# Patient Record
Sex: FEMALE | Race: WHITE | NOT HISPANIC OR LATINO | Employment: PART TIME | URBAN - METROPOLITAN AREA
[De-identification: names, ages, dates, MRNs, and addresses within clinical notes are randomized per-mention and may not be internally consistent; named-entity substitution may affect disease eponyms.]

---

## 2017-02-13 ENCOUNTER — HOSPITAL ENCOUNTER (OUTPATIENT)
Dept: CARDIOLOGY | Facility: HOSPITAL | Age: 57
Discharge: HOME | End: 2017-02-13
Attending: INTERNAL MEDICINE | Admitting: INTERNAL MEDICINE

## 2017-02-16 ENCOUNTER — HOSPITAL ENCOUNTER (OUTPATIENT)
Dept: PREADMISSION TESTING | Facility: HOSPITAL | Age: 57
Discharge: HOME | End: 2017-02-16
Attending: INTERNAL MEDICINE | Admitting: INTERNAL MEDICINE

## 2017-02-16 LAB
ANION GAP SERPL CALC-SCNC: 11.3 MMOL/L (ref 10–20)
BASOPHILS # BLD AUTO: 0 10*3/UL (ref 0–0.2)
BASOPHILS NFR BLD AUTO: 1 % (ref 0–2)
BUN SERPL-MCNC: 18 MG/DL (ref 8–20)
BUN/CREAT SERPL: 15 (ref 5.4–26.2)
CALCIUM SERPL-MCNC: 9.5 MG/DL (ref 8.9–10.3)
CHLORIDE SERPL-SCNC: 105 MMOL/L (ref 101–111)
CONV CO2: 28 MMOL/L (ref 22–32)
CREAT UR-MCNC: 1.2 MG/DL (ref 0.4–1)
DIFFERENTIAL METHOD BLD: (no result)
EOSINOPHIL # BLD AUTO: 0.3 10*3/UL (ref 0–0.3)
EOSINOPHIL # BLD AUTO: 5 % (ref 0–3)
ERYTHROCYTE [DISTWIDTH] IN BLOOD BY AUTOMATED COUNT: 14.4 % (ref 11.5–14.5)
GLUCOSE SERPL-MCNC: 89 MG/DL (ref 65–99)
HCT VFR BLD AUTO: 35.5 % (ref 35–49)
HGB BLD-MCNC: 12 G/DL (ref 12–15)
INR PPP: 0.7
LYMPHOCYTES # BLD AUTO: 2.1 10*3/UL (ref 0.8–4.8)
LYMPHOCYTES NFR BLD AUTO: 36 % (ref 18–42)
MCH RBC QN AUTO: 30.2 PG (ref 26–32)
MCHC RBC AUTO-ENTMCNC: 33.8 G/DL (ref 32–36)
MCV RBC AUTO: 89.4 FL (ref 80–94)
MONOCYTES # BLD AUTO: 0.6 10*3/UL (ref 0.1–1.3)
MONOCYTES NFR BLD AUTO: 11 % (ref 2–11)
NEUTROPHILS # BLD AUTO: 2.8 10*3/UL (ref 2.3–8.6)
NEUTROPHILS NFR BLD AUTO: 47 % (ref 50–75)
NRBC BLD AUTO-RTO: 0 /100{WBCS}
NRBC/RBC NFR BLD MANUAL: 0 10*3/UL
PLATELET # BLD AUTO: 249 10*3/UL (ref 150–450)
PMV BLD AUTO: 8.9 FL (ref 7.4–10.4)
POTASSIUM SERPL-SCNC: 4.3 MMOL/L (ref 3.6–5.1)
PROTHROMBIN TIME: 9.9 SEC (ref 9.6–11.7)
RBC # BLD AUTO: 3.97 10*6/UL (ref 4–5.4)
SODIUM SERPL-SCNC: 140 MMOL/L (ref 136–144)
WBC # BLD AUTO: 5.9 10*3/UL (ref 4.5–11.5)

## 2017-04-17 ENCOUNTER — OFFICE (AMBULATORY)
Dept: URBAN - METROPOLITAN AREA CLINIC 64 | Facility: CLINIC | Age: 57
End: 2017-04-17

## 2017-04-17 VITALS
SYSTOLIC BLOOD PRESSURE: 99 MMHG | WEIGHT: 264 LBS | HEART RATE: 82 BPM | DIASTOLIC BLOOD PRESSURE: 61 MMHG | HEIGHT: 65 IN

## 2017-04-17 DIAGNOSIS — R10.84 GENERALIZED ABDOMINAL PAIN: ICD-10-CM

## 2017-04-17 DIAGNOSIS — R19.7 DIARRHEA, UNSPECIFIED: ICD-10-CM

## 2017-04-17 PROCEDURE — 99212 OFFICE O/P EST SF 10 MIN: CPT | Performed by: NURSE PRACTITIONER

## 2017-05-15 ENCOUNTER — HOSPITAL ENCOUNTER (OUTPATIENT)
Dept: FAMILY MEDICINE CLINIC | Facility: CLINIC | Age: 57
Setting detail: SPECIMEN
Discharge: HOME | End: 2017-05-15
Attending: PHYSICIAN ASSISTANT | Admitting: PHYSICIAN ASSISTANT

## 2017-05-15 LAB
B PERT DNA SPEC QL NAA+PROBE: NOT DETECTED
C PNEUM DNA NPH QL NAA+NON-PROBE: NOT DETECTED
FLUAV H1 2009 PAND RNA NPH QL NAA+PROBE: NOT DETECTED
FLUAV H1 HA GENE NPH QL NAA+PROBE: NOT DETECTED
FLUAV H3 RNA NPH QL NAA+PROBE: NOT DETECTED
FLUAV SUBTYP SPEC NAA+PROBE: NOT DETECTED
FLUBV RNA ISLT QL NAA+PROBE: NOT DETECTED
HADV DNA SPEC NAA+PROBE: NOT DETECTED
HCOV 229E RNA SPEC QL NAA+PROBE: NOT DETECTED
HCOV HKU1 RNA SPEC QL NAA+PROBE: NOT DETECTED
HCOV NL63 RNA SPEC QL NAA+PROBE: NOT DETECTED
HCOV OC43 RNA SPEC QL NAA+PROBE: NOT DETECTED
HMPV RNA NPH QL NAA+NON-PROBE: NOT DETECTED
HPIV1 RNA ISLT QL NAA+PROBE: NOT DETECTED
HPIV2 RNA SPEC QL NAA+PROBE: NOT DETECTED
HPIV3 RNA NPH QL NAA+PROBE: NOT DETECTED
HPIV4 P GENE NPH QL NAA+PROBE: DETECTED
M PNEUMO IGG SER IA-ACNC: NOT DETECTED
RHINOVIRUS RNA SPEC NAA+PROBE: NOT DETECTED
RSV RNA NPH QL NAA+NON-PROBE: NOT DETECTED

## 2017-06-01 ENCOUNTER — HOSPITAL ENCOUNTER (OUTPATIENT)
Dept: RESPIRATORY THERAPY | Facility: HOSPITAL | Age: 57
Discharge: HOME | End: 2017-06-01
Attending: FAMILY MEDICINE | Admitting: FAMILY MEDICINE

## 2017-06-19 ENCOUNTER — HOSPITAL ENCOUNTER (OUTPATIENT)
Dept: FAMILY MEDICINE CLINIC | Facility: CLINIC | Age: 57
Setting detail: SPECIMEN
Discharge: HOME | End: 2017-06-19
Attending: FAMILY MEDICINE | Admitting: FAMILY MEDICINE

## 2017-06-19 LAB
ALBUMIN SERPL-MCNC: 3.7 G/DL (ref 3.5–4.8)
ALBUMIN/GLOB SERPL: 1.1 {RATIO} (ref 1–1.7)
ALP SERPL-CCNC: 85 IU/L (ref 32–91)
ALT SERPL-CCNC: 17 IU/L (ref 14–54)
ANION GAP SERPL CALC-SCNC: 15.3 MMOL/L (ref 10–20)
AST SERPL-CCNC: 23 IU/L (ref 15–41)
BASOPHILS # BLD AUTO: 0.1 10*3/UL (ref 0–0.2)
BASOPHILS NFR BLD AUTO: 1 % (ref 0–2)
BILIRUB SERPL-MCNC: 0.5 MG/DL (ref 0.3–1.2)
BUN SERPL-MCNC: 18 MG/DL (ref 8–20)
BUN/CREAT SERPL: 22.5 (ref 5.4–26.2)
CALCIUM SERPL-MCNC: 9.3 MG/DL (ref 8.9–10.3)
CHLORIDE SERPL-SCNC: 106 MMOL/L (ref 101–111)
CONV CO2: 25 MMOL/L (ref 22–32)
CONV TOTAL PROTEIN: 7.1 G/DL (ref 6.1–7.9)
CREAT UR-MCNC: 0.8 MG/DL (ref 0.4–1)
DIFFERENTIAL METHOD BLD: (no result)
EOSINOPHIL # BLD AUTO: 0.3 10*3/UL (ref 0–0.3)
EOSINOPHIL # BLD AUTO: 4 % (ref 0–3)
ERYTHROCYTE [DISTWIDTH] IN BLOOD BY AUTOMATED COUNT: 15 % (ref 11.5–14.5)
GLOBULIN UR ELPH-MCNC: 3.4 G/DL (ref 2.5–3.8)
GLUCOSE SERPL-MCNC: 80 MG/DL (ref 65–99)
HCT VFR BLD AUTO: 34.4 % (ref 35–49)
HGB BLD-MCNC: 11.5 G/DL (ref 12–15)
LYMPHOCYTES # BLD AUTO: 2.3 10*3/UL (ref 0.8–4.8)
LYMPHOCYTES NFR BLD AUTO: 30 % (ref 18–42)
MCH RBC QN AUTO: 30.4 PG (ref 26–32)
MCHC RBC AUTO-ENTMCNC: 33.5 G/DL (ref 32–36)
MCV RBC AUTO: 90.9 FL (ref 80–94)
MONOCYTES # BLD AUTO: 0.6 10*3/UL (ref 0.1–1.3)
MONOCYTES NFR BLD AUTO: 8 % (ref 2–11)
NEUTROPHILS # BLD AUTO: 4.3 10*3/UL (ref 2.3–8.6)
NEUTROPHILS NFR BLD AUTO: 57 % (ref 50–75)
NRBC BLD AUTO-RTO: 0 /100{WBCS}
NRBC/RBC NFR BLD MANUAL: 0 10*3/UL
PLATELET # BLD AUTO: 280 10*3/UL (ref 150–450)
PMV BLD AUTO: 9.4 FL (ref 7.4–10.4)
POTASSIUM SERPL-SCNC: 4.3 MMOL/L (ref 3.6–5.1)
RBC # BLD AUTO: 3.79 10*6/UL (ref 4–5.4)
SODIUM SERPL-SCNC: 142 MMOL/L (ref 136–144)
TSH SERPL-ACNC: 15.45 UIU/ML (ref 0.34–5.6)
WBC # BLD AUTO: 7.6 10*3/UL (ref 4.5–11.5)

## 2017-10-02 ENCOUNTER — HOSPITAL ENCOUNTER (OUTPATIENT)
Dept: LAB | Facility: HOSPITAL | Age: 57
Discharge: HOME | End: 2017-10-02
Attending: INTERNAL MEDICINE | Admitting: INTERNAL MEDICINE

## 2017-10-02 LAB
BASOPHILS # BLD AUTO: 0.1 10*3/UL (ref 0–0.2)
BASOPHILS NFR BLD AUTO: 1 % (ref 0–2)
DIFFERENTIAL METHOD BLD: (no result)
EOSINOPHIL # BLD AUTO: 0.2 10*3/UL (ref 0–0.3)
EOSINOPHIL # BLD AUTO: 3 % (ref 0–3)
ERYTHROCYTE [DISTWIDTH] IN BLOOD BY AUTOMATED COUNT: 15.1 % (ref 11.5–14.5)
HCT VFR BLD AUTO: 34.4 % (ref 35–49)
HGB BLD-MCNC: 11.4 G/DL (ref 12–15)
LYMPHOCYTES # BLD AUTO: 2 10*3/UL (ref 0.8–4.8)
LYMPHOCYTES NFR BLD AUTO: 31 % (ref 18–42)
MCH RBC QN AUTO: 30.3 PG (ref 26–32)
MCHC RBC AUTO-ENTMCNC: 33.3 G/DL (ref 32–36)
MCV RBC AUTO: 91.1 FL (ref 80–94)
MONOCYTES # BLD AUTO: 0.5 10*3/UL (ref 0.1–1.3)
MONOCYTES NFR BLD AUTO: 8 % (ref 2–11)
NEUTROPHILS # BLD AUTO: 3.9 10*3/UL (ref 2.3–8.6)
NEUTROPHILS NFR BLD AUTO: 57 % (ref 50–75)
NRBC BLD AUTO-RTO: 0 /100{WBCS}
NRBC/RBC NFR BLD MANUAL: 0 10*3/UL
PLATELET # BLD AUTO: 249 10*3/UL (ref 150–450)
PMV BLD AUTO: 8.7 FL (ref 7.4–10.4)
RBC # BLD AUTO: 3.78 10*6/UL (ref 4–5.4)
WBC # BLD AUTO: 6.7 10*3/UL (ref 4.5–11.5)

## 2017-12-18 ENCOUNTER — HOSPITAL ENCOUNTER (OUTPATIENT)
Dept: FAMILY MEDICINE CLINIC | Facility: CLINIC | Age: 57
Setting detail: SPECIMEN
Discharge: HOME | End: 2017-12-18
Attending: FAMILY MEDICINE | Admitting: FAMILY MEDICINE

## 2017-12-18 LAB
ALBUMIN SERPL-MCNC: 3.9 G/DL (ref 3.5–4.8)
ALBUMIN/GLOB SERPL: 1.4 {RATIO} (ref 1–1.7)
ALP SERPL-CCNC: 79 IU/L (ref 32–91)
ALT SERPL-CCNC: ABNORMAL IU/L (ref 14–54)
ANION GAP SERPL CALC-SCNC: 10.8 MMOL/L (ref 10–20)
AST SERPL-CCNC: ABNORMAL IU/L (ref 15–41)
BASOPHILS # BLD AUTO: 0.1 10*3/UL (ref 0–0.2)
BASOPHILS NFR BLD AUTO: 1 % (ref 0–2)
BILIRUB SERPL-MCNC: ABNORMAL MG/DL (ref 0.3–1.2)
BUN SERPL-MCNC: 23 MG/DL (ref 8–20)
BUN/CREAT SERPL: 17.7 (ref 5.4–26.2)
CALCIUM SERPL-MCNC: 8.9 MG/DL (ref 8.9–10.3)
CHLORIDE SERPL-SCNC: 111 MMOL/L (ref 101–111)
CONV CO2: 21 MMOL/L (ref 22–32)
CONV TOTAL PROTEIN: 6.7 G/DL (ref 6.1–7.9)
CREAT UR-MCNC: 1.3 MG/DL (ref 0.4–1)
DIFFERENTIAL METHOD BLD: (no result)
EOSINOPHIL # BLD AUTO: 0.2 10*3/UL (ref 0–0.3)
EOSINOPHIL # BLD AUTO: 2 % (ref 0–3)
ERYTHROCYTE [DISTWIDTH] IN BLOOD BY AUTOMATED COUNT: 15.2 % (ref 11.5–14.5)
GLOBULIN UR ELPH-MCNC: 2.8 G/DL (ref 2.5–3.8)
GLUCOSE SERPL-MCNC: 77 MG/DL (ref 65–99)
HCT VFR BLD AUTO: 35.7 % (ref 35–49)
HGB BLD-MCNC: 11.8 G/DL (ref 12–15)
LYMPHOCYTES # BLD AUTO: 1.8 10*3/UL (ref 0.8–4.8)
LYMPHOCYTES NFR BLD AUTO: 26 % (ref 18–42)
MCH RBC QN AUTO: 30.9 PG (ref 26–32)
MCHC RBC AUTO-ENTMCNC: 33.1 G/DL (ref 32–36)
MCV RBC AUTO: 93.1 FL (ref 80–94)
MONOCYTES # BLD AUTO: 0.6 10*3/UL (ref 0.1–1.3)
MONOCYTES NFR BLD AUTO: 8 % (ref 2–11)
NEUTROPHILS # BLD AUTO: 4.3 10*3/UL (ref 2.3–8.6)
NEUTROPHILS NFR BLD AUTO: 63 % (ref 50–75)
NRBC BLD AUTO-RTO: 0 /100{WBCS}
NRBC/RBC NFR BLD MANUAL: 0 10*3/UL
PLATELET # BLD AUTO: 261 10*3/UL (ref 150–450)
PMV BLD AUTO: 9.3 FL (ref 7.4–10.4)
POTASSIUM SERPL-SCNC: ABNORMAL MMOL/L (ref 3.6–5.1)
RBC # BLD AUTO: 3.84 10*6/UL (ref 4–5.4)
SODIUM SERPL-SCNC: 139 MMOL/L (ref 136–144)
WBC # BLD AUTO: 6.8 10*3/UL (ref 4.5–11.5)

## 2018-01-08 ENCOUNTER — HOSPITAL ENCOUNTER (OUTPATIENT)
Dept: FAMILY MEDICINE CLINIC | Facility: CLINIC | Age: 58
Setting detail: SPECIMEN
Discharge: HOME | End: 2018-01-08
Attending: PHYSICIAN ASSISTANT | Admitting: PHYSICIAN ASSISTANT

## 2018-01-08 ENCOUNTER — HOSPITAL ENCOUNTER (OUTPATIENT)
Dept: MAMMOGRAPHY | Facility: HOSPITAL | Age: 58
Discharge: HOME | End: 2018-01-08
Attending: OBSTETRICS & GYNECOLOGY | Admitting: OBSTETRICS & GYNECOLOGY

## 2018-01-08 LAB
B PERT DNA SPEC QL NAA+PROBE: NOT DETECTED
BASOPHILS # BLD AUTO: 0.1 10*3/UL (ref 0–0.2)
BASOPHILS NFR BLD AUTO: 1 % (ref 0–2)
C PNEUM DNA NPH QL NAA+NON-PROBE: NOT DETECTED
CONV RESPNL SPECIMEN: NORMAL
DIFFERENTIAL METHOD BLD: (no result)
EOSINOPHIL # BLD AUTO: 0.2 10*3/UL (ref 0–0.3)
EOSINOPHIL # BLD AUTO: 3 % (ref 0–3)
ERYTHROCYTE [DISTWIDTH] IN BLOOD BY AUTOMATED COUNT: 14.8 % (ref 11.5–14.5)
FLUAV H1 2009 PAND RNA NPH QL NAA+PROBE: NOT DETECTED
FLUAV H1 HA GENE NPH QL NAA+PROBE: NOT DETECTED
FLUAV H3 RNA NPH QL NAA+PROBE: NOT DETECTED
FLUAV SUBTYP SPEC NAA+PROBE: NOT DETECTED
FLUBV RNA ISLT QL NAA+PROBE: NOT DETECTED
HADV DNA SPEC NAA+PROBE: NOT DETECTED
HCOV 229E RNA SPEC QL NAA+PROBE: NOT DETECTED
HCOV HKU1 RNA SPEC QL NAA+PROBE: NOT DETECTED
HCOV NL63 RNA SPEC QL NAA+PROBE: NOT DETECTED
HCOV OC43 RNA SPEC QL NAA+PROBE: NOT DETECTED
HCT VFR BLD AUTO: 36.6 % (ref 35–49)
HGB BLD-MCNC: 11.9 G/DL (ref 12–15)
HMPV RNA NPH QL NAA+NON-PROBE: NOT DETECTED
HPIV1 RNA ISLT QL NAA+PROBE: NOT DETECTED
HPIV2 RNA SPEC QL NAA+PROBE: NOT DETECTED
HPIV3 RNA NPH QL NAA+PROBE: NOT DETECTED
HPIV4 P GENE NPH QL NAA+PROBE: NOT DETECTED
LYMPHOCYTES # BLD AUTO: 2.1 10*3/UL (ref 0.8–4.8)
LYMPHOCYTES NFR BLD AUTO: 28 % (ref 18–42)
M PNEUMO IGG SER IA-ACNC: NOT DETECTED
MCH RBC QN AUTO: 30.3 PG (ref 26–32)
MCHC RBC AUTO-ENTMCNC: 32.6 G/DL (ref 32–36)
MCV RBC AUTO: 93 FL (ref 80–94)
MONOCYTES # BLD AUTO: 0.7 10*3/UL (ref 0.1–1.3)
MONOCYTES NFR BLD AUTO: 9 % (ref 2–11)
NEUTROPHILS # BLD AUTO: 4.5 10*3/UL (ref 2.3–8.6)
NEUTROPHILS NFR BLD AUTO: 59 % (ref 50–75)
NRBC BLD AUTO-RTO: 0 /100{WBCS}
NRBC/RBC NFR BLD MANUAL: 0 10*3/UL
PLATELET # BLD AUTO: 306 10*3/UL (ref 150–450)
PMV BLD AUTO: 8.9 FL (ref 7.4–10.4)
RBC # BLD AUTO: 3.93 10*6/UL (ref 4–5.4)
RHINOVIRUS RNA SPEC NAA+PROBE: NOT DETECTED
RSV RNA NPH QL NAA+NON-PROBE: NOT DETECTED
WBC # BLD AUTO: 7.6 10*3/UL (ref 4.5–11.5)

## 2018-01-22 ENCOUNTER — HOSPITAL ENCOUNTER (OUTPATIENT)
Dept: FAMILY MEDICINE CLINIC | Facility: CLINIC | Age: 58
Setting detail: SPECIMEN
Discharge: HOME | End: 2018-01-22
Attending: PHYSICIAN ASSISTANT | Admitting: PHYSICIAN ASSISTANT

## 2018-01-22 LAB
ALBUMIN SERPL-MCNC: 4.1 G/DL (ref 3.5–4.8)
ALBUMIN/GLOB SERPL: 1.2 {RATIO} (ref 1–1.7)
ALP SERPL-CCNC: 88 IU/L (ref 32–91)
ALT SERPL-CCNC: 19 IU/L (ref 14–54)
ANION GAP SERPL CALC-SCNC: 16.1 MMOL/L (ref 10–20)
AST SERPL-CCNC: 28 IU/L (ref 15–41)
BASOPHILS # BLD AUTO: 0.1 10*3/UL (ref 0–0.2)
BASOPHILS NFR BLD AUTO: 2 % (ref 0–2)
BILIRUB SERPL-MCNC: 0.6 MG/DL (ref 0.3–1.2)
BILIRUB UR QL STRIP: NEGATIVE MG/DL
BUN SERPL-MCNC: 17 MG/DL (ref 8–20)
BUN/CREAT SERPL: 17 (ref 5.4–26.2)
CALCIUM SERPL-MCNC: 9.8 MG/DL (ref 8.9–10.3)
CASTS URNS QL MICRO: NORMAL /[LPF]
CHLORIDE SERPL-SCNC: 100 MMOL/L (ref 101–111)
COLOR UR: YELLOW
CONV BACTERIA IN URINE MICRO: NEGATIVE
CONV CLARITY OF URINE: CLEAR
CONV CO2: 24 MMOL/L (ref 22–32)
CONV HYALINE CASTS IN URINE MICRO: 1 /[LPF] (ref 0–5)
CONV PROTEIN IN URINE BY AUTOMATED TEST STRIP: NEGATIVE MG/DL
CONV SMALL ROUND CELLS: NORMAL /[HPF]
CONV TOTAL PROTEIN: 7.4 G/DL (ref 6.1–7.9)
CONV UROBILINOGEN IN URINE BY AUTOMATED TEST STRIP: 0.2 MG/DL
CREAT UR-MCNC: 1 MG/DL (ref 0.4–1)
CULTURE INDICATED?: NORMAL
DIFFERENTIAL METHOD BLD: (no result)
EOSINOPHIL # BLD AUTO: 0.1 10*3/UL (ref 0–0.3)
EOSINOPHIL # BLD AUTO: 2 % (ref 0–3)
ERYTHROCYTE [DISTWIDTH] IN BLOOD BY AUTOMATED COUNT: 14.6 % (ref 11.5–14.5)
GLOBULIN UR ELPH-MCNC: 3.3 G/DL (ref 2.5–3.8)
GLUCOSE SERPL-MCNC: 102 MG/DL (ref 65–99)
GLUCOSE UR QL: NEGATIVE MG/DL
HCT VFR BLD AUTO: 37 % (ref 35–49)
HGB BLD-MCNC: 12.2 G/DL (ref 12–15)
HGB UR QL STRIP: NEGATIVE
KETONES UR QL STRIP: NEGATIVE MG/DL
L PNEUMO1 AG UR QL IA: NEGATIVE
LEUKOCYTE ESTERASE UR QL STRIP: NEGATIVE
LYMPHOCYTES # BLD AUTO: 3.3 10*3/UL (ref 0.8–4.8)
LYMPHOCYTES NFR BLD AUTO: 41 % (ref 18–42)
MCH RBC QN AUTO: 30 PG (ref 26–32)
MCHC RBC AUTO-ENTMCNC: 33 G/DL (ref 32–36)
MCV RBC AUTO: 90.9 FL (ref 80–94)
MONOCYTES # BLD AUTO: 0.6 10*3/UL (ref 0.1–1.3)
MONOCYTES NFR BLD AUTO: 8 % (ref 2–11)
NEUTROPHILS # BLD AUTO: 3.8 10*3/UL (ref 2.3–8.6)
NEUTROPHILS NFR BLD AUTO: 47 % (ref 50–75)
NITRITE UR QL STRIP: NEGATIVE
NRBC BLD AUTO-RTO: 0 /100{WBCS}
NRBC/RBC NFR BLD MANUAL: 0 10*3/UL
PH UR STRIP.AUTO: 5.5 [PH] (ref 4.5–8)
PLATELET # BLD AUTO: 270 10*3/UL (ref 150–450)
PMV BLD AUTO: 8.6 FL (ref 7.4–10.4)
POTASSIUM SERPL-SCNC: 4.1 MMOL/L (ref 3.6–5.1)
RBC # BLD AUTO: 4.07 10*6/UL (ref 4–5.4)
RBC #/AREA URNS HPF: 0 /[HPF] (ref 0–3)
S PNEUM AG SPEC QL LA: NEGATIVE
SODIUM SERPL-SCNC: 136 MMOL/L (ref 136–144)
SP GR UR: 1.01 (ref 1–1.03)
SPERM URNS QL MICRO: NORMAL /[HPF]
SQUAMOUS SPT QL MICRO: 0 /[HPF] (ref 0–5)
UNIDENT CRYS URNS QL MICRO: NORMAL /[HPF]
WBC # BLD AUTO: 8 10*3/UL (ref 4.5–11.5)
WBC #/AREA URNS HPF: 0 /[HPF] (ref 0–5)
YEAST SPEC QL WET PREP: NORMAL /[HPF]

## 2018-01-23 LAB
B PERT DNA SPEC QL NAA+PROBE: NOT DETECTED
C PNEUM DNA NPH QL NAA+NON-PROBE: NOT DETECTED
CONV RESPNL SPECIMEN: ABNORMAL
FLUAV H1 2009 PAND RNA NPH QL NAA+PROBE: NOT DETECTED
FLUAV H1 HA GENE NPH QL NAA+PROBE: NOT DETECTED
FLUAV H3 RNA NPH QL NAA+PROBE: DETECTED
FLUAV SUBTYP SPEC NAA+PROBE: DETECTED
FLUBV RNA ISLT QL NAA+PROBE: NOT DETECTED
HADV DNA SPEC NAA+PROBE: NOT DETECTED
HCOV 229E RNA SPEC QL NAA+PROBE: NOT DETECTED
HCOV HKU1 RNA SPEC QL NAA+PROBE: NOT DETECTED
HCOV NL63 RNA SPEC QL NAA+PROBE: NOT DETECTED
HCOV OC43 RNA SPEC QL NAA+PROBE: NOT DETECTED
HMPV RNA NPH QL NAA+NON-PROBE: NOT DETECTED
HPIV1 RNA ISLT QL NAA+PROBE: NOT DETECTED
HPIV2 RNA SPEC QL NAA+PROBE: NOT DETECTED
HPIV3 RNA NPH QL NAA+PROBE: NOT DETECTED
HPIV4 P GENE NPH QL NAA+PROBE: NOT DETECTED
M PNEUMO IGG SER IA-ACNC: NOT DETECTED
RHINOVIRUS RNA SPEC NAA+PROBE: NOT DETECTED
RSV RNA NPH QL NAA+NON-PROBE: NOT DETECTED

## 2018-04-11 ENCOUNTER — HOSPITAL ENCOUNTER (OUTPATIENT)
Dept: FAMILY MEDICINE CLINIC | Facility: CLINIC | Age: 58
Setting detail: SPECIMEN
Discharge: HOME | End: 2018-04-11
Attending: PHYSICIAN ASSISTANT | Admitting: PHYSICIAN ASSISTANT

## 2018-04-11 LAB
B PERT DNA SPEC QL NAA+PROBE: NOT DETECTED
C PNEUM DNA NPH QL NAA+NON-PROBE: NOT DETECTED
CONV RESPNL SPECIMEN: NORMAL
FLUAV H1 2009 PAND RNA NPH QL NAA+PROBE: NOT DETECTED
FLUAV H1 HA GENE NPH QL NAA+PROBE: NOT DETECTED
FLUAV H3 RNA NPH QL NAA+PROBE: NOT DETECTED
FLUAV SUBTYP SPEC NAA+PROBE: NOT DETECTED
FLUBV RNA ISLT QL NAA+PROBE: NOT DETECTED
HADV DNA SPEC NAA+PROBE: NOT DETECTED
HCOV 229E RNA SPEC QL NAA+PROBE: NOT DETECTED
HCOV HKU1 RNA SPEC QL NAA+PROBE: NOT DETECTED
HCOV NL63 RNA SPEC QL NAA+PROBE: NOT DETECTED
HCOV OC43 RNA SPEC QL NAA+PROBE: NOT DETECTED
HMPV RNA NPH QL NAA+NON-PROBE: NOT DETECTED
HPIV1 RNA ISLT QL NAA+PROBE: NOT DETECTED
HPIV2 RNA SPEC QL NAA+PROBE: NOT DETECTED
HPIV3 RNA NPH QL NAA+PROBE: NOT DETECTED
HPIV4 P GENE NPH QL NAA+PROBE: NOT DETECTED
M PNEUMO IGG SER IA-ACNC: NOT DETECTED
RHINOVIRUS RNA SPEC NAA+PROBE: NOT DETECTED
RSV RNA NPH QL NAA+NON-PROBE: NOT DETECTED

## 2018-05-10 ENCOUNTER — HOSPITAL ENCOUNTER (OUTPATIENT)
Dept: FAMILY MEDICINE CLINIC | Facility: CLINIC | Age: 58
Setting detail: SPECIMEN
Discharge: HOME | End: 2018-05-10
Attending: FAMILY MEDICINE | Admitting: FAMILY MEDICINE

## 2018-05-22 ENCOUNTER — OFFICE (AMBULATORY)
Dept: URBAN - METROPOLITAN AREA CLINIC 64 | Facility: CLINIC | Age: 58
End: 2018-05-22
Payer: COMMERCIAL

## 2018-05-22 VITALS
HEART RATE: 86 BPM | DIASTOLIC BLOOD PRESSURE: 64 MMHG | SYSTOLIC BLOOD PRESSURE: 83 MMHG | HEIGHT: 65 IN | WEIGHT: 273 LBS

## 2018-05-22 DIAGNOSIS — R19.7 DIARRHEA, UNSPECIFIED: ICD-10-CM

## 2018-05-22 DIAGNOSIS — R10.84 GENERALIZED ABDOMINAL PAIN: ICD-10-CM

## 2018-05-22 PROCEDURE — 99212 OFFICE O/P EST SF 10 MIN: CPT | Performed by: NURSE PRACTITIONER

## 2018-07-17 ENCOUNTER — HOSPITAL ENCOUNTER (OUTPATIENT)
Dept: PREOP | Facility: HOSPITAL | Age: 58
Setting detail: HOSPITAL OUTPATIENT SURGERY
Discharge: HOME | End: 2018-07-17
Attending: OPHTHALMOLOGY | Admitting: OPHTHALMOLOGY

## 2018-08-08 ENCOUNTER — HOSPITAL ENCOUNTER (OUTPATIENT)
Dept: FAMILY MEDICINE CLINIC | Facility: CLINIC | Age: 58
Setting detail: SPECIMEN
Discharge: HOME | End: 2018-08-08
Attending: FAMILY MEDICINE | Admitting: FAMILY MEDICINE

## 2018-08-08 LAB
ALBUMIN SERPL-MCNC: 3.7 G/DL (ref 3.5–4.8)
ALBUMIN/GLOB SERPL: 1.2 {RATIO} (ref 1–1.7)
ALP SERPL-CCNC: 90 IU/L (ref 32–91)
ALT SERPL-CCNC: 22 IU/L (ref 14–54)
ANION GAP SERPL CALC-SCNC: 12.5 MMOL/L (ref 10–20)
APTT BLD: 23.1 SEC (ref 24–31)
AST SERPL-CCNC: 27 IU/L (ref 15–41)
BASOPHILS # BLD AUTO: 0 10*3/UL (ref 0–0.2)
BASOPHILS NFR BLD AUTO: 1 % (ref 0–2)
BILIRUB SERPL-MCNC: 0.3 MG/DL (ref 0.3–1.2)
BUN SERPL-MCNC: 22 MG/DL (ref 8–20)
BUN/CREAT SERPL: 20 (ref 5.4–26.2)
CALCIUM SERPL-MCNC: 8.6 MG/DL (ref 8.9–10.3)
CHLORIDE SERPL-SCNC: 101 MMOL/L (ref 101–111)
CONV CO2: 25 MMOL/L (ref 22–32)
CONV TOTAL PROTEIN: 6.8 G/DL (ref 6.1–7.9)
CREAT UR-MCNC: 1.1 MG/DL (ref 0.4–1)
DIFFERENTIAL METHOD BLD: (no result)
EOSINOPHIL # BLD AUTO: 0.2 10*3/UL (ref 0–0.3)
EOSINOPHIL # BLD AUTO: 3 % (ref 0–3)
ERYTHROCYTE [DISTWIDTH] IN BLOOD BY AUTOMATED COUNT: 16.4 % (ref 11.5–14.5)
GLOBULIN UR ELPH-MCNC: 3.1 G/DL (ref 2.5–3.8)
GLUCOSE SERPL-MCNC: 93 MG/DL (ref 65–99)
HCT VFR BLD AUTO: 33.1 % (ref 35–49)
HGB BLD-MCNC: 10.8 G/DL (ref 12–15)
INR PPP: 0.9
LYMPHOCYTES # BLD AUTO: 2.2 10*3/UL (ref 0.8–4.8)
LYMPHOCYTES NFR BLD AUTO: 32 % (ref 18–42)
MCH RBC QN AUTO: 30.1 PG (ref 26–32)
MCHC RBC AUTO-ENTMCNC: 32.7 G/DL (ref 32–36)
MCV RBC AUTO: 92.1 FL (ref 80–94)
MONOCYTES # BLD AUTO: 0.8 10*3/UL (ref 0.1–1.3)
MONOCYTES NFR BLD AUTO: 11 % (ref 2–11)
NEUTROPHILS # BLD AUTO: 3.8 10*3/UL (ref 2.3–8.6)
NEUTROPHILS NFR BLD AUTO: 53 % (ref 50–75)
NRBC BLD AUTO-RTO: 0 /100{WBCS}
NRBC/RBC NFR BLD MANUAL: 0 10*3/UL
PLATELET # BLD AUTO: 242 10*3/UL (ref 150–450)
PMV BLD AUTO: 8.6 FL (ref 7.4–10.4)
POTASSIUM SERPL-SCNC: 3.5 MMOL/L (ref 3.6–5.1)
PROTHROMBIN TIME: 9.6 SEC (ref 9.6–11.7)
RBC # BLD AUTO: 3.6 10*6/UL (ref 4–5.4)
SODIUM SERPL-SCNC: 135 MMOL/L (ref 136–144)
WBC # BLD AUTO: 7 10*3/UL (ref 4.5–11.5)

## 2018-08-13 ENCOUNTER — HOSPITAL ENCOUNTER (OUTPATIENT)
Dept: FAMILY MEDICINE CLINIC | Facility: CLINIC | Age: 58
Setting detail: SPECIMEN
Discharge: HOME | End: 2018-08-13
Attending: FAMILY MEDICINE | Admitting: FAMILY MEDICINE

## 2018-08-13 LAB
BASOPHILS # BLD AUTO: 0.1 10*3/UL (ref 0–0.2)
BASOPHILS NFR BLD AUTO: 1 % (ref 0–2)
DIFFERENTIAL METHOD BLD: (no result)
EOSINOPHIL # BLD AUTO: 0.2 10*3/UL (ref 0–0.3)
EOSINOPHIL # BLD AUTO: 2 % (ref 0–3)
ERYTHROCYTE [DISTWIDTH] IN BLOOD BY AUTOMATED COUNT: 16 % (ref 11.5–14.5)
HCT VFR BLD AUTO: 36.7 % (ref 35–49)
HGB BLD-MCNC: 11.9 G/DL (ref 12–15)
IRON SATN MFR SERPL: 14 % (ref 15–50)
IRON SERPL-MCNC: 58 UG/DL (ref 28–170)
LYMPHOCYTES # BLD AUTO: 2.8 10*3/UL (ref 0.8–4.8)
LYMPHOCYTES NFR BLD AUTO: 35 % (ref 18–42)
MAGNESIUM UR-MCNC: 0.93 % (ref 0.5–1.5)
MCH RBC QN AUTO: 29.4 PG (ref 26–32)
MCHC RBC AUTO-ENTMCNC: 32.4 G/DL (ref 32–36)
MCV RBC AUTO: 90.8 FL (ref 80–94)
MONOCYTES # BLD AUTO: 0.7 10*3/UL (ref 0.1–1.3)
MONOCYTES NFR BLD AUTO: 8 % (ref 2–11)
NEUTROPHILS # BLD AUTO: 4.4 10*3/UL (ref 2.3–8.6)
NEUTROPHILS NFR BLD AUTO: 54 % (ref 50–75)
NRBC BLD AUTO-RTO: 0 /100{WBCS}
NRBC/RBC NFR BLD MANUAL: 0 10*3/UL
PLATELET # BLD AUTO: 276 10*3/UL (ref 150–450)
PMV BLD AUTO: 8.7 FL (ref 7.4–10.4)
RBC # BLD AUTO: 4.04 10*6/UL (ref 4–5.4)
RETICS/RBC NFR MANUAL: 0.04 10*6/UL
TIBC SERPL-MCNC: 402 UG/DL (ref 228–428)
WBC # BLD AUTO: 8.1 10*3/UL (ref 4.5–11.5)

## 2018-08-24 ENCOUNTER — HOSPITAL ENCOUNTER (OUTPATIENT)
Dept: OTHER | Facility: HOSPITAL | Age: 58
Discharge: HOME | End: 2018-08-24
Attending: PODIATRIST | Admitting: PODIATRIST

## 2018-08-24 LAB
ANION GAP SERPL CALC-SCNC: 7.5 MMOL/L (ref 10–20)
BASOPHILS # BLD AUTO: 0.1 10*3/UL (ref 0–0.2)
BASOPHILS NFR BLD AUTO: 1 % (ref 0–2)
BUN SERPL-MCNC: 18 MG/DL (ref 8–20)
BUN/CREAT SERPL: 16.4 (ref 5.4–26.2)
CALCIUM SERPL-MCNC: 8.6 MG/DL (ref 8.9–10.3)
CHLORIDE SERPL-SCNC: 105 MMOL/L (ref 101–111)
CONV CO2: 27 MMOL/L (ref 22–32)
CREAT UR-MCNC: 1.1 MG/DL (ref 0.4–1)
DIFFERENTIAL METHOD BLD: (no result)
EOSINOPHIL # BLD AUTO: 0.2 10*3/UL (ref 0–0.3)
EOSINOPHIL # BLD AUTO: 3 % (ref 0–3)
ERYTHROCYTE [DISTWIDTH] IN BLOOD BY AUTOMATED COUNT: 15.4 % (ref 11.5–14.5)
GLUCOSE SERPL-MCNC: 79 MG/DL (ref 65–99)
HCT VFR BLD AUTO: 34.8 % (ref 35–49)
HGB BLD-MCNC: 11.6 G/DL (ref 12–15)
LYMPHOCYTES # BLD AUTO: 3.2 10*3/UL (ref 0.8–4.8)
LYMPHOCYTES NFR BLD AUTO: 39 % (ref 18–42)
MCH RBC QN AUTO: 30.6 PG (ref 26–32)
MCHC RBC AUTO-ENTMCNC: 33.4 G/DL (ref 32–36)
MCV RBC AUTO: 91.7 FL (ref 80–94)
MONOCYTES # BLD AUTO: 0.7 10*3/UL (ref 0.1–1.3)
MONOCYTES NFR BLD AUTO: 8 % (ref 2–11)
NEUTROPHILS # BLD AUTO: 4 10*3/UL (ref 2.3–8.6)
NEUTROPHILS NFR BLD AUTO: 49 % (ref 50–75)
NRBC BLD AUTO-RTO: 0 /100{WBCS}
NRBC/RBC NFR BLD MANUAL: 0 10*3/UL
PLATELET # BLD AUTO: 248 10*3/UL (ref 150–450)
PMV BLD AUTO: 8.6 FL (ref 7.4–10.4)
POTASSIUM SERPL-SCNC: ABNORMAL MMOL/L (ref 3.6–5.1)
RBC # BLD AUTO: 3.79 10*6/UL (ref 4–5.4)
SODIUM SERPL-SCNC: 136 MMOL/L (ref 136–144)
WBC # BLD AUTO: 8.2 10*3/UL (ref 4.5–11.5)

## 2018-09-10 ENCOUNTER — OFFICE (AMBULATORY)
Dept: URBAN - METROPOLITAN AREA PATHOLOGY 4 | Facility: PATHOLOGY | Age: 58
End: 2018-09-10
Payer: COMMERCIAL

## 2018-09-10 ENCOUNTER — ON CAMPUS - OUTPATIENT (AMBULATORY)
Dept: URBAN - METROPOLITAN AREA HOSPITAL 2 | Facility: HOSPITAL | Age: 58
End: 2018-09-10
Payer: COMMERCIAL

## 2018-09-10 VITALS
SYSTOLIC BLOOD PRESSURE: 118 MMHG | DIASTOLIC BLOOD PRESSURE: 62 MMHG | SYSTOLIC BLOOD PRESSURE: 123 MMHG | WEIGHT: 263 LBS | HEART RATE: 60 BPM | SYSTOLIC BLOOD PRESSURE: 108 MMHG | DIASTOLIC BLOOD PRESSURE: 69 MMHG | SYSTOLIC BLOOD PRESSURE: 116 MMHG | HEART RATE: 59 BPM | OXYGEN SATURATION: 97 % | DIASTOLIC BLOOD PRESSURE: 37 MMHG | OXYGEN SATURATION: 98 % | OXYGEN SATURATION: 100 % | RESPIRATION RATE: 16 BRPM | TEMPERATURE: 97 F | DIASTOLIC BLOOD PRESSURE: 63 MMHG | DIASTOLIC BLOOD PRESSURE: 66 MMHG | SYSTOLIC BLOOD PRESSURE: 115 MMHG | HEART RATE: 57 BPM | SYSTOLIC BLOOD PRESSURE: 110 MMHG | DIASTOLIC BLOOD PRESSURE: 65 MMHG | DIASTOLIC BLOOD PRESSURE: 42 MMHG | SYSTOLIC BLOOD PRESSURE: 101 MMHG | DIASTOLIC BLOOD PRESSURE: 74 MMHG | SYSTOLIC BLOOD PRESSURE: 104 MMHG | DIASTOLIC BLOOD PRESSURE: 71 MMHG | RESPIRATION RATE: 20 BRPM | HEART RATE: 66 BPM | SYSTOLIC BLOOD PRESSURE: 107 MMHG | OXYGEN SATURATION: 97.5 % | HEART RATE: 58 BPM | HEIGHT: 65 IN | SYSTOLIC BLOOD PRESSURE: 90 MMHG | HEART RATE: 65 BPM

## 2018-09-10 DIAGNOSIS — R19.7 DIARRHEA, UNSPECIFIED: ICD-10-CM

## 2018-09-10 DIAGNOSIS — R13.10 DYSPHAGIA, UNSPECIFIED: ICD-10-CM

## 2018-09-10 DIAGNOSIS — Z48.815 ENCOUNTER FOR SURGICAL AFTERCARE FOLLOWING SURGERY ON THE DI: ICD-10-CM

## 2018-09-10 DIAGNOSIS — K29.50 UNSPECIFIED CHRONIC GASTRITIS WITHOUT BLEEDING: ICD-10-CM

## 2018-09-10 DIAGNOSIS — B37.81 CANDIDAL ESOPHAGITIS: ICD-10-CM

## 2018-09-10 DIAGNOSIS — D50.9 IRON DEFICIENCY ANEMIA, UNSPECIFIED: ICD-10-CM

## 2018-09-10 PROBLEM — K29.70 GASTRITIS, UNSPECIFIED, WITHOUT BLEEDING: Status: ACTIVE | Noted: 2018-09-10

## 2018-09-10 LAB
GI HISTOLOGY: A. SELECT: (no result)
GI HISTOLOGY: B. SELECT: (no result)
GI HISTOLOGY: C. SELECT: (no result)
GI HISTOLOGY: D. UNSPECIFIED: (no result)
GI HISTOLOGY: PDF REPORT: (no result)

## 2018-09-10 PROCEDURE — 88305 TISSUE EXAM BY PATHOLOGIST: CPT | Performed by: INTERNAL MEDICINE

## 2018-09-10 PROCEDURE — 45380 COLONOSCOPY AND BIOPSY: CPT | Performed by: INTERNAL MEDICINE

## 2018-09-10 PROCEDURE — 43239 EGD BIOPSY SINGLE/MULTIPLE: CPT | Mod: 59 | Performed by: INTERNAL MEDICINE

## 2018-09-21 ENCOUNTER — HOSPITAL ENCOUNTER (OUTPATIENT)
Dept: PREOP | Facility: HOSPITAL | Age: 58
Setting detail: HOSPITAL OUTPATIENT SURGERY
Discharge: HOME | End: 2018-09-21
Attending: OPHTHALMOLOGY | Admitting: OPHTHALMOLOGY

## 2018-09-21 LAB — POTASSIUM SERPL-SCNC: 3.5 MMOL/L (ref 3.6–5.1)

## 2018-10-29 ENCOUNTER — OFFICE (AMBULATORY)
Dept: URBAN - METROPOLITAN AREA CLINIC 64 | Facility: CLINIC | Age: 58
End: 2018-10-29
Payer: COMMERCIAL

## 2018-10-29 VITALS
HEART RATE: 94 BPM | SYSTOLIC BLOOD PRESSURE: 114 MMHG | WEIGHT: 265 LBS | HEIGHT: 65 IN | DIASTOLIC BLOOD PRESSURE: 64 MMHG

## 2018-10-29 DIAGNOSIS — D50.9 IRON DEFICIENCY ANEMIA, UNSPECIFIED: ICD-10-CM

## 2018-10-29 DIAGNOSIS — R19.7 DIARRHEA, UNSPECIFIED: ICD-10-CM

## 2018-10-29 DIAGNOSIS — B37.81 CANDIDAL ESOPHAGITIS: ICD-10-CM

## 2018-10-29 PROCEDURE — 99214 OFFICE O/P EST MOD 30 MIN: CPT | Performed by: INTERNAL MEDICINE

## 2018-12-31 ENCOUNTER — OFFICE (AMBULATORY)
Dept: URBAN - METROPOLITAN AREA INFUSION 3 | Facility: INFUSION | Age: 58
End: 2018-12-31
Payer: COMMERCIAL

## 2018-12-31 VITALS
HEART RATE: 70 BPM | HEIGHT: 65 IN | HEART RATE: 73 BPM | HEART RATE: 64 BPM | DIASTOLIC BLOOD PRESSURE: 76 MMHG | HEART RATE: 63 BPM | SYSTOLIC BLOOD PRESSURE: 101 MMHG | DIASTOLIC BLOOD PRESSURE: 64 MMHG | DIASTOLIC BLOOD PRESSURE: 65 MMHG | RESPIRATION RATE: 17 BRPM | SYSTOLIC BLOOD PRESSURE: 103 MMHG | SYSTOLIC BLOOD PRESSURE: 94 MMHG | TEMPERATURE: 97.6 F | SYSTOLIC BLOOD PRESSURE: 106 MMHG

## 2018-12-31 DIAGNOSIS — D50.9 IRON DEFICIENCY ANEMIA, UNSPECIFIED: ICD-10-CM

## 2018-12-31 DIAGNOSIS — K90.9 INTESTINAL MALABSORPTION, UNSPECIFIED: ICD-10-CM

## 2018-12-31 PROCEDURE — 96365 THER/PROPH/DIAG IV INF INIT: CPT | Performed by: INTERNAL MEDICINE

## 2019-01-02 ENCOUNTER — HOSPITAL ENCOUNTER (OUTPATIENT)
Dept: LAB | Facility: HOSPITAL | Age: 59
Discharge: HOME | End: 2019-01-02
Attending: INTERNAL MEDICINE | Admitting: INTERNAL MEDICINE

## 2019-01-02 LAB
BASOPHILS # BLD AUTO: 0 10*3/UL (ref 0–0.2)
BASOPHILS NFR BLD AUTO: 1 % (ref 0–2)
DIFFERENTIAL METHOD BLD: (no result)
EOSINOPHIL # BLD AUTO: 0.1 10*3/UL (ref 0–0.3)
EOSINOPHIL # BLD AUTO: 1 % (ref 0–3)
ERYTHROCYTE [DISTWIDTH] IN BLOOD BY AUTOMATED COUNT: 14.6 % (ref 11.5–14.5)
HCT VFR BLD AUTO: 37.1 % (ref 35–49)
HGB BLD-MCNC: 11.8 G/DL (ref 12–15)
LYMPHOCYTES # BLD AUTO: 2.4 10*3/UL (ref 0.8–4.8)
LYMPHOCYTES NFR BLD AUTO: 33 % (ref 18–42)
MCH RBC QN AUTO: 29 PG (ref 26–32)
MCHC RBC AUTO-ENTMCNC: 31.9 G/DL (ref 32–36)
MCV RBC AUTO: 91 FL (ref 80–94)
MONOCYTES # BLD AUTO: 0.7 10*3/UL (ref 0.1–1.3)
MONOCYTES NFR BLD AUTO: 10 % (ref 2–11)
NEUTROPHILS # BLD AUTO: 4 10*3/UL (ref 2.3–8.6)
NEUTROPHILS NFR BLD AUTO: 55 % (ref 50–75)
NRBC BLD AUTO-RTO: 0 /100{WBCS}
NRBC/RBC NFR BLD MANUAL: 0 10*3/UL
PLATELET # BLD AUTO: 269 10*3/UL (ref 150–450)
PMV BLD AUTO: 8.1 FL (ref 7.4–10.4)
RBC # BLD AUTO: 4.08 10*6/UL (ref 4–5.4)
WBC # BLD AUTO: 7.2 10*3/UL (ref 4.5–11.5)

## 2019-01-07 ENCOUNTER — OFFICE (AMBULATORY)
Dept: URBAN - METROPOLITAN AREA INFUSION 3 | Facility: INFUSION | Age: 59
End: 2019-01-07
Payer: COMMERCIAL

## 2019-01-07 VITALS
SYSTOLIC BLOOD PRESSURE: 119 MMHG | SYSTOLIC BLOOD PRESSURE: 118 MMHG | TEMPERATURE: 96.8 F | HEIGHT: 65 IN | DIASTOLIC BLOOD PRESSURE: 72 MMHG | SYSTOLIC BLOOD PRESSURE: 114 MMHG | DIASTOLIC BLOOD PRESSURE: 74 MMHG | TEMPERATURE: 97 F | RESPIRATION RATE: 18 BRPM | DIASTOLIC BLOOD PRESSURE: 67 MMHG | HEART RATE: 80 BPM | TEMPERATURE: 96.5 F | RESPIRATION RATE: 16 BRPM | SYSTOLIC BLOOD PRESSURE: 117 MMHG | HEART RATE: 68 BPM | HEART RATE: 70 BPM

## 2019-01-07 DIAGNOSIS — D50.9 IRON DEFICIENCY ANEMIA, UNSPECIFIED: ICD-10-CM

## 2019-01-07 DIAGNOSIS — K90.9 INTESTINAL MALABSORPTION, UNSPECIFIED: ICD-10-CM

## 2019-01-07 PROCEDURE — 96365 THER/PROPH/DIAG IV INF INIT: CPT | Performed by: INTERNAL MEDICINE

## 2019-01-10 ENCOUNTER — HOSPITAL ENCOUNTER (OUTPATIENT)
Dept: PREADMISSION TESTING | Facility: HOSPITAL | Age: 59
Discharge: HOME | End: 2019-01-10
Attending: OPHTHALMOLOGY | Admitting: OPHTHALMOLOGY

## 2019-01-10 LAB — POTASSIUM SERPL-SCNC: 4.3 MMOL/L (ref 3.6–5.1)

## 2019-01-14 ENCOUNTER — HOSPITAL ENCOUNTER (OUTPATIENT)
Dept: CARDIOLOGY | Facility: HOSPITAL | Age: 59
Discharge: HOME | End: 2019-01-14
Attending: INTERNAL MEDICINE | Admitting: INTERNAL MEDICINE

## 2019-01-15 ENCOUNTER — HOSPITAL ENCOUNTER (OUTPATIENT)
Dept: PREOP | Facility: HOSPITAL | Age: 59
Setting detail: HOSPITAL OUTPATIENT SURGERY
Discharge: HOME | End: 2019-01-15
Attending: OPHTHALMOLOGY | Admitting: OPHTHALMOLOGY

## 2019-02-11 ENCOUNTER — HOSPITAL ENCOUNTER (OUTPATIENT)
Dept: FAMILY MEDICINE CLINIC | Facility: CLINIC | Age: 59
Setting detail: SPECIMEN
Discharge: HOME | End: 2019-02-11
Attending: FAMILY MEDICINE | Admitting: FAMILY MEDICINE

## 2019-02-11 LAB
ALBUMIN SERPL-MCNC: 3.8 G/DL (ref 3.5–4.8)
ALBUMIN/GLOB SERPL: 1.3 {RATIO} (ref 1–1.7)
ALP SERPL-CCNC: 122 IU/L (ref 32–91)
ALT SERPL-CCNC: 18 IU/L (ref 14–54)
ANION GAP SERPL CALC-SCNC: 12.3 MMOL/L (ref 10–20)
AST SERPL-CCNC: 24 IU/L (ref 15–41)
BASOPHILS # BLD AUTO: 0.1 10*3/UL (ref 0–0.2)
BASOPHILS NFR BLD AUTO: 1 % (ref 0–2)
BILIRUB SERPL-MCNC: 0.4 MG/DL (ref 0.3–1.2)
BUN SERPL-MCNC: 16 MG/DL (ref 8–20)
BUN/CREAT SERPL: 16 (ref 5.4–26.2)
CALCIUM SERPL-MCNC: 9.2 MG/DL (ref 8.9–10.3)
CHLORIDE SERPL-SCNC: 102 MMOL/L (ref 101–111)
CHOLEST SERPL-MCNC: 198 MG/DL
CHOLEST/HDLC SERPL: 2.4 {RATIO}
CONV CO2: 26 MMOL/L (ref 22–32)
CONV LDL CHOLESTEROL DIRECT: 95 MG/DL (ref 0–100)
CONV TOTAL PROTEIN: 6.7 G/DL (ref 6.1–7.9)
CREAT UR-MCNC: 1 MG/DL (ref 0.4–1)
DIFFERENTIAL METHOD BLD: (no result)
EOSINOPHIL # BLD AUTO: 0.2 10*3/UL (ref 0–0.3)
EOSINOPHIL # BLD AUTO: 3 % (ref 0–3)
ERYTHROCYTE [DISTWIDTH] IN BLOOD BY AUTOMATED COUNT: 17.2 % (ref 11.5–14.5)
GLOBULIN UR ELPH-MCNC: 2.9 G/DL (ref 2.5–3.8)
GLUCOSE SERPL-MCNC: 82 MG/DL (ref 65–99)
HCT VFR BLD AUTO: 37.3 % (ref 35–49)
HDLC SERPL-MCNC: 82 MG/DL
HGB BLD-MCNC: 12.3 G/DL (ref 12–15)
LDLC/HDLC SERPL: 1.2 {RATIO}
LIPID INTERPRETATION: NORMAL
LYMPHOCYTES # BLD AUTO: 2.1 10*3/UL (ref 0.8–4.8)
LYMPHOCYTES NFR BLD AUTO: 32 % (ref 18–42)
MCH RBC QN AUTO: 30.3 PG (ref 26–32)
MCHC RBC AUTO-ENTMCNC: 32.9 G/DL (ref 32–36)
MCV RBC AUTO: 92.2 FL (ref 80–94)
MONOCYTES # BLD AUTO: 0.7 10*3/UL (ref 0.1–1.3)
MONOCYTES NFR BLD AUTO: 11 % (ref 2–11)
NEUTROPHILS # BLD AUTO: 3.6 10*3/UL (ref 2.3–8.6)
NEUTROPHILS NFR BLD AUTO: 53 % (ref 50–75)
NRBC BLD AUTO-RTO: 0 /100{WBCS}
NRBC/RBC NFR BLD MANUAL: 0 10*3/UL
PLATELET # BLD AUTO: 258 10*3/UL (ref 150–450)
PMV BLD AUTO: 8.3 FL (ref 7.4–10.4)
POTASSIUM SERPL-SCNC: 4.3 MMOL/L (ref 3.6–5.1)
RBC # BLD AUTO: 4.05 10*6/UL (ref 4–5.4)
SODIUM SERPL-SCNC: 136 MMOL/L (ref 136–144)
TRIGL SERPL-MCNC: 95 MG/DL
VLDLC SERPL CALC-MCNC: 20.6 MG/DL
WBC # BLD AUTO: 6.7 10*3/UL (ref 4.5–11.5)

## 2019-02-25 ENCOUNTER — HOSPITAL ENCOUNTER (OUTPATIENT)
Dept: PHYSICAL THERAPY | Facility: HOSPITAL | Age: 59
Setting detail: RECURRING SERIES
Discharge: HOME | End: 2019-06-14
Attending: FAMILY MEDICINE | Admitting: FAMILY MEDICINE

## 2019-03-25 ENCOUNTER — HOSPITAL ENCOUNTER (OUTPATIENT)
Dept: RESPIRATORY THERAPY | Facility: HOSPITAL | Age: 59
Discharge: HOME | End: 2019-03-25
Attending: NURSE PRACTITIONER | Admitting: NURSE PRACTITIONER

## 2019-04-07 ENCOUNTER — HOSPITAL ENCOUNTER (OUTPATIENT)
Dept: SLEEP MEDICINE | Facility: HOSPITAL | Age: 59
Discharge: HOME | End: 2019-04-07
Attending: NURSE PRACTITIONER | Admitting: NURSE PRACTITIONER

## 2019-04-14 ENCOUNTER — HOSPITAL ENCOUNTER (OUTPATIENT)
Dept: SLEEP MEDICINE | Facility: HOSPITAL | Age: 59
Discharge: HOME | End: 2019-04-14
Attending: INTERNAL MEDICINE | Admitting: INTERNAL MEDICINE

## 2019-04-22 ENCOUNTER — HOSPITAL ENCOUNTER (OUTPATIENT)
Dept: OTHER | Facility: HOSPITAL | Age: 59
Discharge: HOME | End: 2019-04-22
Attending: ORTHOPAEDIC SURGERY | Admitting: ORTHOPAEDIC SURGERY

## 2019-04-22 LAB
ANION GAP SERPL CALC-SCNC: 16 MMOL/L (ref 10–20)
BASOPHILS # BLD AUTO: 0.2 10*3/UL (ref 0–0.2)
BASOPHILS NFR BLD AUTO: 2 % (ref 0–2)
BUN SERPL-MCNC: 16 MG/DL (ref 8–20)
BUN/CREAT SERPL: 17.8 (ref 5.4–26.2)
CALCIUM SERPL-MCNC: 9.5 MG/DL (ref 8.9–10.3)
CHLORIDE SERPL-SCNC: 103 MMOL/L (ref 101–111)
CONV CO2: 24 MMOL/L (ref 22–32)
CREAT UR-MCNC: 0.9 MG/DL (ref 0.4–1)
DIFFERENTIAL METHOD BLD: (no result)
EOSINOPHIL # BLD AUTO: 0.3 10*3/UL (ref 0–0.3)
EOSINOPHIL # BLD AUTO: 2 % (ref 0–3)
ERYTHROCYTE [DISTWIDTH] IN BLOOD BY AUTOMATED COUNT: 14.6 % (ref 11.5–14.5)
GLUCOSE SERPL-MCNC: 92 MG/DL (ref 65–99)
HCT VFR BLD AUTO: 38.8 % (ref 35–49)
HGB BLD-MCNC: 12.8 G/DL (ref 12–15)
LYMPHOCYTES # BLD AUTO: 5 10*3/UL (ref 0.8–4.8)
LYMPHOCYTES NFR BLD AUTO: 45 % (ref 18–42)
MCH RBC QN AUTO: 31.2 PG (ref 26–32)
MCHC RBC AUTO-ENTMCNC: 32.9 G/DL (ref 32–36)
MCV RBC AUTO: 94.7 FL (ref 80–94)
MONOCYTES # BLD AUTO: 0.9 10*3/UL (ref 0.1–1.3)
MONOCYTES NFR BLD AUTO: 8 % (ref 2–11)
NEUTROPHILS # BLD AUTO: 4.8 10*3/UL (ref 2.3–8.6)
NEUTROPHILS NFR BLD AUTO: 43 % (ref 50–75)
NRBC BLD AUTO-RTO: 0 /100{WBCS}
NRBC/RBC NFR BLD MANUAL: 0 10*3/UL
PLATELET # BLD AUTO: 218 10*3/UL (ref 150–450)
PMV BLD AUTO: 9 FL (ref 7.4–10.4)
POTASSIUM SERPL-SCNC: 4 MMOL/L (ref 3.6–5.1)
RBC # BLD AUTO: 4.1 10*6/UL (ref 4–5.4)
SODIUM SERPL-SCNC: 139 MMOL/L (ref 136–144)
WBC # BLD AUTO: 11.1 10*3/UL (ref 4.5–11.5)

## 2019-06-14 DIAGNOSIS — I25.10 CAD IN NATIVE ARTERY: ICD-10-CM

## 2019-06-14 DIAGNOSIS — R00.1 BRADYCARDIA, SINUS: Primary | ICD-10-CM

## 2019-06-17 ENCOUNTER — TRANSCRIBE ORDERS (OUTPATIENT)
Dept: PHYSICAL THERAPY | Facility: CLINIC | Age: 59
End: 2019-06-17

## 2019-06-17 DIAGNOSIS — R29.6 REPEATED FALLS: Primary | ICD-10-CM

## 2019-06-20 ENCOUNTER — OFFICE VISIT (OUTPATIENT)
Dept: PHYSICAL THERAPY | Facility: CLINIC | Age: 59
End: 2019-06-20

## 2019-06-20 DIAGNOSIS — M25.532 PAIN, WRIST, LEFT: ICD-10-CM

## 2019-06-20 DIAGNOSIS — Z74.09 IMPAIRED FUNCTIONAL MOBILITY, BALANCE, GAIT, AND ENDURANCE: Primary | ICD-10-CM

## 2019-06-20 DIAGNOSIS — R29.6 REPEATED FALLS: ICD-10-CM

## 2019-06-20 PROCEDURE — 97110 THERAPEUTIC EXERCISES: CPT | Performed by: PHYSICAL THERAPIST

## 2019-06-20 PROCEDURE — 97112 NEUROMUSCULAR REEDUCATION: CPT | Performed by: PHYSICAL THERAPIST

## 2019-06-20 NOTE — PROGRESS NOTES
Physical Therapy Daily Progress Note  VISIT#: 16    Subjective   Cassie Winter reports:So far no pain. Overall a good day. Still occasionally left wrist pain when moving it the wrong way. Pt still concerned with left leg weakness but reports overall at least 75 to 80 improvement in balance and endurance.       Objective     See Exercise, Manual, and Modality Logs for complete treatment.     Patient Education: how to improve left hip strength by doing more hip flex/abd. strength ex.     Assessment & Plan       Goals  Plan Goals: 4/5 STG met so far, partially met goal for safe body mechanics, improved squats, still limited with carrying, up down off the floor due to left wrist and left hip weakness.  4/7 LTG met, making progress towards remaining goals.    Plan  Plan details: Cont. To see pt 1 to 2 times a week till all goals are met and pt I with HEP for self management of her condition. Cont. To work on strength balance, gait quality and endurance.                      Timed:         Manual Therapy:         mins  60383;     Therapeutic Exercise:    30     mins  31596;     Neuromuscular Mele:    15    mins  91864;    Therapeutic Activity:          mins  59422;     Gait Training:           mins  37592;     Ultrasound:          mins  93229;    Ionto                                   mins   30482  Self Care                            mins   15515  Canalith Repos                   mins  4209  Aquatic                               mins 45208    Un-Timed:  Electrical Stimulation:         mins  32027 ( );  Dry Needling          mins self-pay  Traction          mins 46981    Timed Treatment:   45   mins   Total Treatment:     45   mins    Bonnie Landa PT  [place] License # [id number]  Physical Therapist

## 2019-06-21 ENCOUNTER — TRANSCRIBE ORDERS (OUTPATIENT)
Dept: PHYSICAL THERAPY | Facility: CLINIC | Age: 59
End: 2019-06-21

## 2019-06-21 DIAGNOSIS — S62.92XA FRACTURE OF LEFT HAND, CLOSED, INITIAL ENCOUNTER: Primary | ICD-10-CM

## 2019-06-24 ENCOUNTER — OFFICE VISIT (OUTPATIENT)
Dept: PHYSICAL THERAPY | Facility: CLINIC | Age: 59
End: 2019-06-24

## 2019-06-24 DIAGNOSIS — M25.532 PAIN, WRIST, LEFT: Primary | ICD-10-CM

## 2019-06-24 DIAGNOSIS — M25.532 PAIN, WRIST, LEFT: ICD-10-CM

## 2019-06-24 DIAGNOSIS — Z74.09 IMPAIRED FUNCTIONAL MOBILITY, BALANCE, GAIT, AND ENDURANCE: Primary | ICD-10-CM

## 2019-06-24 PROCEDURE — 97110 THERAPEUTIC EXERCISES: CPT | Performed by: OCCUPATIONAL THERAPIST

## 2019-06-24 PROCEDURE — 97530 THERAPEUTIC ACTIVITIES: CPT | Performed by: OCCUPATIONAL THERAPIST

## 2019-06-24 PROCEDURE — 97110 THERAPEUTIC EXERCISES: CPT | Performed by: PHYSICAL THERAPIST

## 2019-06-24 PROCEDURE — 97112 NEUROMUSCULAR REEDUCATION: CPT | Performed by: PHYSICAL THERAPIST

## 2019-06-24 PROCEDURE — 97140 MANUAL THERAPY 1/> REGIONS: CPT | Performed by: OCCUPATIONAL THERAPIST

## 2019-06-24 NOTE — PROGRESS NOTES
Physical Therapy Daily Progress Note  VISIT#: 17/20    Subjective   Cassie Winter reports: My back hurt when I first got up this am. I had trouble getting up out of bed. But I'm all better now. I did my lying down stretches and EIS. So far no Left wrist pain yet.       Objective     See Exercise, Manual, and Modality Logs for complete treatment.     Patient Education: Discussion keeping up with HEP, considering joining health club.    Assessment/Plan: Pt still struggling with lateral step ups to the left due to hip weakness. Still c/o R hip pain after dynamic gait side walking with 2# ankle weight.  Still more trouble eyes closed standing balance narrow stance on Aerex mat.       Cont. to see pt 2 times a week, progress as able to tolerate till all goals are met and patient independent with HEP for self management of her condition.             Timed:         Manual Therapy:         mins  56924;     Therapeutic Exercise:    30     mins  75637;     Neuromuscular Mele:    15    mins  47433;    Therapeutic Activity:          mins  65757;     Gait Training:           mins  30625;     Ultrasound:          mins  05897;    Ionto                                   mins   78170  Self Care                            mins   10159  Canalith Repos                   mins  4209  Aquatic                               mins 13920    Un-Timed:  Electrical Stimulation:         mins  79892 ( );  Dry Needling          mins self-pay  Traction          mins 74807    Timed Treatment:   45   mins   Total Treatment:     45   mins    Bonnie Landa PT  [place] License # [id number]  Physical Therapist

## 2019-06-24 NOTE — PROGRESS NOTES
Re-Assessment         Patient: Cassie Winter   : 1960  Diagnosis/ICD-10 Code:  Pain, wrist, left [M25.532]  Referring practitioner: Cristopher Garcia Jr., MD  Date of Initial Visit: Type: THERAPY  Noted: 2019  Today's Date: 2019  Patient seen for 1 sessions      Subjective:   Cassie Winter reports:     Clinical Progress: improved  Home Program Compliance: No  Treatment has included: therapeutic exercise , therapeutic activity, manual therapy    Subjective   Pt reports that she is feeling better and uses her hand as much as she can but continues to have pain along the ulnar styloid   Objective   Initial Quick Dash Score was 75% and the WORK  MODULE DASH was 31%   At this time the Quick Dash Score is 61%  And the Work Module Dash was 25%Assessment/Plan    Progress toward previous goals: Partially Met    New Goals  Short-term goals (STG): decrease pain along radial styloid to 3/10 during activity in 2 wks   Increase Active ROM of wrist ext to 50 from 45 degrees in 2 weeks   Increase dexterity in 9 hole peg from 40 to 30 sec     Long-term goals (LTG):  Increase  strength to 8# from 2# by discharge   Increase functional use of L hand/wrist in ADL and IADL for 10- 15 min   without pain by discharge   Increase pt ability to perform weighted or resistive tasks with L hand /wrist without pain by discharge   Increase L hand /wrist integration of ADL and IADL in bilateral tasks by discharge       Recommendations:  Continue Occupational Therapy   Using Therapeutic exercise and activities as well as manual therapy to achieve above goals   Timeframe: 3 months  Prognosis to achieve goals: good    OT Signature: Maty Andres OT      Based upon review of the patient's progress and continued therapy plan, it is my medical opinion that Cassie Winter should continue occupational therapy treatment at [facility]  [facility]  [address] [place] [address] [place] [address].    Signature:  __________________________________  Cristopher Garcia Jr., MD      Timed Codes        Manual Therapy:    15     mins  71796;    Therapeutic Exercise:    15     mins  97931;     Neuromuscular Mele:        mins  97801;    Therapeutic Activity:          mins  42788;      Ultrasound:          mins  31407;    Community/ work         mins  74110  Self Care/ [place]         mins  15175  Sensory Re-Int.                  mins   36461  Cognitve Re-train         mins  23046     Un-timed Codes  Electrical Stimulation:         mins 9 45338 ( );    Timed Treatment:   30   mins   Total Treatment:     30   mins

## 2019-06-26 ENCOUNTER — OFFICE VISIT (OUTPATIENT)
Dept: PHYSICAL THERAPY | Facility: CLINIC | Age: 59
End: 2019-06-26

## 2019-06-26 DIAGNOSIS — R29.6 REPEATED FALLS: ICD-10-CM

## 2019-06-26 DIAGNOSIS — Z74.09 IMPAIRED FUNCTIONAL MOBILITY, BALANCE, GAIT, AND ENDURANCE: Primary | ICD-10-CM

## 2019-06-26 PROCEDURE — 97112 NEUROMUSCULAR REEDUCATION: CPT | Performed by: PHYSICAL THERAPIST

## 2019-06-26 PROCEDURE — 97110 THERAPEUTIC EXERCISES: CPT | Performed by: PHYSICAL THERAPIST

## 2019-06-26 NOTE — PROGRESS NOTES
Physical Therapy Daily Progress Note  VISIT#: 18/20    Subjective   Cassie Winter reports: pt came in with no pain but legs still tired and weak.   Pt dropped a can of pairs on her left 3 rd toe. Probably broken, black and blue, 2 nd toe also bruised. Showed pt how to tape 2 nd and 3 rd toes together.    Objective     See Exercise, Manual, and Modality Logs for complete treatment.     Patient Education: Taping toes for possible fx.     Assessment/Plan   Cont. to see pt 2 times a week, progress as able to tolerate till all goals are met and patient independent with HEP for self management of his/her condition.                   Timed:         Manual Therapy:         mins  80349;     Therapeutic Exercise:    30     mins  73211;     Neuromuscular Mele:    15    mins  87448;    Therapeutic Activity:          mins  04199;     Gait Training:           mins  78506;     Ultrasound:          mins  95338;    Ionto                                   mins   48118  Self Care                            mins   93241  Canalith Repos                   mins  4209  Aquatic                               mins 11315    Un-Timed:  Electrical Stimulation:         mins  10315 ( );  Dry Needling          mins self-pay  Traction          mins 84324    Timed Treatment:   45   mins   Total Treatment:     45   mins    Bonnie Landa, PT  [place] License # [id number]  Physical Therapist

## 2019-07-01 ENCOUNTER — OFFICE VISIT (OUTPATIENT)
Dept: PHYSICAL THERAPY | Facility: CLINIC | Age: 59
End: 2019-07-01

## 2019-07-01 DIAGNOSIS — M25.532 PAIN, WRIST, LEFT: Primary | ICD-10-CM

## 2019-07-01 DIAGNOSIS — M25.532 PAIN, WRIST, LEFT: ICD-10-CM

## 2019-07-01 DIAGNOSIS — Z74.09 IMPAIRED FUNCTIONAL MOBILITY, BALANCE, GAIT, AND ENDURANCE: ICD-10-CM

## 2019-07-01 DIAGNOSIS — R29.6 REPEATED FALLS: Primary | ICD-10-CM

## 2019-07-01 PROCEDURE — 97110 THERAPEUTIC EXERCISES: CPT | Performed by: OCCUPATIONAL THERAPIST

## 2019-07-01 PROCEDURE — 97140 MANUAL THERAPY 1/> REGIONS: CPT | Performed by: OCCUPATIONAL THERAPIST

## 2019-07-01 PROCEDURE — 97110 THERAPEUTIC EXERCISES: CPT | Performed by: PHYSICAL THERAPIST

## 2019-07-01 PROCEDURE — 97112 NEUROMUSCULAR REEDUCATION: CPT | Performed by: PHYSICAL THERAPIST

## 2019-07-01 PROCEDURE — 97530 THERAPEUTIC ACTIVITIES: CPT | Performed by: OCCUPATIONAL THERAPIST

## 2019-07-01 NOTE — PROGRESS NOTES
Physical Therapy Daily Progress Note  VISIT#: 19/20    Subjective   Cassie Winter reports: She fell back at a friends house on Friday: banged up her left forearm. Still 2/10 sore from fall in left arm and LBP.   Left foot 2&3 rd toes getting better, less bruised and swollen.         Objective     See Exercise, Manual, and Modality Logs for complete treatment.     Patient Education: Discussed follow up with I HEP. Pt thinking of possible doing ex. in the pool at the [facility].    Assessment/Plan; Pt did well with her ex. Routine while here. Concerned about new fall. Discussed being more aware of pets and throw rugs and using cane when tired and in new surroundings...      Progress per Plan of Care and Anticipate DC next Visit            Timed:         Manual Therapy:         mins  99872;     Therapeutic Exercise:    30     mins  16448;     Neuromuscular Mele:    15    mins  04864;    Therapeutic Activity:          mins  69973;     Gait Training:           mins  89491;     Ultrasound:          mins  08147;    Ionto                                   mins   30553  Self Care                            mins   70837  Canalith Repos                   mins  4209  Aquatic                              mins 80946    Un-Timed:  Electrical Stimulation:         mins  32556 ( );  Dry Needling          mins self-pay  Traction          mins 34762    Timed Treatment:   45   mins   Total Treatment:     45   mins    Bonnie Landa PT  [place] License # [id number]  Physical Therapist

## 2019-07-01 NOTE — PROGRESS NOTES
OccupationalTherapy Daily Progress Note        Patient: Cassie Winter   : 1960  Diagnosis/ICD-10 Code:  No primary diagnosis found.  Referring practitioner: Cristopher Garcia Jr., MD  Date of Initial Visit: Type: THERAPY  Noted: 2019  Today's Date: 2019  Patient seen for 18 sessions         Cassie Winter reports:   I fell again at a friends house on my left arm       Subjective   Pt is bruised on the volar side and the hand is slightly edematous   Pt is able to make a fist, oppose and use the hand with no new pain reports     Objective   See Exercise, Manual, and Modality Logs for complete treatment.   Tx has been focusing on strengthening especially    Treatment involves wrist strengthening in all planes   Supination  In full range pt reports pain on volar surface ulnar border but able to do all tasks and exercises   Pt continues to have indentations on dorsal surface when performs wrist extension   Pt performs continuous  Gripping activities but however has no registered  strength        Assessment/Plan    Progress strengthening /stabilization /functional activity   Pt does register lat and 3pt prehension but no  strength with dynamometer            Timed Codes        Manual Therapy:    15     mins  29597;    Therapeutic Exercise:    15     mins  10135;     Neuromuscular Mele:        mins  29379;    Therapeutic Activity:     15   mins  21303;      Ultrasound:          mins  00445;    Community/ work         mins  36505  Self Care/ [place]         mins  42938  Sensory Re-Int.                  mins   75322  Cognitve Re-train         mins  74648     Un-timed Codes  Electrical Stimulation:         mins 9 88990 ( );      Timed Treatment:   45   mins   Total Treatment:     45   mins    Maty Andres OT  Occupational Therapist

## 2019-07-03 ENCOUNTER — OFFICE VISIT (OUTPATIENT)
Dept: PHYSICAL THERAPY | Facility: CLINIC | Age: 59
End: 2019-07-03

## 2019-07-03 DIAGNOSIS — M25.532 PAIN, WRIST, LEFT: Primary | ICD-10-CM

## 2019-07-03 PROCEDURE — 97530 THERAPEUTIC ACTIVITIES: CPT | Performed by: OCCUPATIONAL THERAPIST

## 2019-07-03 PROCEDURE — 97110 THERAPEUTIC EXERCISES: CPT | Performed by: OCCUPATIONAL THERAPIST

## 2019-07-03 NOTE — PROGRESS NOTES
OccupationalTherapy Daily Progress Note        Patient: Cassie Winter   : 1960  Diagnosis/ICD-10 Code:  Pain, wrist, left [M25.532]  Referring practitioner: Cristopher Garcia Jr., MD  Date of Initial Visit: Type: THERAPY  Noted: 2019  Today's Date: 7/3/2019  Patient seen for 19 sessions         Cassie Winter reports: pt reports that the dog she is watching scratched her arm bleeding but I'm ok I'll be glad to get this dog back to owner     Subjective   Pt appears good no report of pain and she is moving her left arm well       Objective   See Exercise, Manual, and Modality Logs for complete treatment.   Exercises focus on increasing  strength as we have been unable to register or increase the left  strength   After tx she did register 20 # of  on the left and lat at 10, tip and 3pt at 8   Continues to display wrist flex /ext WFL  Supination at 80 with mild pain along ulnar border when at extreme end range but pt reports it is improving at 2-3          Assessment/Plan    Progress strengthening /stabilization /functional activity           Timed Codes        Manual Therapy:        mins  51835;    Therapeutic Exercise:    15     mins  92324;     Neuromuscular Mele:     mins  04106;    Therapeutic Activity:   15     mins  02759;      Ultrasound:          mins  67018;    Community/ work         mins  59083  Self Care/ [place]         mins  76383  Sensory Re-Int.                  mins   64606  Cognitve Re-train         mins  63440     Un-timed Codes  Electrical Stimulation:         mins 9 58766 ( );      Timed Treatment:   31  mins   Total Treatment:     31   mins    Maty Andres OT  Occupational Therapist

## 2019-07-23 ENCOUNTER — DOCUMENTATION (OUTPATIENT)
Dept: PHYSICAL THERAPY | Facility: CLINIC | Age: 59
End: 2019-07-23

## 2019-07-23 NOTE — PROGRESS NOTES
Discharge Summary  Discharge Summary from Physical Therapy Report      Dates  PT visit: 2/25/19 to 7/1/19  Number of Visits: 19 visits     Discharge Status of Patient: See MD Note dated 7/1/19 last PT visit, pt di not reschedule last PT visit for re-assessment.     Goals: Partially Met all STG met. All but one long term goal met.  Pt had another fall before DC but did not get hurt this time.    Discharge Plan: Continue with current home exercise program as instructed    Comments : pt did not reschedule for formal re-assess and wrap up, depite leaving multiple phone messages.    Date of Discharge 7/23/19        Bonnie Landa, PT  Physical Therapist

## 2019-07-25 PROBLEM — J45.909 ASTHMA: Status: ACTIVE | Noted: 2017-06-19

## 2019-07-25 PROBLEM — R55 SYNCOPE: Status: ACTIVE | Noted: 2019-05-20

## 2019-07-25 PROBLEM — H33.20 DETACHED RETINA: Status: ACTIVE | Noted: 2018-07-25

## 2019-07-25 PROBLEM — R00.1 BRADYCARDIA: Status: ACTIVE | Noted: 2019-05-20

## 2019-07-25 PROBLEM — I25.10 CHRONIC CORONARY ARTERY DISEASE: Status: ACTIVE | Noted: 2017-03-20

## 2019-07-25 PROBLEM — R29.6 FREQUENT FALLS: Status: ACTIVE | Noted: 2019-02-11

## 2019-07-25 RX ORDER — MONTELUKAST SODIUM 10 MG/1
1 TABLET ORAL NIGHTLY
COMMUNITY
Start: 2017-06-19 | End: 2019-11-25 | Stop reason: SDUPTHER

## 2019-07-25 RX ORDER — LEVOTHYROXINE SODIUM 0.15 MG/1
TABLET ORAL EVERY 24 HOURS
COMMUNITY
Start: 2019-02-28 | End: 2019-08-12 | Stop reason: CLARIF

## 2019-07-25 RX ORDER — PREGABALIN 75 MG/1
CAPSULE ORAL
COMMUNITY
Start: 2016-01-11 | End: 2019-09-06 | Stop reason: CLARIF

## 2019-07-25 RX ORDER — LEVOTHYROXINE SODIUM 112 UG/1
TABLET ORAL
COMMUNITY
Start: 2015-07-09 | End: 2019-08-12 | Stop reason: SDUPTHER

## 2019-07-25 RX ORDER — ARIPIPRAZOLE 2 MG/1
TABLET ORAL EVERY 24 HOURS
COMMUNITY
Start: 2018-08-08 | End: 2019-08-12

## 2019-07-25 RX ORDER — ALBUTEROL SULFATE 2.5 MG/3ML
SOLUTION RESPIRATORY (INHALATION)
COMMUNITY
Start: 2018-01-22 | End: 2019-09-28 | Stop reason: SDUPTHER

## 2019-07-25 RX ORDER — MONTELUKAST SODIUM 4 MG/1
TABLET, CHEWABLE ORAL EVERY 12 HOURS
COMMUNITY
Start: 2016-11-28

## 2019-07-25 RX ORDER — CYANOCOBALAMIN 1000 UG/ML
INJECTION, SOLUTION INTRAMUSCULAR; SUBCUTANEOUS
COMMUNITY
Start: 2018-07-23 | End: 2019-07-25 | Stop reason: SDUPTHER

## 2019-07-25 RX ORDER — ASPIRIN 81 MG/1
TABLET ORAL EVERY 24 HOURS
COMMUNITY
Start: 2019-05-20

## 2019-07-25 RX ORDER — DIAZEPAM 10 MG/1
TABLET ORAL
COMMUNITY
Start: 2015-05-26 | End: 2019-08-12

## 2019-07-25 RX ORDER — ISOSORBIDE MONONITRATE 20 MG/1
TABLET ORAL EVERY 24 HOURS
COMMUNITY
Start: 2017-11-06 | End: 2020-02-18

## 2019-07-25 RX ORDER — TOLTERODINE 4 MG/1
CAPSULE, EXTENDED RELEASE ORAL
COMMUNITY
Start: 2018-01-04 | End: 2019-08-12 | Stop reason: CLARIF

## 2019-07-25 RX ORDER — VENLAFAXINE HYDROCHLORIDE 150 MG/1
CAPSULE, EXTENDED RELEASE ORAL
COMMUNITY
Start: 2018-04-11 | End: 2022-05-09 | Stop reason: SDUPTHER

## 2019-07-25 RX ORDER — ERGOCALCIFEROL 1.25 MG/1
CAPSULE ORAL
COMMUNITY
Start: 2015-07-05 | End: 2020-11-16

## 2019-07-25 RX ORDER — FUROSEMIDE 20 MG/1
TABLET ORAL EVERY 24 HOURS
COMMUNITY
Start: 2015-07-16 | End: 2019-09-05 | Stop reason: SDUPTHER

## 2019-07-25 RX ORDER — DICYCLOMINE HCL 20 MG
TABLET ORAL EVERY 12 HOURS
COMMUNITY
Start: 2016-11-28

## 2019-07-25 RX ORDER — CYANOCOBALAMIN 1000 UG/ML
1000 INJECTION, SOLUTION INTRAMUSCULAR; SUBCUTANEOUS
Qty: 10 ML | Refills: 0 | Status: SHIPPED | OUTPATIENT
Start: 2019-07-25 | End: 2020-06-10

## 2019-07-25 RX ORDER — TRAZODONE HYDROCHLORIDE 100 MG/1
TABLET ORAL
COMMUNITY
Start: 2015-07-15 | End: 2020-11-16

## 2019-07-26 ENCOUNTER — TRANSCRIBE ORDERS (OUTPATIENT)
Dept: ADMINISTRATIVE | Facility: HOSPITAL | Age: 59
End: 2019-07-26

## 2019-07-26 DIAGNOSIS — J45.909 ASTHMA IN ADULT WITHOUT COMPLICATION, UNSPECIFIED ASTHMA SEVERITY, UNSPECIFIED WHETHER PERSISTENT: Primary | ICD-10-CM

## 2019-07-26 DIAGNOSIS — Z12.39 SCREENING BREAST EXAMINATION: Primary | ICD-10-CM

## 2019-08-05 ENCOUNTER — ON CAMPUS - OUTPATIENT (AMBULATORY)
Dept: URBAN - METROPOLITAN AREA HOSPITAL 2 | Facility: HOSPITAL | Age: 59
End: 2019-08-05
Payer: COMMERCIAL

## 2019-08-05 VITALS
SYSTOLIC BLOOD PRESSURE: 95 MMHG | HEART RATE: 90 BPM | HEART RATE: 69 BPM | HEART RATE: 88 BPM | DIASTOLIC BLOOD PRESSURE: 59 MMHG | DIASTOLIC BLOOD PRESSURE: 54 MMHG | SYSTOLIC BLOOD PRESSURE: 136 MMHG | OXYGEN SATURATION: 100 % | SYSTOLIC BLOOD PRESSURE: 134 MMHG | DIASTOLIC BLOOD PRESSURE: 108 MMHG | RESPIRATION RATE: 16 BRPM | OXYGEN SATURATION: 92 % | DIASTOLIC BLOOD PRESSURE: 52 MMHG | WEIGHT: 257.2 LBS | OXYGEN SATURATION: 96 % | DIASTOLIC BLOOD PRESSURE: 61 MMHG | SYSTOLIC BLOOD PRESSURE: 135 MMHG | HEART RATE: 77 BPM | HEART RATE: 91 BPM | HEART RATE: 86 BPM | TEMPERATURE: 97.8 F | HEART RATE: 74 BPM | DIASTOLIC BLOOD PRESSURE: 46 MMHG | HEART RATE: 68 BPM | OXYGEN SATURATION: 94 % | HEART RATE: 79 BPM | DIASTOLIC BLOOD PRESSURE: 82 MMHG | SYSTOLIC BLOOD PRESSURE: 128 MMHG | SYSTOLIC BLOOD PRESSURE: 77 MMHG | DIASTOLIC BLOOD PRESSURE: 100 MMHG | SYSTOLIC BLOOD PRESSURE: 99 MMHG | HEART RATE: 85 BPM | OXYGEN SATURATION: 97 % | SYSTOLIC BLOOD PRESSURE: 116 MMHG | RESPIRATION RATE: 18 BRPM | HEIGHT: 65 IN | DIASTOLIC BLOOD PRESSURE: 88 MMHG | OXYGEN SATURATION: 93 % | SYSTOLIC BLOOD PRESSURE: 120 MMHG | DIASTOLIC BLOOD PRESSURE: 76 MMHG | DIASTOLIC BLOOD PRESSURE: 48 MMHG | SYSTOLIC BLOOD PRESSURE: 151 MMHG

## 2019-08-05 DIAGNOSIS — D50.9 IRON DEFICIENCY ANEMIA, UNSPECIFIED: ICD-10-CM

## 2019-08-05 PROCEDURE — 45378 DIAGNOSTIC COLONOSCOPY: CPT | Performed by: INTERNAL MEDICINE

## 2019-08-12 ENCOUNTER — OFFICE VISIT (OUTPATIENT)
Dept: FAMILY MEDICINE CLINIC | Facility: CLINIC | Age: 59
End: 2019-08-12

## 2019-08-12 ENCOUNTER — HOSPITAL ENCOUNTER (OUTPATIENT)
Dept: MAMMOGRAPHY | Facility: HOSPITAL | Age: 59
Discharge: HOME | End: 2019-08-12
Admitting: OBSTETRICS & GYNECOLOGY

## 2019-08-12 ENCOUNTER — LAB (OUTPATIENT)
Dept: LAB | Facility: HOSPITAL | Age: 59
End: 2019-08-12

## 2019-08-12 ENCOUNTER — HOSPITAL ENCOUNTER (OUTPATIENT)
Dept: RESPIRATORY THERAPY | Facility: HOSPITAL | Age: 59
Discharge: HOME | End: 2019-08-12

## 2019-08-12 VITALS
RESPIRATION RATE: 16 BRPM | HEIGHT: 65 IN | DIASTOLIC BLOOD PRESSURE: 75 MMHG | TEMPERATURE: 98 F | SYSTOLIC BLOOD PRESSURE: 109 MMHG | OXYGEN SATURATION: 98 % | WEIGHT: 259.6 LBS | BODY MASS INDEX: 43.25 KG/M2 | HEART RATE: 66 BPM

## 2019-08-12 DIAGNOSIS — Z12.39 SCREENING BREAST EXAMINATION: ICD-10-CM

## 2019-08-12 DIAGNOSIS — I25.10 CHRONIC CORONARY ARTERY DISEASE: ICD-10-CM

## 2019-08-12 DIAGNOSIS — F33.41 RECURRENT MAJOR DEPRESSIVE DISORDER, IN PARTIAL REMISSION (HCC): ICD-10-CM

## 2019-08-12 DIAGNOSIS — N32.89 IRRITABLE BLADDER: ICD-10-CM

## 2019-08-12 DIAGNOSIS — K58.2 IRRITABLE BOWEL SYNDROME WITH BOTH CONSTIPATION AND DIARRHEA: ICD-10-CM

## 2019-08-12 DIAGNOSIS — I25.10 CHRONIC CORONARY ARTERY DISEASE: Primary | ICD-10-CM

## 2019-08-12 DIAGNOSIS — J45.909 ASTHMA IN ADULT WITHOUT COMPLICATION, UNSPECIFIED ASTHMA SEVERITY, UNSPECIFIED WHETHER PERSISTENT: ICD-10-CM

## 2019-08-12 DIAGNOSIS — E03.9 ACQUIRED HYPOTHYROIDISM: ICD-10-CM

## 2019-08-12 DIAGNOSIS — J45.20 MILD INTERMITTENT ASTHMA, UNSPECIFIED WHETHER COMPLICATED: ICD-10-CM

## 2019-08-12 PROBLEM — H33.20 DETACHED RETINA: Status: RESOLVED | Noted: 2018-07-25 | Resolved: 2019-08-12

## 2019-08-12 LAB
ALBUMIN SERPL-MCNC: 3.8 G/DL (ref 3.5–4.8)
ALBUMIN/GLOB SERPL: ABNORMAL G/DL (ref 1–1.7)
ALP SERPL-CCNC: 95 U/L (ref 32–91)
ALT SERPL W P-5'-P-CCNC: 22 U/L (ref 14–54)
ANION GAP SERPL CALCULATED.3IONS-SCNC: 12.6 MMOL/L (ref 5–15)
AST SERPL-CCNC: 25 U/L (ref 15–41)
BASOPHILS # BLD AUTO: 0 10*3/MM3 (ref 0–0.2)
BASOPHILS NFR BLD AUTO: 0.5 % (ref 0–1.5)
BILIRUB SERPL-MCNC: 0.6 MG/DL (ref 0.3–1.2)
BUN BLD-MCNC: 16 MG/DL (ref 8–20)
BUN/CREAT SERPL: 14.5 (ref 5.4–26.2)
CALCIUM SPEC-SCNC: 9 MG/DL (ref 8.9–10.3)
CHLORIDE SERPL-SCNC: 106 MMOL/L (ref 101–111)
CO2 SERPL-SCNC: 22 MMOL/L (ref 22–32)
CREAT BLD-MCNC: 1.1 MG/DL (ref 0.4–1)
DEPRECATED RDW RBC AUTO: 46.4 FL (ref 37–54)
EOSINOPHIL # BLD AUTO: 0.2 10*3/MM3 (ref 0–0.4)
EOSINOPHIL NFR BLD AUTO: 3 % (ref 0.3–6.2)
ERYTHROCYTE [DISTWIDTH] IN BLOOD BY AUTOMATED COUNT: 13.7 % (ref 12.3–15.4)
GFR SERPL CREATININE-BSD FRML MDRD: 51 ML/MIN/1.73
GLOBULIN UR ELPH-MCNC: ABNORMAL GM/DL (ref 2.5–3.8)
GLUCOSE BLD-MCNC: 108 MG/DL (ref 65–99)
HCT VFR BLD AUTO: 37.3 % (ref 34–46.6)
HGB BLD-MCNC: 12.7 G/DL (ref 12–15.9)
LYMPHOCYTES # BLD AUTO: 2.3 10*3/MM3 (ref 0.7–3.1)
LYMPHOCYTES NFR BLD AUTO: 32.6 % (ref 19.6–45.3)
MCH RBC QN AUTO: 32.8 PG (ref 26.6–33)
MCHC RBC AUTO-ENTMCNC: 34 G/DL (ref 31.5–35.7)
MCV RBC AUTO: 96.4 FL (ref 79–97)
MONOCYTES # BLD AUTO: 0.7 10*3/MM3 (ref 0.1–0.9)
MONOCYTES NFR BLD AUTO: 9.9 % (ref 5–12)
NEUTROPHILS # BLD AUTO: 3.8 10*3/MM3 (ref 1.7–7)
NEUTROPHILS NFR BLD AUTO: 54 % (ref 42.7–76)
NRBC BLD AUTO-RTO: 0.1 /100 WBC (ref 0–0.2)
PLATELET # BLD AUTO: 252 10*3/MM3 (ref 140–450)
PMV BLD AUTO: 8.1 FL (ref 6–12)
POTASSIUM BLD-SCNC: 3.6 MMOL/L (ref 3.6–5.1)
PROT SERPL-MCNC: <1 G/DL (ref 6.1–7.9)
RBC # BLD AUTO: 3.87 10*6/MM3 (ref 3.77–5.28)
SODIUM BLD-SCNC: 137 MMOL/L (ref 136–144)
WBC NRBC COR # BLD: 7 10*3/MM3 (ref 3.4–10.8)

## 2019-08-12 PROCEDURE — 85025 COMPLETE CBC W/AUTO DIFF WBC: CPT

## 2019-08-12 PROCEDURE — 80053 COMPREHEN METABOLIC PANEL: CPT

## 2019-08-12 PROCEDURE — 77067 SCR MAMMO BI INCL CAD: CPT

## 2019-08-12 PROCEDURE — 99214 OFFICE O/P EST MOD 30 MIN: CPT | Performed by: FAMILY MEDICINE

## 2019-08-12 PROCEDURE — 77063 BREAST TOMOSYNTHESIS BI: CPT

## 2019-08-12 PROCEDURE — 94060 EVALUATION OF WHEEZING: CPT

## 2019-08-12 PROCEDURE — 94727 GAS DIL/WSHOT DETER LNG VOL: CPT

## 2019-08-12 PROCEDURE — 94729 DIFFUSING CAPACITY: CPT

## 2019-08-12 PROCEDURE — 36415 COLL VENOUS BLD VENIPUNCTURE: CPT

## 2019-08-12 RX ORDER — ARIPIPRAZOLE 5 MG/1
5 TABLET ORAL EVERY 24 HOURS
Status: SHIPPED | COMMUNITY
Start: 2019-08-12 | End: 2020-02-17

## 2019-08-12 RX ORDER — ALBUTEROL SULFATE 2.5 MG/3ML
2.5 SOLUTION RESPIRATORY (INHALATION) ONCE
Status: COMPLETED | OUTPATIENT
Start: 2019-08-12 | End: 2019-08-12

## 2019-08-12 RX ORDER — OXYBUTYNIN CHLORIDE 5 MG/1
5 TABLET ORAL 3 TIMES DAILY
Qty: 60 TABLET | Refills: 0 | Status: SHIPPED | OUTPATIENT
Start: 2019-08-12 | End: 2019-09-17 | Stop reason: SDUPTHER

## 2019-08-12 RX ADMIN — ALBUTEROL SULFATE 2.5 MG: 2.5 SOLUTION RESPIRATORY (INHALATION) at 14:41

## 2019-08-12 NOTE — PATIENT INSTRUCTIONS
Continue your current medications and treatment, except try oxybutin instead of Detrol.    Have the follow up labs and a mammogram done and call for results.    Follow up in the office in 6 months.

## 2019-08-12 NOTE — PROGRESS NOTES
"Subjective   Cassie Winter is a 58 y.o. female.     Chief Complaint   Patient presents with   • Hyperlipidemia     6 MTH F/U   • Hypertension       HPI  Chief complaint: Coronary disease asthma irritable bowel syndrome hypothyroidism irritable bladder syndrome depression    The patient is a 58-year-old white female comes in for follow-up and maintenance of her current problems which include    #1 coronary artery disease-stable-patient on aspirin and long-acting nitroglycerin.  Denies chest pain shortness orthopnea or PND.    #2 asthma-stable-patient on Spiriva Singulair rescue inhaler and Breo.  Denies recent cough shortness breath wheezing or sputum production.    #3 irritable bowel syndrome-stable-patient on Colestid and Bentyl.  She denied recent abdominal pain or diarrhea.    #4 irritable bladder syndrome-stable-patient states her insurance will no longer pay for Detrol.  They recommended a trial of accident.  Patient states he would like to give him a trial.  She still having problems with urgency and incontinence at times.    #5 depression-stable patient on Abilify venlafaxine and trazodone.  Not anxious agitation depressed or suicidal thoughts.        The following portions of the patient's history were reviewed and updated as appropriate: allergies, current medications, past family history, past medical history, past social history, past surgical history and problem list.    Review of Systems    Objective     /75 (BP Location: Left arm, Patient Position: Sitting, Cuff Size: Large Adult)   Pulse 66   Temp 98 °F (36.7 °C) (Oral)   Resp 16   Ht 165.1 cm (65\")   Wt 118 kg (259 lb 9.6 oz)   SpO2 98%   BMI 43.20 kg/m²     Physical Exam   Constitutional: She is oriented to person, place, and time. She appears well-developed and well-nourished.   Obese, poor muscle mass   HENT:   Head: Normocephalic and atraumatic.   Eyes: Conjunctivae and EOM are normal. Pupils are equal, round, and reactive to light. "   Neck: Normal range of motion. Neck supple.   Cardiovascular: Normal rate, regular rhythm, normal heart sounds and intact distal pulses.   Pulmonary/Chest: Effort normal and breath sounds normal.   Abdominal: Soft. Bowel sounds are normal.   Musculoskeletal: Normal range of motion.   Neurological: She is alert and oriented to person, place, and time.   Skin: Skin is warm and dry.   Psychiatric: She has a normal mood and affect. Her behavior is normal.   Nursing note and vitals reviewed.        Assessment/Plan   Cassie was seen today for hyperlipidemia and hypertension.    Diagnoses and all orders for this visit:    Chronic coronary artery disease  -     CBC & Differential; Future  -     Comprehensive Metabolic Panel; Future    Mild intermittent asthma, unspecified whether complicated    Irritable bowel syndrome with both constipation and diarrhea    Acquired hypothyroidism    Irritable bladder    Recurrent major depressive disorder, in partial remission (CMS/HCC)    Other orders  -     oxybutynin (DITROPAN) 5 MG tablet; Take 1 tablet by mouth 3 (Three) Times a Day.      Patient Instructions   Continue your current medications and treatment, except try oxybutin instead of Detrol.    Have the follow up labs and a mammogram done and call for results.    Follow up in the office in 6 months.          Cristopher Garcia Jr., MD    08/12/19

## 2019-09-04 RX ORDER — LEVOTHYROXINE SODIUM 0.15 MG/1
TABLET ORAL
Qty: 90 TABLET | Refills: 1 | Status: SHIPPED | OUTPATIENT
Start: 2019-09-04 | End: 2020-02-21 | Stop reason: SDUPTHER

## 2019-09-05 RX ORDER — FUROSEMIDE 20 MG/1
TABLET ORAL
Qty: 90 TABLET | Refills: 0 | Status: SHIPPED | OUTPATIENT
Start: 2019-09-05 | End: 2020-02-18

## 2019-09-06 ENCOUNTER — OFFICE VISIT (OUTPATIENT)
Dept: PULMONOLOGY | Facility: HOSPITAL | Age: 59
End: 2019-09-06

## 2019-09-06 VITALS
HEIGHT: 66 IN | SYSTOLIC BLOOD PRESSURE: 108 MMHG | WEIGHT: 261 LBS | DIASTOLIC BLOOD PRESSURE: 71 MMHG | OXYGEN SATURATION: 96 % | BODY MASS INDEX: 41.95 KG/M2 | HEART RATE: 66 BPM

## 2019-09-06 DIAGNOSIS — G47.33 OSA (OBSTRUCTIVE SLEEP APNEA): Primary | ICD-10-CM

## 2019-09-06 DIAGNOSIS — J45.909 UNCOMPLICATED ASTHMA, UNSPECIFIED ASTHMA SEVERITY, UNSPECIFIED WHETHER PERSISTENT: ICD-10-CM

## 2019-09-06 PROCEDURE — G0463 HOSPITAL OUTPT CLINIC VISIT: HCPCS

## 2019-09-06 RX ORDER — FLUTICASONE PROPIONATE 50 MCG
2 SPRAY, SUSPENSION (ML) NASAL DAILY
COMMUNITY
End: 2019-12-26

## 2019-09-06 NOTE — PROGRESS NOTES
9/6/2019     Cassie Winter  1960      Chief Complaint   Patient presents with   • Asthma   • Sleep Apnea         Subjective   58 year old female presented to clinic today for follow up to her WILFRID and asthma.  She was last seen in clinic by me on 3/25/19 and since that visit she has completed a sleep study.  She now has a CPAP.  Data sheet reviewed and showed 77 days with device and 8 days without.  90.6% compliance.  Average usage time is 6 hours and 25 minutes.  Greater than 4 hours use is 82.4%.  Average AHI is 2.4.  She is using a full face mask.  No issues with her machine or mask.  She feels better with use and is more rested during the day.  No longer wakes up short of air.  She does awaken at night to use the bathroom and us taking a diuretic.  She does have some shortness of air with activity that is better with rest.  No recent hospitalizations or antibiotics.      Review of System  General: Denies fevers or chills.  Denies any weakness or fatigue.  Denies any weight loss or weight gain.  HEENT: Denies sore throat, rhinorrhea, ear pain.  Denies any change in vision.   LUNGS: + shortness of breath No wheezing.  Denies any cough.  Denies any hemoptysis.  CARDIAC: Denies any chest pain, palpitations. + edema  ABD: Denies any abdominal pain.  Denies any N/V/D  PSYCH: Negative for suicidal ideas. Denies anxiety or depression  All other systems reviewed and negative unless stated in HPI       Objective    Vitals:    09/06/19 1018   BP: 108/71   Pulse: 66   SpO2: 96%       General: NAD, alert and oriented x 4  Cardiac: S1, S2, RRR, no murmur, + LE edema  Pulmonary: CTA bilaterally, no wheezing   Abdomen: soft, non-tender, non-distended  : Voids independently, no CVA tenderness   Musculoskeletal: Moves all extremities, equal strength bilaterally  Neuro: alert and oriented x 3, CN 2 - 12 grossly intact  Skin: warm, dry, and intact          Current Outpatient Medications:   •  albuterol (PROVENTIL) (2.5  MG/3ML) 0.083% nebulizer solution, ALBUTEROL SULFATE (2.5 MG/3ML) 0.083% NEBU, Disp: , Rfl:   •  ARIPiprazole (ABILIFY) 5 MG tablet, 1 tablet Daily., Disp: , Rfl:   •  aspirin (ASPIR-LOW) 81 MG EC tablet, Daily., Disp: , Rfl:   •  colestipol (COLESTID) 1 g tablet, Every 12 (Twelve) Hours., Disp: , Rfl:   •  cyanocobalamin 1000 MCG/ML injection, Inject 1 mL into the appropriate muscle as directed by prescriber Every 30 (Thirty) Days., Disp: 10 mL, Rfl: 0  •  dicyclomine (BENTYL) 20 MG tablet, Every 12 (Twelve) Hours., Disp: , Rfl:   •  fluticasone (FLONASE) 50 MCG/ACT nasal spray, 2 sprays into the nostril(s) as directed by provider Daily., Disp: , Rfl:   •  Fluticasone Furoate-Vilanterol (BREO ELLIPTA) 200-25 MCG/INH inhaler, BREO ELLIPTA 200-25 MCG/INH AEPB, Disp: , Rfl:   •  furosemide (LASIX) 20 MG tablet, TAKE 1 TABLET BY MOUTH DAILY, Disp: 90 tablet, Rfl: 0  •  isosorbide mononitrate (ISMO,MONOKET) 20 MG tablet, Daily., Disp: , Rfl:   •  levothyroxine (SYNTHROID, LEVOTHROID) 150 MCG tablet, TAKE 1 TABLET BY MOUTH EVERY DAY, Disp: 90 tablet, Rfl: 1  •  montelukast (SINGULAIR) 10 MG tablet, MONTELUKAST SODIUM 10 MG TABS, Disp: , Rfl:   •  Nebulizers (NEBULIZER COMPRESSOR) misc, NEBULIZER COMPRESSOR, Disp: , Rfl:   •  oxybutynin (DITROPAN) 5 MG tablet, Take 1 tablet by mouth 3 (Three) Times a Day., Disp: 60 tablet, Rfl: 0  •  traZODone (DESYREL) 100 MG tablet, TRAZODONE  MG TABS, Disp: , Rfl:   •  venlafaxine XR (EFFEXOR XR) 150 MG 24 hr capsule, EFFEXOR  MG XR24H-CAP, Disp: , Rfl:   •  vitamin D (ERGOCALCIFEROL) 25136 units capsule capsule, , Disp: , Rfl:   •  tiotropium (SPIRIVA) 18 MCG per inhalation capsule, Place 1 capsule into inhaler and inhale Daily., Disp: 30 capsule, Rfl: 5    Social History     Socioeconomic History   • Marital status: Single     Spouse name: Not on file   • Number of children: Not on file   • Years of education: Not on file   • Highest education level: Not on file    Tobacco Use   • Smoking status: Never Smoker   • Smokeless tobacco: Never Used   Substance and Sexual Activity   • Alcohol use: No     Frequency: Never   • Drug use: No       Past Medical History:   Diagnosis Date   • Allergic rhinitis    • Anemia    • Asthma    • B12 deficiency    • Broken arm 2019    Left arm broken    • Chronic back pain    • Chronic bronchitis (CMS/HCC)    • Coronary artery disease    • Diarrhea    • Hypothyroidism    • MRSA (methicillin resistant Staphylococcus aureus)    • Neuropathic pain of foot    • Severe back pain 2010   • Sleep apnea, obstructive                Diagnoses and all orders for this visit:    1. WILFRID (obstructive sleep apnea) (Primary)    2. Uncomplicated asthma, unspecified asthma severity, unspecified whether persistent    Other orders  -     tiotropium (SPIRIVA) 18 MCG per inhalation capsule; Place 1 capsule into inhaler and inhale Daily.  Dispense: 30 capsule; Refill: 5      PLAN:  -data sheet reviewed and showed 77 days with device and 8 days without.  90.6% compliance.  Average usage time is 6 hours and 25 minutes.  Greater than 4 hours use is 82.4%.  Average AHI is 2.4.  -patient is benefiting from use and feels better with use.  Patient is compliant with PAP  -cont use of Breo  -using Spiriva Respimat and is requesting Spiriva Handihaler.  She feels like it works better for her.      Nisha Cancino, APRN

## 2019-09-18 RX ORDER — OXYBUTYNIN CHLORIDE 5 MG/1
TABLET ORAL
Qty: 60 TABLET | Refills: 5 | Status: SHIPPED | OUTPATIENT
Start: 2019-09-18 | End: 2020-04-12

## 2019-10-01 RX ORDER — ALBUTEROL SULFATE 2.5 MG/3ML
SOLUTION RESPIRATORY (INHALATION)
Qty: 360 ML | Refills: 0 | Status: SHIPPED | OUTPATIENT
Start: 2019-10-01 | End: 2022-11-15 | Stop reason: SDUPTHER

## 2019-11-13 ENCOUNTER — TELEPHONE (OUTPATIENT)
Dept: FAMILY MEDICINE CLINIC | Facility: CLINIC | Age: 59
End: 2019-11-13

## 2019-11-13 NOTE — TELEPHONE ENCOUNTER
Called and spoke with patient to let her know she needs to be seen to have disability papers filled out, she will call back to schedule appointment.

## 2019-11-25 ENCOUNTER — TELEPHONE (OUTPATIENT)
Dept: PULMONOLOGY | Facility: HOSPITAL | Age: 59
End: 2019-11-25

## 2019-11-25 RX ORDER — MONTELUKAST SODIUM 10 MG/1
10 TABLET ORAL NIGHTLY
Qty: 30 TABLET | Refills: 5 | Status: SHIPPED | OUTPATIENT
Start: 2019-11-25 | End: 2020-05-19 | Stop reason: SDUPTHER

## 2019-11-25 NOTE — TELEPHONE ENCOUNTER
Patient left  requesting refills on Breo and Singulair.  She states refills were denied, and pharmacy was told she is not our patient.      Upon review, pharmacy has not contacted us for refills.  Advised patient they likely sent refill requests to [facility] office.  I told patient I would be glad to send refills to her pharmacy.  Sent electronically.

## 2019-12-02 ENCOUNTER — OFFICE VISIT (OUTPATIENT)
Dept: FAMILY MEDICINE CLINIC | Facility: CLINIC | Age: 59
End: 2019-12-02

## 2019-12-02 VITALS
SYSTOLIC BLOOD PRESSURE: 104 MMHG | DIASTOLIC BLOOD PRESSURE: 72 MMHG | HEART RATE: 69 BPM | BODY MASS INDEX: 40.11 KG/M2 | WEIGHT: 249.6 LBS | OXYGEN SATURATION: 93 % | TEMPERATURE: 98 F | RESPIRATION RATE: 18 BRPM | HEIGHT: 66 IN

## 2019-12-02 DIAGNOSIS — G89.29 CHRONIC MIDLINE LOW BACK PAIN, UNSPECIFIED WHETHER SCIATICA PRESENT: Primary | ICD-10-CM

## 2019-12-02 DIAGNOSIS — Z98.890 H/O LAMINECTOMY: ICD-10-CM

## 2019-12-02 DIAGNOSIS — J98.8 RESPIRATORY INFECTION: ICD-10-CM

## 2019-12-02 DIAGNOSIS — M54.50 CHRONIC MIDLINE LOW BACK PAIN, UNSPECIFIED WHETHER SCIATICA PRESENT: Primary | ICD-10-CM

## 2019-12-02 PROCEDURE — 99213 OFFICE O/P EST LOW 20 MIN: CPT | Performed by: FAMILY MEDICINE

## 2019-12-02 RX ORDER — AZITHROMYCIN 500 MG/1
500 TABLET, FILM COATED ORAL DAILY
Qty: 3 TABLET | Refills: 0 | Status: SHIPPED | OUTPATIENT
Start: 2019-12-02 | End: 2019-12-26

## 2019-12-02 NOTE — PROGRESS NOTES
"Subjective   Cassie Winter is a 59 y.o. female.     Chief Complaint   Patient presents with   • Back Pain     Disability paperwork   • Sinus Problem       HPI  Chief complaint: Low back pain    Respiratory infection    This patient is a 59-year-old white female comes in for follow-up and maintenance of her current problems which includes     1. Chronic Low Back Pain-deteriorated-Patient has a history of chronic low back pain.  Patient states that she had surgery on her lower back in 2006.  She states she had spine decompression at that time.  Patient states that she is had chronic low back pain since then.  She denies fever bowel or bladder problems.  She denies radicular symptoms.  She states however she is only able to be up and about for a brief period of time because of her pain.  She is only able to sit and walk for short distances because of the pain.  Patient has had imaging of the lumbosacral spine which revealed a fluid collection in the lower lumbosacral spine area that is been stable.  The imaging did reveal previous surgical changes.  States is resolved her back pain she is unable to perform gainful employment    2. Respiratory infection-deteriorated-patient complained of hoarseness and cough.  She denies chills.  She states that the cough is productive of scant sputum.  Patient does complain of hoarseness.      The following portions of the patient's history were reviewed and updated as appropriate: allergies, current medications, past family history, past medical history, past social history, past surgical history and problem list.    Review of Systems    Objective     /72 (BP Location: Left arm, Patient Position: Sitting, Cuff Size: Large Adult)   Pulse 69   Temp 98 °F (36.7 °C) (Oral)   Resp 18   Ht 166.4 cm (65.5\")   Wt 113 kg (249 lb 9.6 oz)   SpO2 93%   BMI 40.90 kg/m²     Physical Exam   Constitutional: She is oriented to person, place, and time.   Obese, poor muscle mass   HENT: "   Head: Normocephalic and atraumatic.   Eyes: Conjunctivae and EOM are normal. Pupils are equal, round, and reactive to light.   Neck: Normal range of motion. Neck supple.   Cardiovascular: Normal rate, regular rhythm, normal heart sounds and intact distal pulses.   Pulmonary/Chest: Effort normal and breath sounds normal.   Abdominal: Soft. Bowel sounds are normal.   Musculoskeletal: Normal range of motion.   - SLR test  Reflexes symmetrical  Tenderness of the lower LS spine area   Neurological: She is alert and oriented to person, place, and time.   Skin: Skin is warm and dry.   Psychiatric: She has a normal mood and affect. Her behavior is normal.   Nursing note and vitals reviewed.    I Completed insurance paperwork for the patient.    Assessment/Plan   Cassie was seen today for back pain and sinus problem.    Diagnoses and all orders for this visit:    Chronic midline low back pain, unspecified whether sciatica present    H/O laminectomy    Respiratory infection    Other orders  -     azithromycin (ZITHROMAX) 500 MG tablet; Take 1 tablet by mouth Daily.      Patient Instructions   Take the antibiotics as prescribed.    Continue your other treatment.    Follow up with ENT and GI if the hoarseness and cough does not improve.      Cristopher Garcia Jr., MD    12/02/19

## 2019-12-02 NOTE — PATIENT INSTRUCTIONS
Take the antibiotics as prescribed.    Continue your other treatment.    Follow up with ENT and GI if the hoarseness and cough does not improve.

## 2019-12-09 ENCOUNTER — TELEPHONE (OUTPATIENT)
Dept: FAMILY MEDICINE CLINIC | Facility: CLINIC | Age: 59
End: 2019-12-09

## 2019-12-09 DIAGNOSIS — J02.9 PHARYNGITIS, UNSPECIFIED ETIOLOGY: Primary | ICD-10-CM

## 2019-12-09 RX ORDER — PREDNISONE 20 MG/1
20 TABLET ORAL 2 TIMES DAILY
Qty: 14 TABLET | Refills: 0 | Status: SHIPPED | OUTPATIENT
Start: 2019-12-09 | End: 2019-12-26

## 2019-12-09 NOTE — TELEPHONE ENCOUNTER
The patient is calling today because her throat is not any better. She stated that during her visit last week you had mentioned if it did not improve, you would order some kind of test. Please call the patient to discuss this.

## 2019-12-10 ENCOUNTER — LAB (OUTPATIENT)
Dept: LAB | Facility: HOSPITAL | Age: 59
End: 2019-12-10

## 2019-12-10 DIAGNOSIS — J02.9 PHARYNGITIS, UNSPECIFIED ETIOLOGY: ICD-10-CM

## 2019-12-10 LAB
ALBUMIN SERPL-MCNC: 4.1 G/DL (ref 3.5–5.2)
ALBUMIN/GLOB SERPL: 1.4 G/DL
ALP SERPL-CCNC: 107 U/L (ref 39–117)
ALT SERPL W P-5'-P-CCNC: 18 U/L (ref 1–33)
ANION GAP SERPL CALCULATED.3IONS-SCNC: 11.4 MMOL/L (ref 5–15)
AST SERPL-CCNC: 23 U/L (ref 1–32)
BASOPHILS # BLD AUTO: 0.05 10*3/MM3 (ref 0–0.2)
BASOPHILS NFR BLD AUTO: 0.6 % (ref 0–1.5)
BILIRUB SERPL-MCNC: 0.2 MG/DL (ref 0.2–1.2)
BUN BLD-MCNC: 17 MG/DL (ref 8–23)
BUN/CREAT SERPL: 17.7 (ref 7–25)
CALCIUM SPEC-SCNC: 9.5 MG/DL (ref 8.6–10.5)
CHLORIDE SERPL-SCNC: 102 MMOL/L (ref 98–107)
CO2 SERPL-SCNC: 27.6 MMOL/L (ref 22–29)
CREAT BLD-MCNC: 0.96 MG/DL (ref 0.57–1)
DEPRECATED RDW RBC AUTO: 43.2 FL (ref 37–54)
EOSINOPHIL # BLD AUTO: 0.17 10*3/MM3 (ref 0–0.4)
EOSINOPHIL NFR BLD AUTO: 2.1 % (ref 0.3–6.2)
ERYTHROCYTE [DISTWIDTH] IN BLOOD BY AUTOMATED COUNT: 12.3 % (ref 12.3–15.4)
GFR SERPL CREATININE-BSD FRML MDRD: 59 ML/MIN/1.73
GLOBULIN UR ELPH-MCNC: 3 GM/DL
GLUCOSE BLD-MCNC: 91 MG/DL (ref 65–99)
HCT VFR BLD AUTO: 37.6 % (ref 34–46.6)
HGB BLD-MCNC: 12.1 G/DL (ref 12–15.9)
IMM GRANULOCYTES # BLD AUTO: 0.04 10*3/MM3 (ref 0–0.05)
IMM GRANULOCYTES NFR BLD AUTO: 0.5 % (ref 0–0.5)
LYMPHOCYTES # BLD AUTO: 2.44 10*3/MM3 (ref 0.7–3.1)
LYMPHOCYTES NFR BLD AUTO: 30.8 % (ref 19.6–45.3)
MCH RBC QN AUTO: 30.9 PG (ref 26.6–33)
MCHC RBC AUTO-ENTMCNC: 32.2 G/DL (ref 31.5–35.7)
MCV RBC AUTO: 95.9 FL (ref 79–97)
MONOCYTES # BLD AUTO: 0.74 10*3/MM3 (ref 0.1–0.9)
MONOCYTES NFR BLD AUTO: 9.4 % (ref 5–12)
NEUTROPHILS # BLD AUTO: 4.47 10*3/MM3 (ref 1.7–7)
NEUTROPHILS NFR BLD AUTO: 56.6 % (ref 42.7–76)
NRBC BLD AUTO-RTO: 0 /100 WBC (ref 0–0.2)
PLATELET # BLD AUTO: 241 10*3/MM3 (ref 140–450)
PMV BLD AUTO: 10.4 FL (ref 6–12)
POTASSIUM BLD-SCNC: 4 MMOL/L (ref 3.5–5.2)
PROT SERPL-MCNC: 7.1 G/DL (ref 6–8.5)
RBC # BLD AUTO: 3.92 10*6/MM3 (ref 3.77–5.28)
SODIUM BLD-SCNC: 141 MMOL/L (ref 136–145)
WBC NRBC COR # BLD: 7.91 10*3/MM3 (ref 3.4–10.8)

## 2019-12-10 PROCEDURE — 86665 EPSTEIN-BARR CAPSID VCA: CPT

## 2019-12-10 PROCEDURE — 87081 CULTURE SCREEN ONLY: CPT

## 2019-12-10 PROCEDURE — 85025 COMPLETE CBC W/AUTO DIFF WBC: CPT

## 2019-12-10 PROCEDURE — 80053 COMPREHEN METABOLIC PANEL: CPT

## 2019-12-10 PROCEDURE — 86645 CMV ANTIBODY IGM: CPT

## 2019-12-10 PROCEDURE — 36415 COLL VENOUS BLD VENIPUNCTURE: CPT

## 2019-12-11 LAB
CMV IGM SERPL IA-ACNC: <30 AU/ML (ref 0–29.9)
EBV VCA IGM SER-ACNC: <36 U/ML (ref 0–35.9)

## 2019-12-12 LAB — BACTERIA SPEC AEROBE CULT: NORMAL

## 2019-12-26 ENCOUNTER — OFFICE VISIT (OUTPATIENT)
Dept: FAMILY MEDICINE CLINIC | Facility: CLINIC | Age: 59
End: 2019-12-26

## 2019-12-26 VITALS
HEART RATE: 78 BPM | HEIGHT: 66 IN | OXYGEN SATURATION: 96 % | RESPIRATION RATE: 24 BRPM | BODY MASS INDEX: 39.25 KG/M2 | WEIGHT: 244.2 LBS | SYSTOLIC BLOOD PRESSURE: 116 MMHG | TEMPERATURE: 98.8 F | DIASTOLIC BLOOD PRESSURE: 84 MMHG

## 2019-12-26 DIAGNOSIS — J06.9 ACUTE URI: Primary | ICD-10-CM

## 2019-12-26 LAB
EXPIRATION DATE: NORMAL
FLUAV AG NPH QL: NEGATIVE
FLUBV AG NPH QL: NEGATIVE
INTERNAL CONTROL: NORMAL
Lab: NORMAL

## 2019-12-26 PROCEDURE — 87804 INFLUENZA ASSAY W/OPTIC: CPT | Performed by: NURSE PRACTITIONER

## 2019-12-26 PROCEDURE — 99213 OFFICE O/P EST LOW 20 MIN: CPT | Performed by: NURSE PRACTITIONER

## 2019-12-26 NOTE — PATIENT INSTRUCTIONS
Use inhalers prescribed.  Stay hydrated. mucinex water  Tylenol for aches and pain  No work for 2 days.  Take delsym or robitussin for cough.

## 2019-12-26 NOTE — PROGRESS NOTES
Subjective   Cassie Winter is a 59 y.o. female.     Chief Complaint   Patient presents with   • Cough   • Diarrhea   • Generalized Body Aches       HPI  She is here for cough, body aches and diarrhea. She started diarrhea last Friday.Sat and sun got better. Then she started with cough and congestion. She has not had flu shot.   She is using albuterol, breo, and singulair needs refill on flonase.   She was unable to work today. No chest pain. Not short of air feels fatigued.         The following portions of the patient's history were reviewed and updated as appropriate: allergies, current medications, past family history, past medical history, past social history, past surgical history and problem list.      Current Outpatient Medications:   •  albuterol (PROVENTIL) (2.5 MG/3ML) 0.083% nebulizer solution, USE 1 VIAL IN NEBULIZER FOUR TIMES DAILY AS NEEDED FOR COUGH AND SHORTNESS OF AIRNESS, Disp: 360 mL, Rfl: 0  •  ARIPiprazole (ABILIFY) 5 MG tablet, 1 tablet Daily., Disp: , Rfl:   •  aspirin (ASPIR-LOW) 81 MG EC tablet, Daily., Disp: , Rfl:   •  colestipol (COLESTID) 1 g tablet, Every 12 (Twelve) Hours., Disp: , Rfl:   •  cyanocobalamin 1000 MCG/ML injection, Inject 1 mL into the appropriate muscle as directed by prescriber Every 30 (Thirty) Days., Disp: 10 mL, Rfl: 0  •  dicyclomine (BENTYL) 20 MG tablet, Every 12 (Twelve) Hours., Disp: , Rfl:   •  Fluticasone Furoate-Vilanterol (BREO ELLIPTA) 200-25 MCG/INH inhaler, Inhale 1 puff Daily., Disp: 60 each, Rfl: 5  •  furosemide (LASIX) 20 MG tablet, TAKE 1 TABLET BY MOUTH DAILY, Disp: 90 tablet, Rfl: 0  •  isosorbide mononitrate (ISMO,MONOKET) 20 MG tablet, Daily., Disp: , Rfl:   •  levothyroxine (SYNTHROID, LEVOTHROID) 150 MCG tablet, TAKE 1 TABLET BY MOUTH EVERY DAY, Disp: 90 tablet, Rfl: 1  •  montelukast (SINGULAIR) 10 MG tablet, Take 1 tablet by mouth Every Night., Disp: 30 tablet, Rfl: 5  •  Nebulizers (NEBULIZER COMPRESSOR) misc, NEBULIZER COMPRESSOR, Disp: ,  Rfl:   •  oxybutynin (DITROPAN) 5 MG tablet, TAKE 1 TABLET BY MOUTH THREE TIMES DAILY, Disp: 60 tablet, Rfl: 5  •  tiotropium (SPIRIVA) 18 MCG per inhalation capsule, Place 1 capsule into inhaler and inhale Daily., Disp: 30 capsule, Rfl: 5  •  traZODone (DESYREL) 100 MG tablet, TRAZODONE  MG TABS, Disp: , Rfl:   •  venlafaxine XR (EFFEXOR XR) 150 MG 24 hr capsule, EFFEXOR  MG XR24H-CAP, Disp: , Rfl:   •  vitamin D (ERGOCALCIFEROL) 81708 units capsule capsule, , Disp: , Rfl:     Recent Results (from the past 4032 hour(s))   Comprehensive Metabolic Panel    Collection Time: 08/12/19  1:53 PM   Result Value Ref Range    Glucose 108 (H) 65 - 99 mg/dL    BUN 16 8 - 20 mg/dL    Creatinine 1.10 (H) 0.40 - 1.00 mg/dL    Sodium 137 136 - 144 mmol/L    Potassium 3.6 3.6 - 5.1 mmol/L    Chloride 106 101 - 111 mmol/L    CO2 22.0 22.0 - 32.0 mmol/L    Calcium 9.0 8.9 - 10.3 mg/dL    Total Protein <1.0 (L) 6.1 - 7.9 g/dL    Albumin 3.80 3.50 - 4.80 g/dL    ALT (SGPT) 22 14 - 54 U/L    AST (SGOT) 25 15 - 41 U/L    Alkaline Phosphatase 95 (H) 32 - 91 U/L    Total Bilirubin 0.6 0.3 - 1.2 mg/dL    eGFR Non African Amer 51 (L) >60 mL/min/1.73    Globulin  2.5 - 3.8 gm/dL    A/G Ratio  1.0 - 1.7 g/dL    BUN/Creatinine Ratio 14.5 5.4 - 26.2    Anion Gap 12.6 5.0 - 15.0 mmol/L   CBC Auto Differential    Collection Time: 08/12/19  1:53 PM   Result Value Ref Range    WBC 7.00 3.40 - 10.80 10*3/mm3    RBC 3.87 3.77 - 5.28 10*6/mm3    Hemoglobin 12.7 12.0 - 15.9 g/dL    Hematocrit 37.3 34.0 - 46.6 %    MCV 96.4 79.0 - 97.0 fL    MCH 32.8 26.6 - 33.0 pg    MCHC 34.0 31.5 - 35.7 g/dL    RDW 13.7 12.3 - 15.4 %    RDW-SD 46.4 37.0 - 54.0 fl    MPV 8.1 6.0 - 12.0 fL    Platelets 252 140 - 450 10*3/mm3    Neutrophil % 54.0 42.7 - 76.0 %    Lymphocyte % 32.6 19.6 - 45.3 %    Monocyte % 9.9 5.0 - 12.0 %    Eosinophil % 3.0 0.3 - 6.2 %    Basophil % 0.5 0.0 - 1.5 %    Neutrophils, Absolute 3.80 1.70 - 7.00 10*3/mm3    Lymphocytes,  Absolute 2.30 0.70 - 3.10 10*3/mm3    Monocytes, Absolute 0.70 0.10 - 0.90 10*3/mm3    Eosinophils, Absolute 0.20 0.00 - 0.40 10*3/mm3    Basophils, Absolute 0.00 0.00 - 0.20 10*3/mm3    nRBC 0.1 0.0 - 0.2 /100 WBC   Throat / Upper Respiratory Culture - Swab, Throat    Collection Time: 12/10/19  2:41 PM   Result Value Ref Range    Throat Culture, Beta Strep No Beta Hemolytic Streptococcus Isolated    Jenifer-Barr Virus VCA, IgM    Collection Time: 12/10/19  3:15 PM   Result Value Ref Range    EBV VCA IgM <36.0 0.0 - 35.9 U/mL   Cytomegalovirus Antibody, IgM    Collection Time: 12/10/19  3:15 PM   Result Value Ref Range    CMV IgM <30.0 0.0 - 29.9 AU/mL   Comprehensive Metabolic Panel    Collection Time: 12/10/19  3:15 PM   Result Value Ref Range    Glucose 91 65 - 99 mg/dL    BUN 17 8 - 23 mg/dL    Creatinine 0.96 0.57 - 1.00 mg/dL    Sodium 141 136 - 145 mmol/L    Potassium 4.0 3.5 - 5.2 mmol/L    Chloride 102 98 - 107 mmol/L    CO2 27.6 22.0 - 29.0 mmol/L    Calcium 9.5 8.6 - 10.5 mg/dL    Total Protein 7.1 6.0 - 8.5 g/dL    Albumin 4.10 3.50 - 5.20 g/dL    ALT (SGPT) 18 1 - 33 U/L    AST (SGOT) 23 1 - 32 U/L    Alkaline Phosphatase 107 39 - 117 U/L    Total Bilirubin 0.2 0.2 - 1.2 mg/dL    eGFR Non African Amer 59 (L) >60 mL/min/1.73    Globulin 3.0 gm/dL    A/G Ratio 1.4 g/dL    BUN/Creatinine Ratio 17.7 7.0 - 25.0    Anion Gap 11.4 5.0 - 15.0 mmol/L   CBC Auto Differential    Collection Time: 12/10/19  3:15 PM   Result Value Ref Range    WBC 7.91 3.40 - 10.80 10*3/mm3    RBC 3.92 3.77 - 5.28 10*6/mm3    Hemoglobin 12.1 12.0 - 15.9 g/dL    Hematocrit 37.6 34.0 - 46.6 %    MCV 95.9 79.0 - 97.0 fL    MCH 30.9 26.6 - 33.0 pg    MCHC 32.2 31.5 - 35.7 g/dL    RDW 12.3 12.3 - 15.4 %    RDW-SD 43.2 37.0 - 54.0 fl    MPV 10.4 6.0 - 12.0 fL    Platelets 241 140 - 450 10*3/mm3    Neutrophil % 56.6 42.7 - 76.0 %    Lymphocyte % 30.8 19.6 - 45.3 %    Monocyte % 9.4 5.0 - 12.0 %    Eosinophil % 2.1 0.3 - 6.2 %    Basophil %  "0.6 0.0 - 1.5 %    Immature Grans % 0.5 0.0 - 0.5 %    Neutrophils, Absolute 4.47 1.70 - 7.00 10*3/mm3    Lymphocytes, Absolute 2.44 0.70 - 3.10 10*3/mm3    Monocytes, Absolute 0.74 0.10 - 0.90 10*3/mm3    Eosinophils, Absolute 0.17 0.00 - 0.40 10*3/mm3    Basophils, Absolute 0.05 0.00 - 0.20 10*3/mm3    Immature Grans, Absolute 0.04 0.00 - 0.05 10*3/mm3    nRBC 0.0 0.0 - 0.2 /100 WBC         Review of Systems   HENT: Positive for postnasal drip and sore throat.    Respiratory: Positive for cough.    Gastrointestinal: Positive for diarrhea.   Musculoskeletal: Positive for myalgias.       Objective     /84 (BP Location: Left arm, Patient Position: Sitting, Cuff Size: Large Adult)   Pulse 78   Temp 98.8 °F (37.1 °C) (Oral)   Resp 24   Ht 166.4 cm (65.5\")   Wt 111 kg (244 lb 3.2 oz)   SpO2 96%   BMI 40.02 kg/m²     Physical Exam   Constitutional: She is oriented to person, place, and time. She appears well-developed and well-nourished.   HENT:   Head: Normocephalic and atraumatic.   Mouth/Throat: Uvula is midline, oropharynx is clear and moist and mucous membranes are normal. No posterior oropharyngeal erythema.   Eyes: Pupils are equal, round, and reactive to light. Conjunctivae and EOM are normal.   Neck: Normal range of motion. Neck supple.   Cardiovascular: Normal rate, regular rhythm, normal heart sounds and intact distal pulses.   Pulmonary/Chest: Effort normal and breath sounds normal. No accessory muscle usage. No respiratory distress.           Abdominal: Soft. Bowel sounds are normal.   Musculoskeletal: Normal range of motion.   Neurological: She is alert and oriented to person, place, and time.   Skin: Skin is warm and dry.   Psychiatric: She has a normal mood and affect. Her behavior is normal.   Nursing note and vitals reviewed.        Assessment/Plan   Cassie was seen today for cough, diarrhea and generalized body aches.    Diagnoses and all orders for this visit:    Acute " URI  Comments:  symptomatic treatment      Patient Instructions   Use inhalers prescribed.  Stay hydrated. mucinex water  Tylenol for aches and pain  No work for 2 days.  Take delsym or robitussin for cough.        Michelle Cage, APRN    12/26/19

## 2020-01-27 ENCOUNTER — DOCUMENTATION (OUTPATIENT)
Dept: PHYSICAL THERAPY | Facility: CLINIC | Age: 60
End: 2020-01-27

## 2020-01-27 NOTE — PROGRESS NOTES
Discharge Summary  Discharge Summary from Occupational Therapy Report      Dates OT visit: 3    Clinical Progress: improved  Home Program Compliance: No  Treatment has included: therapeutic exercise , therapeutic activity, manual therapy    Subjective   Pt reports that she is feeling better and uses her hand as much as she can but continues to have pain along the ulnar styloid   Objective   Initial Quick Dash Score was 75% and the WORK  MODULE DASH was 31%   At this time the Quick Dash Score is 61%  And the Work Module Dash was 25%Assessment/Plan     Progress toward previous goals: Partially Met     New Goals  Short-term goals (STG): decrease pain along radial styloid to 3/10 during activity in 2 wks   Increase Active ROM of wrist ext to 50 from 45 degrees in 2 weeks   Increase dexterity in 9 hole peg from 40 to 30 sec      Long-term goals (LTG):  Increase  strength to 8# from 2# by discharge   Increase functional use of L hand/wrist in ADL and IADL for 10- 15 min   without pain by discharge   Increase pt ability to perform weighted or resistive tasks with L hand /wrist without pain by discharge   Increase L hand /wrist integration of ADL and IADL in bilateral tasks by discharge                 Recommendations:  Continue Occupational Therapy   Using Therapeutic exercise and activities as well as manual therapy to achieve above goals       Goals: Partially Met    Discharge Plan: Continue with current home exercise program as instructed    Date of Discharge 1/27/20        Maty Andres OT  Occupational Therapist

## 2020-02-11 ENCOUNTER — TRANSCRIBE ORDERS (OUTPATIENT)
Dept: ADMINISTRATIVE | Facility: HOSPITAL | Age: 60
End: 2020-02-11

## 2020-02-11 ENCOUNTER — HOSPITAL ENCOUNTER (OUTPATIENT)
Dept: GENERAL RADIOLOGY | Facility: HOSPITAL | Age: 60
Discharge: HOME | End: 2020-02-11

## 2020-02-11 DIAGNOSIS — Z02.71 ENCOUNTER FOR DISABILITY DETERMINATION: ICD-10-CM

## 2020-02-11 DIAGNOSIS — Z02.71 ENCOUNTER FOR DISABILITY DETERMINATION: Primary | ICD-10-CM

## 2020-02-11 PROCEDURE — 72100 X-RAY EXAM L-S SPINE 2/3 VWS: CPT

## 2020-02-17 ENCOUNTER — OFFICE VISIT (OUTPATIENT)
Dept: FAMILY MEDICINE CLINIC | Facility: CLINIC | Age: 60
End: 2020-02-17

## 2020-02-17 ENCOUNTER — LAB (OUTPATIENT)
Dept: LAB | Facility: HOSPITAL | Age: 60
End: 2020-02-17

## 2020-02-17 VITALS
TEMPERATURE: 98.5 F | HEART RATE: 64 BPM | DIASTOLIC BLOOD PRESSURE: 82 MMHG | OXYGEN SATURATION: 98 % | BODY MASS INDEX: 37.99 KG/M2 | SYSTOLIC BLOOD PRESSURE: 131 MMHG | RESPIRATION RATE: 18 BRPM | HEIGHT: 66 IN | WEIGHT: 236.4 LBS

## 2020-02-17 DIAGNOSIS — J45.20 MILD INTERMITTENT ASTHMA, UNSPECIFIED WHETHER COMPLICATED: ICD-10-CM

## 2020-02-17 DIAGNOSIS — E03.9 ACQUIRED HYPOTHYROIDISM: ICD-10-CM

## 2020-02-17 DIAGNOSIS — I25.10 CHRONIC CORONARY ARTERY DISEASE: Primary | ICD-10-CM

## 2020-02-17 DIAGNOSIS — D64.9 ANEMIA, UNSPECIFIED TYPE: ICD-10-CM

## 2020-02-17 DIAGNOSIS — K58.2 IRRITABLE BOWEL SYNDROME WITH BOTH CONSTIPATION AND DIARRHEA: ICD-10-CM

## 2020-02-17 DIAGNOSIS — F33.41 RECURRENT MAJOR DEPRESSIVE DISORDER, IN PARTIAL REMISSION (HCC): ICD-10-CM

## 2020-02-17 DIAGNOSIS — N32.89 IRRITABLE BLADDER: ICD-10-CM

## 2020-02-17 PROBLEM — J06.9 ACUTE URI: Status: RESOLVED | Noted: 2019-12-26 | Resolved: 2020-02-17

## 2020-02-17 PROBLEM — R55 SYNCOPE: Status: RESOLVED | Noted: 2019-05-20 | Resolved: 2020-02-17

## 2020-02-17 PROBLEM — J98.8 RESPIRATORY INFECTION: Status: RESOLVED | Noted: 2019-12-02 | Resolved: 2020-02-17

## 2020-02-17 LAB — TSH SERPL DL<=0.05 MIU/L-ACNC: 23.1 UIU/ML (ref 0.27–4.2)

## 2020-02-17 PROCEDURE — 36415 COLL VENOUS BLD VENIPUNCTURE: CPT

## 2020-02-17 PROCEDURE — 84443 ASSAY THYROID STIM HORMONE: CPT

## 2020-02-17 PROCEDURE — 99214 OFFICE O/P EST MOD 30 MIN: CPT | Performed by: FAMILY MEDICINE

## 2020-02-17 RX ORDER — ALBUTEROL SULFATE 90 UG/1
2 AEROSOL, METERED RESPIRATORY (INHALATION) EVERY 4 HOURS PRN
COMMUNITY
Start: 2020-01-01 | End: 2022-05-09 | Stop reason: SDUPTHER

## 2020-02-17 RX ORDER — ARIPIPRAZOLE 15 MG/1
1 TABLET ORAL NIGHTLY
COMMUNITY
Start: 2020-01-27 | End: 2022-05-09

## 2020-02-17 NOTE — PROGRESS NOTES
Subjective   Cassie Winter is a 59 y.o. female.     Chief Complaint   Patient presents with   • Coronary Artery Disease     6 MTH F/U   • Hypothyroidism       HPI  Chief complaint: Asthma coronary artery disease hypothyroidism B12 deficiency/anemia irritable bowel syndrome irritable bladder    The patient is a 59-year-old white female comes in for follow-up and maintenance of her current problems which include    1.  Allergic rhinitis/asthma-stable-patient is on inhaled long-acting beta adrenergics inhaled corticosteroids singular 10 mg daily Spiriva once a day and a rescue inhaler.  She denied recent cough shortness of breath wheezing or sputum production.    2.  Coronary artery disease-stable-patient on aspirin 81 mg daily and long-acting nitroglycerin.  She denies chest pain shortness of breath orthopnea or PND.    3.  Anemia/B12 deficiency-stable-the patient is on B12 injections once a month.  Her last hemoglobin was normal.  Patient states she feels better.    4.  Irritable bowel syndrome-stable-patient is currently on dicyclomine every 12 hours and Colestid 1 g every 12 hours.  She denied abdominal pain diarrhea.    5.  Depression/anxiety-stable-patient is on trazodone 100 mg at night Effexor 150 mg daily and Abilify 15 mg daily.  She denies anxiousness agitation depressed or suicidal thoughts.    6.  Urinary tract infection-stable-patient on oxybutynin.  She denies urinary frequency intermittency hesitancy or incontinence.    7 hypothyroidism-stable-patient on thyroid replacement therapy.,  Synthroid 150 mg daily.  She denied heat or cold intolerance.  She denied tremor weight gain or weight loss.        The following portions of the patient's history were reviewed and updated as appropriate: allergies, current medications, past family history, past medical history, past social history, past surgical history and problem list.    Review of Systems    Objective     /82 (BP Location: Right arm, Patient  "Position: Sitting, Cuff Size: Large Adult)   Pulse 64   Temp 98.5 °F (36.9 °C) (Oral)   Resp 18   Ht 166.4 cm (65.5\")   Wt 107 kg (236 lb 6.4 oz)   SpO2 98%   BMI 38.74 kg/m²     Physical Exam   Constitutional: She is oriented to person, place, and time. She appears well-developed and well-nourished.   Obese, poor muscle mass   HENT:   Head: Normocephalic and atraumatic.   Eyes: Pupils are equal, round, and reactive to light. Conjunctivae and EOM are normal.   Neck: Normal range of motion. Neck supple.   Cardiovascular: Normal rate, regular rhythm, normal heart sounds and intact distal pulses.   Pulmonary/Chest: Effort normal and breath sounds normal.   Abdominal: Soft. Bowel sounds are normal.   Musculoskeletal: Normal range of motion.   Neurological: She is alert and oriented to person, place, and time.   Skin: Skin is warm and dry.   Psychiatric: She has a normal mood and affect. Her behavior is normal.   Nursing note and vitals reviewed.        Assessment/Plan   Cassie was seen today for coronary artery disease and hypothyroidism.    Diagnoses and all orders for this visit:    Chronic coronary artery disease    Mild intermittent asthma, unspecified whether complicated    Irritable bowel syndrome with both constipation and diarrhea    Acquired hypothyroidism    Irritable bladder    Anemia, unspecified type    Recurrent major depressive disorder, in partial remission (CMS/[facility])      Patient Instructions   Continue your current medications and treatment.    Have the follow up labs done and call for results.    Foloow up in the offcie in 6 months.        Cristopher Garcia Jr., MD    02/17/20  "

## 2020-02-18 RX ORDER — FUROSEMIDE 20 MG/1
TABLET ORAL
Qty: 90 TABLET | Refills: 0 | Status: SHIPPED | OUTPATIENT
Start: 2020-02-18 | End: 2020-05-15

## 2020-02-18 RX ORDER — ISOSORBIDE MONONITRATE 20 MG/1
TABLET ORAL
Qty: 90 TABLET | Refills: 1 | Status: SHIPPED | OUTPATIENT
Start: 2020-02-18 | End: 2020-08-10

## 2020-02-21 ENCOUNTER — TELEPHONE (OUTPATIENT)
Dept: FAMILY MEDICINE CLINIC | Facility: CLINIC | Age: 60
End: 2020-02-21

## 2020-02-21 RX ORDER — LEVOTHYROXINE SODIUM 0.15 MG/1
150 TABLET ORAL DAILY
Qty: 90 TABLET | Refills: 1 | Status: SHIPPED | OUTPATIENT
Start: 2020-02-21 | End: 2020-08-10

## 2020-02-21 NOTE — TELEPHONE ENCOUNTER
Pt called regarding the increase in her levothyroxine Rx, the new Rx has not been sent to the pharmacy

## 2020-04-12 RX ORDER — OXYBUTYNIN CHLORIDE 5 MG/1
TABLET ORAL
Qty: 60 TABLET | Refills: 5 | Status: SHIPPED | OUTPATIENT
Start: 2020-04-12 | End: 2020-07-10

## 2020-04-12 RX ORDER — PREDNISONE 20 MG/1
TABLET ORAL
Qty: 14 TABLET | Refills: 0 | Status: SHIPPED | OUTPATIENT
Start: 2020-04-12 | End: 2020-05-29

## 2020-04-27 ENCOUNTER — TELEPHONE (OUTPATIENT)
Dept: PULMONOLOGY | Facility: HOSPITAL | Age: 60
End: 2020-04-27

## 2020-04-27 NOTE — TELEPHONE ENCOUNTER
Pt was scheduled for telehealth visit with Dr. Moon at 2:30pm on 4/23/2020. Tried calling pt a few times and had to LMOM.

## 2020-05-15 RX ORDER — FUROSEMIDE 20 MG/1
TABLET ORAL
Qty: 90 TABLET | Refills: 0 | Status: SHIPPED | OUTPATIENT
Start: 2020-05-15 | End: 2020-08-10

## 2020-05-19 RX ORDER — MONTELUKAST SODIUM 10 MG/1
10 TABLET ORAL NIGHTLY
Qty: 30 TABLET | Refills: 0 | Status: SHIPPED | OUTPATIENT
Start: 2020-05-19 | End: 2022-05-20 | Stop reason: SDUPTHER

## 2020-05-29 ENCOUNTER — OFFICE VISIT (OUTPATIENT)
Dept: CARDIOLOGY | Facility: CLINIC | Age: 60
End: 2020-05-29

## 2020-05-29 VITALS
WEIGHT: 236 LBS | BODY MASS INDEX: 39.32 KG/M2 | HEIGHT: 65 IN | DIASTOLIC BLOOD PRESSURE: 63 MMHG | SYSTOLIC BLOOD PRESSURE: 104 MMHG | OXYGEN SATURATION: 100 % | HEART RATE: 59 BPM

## 2020-05-29 DIAGNOSIS — I25.10 CAD IN NATIVE ARTERY: ICD-10-CM

## 2020-05-29 DIAGNOSIS — R00.1 BRADYCARDIA, SINUS: ICD-10-CM

## 2020-05-29 DIAGNOSIS — I47.1 PAROXYSMAL ATRIAL TACHYCARDIA (HCC): Primary | ICD-10-CM

## 2020-05-29 DIAGNOSIS — R60.0 EDEMA LEG: ICD-10-CM

## 2020-05-29 DIAGNOSIS — R06.09 DYSPNEA ON EXERTION: ICD-10-CM

## 2020-05-29 DIAGNOSIS — I25.10 CORONARY ARTERY DISEASE INVOLVING NATIVE CORONARY ARTERY OF NATIVE HEART WITHOUT ANGINA PECTORIS: ICD-10-CM

## 2020-05-29 PROCEDURE — 99214 OFFICE O/P EST MOD 30 MIN: CPT | Performed by: INTERNAL MEDICINE

## 2020-05-29 RX ORDER — METOPROLOL SUCCINATE 25 MG/1
25 TABLET, EXTENDED RELEASE ORAL DAILY
Qty: 90 TABLET | Refills: 3 | Status: SHIPPED | OUTPATIENT
Start: 2020-05-29 | End: 2020-12-14 | Stop reason: SDUPTHER

## 2020-06-10 RX ORDER — CYANOCOBALAMIN 1000 UG/ML
INJECTION, SOLUTION INTRAMUSCULAR; SUBCUTANEOUS
Qty: 10 ML | Refills: 0 | Status: SHIPPED | OUTPATIENT
Start: 2020-06-10 | End: 2021-07-20 | Stop reason: SDUPTHER

## 2020-06-18 ENCOUNTER — DOCUMENTATION (OUTPATIENT)
Dept: PULMONOLOGY | Facility: HOSPITAL | Age: 60
End: 2020-06-18

## 2020-06-18 ENCOUNTER — TELEPHONE (OUTPATIENT)
Dept: PULMONOLOGY | Facility: HOSPITAL | Age: 60
End: 2020-06-18

## 2020-07-10 RX ORDER — OXYBUTYNIN CHLORIDE 5 MG/1
TABLET ORAL
Qty: 60 TABLET | Refills: 1 | Status: SHIPPED | OUTPATIENT
Start: 2020-07-10 | End: 2020-09-14

## 2020-08-10 RX ORDER — ISOSORBIDE MONONITRATE 20 MG/1
TABLET ORAL
Qty: 90 TABLET | Refills: 0 | Status: SHIPPED | OUTPATIENT
Start: 2020-08-10 | End: 2020-11-11

## 2020-08-10 RX ORDER — LEVOTHYROXINE SODIUM 0.15 MG/1
150 TABLET ORAL DAILY
Qty: 90 TABLET | Refills: 0 | Status: SHIPPED | OUTPATIENT
Start: 2020-08-10 | End: 2020-08-17

## 2020-08-10 RX ORDER — FUROSEMIDE 20 MG/1
TABLET ORAL
Qty: 90 TABLET | Refills: 0 | Status: SHIPPED | OUTPATIENT
Start: 2020-08-10 | End: 2020-11-11

## 2020-08-17 ENCOUNTER — LAB (OUTPATIENT)
Dept: LAB | Facility: HOSPITAL | Age: 60
End: 2020-08-17

## 2020-08-17 ENCOUNTER — OFFICE VISIT (OUTPATIENT)
Dept: FAMILY MEDICINE CLINIC | Facility: CLINIC | Age: 60
End: 2020-08-17

## 2020-08-17 VITALS
BODY MASS INDEX: 38.39 KG/M2 | OXYGEN SATURATION: 96 % | DIASTOLIC BLOOD PRESSURE: 63 MMHG | WEIGHT: 230.4 LBS | HEIGHT: 65 IN | HEART RATE: 92 BPM | RESPIRATION RATE: 20 BRPM | SYSTOLIC BLOOD PRESSURE: 96 MMHG

## 2020-08-17 DIAGNOSIS — R42 DIZZINESS: ICD-10-CM

## 2020-08-17 DIAGNOSIS — M54.50 CHRONIC MIDLINE LOW BACK PAIN, UNSPECIFIED WHETHER SCIATICA PRESENT: ICD-10-CM

## 2020-08-17 DIAGNOSIS — K58.2 IRRITABLE BOWEL SYNDROME WITH BOTH CONSTIPATION AND DIARRHEA: ICD-10-CM

## 2020-08-17 DIAGNOSIS — J45.20 MILD INTERMITTENT ASTHMA, UNSPECIFIED WHETHER COMPLICATED: Primary | ICD-10-CM

## 2020-08-17 DIAGNOSIS — F33.41 RECURRENT MAJOR DEPRESSIVE DISORDER, IN PARTIAL REMISSION (HCC): ICD-10-CM

## 2020-08-17 DIAGNOSIS — E03.9 ACQUIRED HYPOTHYROIDISM: ICD-10-CM

## 2020-08-17 DIAGNOSIS — I25.10 CHRONIC CORONARY ARTERY DISEASE: ICD-10-CM

## 2020-08-17 DIAGNOSIS — N32.89 IRRITABLE BLADDER: ICD-10-CM

## 2020-08-17 DIAGNOSIS — G89.29 CHRONIC MIDLINE LOW BACK PAIN, UNSPECIFIED WHETHER SCIATICA PRESENT: ICD-10-CM

## 2020-08-17 PROBLEM — R29.6 FREQUENT FALLS: Status: RESOLVED | Noted: 2019-02-11 | Resolved: 2020-08-17

## 2020-08-17 PROBLEM — R00.1 BRADYCARDIA: Status: RESOLVED | Noted: 2019-05-20 | Resolved: 2020-08-17

## 2020-08-17 LAB
ALBUMIN SERPL-MCNC: 3.8 G/DL (ref 3.5–5.2)
ALBUMIN/GLOB SERPL: 1.7 G/DL
ALP SERPL-CCNC: 60 U/L (ref 39–117)
ALT SERPL W P-5'-P-CCNC: 16 U/L (ref 1–33)
ANION GAP SERPL CALCULATED.3IONS-SCNC: 9 MMOL/L (ref 5–15)
AST SERPL-CCNC: 15 U/L (ref 1–32)
BACTERIA UR QL AUTO: NORMAL /HPF
BASOPHILS # BLD AUTO: 0.08 10*3/MM3 (ref 0–0.2)
BASOPHILS NFR BLD AUTO: 1.1 % (ref 0–1.5)
BILIRUB SERPL-MCNC: 0.3 MG/DL (ref 0–1.2)
BILIRUB UR QL STRIP: NEGATIVE
BUN SERPL-MCNC: 18 MG/DL (ref 8–23)
BUN/CREAT SERPL: 13.7 (ref 7–25)
CALCIUM SPEC-SCNC: 9.3 MG/DL (ref 8.6–10.5)
CHLORIDE SERPL-SCNC: 106 MMOL/L (ref 98–107)
CLARITY UR: CLEAR
CO2 SERPL-SCNC: 27 MMOL/L (ref 22–29)
COLOR UR: YELLOW
CREAT SERPL-MCNC: 1.31 MG/DL (ref 0.57–1)
DEPRECATED RDW RBC AUTO: 45.3 FL (ref 37–54)
EOSINOPHIL # BLD AUTO: 0.3 10*3/MM3 (ref 0–0.4)
EOSINOPHIL NFR BLD AUTO: 4.3 % (ref 0.3–6.2)
ERYTHROCYTE [DISTWIDTH] IN BLOOD BY AUTOMATED COUNT: 12.6 % (ref 12.3–15.4)
GFR SERPL CREATININE-BSD FRML MDRD: 41 ML/MIN/1.73
GLOBULIN UR ELPH-MCNC: 2.3 GM/DL
GLUCOSE SERPL-MCNC: 87 MG/DL (ref 65–99)
GLUCOSE UR STRIP-MCNC: NEGATIVE MG/DL
HCT VFR BLD AUTO: 34.8 % (ref 34–46.6)
HGB BLD-MCNC: 11.3 G/DL (ref 12–15.9)
HGB UR QL STRIP.AUTO: NEGATIVE
HYALINE CASTS UR QL AUTO: NORMAL /LPF
IMM GRANULOCYTES # BLD AUTO: 0.03 10*3/MM3 (ref 0–0.05)
IMM GRANULOCYTES NFR BLD AUTO: 0.4 % (ref 0–0.5)
KETONES UR QL STRIP: NEGATIVE
LEUKOCYTE ESTERASE UR QL STRIP.AUTO: ABNORMAL
LYMPHOCYTES # BLD AUTO: 2.96 10*3/MM3 (ref 0.7–3.1)
LYMPHOCYTES NFR BLD AUTO: 42.2 % (ref 19.6–45.3)
MCH RBC QN AUTO: 31.8 PG (ref 26.6–33)
MCHC RBC AUTO-ENTMCNC: 32.5 G/DL (ref 31.5–35.7)
MCV RBC AUTO: 98 FL (ref 79–97)
MONOCYTES # BLD AUTO: 0.53 10*3/MM3 (ref 0.1–0.9)
MONOCYTES NFR BLD AUTO: 7.5 % (ref 5–12)
NEUTROPHILS NFR BLD AUTO: 3.12 10*3/MM3 (ref 1.7–7)
NEUTROPHILS NFR BLD AUTO: 44.5 % (ref 42.7–76)
NITRITE UR QL STRIP: NEGATIVE
NRBC BLD AUTO-RTO: 0 /100 WBC (ref 0–0.2)
PH UR STRIP.AUTO: 6 [PH] (ref 5–8)
PLATELET # BLD AUTO: 236 10*3/MM3 (ref 140–450)
PMV BLD AUTO: 10.4 FL (ref 6–12)
POTASSIUM SERPL-SCNC: 4.2 MMOL/L (ref 3.5–5.2)
PROT SERPL-MCNC: 6.1 G/DL (ref 6–8.5)
PROT UR QL STRIP: NEGATIVE
RBC # BLD AUTO: 3.55 10*6/MM3 (ref 3.77–5.28)
RBC # UR: NORMAL /HPF
REF LAB TEST METHOD: NORMAL
SODIUM SERPL-SCNC: 142 MMOL/L (ref 136–145)
SP GR UR STRIP: 1.01 (ref 1–1.03)
SQUAMOUS #/AREA URNS HPF: NORMAL /HPF
TSH SERPL DL<=0.05 MIU/L-ACNC: 68.9 UIU/ML (ref 0.27–4.2)
UROBILINOGEN UR QL STRIP: ABNORMAL
VIT B12 BLD-MCNC: 458 PG/ML (ref 211–946)
WBC # BLD AUTO: 7.02 10*3/MM3 (ref 3.4–10.8)
WBC UR QL AUTO: NORMAL /HPF

## 2020-08-17 PROCEDURE — 81001 URINALYSIS AUTO W/SCOPE: CPT

## 2020-08-17 PROCEDURE — 82607 VITAMIN B-12: CPT

## 2020-08-17 PROCEDURE — 85025 COMPLETE CBC W/AUTO DIFF WBC: CPT

## 2020-08-17 PROCEDURE — 84443 ASSAY THYROID STIM HORMONE: CPT

## 2020-08-17 PROCEDURE — 80053 COMPREHEN METABOLIC PANEL: CPT

## 2020-08-17 PROCEDURE — 99214 OFFICE O/P EST MOD 30 MIN: CPT | Performed by: FAMILY MEDICINE

## 2020-08-17 PROCEDURE — 36415 COLL VENOUS BLD VENIPUNCTURE: CPT

## 2020-08-17 RX ORDER — LEVOTHYROXINE SODIUM 175 UG/1
175 TABLET ORAL DAILY
Qty: 30 TABLET | Refills: 2 | Status: SHIPPED | OUTPATIENT
Start: 2020-08-17 | End: 2021-01-26 | Stop reason: SDUPTHER

## 2020-08-17 NOTE — PATIENT INSTRUCTIONS
Hole ythe lasix and the Imdur for now.    Have the follow up labs done and call for results.    Talk with your cardiologist regarding the Imdur.    Furhter care will depend on the results of the test and how you progress.

## 2020-08-17 NOTE — PROGRESS NOTES
Subjective   Cassie Winter is a 59 y.o. female.     Chief Complaint   Patient presents with   • Coronary Artery Disease     6 month follow up   • Hypothyroidism   • Depression       HPI chief complaint: Dizziness asthma coronary artery disease hypothyroidism irritable bladder syndrome depression    The patient is a 59-year-old white female comes in for follow-up and maintenance of her current problems which include    1.  Dizziness-new-patient states that she has had intermittent episodes where she becomes lightheaded and dizzy.  She states that these occur while she is sitting at her desk at rest.  She states the last 15 to 20 minutes and then subside.  States that afterwards she feels fatigued.  She denied loss of consciousness or loss of bowel or bladder during these episodes.  She is not aware that her heart is racing during these episodes.    2.  Asthma/allergic rhinitis-stable-patient is currently on Breo Spiriva a rescue inhaler.  She denied cough shortness of breath wheezing or sputum    3.  Coronary artery disease-stable-patient is on aspirin 81 mg daily long-acting nitroglycerin and a beta-blocker.  She was placed on a beta-blocker because of intermittent episodes of rapid heart rhythm.  This was done several months ago.  She denied chest pain shortness of breath orthopnea or PND.  Patient also is on Lasix 20 mg daily as needed.    4.  Depression-stable-patient on 20 mg daily venlafaxine 150 mg daily Abilify 15 mg daily.  She denies anxiousness agitation depressed or suicidal thoughts.    5. Hypothyroidism-stable-patient is currently on thyroid replacement therapy.  She denied heat or cold intolerance tremor weight gain or weight loss.    6.  Irritable bladder syndrome-stable-patient is on Ditropan.  She denies urinary frequency intermittency hesitancy or incontinence.    7.  Irritable bowel syndrome-stable-patient is on Bentyl.        The following portions of the patient's history were reviewed and  "updated as appropriate: allergies, current medications, past family history, past medical history, past social history, past surgical history and problem list.    Review of Systems    Objective     BP 96/63 (BP Location: Left arm, Patient Position: Sitting, Cuff Size: Large Adult)   Pulse 92   Resp 20   Ht 165.1 cm (65\")   Wt 105 kg (230 lb 6.4 oz)   SpO2 96%   BMI 38.34 kg/m²     Physical Exam   Constitutional: She is oriented to person, place, and time. She appears well-developed and well-nourished.   HENT:   Head: Normocephalic and atraumatic.   Eyes: Pupils are equal, round, and reactive to light. Conjunctivae and EOM are normal.   Neck: Normal range of motion. Neck supple.   Cardiovascular: Normal rate, regular rhythm, normal heart sounds and intact distal pulses.   Pulmonary/Chest: Effort normal and breath sounds normal.   Abdominal: Soft. Bowel sounds are normal.   Musculoskeletal: Normal range of motion.   Neurological: She is alert and oriented to person, place, and time.   Skin: Skin is warm and dry.   Psychiatric: She has a normal mood and affect. Her behavior is normal.   Nursing note and vitals reviewed.        Assessment/Plan   Cassie was seen today for coronary artery disease, hypothyroidism and depression.    Diagnoses and all orders for this visit:    Mild intermittent asthma, unspecified whether complicated    Chronic coronary artery disease    Irritable bowel syndrome with both constipation and diarrhea    Acquired hypothyroidism  -     TSH; Future    Irritable bladder    Recurrent major depressive disorder, in partial remission (CMS/HCC)    Chronic midline low back pain, unspecified whether sciatica present    Dizziness-the patient was found to have relatively low blood pressure today.  I recommended she hold her Lasix and long-acting nitroglycerin.  Patient was advised that some of her psychiatric medications can sometimes cause hypotension.  She is to have laboratory testing.  She is to " talk about holding her long-acting nitroglycerin and beta-blocker long-term with her cardiologist.  Further care depend results of studies and how she progresses.  -     CBC & Differential; Future  -     Comprehensive Metabolic Panel; Future  -     Vitamin B12; Future  -     Urinalysis With / Culture If Indicated -; Future      Patient Instructions   Hole robinson lasix and the Imdur for now.    Have the follow up labs done and call for results.    Talk with your cardiologist regarding the Imdur.    Furhter care will depend on the results of the test and how you progress.          Cristopher Garcia Jr., MD    08/17/20

## 2020-09-14 RX ORDER — OXYBUTYNIN CHLORIDE 5 MG/1
TABLET ORAL
Qty: 60 TABLET | Refills: 5 | Status: SHIPPED | OUTPATIENT
Start: 2020-09-14 | End: 2021-08-02 | Stop reason: SDUPTHER

## 2020-11-11 RX ORDER — ISOSORBIDE MONONITRATE 20 MG/1
TABLET ORAL
Qty: 90 TABLET | Refills: 0 | Status: SHIPPED | OUTPATIENT
Start: 2020-11-11 | End: 2020-11-16

## 2020-11-11 RX ORDER — LEVOTHYROXINE SODIUM 175 UG/1
175 TABLET ORAL DAILY
Qty: 90 TABLET | Refills: 0 | Status: SHIPPED | OUTPATIENT
Start: 2020-11-11 | End: 2020-11-16

## 2020-11-11 RX ORDER — FUROSEMIDE 20 MG/1
TABLET ORAL
Qty: 90 TABLET | Refills: 0 | Status: SHIPPED | OUTPATIENT
Start: 2020-11-11 | End: 2020-11-16

## 2020-11-16 ENCOUNTER — OFFICE VISIT (OUTPATIENT)
Dept: FAMILY MEDICINE CLINIC | Facility: CLINIC | Age: 60
End: 2020-11-16

## 2020-11-16 ENCOUNTER — LAB (OUTPATIENT)
Dept: LAB | Facility: HOSPITAL | Age: 60
End: 2020-11-16

## 2020-11-16 VITALS
HEIGHT: 65 IN | OXYGEN SATURATION: 100 % | SYSTOLIC BLOOD PRESSURE: 114 MMHG | WEIGHT: 233.6 LBS | RESPIRATION RATE: 18 BRPM | HEART RATE: 62 BPM | BODY MASS INDEX: 38.92 KG/M2 | TEMPERATURE: 96.8 F | DIASTOLIC BLOOD PRESSURE: 78 MMHG

## 2020-11-16 DIAGNOSIS — I25.10 CORONARY ARTERY DISEASE INVOLVING NATIVE CORONARY ARTERY OF NATIVE HEART WITHOUT ANGINA PECTORIS: ICD-10-CM

## 2020-11-16 DIAGNOSIS — J45.20 MILD INTERMITTENT ASTHMA, UNSPECIFIED WHETHER COMPLICATED: ICD-10-CM

## 2020-11-16 DIAGNOSIS — I25.10 CAD IN NATIVE ARTERY: ICD-10-CM

## 2020-11-16 DIAGNOSIS — R60.0 EDEMA LEG: ICD-10-CM

## 2020-11-16 DIAGNOSIS — D64.9 ANEMIA, UNSPECIFIED TYPE: ICD-10-CM

## 2020-11-16 DIAGNOSIS — F33.41 RECURRENT MAJOR DEPRESSIVE DISORDER, IN PARTIAL REMISSION (HCC): ICD-10-CM

## 2020-11-16 DIAGNOSIS — E55.9 VITAMIN D DEFICIENCY: ICD-10-CM

## 2020-11-16 DIAGNOSIS — R42 DIZZINESS: ICD-10-CM

## 2020-11-16 DIAGNOSIS — R06.09 DYSPNEA ON EXERTION: ICD-10-CM

## 2020-11-16 DIAGNOSIS — I47.1 NARROW COMPLEX TACHYCARDIA (HCC): ICD-10-CM

## 2020-11-16 DIAGNOSIS — R00.1 BRADYCARDIA, SINUS: ICD-10-CM

## 2020-11-16 DIAGNOSIS — E03.9 ACQUIRED HYPOTHYROIDISM: ICD-10-CM

## 2020-11-16 DIAGNOSIS — I25.10 CHRONIC CORONARY ARTERY DISEASE: ICD-10-CM

## 2020-11-16 DIAGNOSIS — E03.9 ACQUIRED HYPOTHYROIDISM: Primary | ICD-10-CM

## 2020-11-16 DIAGNOSIS — K58.2 IRRITABLE BOWEL SYNDROME WITH BOTH CONSTIPATION AND DIARRHEA: ICD-10-CM

## 2020-11-16 DIAGNOSIS — N32.89 IRRITABLE BLADDER: ICD-10-CM

## 2020-11-16 DIAGNOSIS — I47.1 PAROXYSMAL ATRIAL TACHYCARDIA (HCC): ICD-10-CM

## 2020-11-16 PROBLEM — I47.19 ATRIAL TACHYCARDIA: Status: ACTIVE | Noted: 2020-11-16

## 2020-11-16 LAB
25(OH)D3 SERPL-MCNC: 28.6 NG/ML (ref 30–100)
ALBUMIN SERPL-MCNC: 4.1 G/DL (ref 3.5–5.2)
ALBUMIN/GLOB SERPL: 1.6 G/DL
ALP SERPL-CCNC: 87 U/L (ref 39–117)
ALT SERPL W P-5'-P-CCNC: 11 U/L (ref 1–33)
ANION GAP SERPL CALCULATED.3IONS-SCNC: 7.5 MMOL/L (ref 5–15)
AST SERPL-CCNC: 19 U/L (ref 1–32)
BASOPHILS # BLD AUTO: 0.07 10*3/MM3 (ref 0–0.2)
BASOPHILS NFR BLD AUTO: 1.1 % (ref 0–1.5)
BILIRUB SERPL-MCNC: 0.2 MG/DL (ref 0–1.2)
BUN SERPL-MCNC: 16 MG/DL (ref 8–23)
BUN/CREAT SERPL: 16.5 (ref 7–25)
CALCIUM SPEC-SCNC: 9.3 MG/DL (ref 8.6–10.5)
CHLORIDE SERPL-SCNC: 107 MMOL/L (ref 98–107)
CHOLEST SERPL-MCNC: 187 MG/DL (ref 0–200)
CO2 SERPL-SCNC: 26.5 MMOL/L (ref 22–29)
CREAT SERPL-MCNC: 0.97 MG/DL (ref 0.57–1)
DEPRECATED RDW RBC AUTO: 42.9 FL (ref 37–54)
EOSINOPHIL # BLD AUTO: 0.26 10*3/MM3 (ref 0–0.4)
EOSINOPHIL NFR BLD AUTO: 4 % (ref 0.3–6.2)
ERYTHROCYTE [DISTWIDTH] IN BLOOD BY AUTOMATED COUNT: 12.3 % (ref 12.3–15.4)
GFR SERPL CREATININE-BSD FRML MDRD: 58 ML/MIN/1.73
GLOBULIN UR ELPH-MCNC: 2.5 GM/DL
GLUCOSE SERPL-MCNC: 98 MG/DL (ref 65–99)
HCT VFR BLD AUTO: 40 % (ref 34–46.6)
HDLC SERPL-MCNC: 75 MG/DL (ref 40–60)
HGB BLD-MCNC: 12.8 G/DL (ref 12–15.9)
IMM GRANULOCYTES # BLD AUTO: 0.02 10*3/MM3 (ref 0–0.05)
IMM GRANULOCYTES NFR BLD AUTO: 0.3 % (ref 0–0.5)
LDLC SERPL CALC-MCNC: 91 MG/DL (ref 0–100)
LDLC/HDLC SERPL: 1.17 {RATIO}
LYMPHOCYTES # BLD AUTO: 2.6 10*3/MM3 (ref 0.7–3.1)
LYMPHOCYTES NFR BLD AUTO: 39.5 % (ref 19.6–45.3)
MCH RBC QN AUTO: 30.7 PG (ref 26.6–33)
MCHC RBC AUTO-ENTMCNC: 32 G/DL (ref 31.5–35.7)
MCV RBC AUTO: 95.9 FL (ref 79–97)
MONOCYTES # BLD AUTO: 0.68 10*3/MM3 (ref 0.1–0.9)
MONOCYTES NFR BLD AUTO: 10.3 % (ref 5–12)
NEUTROPHILS NFR BLD AUTO: 2.95 10*3/MM3 (ref 1.7–7)
NEUTROPHILS NFR BLD AUTO: 44.8 % (ref 42.7–76)
NRBC BLD AUTO-RTO: 0 /100 WBC (ref 0–0.2)
NT-PROBNP SERPL-MCNC: 165.7 PG/ML (ref 0–900)
PLATELET # BLD AUTO: 310 10*3/MM3 (ref 140–450)
PMV BLD AUTO: 10 FL (ref 6–12)
POTASSIUM SERPL-SCNC: 4.3 MMOL/L (ref 3.5–5.2)
PROT SERPL-MCNC: 6.6 G/DL (ref 6–8.5)
RBC # BLD AUTO: 4.17 10*6/MM3 (ref 3.77–5.28)
SODIUM SERPL-SCNC: 141 MMOL/L (ref 136–145)
TRIGL SERPL-MCNC: 123 MG/DL (ref 0–150)
TSH SERPL DL<=0.05 MIU/L-ACNC: 8.82 UIU/ML (ref 0.27–4.2)
VLDLC SERPL-MCNC: 21 MG/DL (ref 5–40)
WBC # BLD AUTO: 6.58 10*3/MM3 (ref 3.4–10.8)

## 2020-11-16 PROCEDURE — 84443 ASSAY THYROID STIM HORMONE: CPT

## 2020-11-16 PROCEDURE — 82306 VITAMIN D 25 HYDROXY: CPT

## 2020-11-16 PROCEDURE — 83880 ASSAY OF NATRIURETIC PEPTIDE: CPT

## 2020-11-16 PROCEDURE — 36415 COLL VENOUS BLD VENIPUNCTURE: CPT

## 2020-11-16 PROCEDURE — 99214 OFFICE O/P EST MOD 30 MIN: CPT | Performed by: FAMILY MEDICINE

## 2020-11-16 PROCEDURE — 80061 LIPID PANEL: CPT

## 2020-11-16 PROCEDURE — 85025 COMPLETE CBC W/AUTO DIFF WBC: CPT

## 2020-11-16 PROCEDURE — 80053 COMPREHEN METABOLIC PANEL: CPT

## 2020-11-16 RX ORDER — LEMBOREXANT 5 MG/1
1 TABLET, FILM COATED ORAL NIGHTLY
COMMUNITY
Start: 2020-11-14 | End: 2020-12-14 | Stop reason: SDUPTHER

## 2020-11-16 NOTE — PROGRESS NOTES
Subjective   Cassie Winter is a 60 y.o. female.     Chief Complaint   Patient presents with   • Depression     3 month followup   • Coronary Artery Disease   • Hypothyroidism       HPI  Chief complaint: Depression coronary artery disease hypothyroidism    Patient is a 60-year-old white female comes in for follow-up and maintenance of her current problems which include    1.  Hypothyroidism-stable-patient on Synthroid 0.175 mg daily.  Patient's TSH done last time was elevated.  Her dose was increased to her current dose from 0.150 mgm daily.  She denied heat or cold intolerance or tremor.    2.  Coronary artery disease-stable-patient is on aspirin  81 mg daily metoprolol 25 mg daily.  She denies chest pain shortness breath orthopnea or PND.    3.  Asthma/allergic rhinitis-stable-patient is currently on a rescue inhaler inhaled long-acting corticosteroids and will inhaled long-acting beta adrenergics Singulair 10 mg daily and inhaled anticholinergics.  She denied recent cough shortness of breath wheezing or sputum duction.    4.  Depression/insomnia-stable-patient is currently on lower 150 mg daily Abilify 25 mg daily.  Patient states she is having difficulty sleeping.  Is currently being monitored by psychiatry.    5.  Irritable bladder syndrome-stable-he is on Ditropan 5 mg.  She states it helps with her urinary frequency and urgency.    6.  Irritable bowel syndrome-stable-patient on dicyclomine 20 mg.  She states it helps to control her abdominal pain and diarrhea.    7.  Dizziness/rapid heart rhythm-improved-patient states she is having less frequent episodes of dizziness.  Patient's been evaluated for dizziness and lightheadedness.  Patient had an Holter monitor done several months ago revealed short episodes of atrial tachycardia.  Patient has a follow-up appoint with a cardiologist.      The following portions of the patient's history were reviewed and updated as appropriate: allergies, current medications,  "past family history, past medical history, past social history, past surgical history and problem list.    Review of Systems    Objective     /78 (BP Location: Right arm, Patient Position: Sitting, Cuff Size: Large Adult)   Pulse 62   Temp 96.8 °F (36 °C) (Infrared)   Resp 18   Ht 165.1 cm (65\")   Wt 106 kg (233 lb 9.6 oz)   SpO2 100%   BMI 38.87 kg/m²     Physical Exam  Vitals signs and nursing note reviewed.   Constitutional:       Appearance: She is well-developed. She is obese.   HENT:      Head: Normocephalic and atraumatic.      Nose: Nose normal.      Mouth/Throat:      Mouth: Mucous membranes are moist.      Pharynx: Oropharynx is clear.   Eyes:      Conjunctiva/sclera: Conjunctivae normal.      Pupils: Pupils are equal, round, and reactive to light.   Neck:      Musculoskeletal: Normal range of motion and neck supple.   Cardiovascular:      Rate and Rhythm: Normal rate and regular rhythm.      Pulses: Normal pulses.      Heart sounds: Normal heart sounds.   Pulmonary:      Effort: Pulmonary effort is normal.      Breath sounds: Normal breath sounds.   Abdominal:      General: Abdomen is flat. Bowel sounds are normal.      Palpations: Abdomen is soft.   Musculoskeletal: Normal range of motion.   Skin:     General: Skin is warm and dry.   Neurological:      Mental Status: She is alert and oriented to person, place, and time.   Psychiatric:         Behavior: Behavior normal.         Thought Content: Thought content normal.         Judgment: Judgment normal.           Assessment/Plan   Diagnoses and all orders for this visit:    1. Acquired hypothyroidism (Primary)  -     TSH; Future    2. Chronic coronary artery disease  -     CBC & Differential; Future  -     Comprehensive Metabolic Panel; Future  -     Lipid Panel; Future    3. Mild intermittent asthma, unspecified whether complicated    4. Dizziness    5. Recurrent major depressive disorder, in partial remission (CMS/[facility])    6. Irritable " bladder    7. Anemia, unspecified type    8. Irritable bowel syndrome with both constipation and diarrhea    9. Narrow complex tachycardia (CMS/HCC)      Patient Instructions   Continue your current medications and treatment.    Have the follow up labs done and call for results.    Follow up in the office in 6 months.      Cristopher Garcia Jr., MD    11/16/20

## 2020-12-01 RX ORDER — FAMOTIDINE 20 MG
90 TABLET ORAL DAILY
Qty: 90 CAPSULE | Refills: 3 | Status: SHIPPED | OUTPATIENT
Start: 2020-12-01

## 2020-12-14 ENCOUNTER — OFFICE VISIT (OUTPATIENT)
Dept: CARDIOLOGY | Facility: CLINIC | Age: 60
End: 2020-12-14

## 2020-12-14 VITALS
BODY MASS INDEX: 40.32 KG/M2 | TEMPERATURE: 93.2 F | DIASTOLIC BLOOD PRESSURE: 79 MMHG | HEIGHT: 65 IN | OXYGEN SATURATION: 99 % | WEIGHT: 242 LBS | SYSTOLIC BLOOD PRESSURE: 114 MMHG | HEART RATE: 57 BPM

## 2020-12-14 DIAGNOSIS — R00.1 BRADYCARDIA, SINUS: ICD-10-CM

## 2020-12-14 DIAGNOSIS — I25.10 CAD IN NATIVE ARTERY: ICD-10-CM

## 2020-12-14 DIAGNOSIS — I47.1 PAROXYSMAL ATRIAL TACHYCARDIA (HCC): Primary | ICD-10-CM

## 2020-12-14 PROCEDURE — 99213 OFFICE O/P EST LOW 20 MIN: CPT | Performed by: INTERNAL MEDICINE

## 2020-12-14 RX ORDER — LEMBOREXANT 10 MG/1
TABLET, FILM COATED ORAL
COMMUNITY
Start: 2020-12-01 | End: 2021-12-20

## 2020-12-14 RX ORDER — METOPROLOL SUCCINATE 25 MG/1
25 TABLET, EXTENDED RELEASE ORAL DAILY
Qty: 90 TABLET | Refills: 3 | Status: SHIPPED | OUTPATIENT
Start: 2020-12-14 | End: 2021-11-02
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
## 2021-01-26 RX ORDER — LEVOTHYROXINE SODIUM 175 UG/1
175 TABLET ORAL DAILY
Qty: 90 TABLET | Refills: 1 | Status: SHIPPED | OUTPATIENT
Start: 2021-01-26 | End: 2021-08-02 | Stop reason: SDUPTHER

## 2021-05-17 ENCOUNTER — LAB (OUTPATIENT)
Dept: LAB | Facility: HOSPITAL | Age: 61
End: 2021-05-17

## 2021-05-17 ENCOUNTER — OFFICE VISIT (OUTPATIENT)
Dept: FAMILY MEDICINE CLINIC | Facility: CLINIC | Age: 61
End: 2021-05-17

## 2021-05-17 VITALS
SYSTOLIC BLOOD PRESSURE: 105 MMHG | HEIGHT: 65 IN | OXYGEN SATURATION: 100 % | TEMPERATURE: 97.1 F | HEART RATE: 69 BPM | DIASTOLIC BLOOD PRESSURE: 72 MMHG | BODY MASS INDEX: 40.98 KG/M2 | WEIGHT: 246 LBS

## 2021-05-17 DIAGNOSIS — J45.20 MILD INTERMITTENT ASTHMA, UNSPECIFIED WHETHER COMPLICATED: Chronic | ICD-10-CM

## 2021-05-17 DIAGNOSIS — F33.41 RECURRENT MAJOR DEPRESSIVE DISORDER, IN PARTIAL REMISSION (HCC): Chronic | ICD-10-CM

## 2021-05-17 DIAGNOSIS — E03.9 ACQUIRED HYPOTHYROIDISM: ICD-10-CM

## 2021-05-17 DIAGNOSIS — K58.2 IRRITABLE BOWEL SYNDROME WITH BOTH CONSTIPATION AND DIARRHEA: Chronic | ICD-10-CM

## 2021-05-17 DIAGNOSIS — I25.10 CHRONIC CORONARY ARTERY DISEASE: Primary | ICD-10-CM

## 2021-05-17 DIAGNOSIS — I25.10 CHRONIC CORONARY ARTERY DISEASE: ICD-10-CM

## 2021-05-17 DIAGNOSIS — G47.39 OTHER SLEEP APNEA: Chronic | ICD-10-CM

## 2021-05-17 DIAGNOSIS — Z13.9 ENCOUNTER FOR HEALTH-RELATED SCREENING: ICD-10-CM

## 2021-05-17 DIAGNOSIS — E53.8 B12 DEFICIENCY: Chronic | ICD-10-CM

## 2021-05-17 PROBLEM — I47.1 NARROW COMPLEX TACHYCARDIA: Chronic | Status: ACTIVE | Noted: 2020-11-16

## 2021-05-17 PROBLEM — J45.909 ASTHMA: Chronic | Status: ACTIVE | Noted: 2017-06-19

## 2021-05-17 PROBLEM — R42 DIZZINESS: Chronic | Status: ACTIVE | Noted: 2020-08-17

## 2021-05-17 PROBLEM — I47.19 NARROW COMPLEX TACHYCARDIA: Chronic | Status: ACTIVE | Noted: 2020-11-16

## 2021-05-17 LAB
ALBUMIN SERPL-MCNC: 3.6 G/DL (ref 3.5–5.2)
ALBUMIN/GLOB SERPL: 1.4 G/DL
ALP SERPL-CCNC: 123 U/L (ref 39–117)
ALT SERPL W P-5'-P-CCNC: 14 U/L (ref 1–33)
ANION GAP SERPL CALCULATED.3IONS-SCNC: 7.7 MMOL/L (ref 5–15)
AST SERPL-CCNC: 20 U/L (ref 1–32)
BASOPHILS # BLD AUTO: 0.13 10*3/MM3 (ref 0–0.2)
BASOPHILS NFR BLD AUTO: 1.5 % (ref 0–1.5)
BILIRUB SERPL-MCNC: 0.2 MG/DL (ref 0–1.2)
BUN SERPL-MCNC: 12 MG/DL (ref 8–23)
BUN/CREAT SERPL: 11.8 (ref 7–25)
CALCIUM SPEC-SCNC: 8.7 MG/DL (ref 8.6–10.5)
CHLORIDE SERPL-SCNC: 107 MMOL/L (ref 98–107)
CO2 SERPL-SCNC: 25.3 MMOL/L (ref 22–29)
CREAT SERPL-MCNC: 1.02 MG/DL (ref 0.57–1)
DEPRECATED RDW RBC AUTO: 47.2 FL (ref 37–54)
EOSINOPHIL # BLD AUTO: 0.52 10*3/MM3 (ref 0–0.4)
EOSINOPHIL NFR BLD AUTO: 6.2 % (ref 0.3–6.2)
ERYTHROCYTE [DISTWIDTH] IN BLOOD BY AUTOMATED COUNT: 13.3 % (ref 12.3–15.4)
GFR SERPL CREATININE-BSD FRML MDRD: 55 ML/MIN/1.73
GLOBULIN UR ELPH-MCNC: 2.6 GM/DL
GLUCOSE SERPL-MCNC: 85 MG/DL (ref 65–99)
HCT VFR BLD AUTO: 40.6 % (ref 34–46.6)
HGB BLD-MCNC: 12.8 G/DL (ref 12–15.9)
IMM GRANULOCYTES # BLD AUTO: 0.04 10*3/MM3 (ref 0–0.05)
IMM GRANULOCYTES NFR BLD AUTO: 0.5 % (ref 0–0.5)
LYMPHOCYTES # BLD AUTO: 3.25 10*3/MM3 (ref 0.7–3.1)
LYMPHOCYTES NFR BLD AUTO: 38.6 % (ref 19.6–45.3)
MCH RBC QN AUTO: 30.6 PG (ref 26.6–33)
MCHC RBC AUTO-ENTMCNC: 31.5 G/DL (ref 31.5–35.7)
MCV RBC AUTO: 97.1 FL (ref 79–97)
MONOCYTES # BLD AUTO: 0.86 10*3/MM3 (ref 0.1–0.9)
MONOCYTES NFR BLD AUTO: 10.2 % (ref 5–12)
NEUTROPHILS NFR BLD AUTO: 3.61 10*3/MM3 (ref 1.7–7)
NEUTROPHILS NFR BLD AUTO: 43 % (ref 42.7–76)
NRBC BLD AUTO-RTO: 0.1 /100 WBC (ref 0–0.2)
PLATELET # BLD AUTO: 262 10*3/MM3 (ref 140–450)
PMV BLD AUTO: 10.4 FL (ref 6–12)
POTASSIUM SERPL-SCNC: 4.3 MMOL/L (ref 3.5–5.2)
PROT SERPL-MCNC: 6.2 G/DL (ref 6–8.5)
RBC # BLD AUTO: 4.18 10*6/MM3 (ref 3.77–5.28)
SODIUM SERPL-SCNC: 140 MMOL/L (ref 136–145)
TSH SERPL DL<=0.05 MIU/L-ACNC: 2.14 UIU/ML (ref 0.27–4.2)
WBC # BLD AUTO: 8.41 10*3/MM3 (ref 3.4–10.8)

## 2021-05-17 PROCEDURE — 80053 COMPREHEN METABOLIC PANEL: CPT

## 2021-05-17 PROCEDURE — 99214 OFFICE O/P EST MOD 30 MIN: CPT | Performed by: FAMILY MEDICINE

## 2021-05-17 PROCEDURE — 84443 ASSAY THYROID STIM HORMONE: CPT

## 2021-05-17 PROCEDURE — 36415 COLL VENOUS BLD VENIPUNCTURE: CPT

## 2021-05-17 PROCEDURE — 85025 COMPLETE CBC W/AUTO DIFF WBC: CPT

## 2021-05-17 NOTE — PATIENT INSTRUCTIONS
Contnue your current medications and treatment.    Have the follow up labs done and call for results.    Have a mammogram.    Follow up with psychiatry and gastroenterology.    Further care will depend on the results of the tests and how you progress.    Follow up in the office in 6 months or sooner if needed.

## 2021-05-17 NOTE — PROGRESS NOTES
Subjective   Cassie Winter is a 60 y.o. female.     Chief Complaint   Patient presents with   • Depression     6 month f/u   • Coronary Artery Disease   • Hypothyroidism       HPI  Chief complaint: Hypothyroidism depression coronary artery disease atrial tachycardia irritable bowel syndrome asthma    The patient is a 60-year-old white female comes in for follow-up and maintenance of her current problems which include    1.  Hypothyroidism-stable-patient on Synthroid 0.175 mg a day.  She does complain of daytime sleepiness and.  She states that sometimes interferes with her work.    2.  Coronary artery disease-stable-patient is currently on aspirin 81 mg daily metoprolol 25 mg daily.  She denies chest pain shortness breath orthopnea or PND.    3.  Atrial tachycardia-stable-patient is currently metoprolol 25 mg daily.  She denies episodes of rapid or slow heart rhythm.    4.  Depression-deteriorated-patient has history of recurrent moderately severe unipolar depression.  Patient is currently taking Abilify 15 mg daily and Effexor 150 mg daily.  States that her depression is worse.  She is not suicidal or homicidal.  She is scheduled for follow-up appointment with her psychiatrist.    5.  Irritable bowel syndrome-deteriorated-patient is currently on Colestid 1 g every 12 hours and dicyclomine 20 mg twice a day.  She continues to have diarrhea.  I recommended she follow-up with gastroenterology.  Patient has not had recent colonoscopy.    6.  Asthma/allergic rhinitis-stable-patient on Spiriva 1 inhalation a day Breo 200/25 1 puff twice a day Singulair 10 mg daily rescue inhaler as needed.  She denied recent cough shortness of breath wheezing or sputum production.    7.  Sleep apnea-deteriorated-patient complains of daytime sleepiness.  Patient does wear her CPAP on a regular basis.  Patient however has not had a follow-up evaluation recently.    8.  Irritable bladder syndrome-stable-patient on different Ditropan 5 mg 3  "times a day.        The following portions of the patient's history were reviewed and updated as appropriate: allergies, current medications, past family history, past medical history, past social history, past surgical history and problem list.    Review of Systems    Objective     /72 (BP Location: Right arm, Patient Position: Sitting, Cuff Size: Large Adult)   Pulse 69   Temp 97.1 °F (36.2 °C)   Ht 165.1 cm (65\")   Wt 112 kg (246 lb)   SpO2 100%   BMI 40.94 kg/m²     Physical Exam  Vitals and nursing note reviewed.   Constitutional:       Appearance: She is well-developed. She is obese.   HENT:      Head: Normocephalic and atraumatic.      Nose: Nose normal.      Mouth/Throat:      Mouth: Mucous membranes are moist.      Pharynx: Oropharynx is clear.   Eyes:      Extraocular Movements: Extraocular movements intact.      Conjunctiva/sclera: Conjunctivae normal.      Pupils: Pupils are equal, round, and reactive to light.   Cardiovascular:      Rate and Rhythm: Normal rate and regular rhythm.      Pulses: Normal pulses.      Heart sounds: Normal heart sounds.   Pulmonary:      Effort: Pulmonary effort is normal.      Breath sounds: Normal breath sounds.   Abdominal:      General: Abdomen is flat. Bowel sounds are normal.      Palpations: Abdomen is soft.   Musculoskeletal:         General: Normal range of motion.      Cervical back: Neck supple.   Skin:     General: Skin is warm and dry.   Neurological:      Mental Status: She is alert and oriented to person, place, and time.   Psychiatric:         Behavior: Behavior normal.         Thought Content: Thought content normal.         Judgment: Judgment normal.       Vitamin D 25 Hydroxy (11/16/2020 10:47)  TSH (11/16/2020 10:47)  Lipid Panel (11/16/2020 10:47)  Comprehensive Metabolic Panel (11/16/2020 10:47)  CBC & Differential (11/16/2020 10:47)      Assessment/Plan   Diagnoses and all orders for this visit:    1. Chronic coronary artery disease " (Primary)  -     CBC & Differential; Future  -     Comprehensive Metabolic Panel; Future    2. Acquired hypothyroidism  -     TSH; Future    3. B12 deficiency    4. Recurrent major depressive disorder, in partial remission (CMS/HCC)    5. Mild intermittent asthma, unspecified whether complicated    6. Encounter for health-related screening  -     Mammo Screening Digital Tomosynthesis Bilateral With CAD; Future    7. Irritable bowel syndrome with both constipation and diarrhea    8. Other sleep apnea      Patient Instructions   Contnue your current medications and treatment.    Have the follow up labs done and call for results.    Have a mammogram.    Follow up with psychiatry and gastroenterology.    Further care will depend on the results of the tests and how you progress.    Follow up in the office in 6 months or sooner if needed.      Cristopher Garcia Jr., MD    05/17/21

## 2021-05-27 ENCOUNTER — HOSPITAL ENCOUNTER (OUTPATIENT)
Dept: MAMMOGRAPHY | Facility: HOSPITAL | Age: 61
Discharge: HOME | End: 2021-05-27
Admitting: FAMILY MEDICINE

## 2021-05-27 DIAGNOSIS — Z13.9 ENCOUNTER FOR HEALTH-RELATED SCREENING: ICD-10-CM

## 2021-05-27 PROCEDURE — 77063 BREAST TOMOSYNTHESIS BI: CPT

## 2021-05-27 PROCEDURE — 77067 SCR MAMMO BI INCL CAD: CPT

## 2021-07-20 RX ORDER — CYANOCOBALAMIN 1000 UG/ML
1000 INJECTION, SOLUTION INTRAMUSCULAR; SUBCUTANEOUS
Qty: 10 ML | Refills: 0 | Status: SHIPPED | OUTPATIENT
Start: 2021-07-20 | End: 2022-08-11 | Stop reason: SDUPTHER

## 2021-08-02 RX ORDER — LEVOTHYROXINE SODIUM 175 UG/1
175 TABLET ORAL DAILY
Qty: 90 TABLET | Refills: 1 | Status: SHIPPED | OUTPATIENT
Start: 2021-08-02 | End: 2021-12-15

## 2021-08-02 RX ORDER — OXYBUTYNIN CHLORIDE 5 MG/1
5 TABLET ORAL 3 TIMES DAILY
Qty: 270 TABLET | Refills: 1 | Status: SHIPPED | OUTPATIENT
Start: 2021-08-02 | End: 2022-02-11

## 2021-08-11 ENCOUNTER — OFFICE (AMBULATORY)
Dept: URBAN - METROPOLITAN AREA CLINIC 64 | Facility: CLINIC | Age: 61
End: 2021-08-11
Payer: COMMERCIAL

## 2021-08-11 VITALS
HEIGHT: 65 IN | SYSTOLIC BLOOD PRESSURE: 91 MMHG | WEIGHT: 261 LBS | HEART RATE: 71 BPM | DIASTOLIC BLOOD PRESSURE: 60 MMHG

## 2021-08-11 DIAGNOSIS — K90.9 INTESTINAL MALABSORPTION, UNSPECIFIED: ICD-10-CM

## 2021-08-11 DIAGNOSIS — D50.9 IRON DEFICIENCY ANEMIA, UNSPECIFIED: ICD-10-CM

## 2021-08-11 DIAGNOSIS — R19.7 DIARRHEA, UNSPECIFIED: ICD-10-CM

## 2021-08-11 DIAGNOSIS — R53.83 OTHER FATIGUE: ICD-10-CM

## 2021-08-11 PROCEDURE — 99214 OFFICE O/P EST MOD 30 MIN: CPT | Performed by: INTERNAL MEDICINE

## 2021-08-11 RX ORDER — DICYCLOMINE HYDROCHLORIDE 20 MG/1
TABLET ORAL
Qty: 60 | Refills: 0 | Status: COMPLETED
End: 2023-09-05

## 2021-08-11 RX ORDER — COLESTIPOL HYDROCHLORIDE 1 G/1
TABLET, FILM COATED ORAL
Qty: 60 | Refills: 11 | Status: ACTIVE

## 2021-11-02 RX ORDER — METOPROLOL SUCCINATE 25 MG/1
25 TABLET, EXTENDED RELEASE ORAL DAILY
Qty: 90 TABLET | Refills: 0 | Status: SHIPPED | OUTPATIENT
Start: 2021-11-02 | End: 2022-05-02

## 2021-11-02 NOTE — TELEPHONE ENCOUNTER
Rx Refill Note  Requested Prescriptions     Pending Prescriptions Disp Refills   • metoprolol succinate XL (TOPROL-XL) 25 MG 24 hr tablet [Pharmacy Med Name: METOPROLOL ER SUCCINATE 25MG TABS] 90 tablet 3     Sig: TAKE 1 TABLET BY MOUTH DAILY      Last office visit with prescribing clinician: 12/14/2020      Next office visit with prescribing clinician: 12/20/2021            Gloria Loco MA  11/02/21, 08:16 EDT

## 2021-11-05 ENCOUNTER — OFFICE VISIT (OUTPATIENT)
Dept: FAMILY MEDICINE CLINIC | Facility: CLINIC | Age: 61
End: 2021-11-05

## 2021-11-05 VITALS
RESPIRATION RATE: 18 BRPM | HEIGHT: 65 IN | SYSTOLIC BLOOD PRESSURE: 106 MMHG | BODY MASS INDEX: 44.65 KG/M2 | TEMPERATURE: 96.9 F | HEART RATE: 80 BPM | WEIGHT: 268 LBS | DIASTOLIC BLOOD PRESSURE: 72 MMHG | OXYGEN SATURATION: 97 %

## 2021-11-05 DIAGNOSIS — D64.9 ANEMIA, UNSPECIFIED TYPE: Chronic | ICD-10-CM

## 2021-11-05 DIAGNOSIS — J45.20 MILD INTERMITTENT ASTHMA, UNSPECIFIED WHETHER COMPLICATED: Chronic | ICD-10-CM

## 2021-11-05 DIAGNOSIS — K58.2 IRRITABLE BOWEL SYNDROME WITH BOTH CONSTIPATION AND DIARRHEA: Chronic | ICD-10-CM

## 2021-11-05 DIAGNOSIS — I25.10 CHRONIC CORONARY ARTERY DISEASE: Chronic | ICD-10-CM

## 2021-11-05 DIAGNOSIS — E03.9 ACQUIRED HYPOTHYROIDISM: Primary | Chronic | ICD-10-CM

## 2021-11-05 DIAGNOSIS — J30.9 ALLERGIC RHINITIS, UNSPECIFIED SEASONALITY, UNSPECIFIED TRIGGER: Chronic | ICD-10-CM

## 2021-11-05 DIAGNOSIS — M26.629 TMJ SYNDROME: ICD-10-CM

## 2021-11-05 DIAGNOSIS — I47.1 NARROW COMPLEX TACHYCARDIA (HCC): Chronic | ICD-10-CM

## 2021-11-05 DIAGNOSIS — F33.41 RECURRENT MAJOR DEPRESSIVE DISORDER, IN PARTIAL REMISSION (HCC): Chronic | ICD-10-CM

## 2021-11-05 PROCEDURE — 99214 OFFICE O/P EST MOD 30 MIN: CPT | Performed by: FAMILY MEDICINE

## 2021-11-05 RX ORDER — PANCRELIPASE 36000; 180000; 114000 [USP'U]/1; [USP'U]/1; [USP'U]/1
CAPSULE, DELAYED RELEASE PELLETS ORAL
COMMUNITY
Start: 2021-10-22

## 2021-11-05 RX ORDER — BREXPIPRAZOLE 2 MG/1
2 TABLET ORAL DAILY
COMMUNITY
Start: 2021-10-11 | End: 2022-05-09 | Stop reason: SDUPTHER

## 2021-11-05 NOTE — PATIENT INSTRUCTIONS
Comntinue your current medications and treat,ent.    Follow up with the dentist regarding the TMJ pain.    Have the follow up labs done and call for results.

## 2021-11-05 NOTE — PROGRESS NOTES
Subjective   Cassie Winter is a 61 y.o. female.     Chief Complaint   Patient presents with   • Earache     right ear started 2 weeks ago   • Sore Throat   • Hypothyroidism     6 month followup       HPI  Chief complaint: Right ear pain asthma hypothyroidism coronary artery disease irritable bowel syndrome    Patient is a 61-year-old white female comes in for follow-up and maintenance of her current problems which include    1. Right ear pain-new-patient states that for the past 2 weeks she has had pain in the right ear. She denied fever chills. She denied ringing in ears. She denied decreased hearing. She denied sinus congestion or drainage.     2. coronary disease-stable-the patient is current on aspirin 81 mg daily and metoprolol 25 mg daily. Patient denies chest pain shortness of breath orthopnea or PND.    3. Depression-stable-patient has history of moderately severe recurrent bipolar depression with anxiety. Patient is currently on Rexulti 2 mg daily Abilify 15 mg a day venlafaxine 150 mg a day. She states that her depression is controlled.    4. Allergic rhinitis/asthma-stable-the patient on Breo Ellipta 200/25 1 puff daily Spiriva 1 puff daily Singulair 10 mg daily a rescue inhaler. She denied recent cough shortness breath wheezing or sputum production.    5. Irritable bowel syndrome-stable-patient on Bentyl 20 mg every 12 hours.    6. Hypothyroidism-stable-patient on Synthroid 0.175 mg daily. She denied heat or cold intolerance tremor weight gain or weight loss. Her last TSH was therapeutic.    7. Narrow complex tachycardia-stable patient is on metoprolol 25 mg daily. Denied sounds of rapid or slow heart rhythm.      The following portions of the patient's history were reviewed and updated as appropriate: allergies, current medications, past family history, past medical history, past social history, past surgical history and problem list.    Review of Systems    Objective     /72 (BP Location: Left  "arm, Patient Position: Sitting, Cuff Size: Large Adult)   Pulse 80   Temp 96.9 °F (36.1 °C) (Infrared)   Resp 18   Ht 165.1 cm (65\")   Wt 122 kg (268 lb)   SpO2 97%   BMI 44.60 kg/m²     Physical Exam  Vitals and nursing note reviewed.   Constitutional:       Appearance: She is well-developed. She is obese.   HENT:      Head: Normocephalic and atraumatic.      Nose: Nose normal.      Mouth/Throat:      Mouth: Mucous membranes are moist.      Pharynx: Oropharynx is clear.   Eyes:      Extraocular Movements: Extraocular movements intact.      Conjunctiva/sclera: Conjunctivae normal.      Pupils: Pupils are equal, round, and reactive to light.   Cardiovascular:      Rate and Rhythm: Normal rate and regular rhythm.      Heart sounds: Normal heart sounds.   Pulmonary:      Effort: Pulmonary effort is normal.      Breath sounds: Normal breath sounds.   Abdominal:      General: Abdomen is flat. Bowel sounds are normal.      Palpations: Abdomen is soft.   Musculoskeletal:         General: Normal range of motion.      Cervical back: Neck supple.   Skin:     General: Skin is warm and dry.   Neurological:      Mental Status: She is alert and oriented to person, place, and time.   Psychiatric:         Behavior: Behavior normal.         Thought Content: Thought content normal.         Judgment: Judgment normal.         TSH (05/17/2021 08:49)  Comprehensive Metabolic Panel (05/17/2021 08:49)  CBC & Differential (05/17/2021 08:49)    Assessment/Plan   Diagnoses and all orders for this visit:    1. Acquired hypothyroidism (Primary)  -     TSH; Future    2. Chronic coronary artery disease  -     ALT; Future  -     Basic Metabolic Panel; Future  -     Lipid Panel; Future    3. Allergic rhinitis, unspecified seasonality, unspecified trigger    4. Irritable bowel syndrome with both constipation and diarrhea    5. Mild intermittent asthma, unspecified whether complicated    6. Recurrent major depressive disorder, in partial " remission (HCC)    7. Anemia, unspecified type  -     CBC & Differential; Future    8. Narrow complex tachycardia (HCC)    9. TMJ syndrome-on clinical exam patient has tenderness to the right TMJ with palpation. When I put pressure on the right TMJ reproduces the patient's pain. States she has a cavity on the other side. I recommended he follow-up with her dentist for possible creation of an implant..      Patient Instructions   Comntinue your current medications and treat,ent.    Follow up with the dentist regarding the TMJ pain.    Have the follow up labs done and call for results.      Cristopher Garcia Jr., MD    11/05/21

## 2021-11-15 ENCOUNTER — OFFICE (AMBULATORY)
Dept: URBAN - METROPOLITAN AREA CLINIC 64 | Facility: CLINIC | Age: 61
End: 2021-11-15
Payer: COMMERCIAL

## 2021-11-15 VITALS
SYSTOLIC BLOOD PRESSURE: 98 MMHG | HEART RATE: 70 BPM | WEIGHT: 260 LBS | DIASTOLIC BLOOD PRESSURE: 61 MMHG | HEIGHT: 65 IN

## 2021-11-15 DIAGNOSIS — D50.0 IRON DEFICIENCY ANEMIA SECONDARY TO BLOOD LOSS (CHRONIC): ICD-10-CM

## 2021-11-15 DIAGNOSIS — K90.9 INTESTINAL MALABSORPTION, UNSPECIFIED: ICD-10-CM

## 2021-11-15 PROCEDURE — 99214 OFFICE O/P EST MOD 30 MIN: CPT | Performed by: NURSE PRACTITIONER

## 2021-11-15 RX ORDER — DICYCLOMINE HYDROCHLORIDE 20 MG/1
TABLET ORAL
Qty: 60 | Refills: 0 | Status: COMPLETED
End: 2023-09-05

## 2021-11-15 RX ORDER — PANCRELIPASE 36000; 180000; 114000 [USP'U]/1; [USP'U]/1; [USP'U]/1
CAPSULE, DELAYED RELEASE PELLETS ORAL
Qty: 540 | Refills: 6 | Status: COMPLETED
Start: 2021-08-17 | End: 2023-09-05

## 2021-11-15 RX ORDER — COLESTIPOL HYDROCHLORIDE 1 G/1
TABLET, FILM COATED ORAL
Qty: 60 | Refills: 11 | Status: ACTIVE

## 2021-11-22 ENCOUNTER — LAB (OUTPATIENT)
Dept: LAB | Facility: HOSPITAL | Age: 61
End: 2021-11-22

## 2021-11-22 DIAGNOSIS — I25.10 CHRONIC CORONARY ARTERY DISEASE: Chronic | ICD-10-CM

## 2021-11-22 DIAGNOSIS — D64.9 ANEMIA, UNSPECIFIED TYPE: ICD-10-CM

## 2021-11-22 DIAGNOSIS — E03.9 ACQUIRED HYPOTHYROIDISM: Chronic | ICD-10-CM

## 2021-11-22 LAB
ALT SERPL W P-5'-P-CCNC: 19 U/L (ref 1–33)
ANION GAP SERPL CALCULATED.3IONS-SCNC: 10.9 MMOL/L (ref 5–15)
BASOPHILS # BLD AUTO: 0.09 10*3/MM3 (ref 0–0.2)
BASOPHILS NFR BLD AUTO: 0.9 % (ref 0–1.5)
BUN SERPL-MCNC: 23 MG/DL (ref 8–23)
BUN/CREAT SERPL: 21.7 (ref 7–25)
CALCIUM SPEC-SCNC: 8.9 MG/DL (ref 8.6–10.5)
CHLORIDE SERPL-SCNC: 106 MMOL/L (ref 98–107)
CHOLEST SERPL-MCNC: 187 MG/DL (ref 0–200)
CO2 SERPL-SCNC: 24.1 MMOL/L (ref 22–29)
CREAT SERPL-MCNC: 1.06 MG/DL (ref 0.57–1)
DEPRECATED RDW RBC AUTO: 45.5 FL (ref 37–54)
EOSINOPHIL # BLD AUTO: 0.39 10*3/MM3 (ref 0–0.4)
EOSINOPHIL NFR BLD AUTO: 4 % (ref 0.3–6.2)
ERYTHROCYTE [DISTWIDTH] IN BLOOD BY AUTOMATED COUNT: 12.7 % (ref 12.3–15.4)
GFR SERPL CREATININE-BSD FRML MDRD: 52 ML/MIN/1.73
GLUCOSE SERPL-MCNC: 100 MG/DL (ref 65–99)
HCT VFR BLD AUTO: 39.5 % (ref 34–46.6)
HDLC SERPL-MCNC: 69 MG/DL (ref 40–60)
HGB BLD-MCNC: 12.7 G/DL (ref 12–15.9)
IMM GRANULOCYTES # BLD AUTO: 0.06 10*3/MM3 (ref 0–0.05)
IMM GRANULOCYTES NFR BLD AUTO: 0.6 % (ref 0–0.5)
LDLC SERPL CALC-MCNC: 93 MG/DL (ref 0–100)
LDLC/HDLC SERPL: 1.29 {RATIO}
LYMPHOCYTES # BLD AUTO: 4.05 10*3/MM3 (ref 0.7–3.1)
LYMPHOCYTES NFR BLD AUTO: 41.2 % (ref 19.6–45.3)
MCH RBC QN AUTO: 31.1 PG (ref 26.6–33)
MCHC RBC AUTO-ENTMCNC: 32.2 G/DL (ref 31.5–35.7)
MCV RBC AUTO: 96.6 FL (ref 79–97)
MONOCYTES # BLD AUTO: 0.74 10*3/MM3 (ref 0.1–0.9)
MONOCYTES NFR BLD AUTO: 7.5 % (ref 5–12)
NEUTROPHILS NFR BLD AUTO: 4.49 10*3/MM3 (ref 1.7–7)
NEUTROPHILS NFR BLD AUTO: 45.8 % (ref 42.7–76)
NRBC BLD AUTO-RTO: 0.1 /100 WBC (ref 0–0.2)
PLATELET # BLD AUTO: 313 10*3/MM3 (ref 140–450)
PMV BLD AUTO: 10.1 FL (ref 6–12)
POTASSIUM SERPL-SCNC: 4 MMOL/L (ref 3.5–5.2)
RBC # BLD AUTO: 4.09 10*6/MM3 (ref 3.77–5.28)
SODIUM SERPL-SCNC: 141 MMOL/L (ref 136–145)
TRIGL SERPL-MCNC: 144 MG/DL (ref 0–150)
TSH SERPL DL<=0.05 MIU/L-ACNC: 17.8 UIU/ML (ref 0.27–4.2)
VLDLC SERPL-MCNC: 25 MG/DL (ref 5–40)
WBC NRBC COR # BLD: 9.82 10*3/MM3 (ref 3.4–10.8)

## 2021-11-22 PROCEDURE — 84443 ASSAY THYROID STIM HORMONE: CPT

## 2021-11-22 PROCEDURE — 36415 COLL VENOUS BLD VENIPUNCTURE: CPT

## 2021-11-22 PROCEDURE — 85025 COMPLETE CBC W/AUTO DIFF WBC: CPT

## 2021-11-22 PROCEDURE — 80061 LIPID PANEL: CPT

## 2021-11-22 PROCEDURE — 80048 BASIC METABOLIC PNL TOTAL CA: CPT

## 2021-11-22 PROCEDURE — 84460 ALANINE AMINO (ALT) (SGPT): CPT

## 2021-12-15 DIAGNOSIS — E03.9 ACQUIRED HYPOTHYROIDISM: Primary | ICD-10-CM

## 2021-12-15 RX ORDER — LEVOTHYROXINE SODIUM 0.2 MG/1
200 TABLET ORAL DAILY
Qty: 90 TABLET | Refills: 1 | Status: SHIPPED | OUTPATIENT
Start: 2021-12-15 | End: 2022-06-01 | Stop reason: SDUPTHER

## 2021-12-20 ENCOUNTER — OFFICE VISIT (OUTPATIENT)
Dept: CARDIOLOGY | Facility: CLINIC | Age: 61
End: 2021-12-20

## 2021-12-20 VITALS
WEIGHT: 236.5 LBS | HEART RATE: 53 BPM | OXYGEN SATURATION: 96 % | HEIGHT: 65 IN | DIASTOLIC BLOOD PRESSURE: 75 MMHG | BODY MASS INDEX: 39.4 KG/M2 | SYSTOLIC BLOOD PRESSURE: 114 MMHG

## 2021-12-20 DIAGNOSIS — E66.9 OBESITY (BMI 30-39.9): ICD-10-CM

## 2021-12-20 DIAGNOSIS — R07.2 PRECORDIAL PAIN: Primary | ICD-10-CM

## 2021-12-20 DIAGNOSIS — I25.118 CORONARY ARTERY DISEASE OF NATIVE ARTERY OF NATIVE HEART WITH STABLE ANGINA PECTORIS (HCC): ICD-10-CM

## 2021-12-20 DIAGNOSIS — R00.1 BRADYCARDIA, SINUS: ICD-10-CM

## 2021-12-20 DIAGNOSIS — I47.1 PAROXYSMAL ATRIAL TACHYCARDIA (HCC): ICD-10-CM

## 2021-12-20 DIAGNOSIS — R06.09 DYSPNEA ON EXERTION: ICD-10-CM

## 2021-12-20 PROCEDURE — 93000 ELECTROCARDIOGRAM COMPLETE: CPT | Performed by: INTERNAL MEDICINE

## 2021-12-20 PROCEDURE — 99214 OFFICE O/P EST MOD 30 MIN: CPT | Performed by: INTERNAL MEDICINE

## 2021-12-20 RX ORDER — CHLORHEXIDINE GLUCONATE 0.12 MG/ML
RINSE ORAL
COMMUNITY
Start: 2021-11-13

## 2021-12-20 RX ORDER — SUVOREXANT 20 MG/1
TABLET, FILM COATED ORAL
COMMUNITY
Start: 2021-11-17 | End: 2022-05-09 | Stop reason: SDUPTHER

## 2021-12-20 RX ORDER — VENLAFAXINE HYDROCHLORIDE 75 MG/1
TABLET, EXTENDED RELEASE ORAL
COMMUNITY
Start: 2021-12-16 | End: 2022-05-09 | Stop reason: SDUPTHER
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
## 2022-01-03 ENCOUNTER — HOSPITAL ENCOUNTER (OUTPATIENT)
Dept: CARDIOLOGY | Facility: HOSPITAL | Age: 62
Discharge: HOME | End: 2022-01-03

## 2022-01-03 DIAGNOSIS — I47.1 PAROXYSMAL ATRIAL TACHYCARDIA: ICD-10-CM

## 2022-01-03 DIAGNOSIS — R00.1 BRADYCARDIA, SINUS: ICD-10-CM

## 2022-01-03 DIAGNOSIS — E66.9 OBESITY (BMI 30-39.9): ICD-10-CM

## 2022-01-03 DIAGNOSIS — R07.2 PRECORDIAL PAIN: ICD-10-CM

## 2022-01-03 DIAGNOSIS — R06.09 DYSPNEA ON EXERTION: ICD-10-CM

## 2022-01-03 DIAGNOSIS — I25.118 CORONARY ARTERY DISEASE OF NATIVE ARTERY OF NATIVE HEART WITH STABLE ANGINA PECTORIS: ICD-10-CM

## 2022-01-03 LAB
BH CV REST NUCLEAR ISOTOPE DOSE: 10.9 MCI
BH CV STRESS BP STAGE 1: NORMAL
BH CV STRESS COMMENTS STAGE 1: NORMAL
BH CV STRESS DOSE REGADENOSON STAGE 1: 0.4
BH CV STRESS DURATION MIN STAGE 1: 0
BH CV STRESS DURATION SEC STAGE 1: 10
BH CV STRESS HR STAGE 1: 62
BH CV STRESS NUCLEAR ISOTOPE DOSE: 31.4 MCI
BH CV STRESS PROTOCOL 1: NORMAL
BH CV STRESS RECOVERY BP: NORMAL MMHG
BH CV STRESS RECOVERY HR: 72 BPM
BH CV STRESS STAGE 1: 1
MAXIMAL PREDICTED HEART RATE: 157 BPM
STRESS BASELINE BP: NORMAL MMHG
STRESS BASELINE HR: 62 BPM
STRESS TARGET HR: 133 BPM

## 2022-01-03 PROCEDURE — 93016 CV STRESS TEST SUPVJ ONLY: CPT | Performed by: NURSE PRACTITIONER

## 2022-01-03 PROCEDURE — A9500 TC99M SESTAMIBI: HCPCS | Performed by: INTERNAL MEDICINE

## 2022-01-03 PROCEDURE — 78452 HT MUSCLE IMAGE SPECT MULT: CPT | Performed by: INTERNAL MEDICINE

## 2022-01-03 PROCEDURE — 0 TECHNETIUM SESTAMIBI: Performed by: INTERNAL MEDICINE

## 2022-01-03 PROCEDURE — 25010000002 REGADENOSON 0.4 MG/5ML SOLUTION: Performed by: INTERNAL MEDICINE

## 2022-01-03 PROCEDURE — 93017 CV STRESS TEST TRACING ONLY: CPT

## 2022-01-03 PROCEDURE — 93018 CV STRESS TEST I&R ONLY: CPT | Performed by: INTERNAL MEDICINE

## 2022-01-03 PROCEDURE — 78452 HT MUSCLE IMAGE SPECT MULT: CPT

## 2022-01-03 RX ADMIN — TECHNETIUM TC 99M SESTAMIBI 1 DOSE: 1 INJECTION INTRAVENOUS at 12:35

## 2022-01-03 RX ADMIN — REGADENOSON 0.4 MG: 0.08 INJECTION, SOLUTION INTRAVENOUS at 14:40

## 2022-01-03 RX ADMIN — TECHNETIUM TC 99M SESTAMIBI 1 DOSE: 1 INJECTION INTRAVENOUS at 14:40

## 2022-02-11 RX ORDER — OXYBUTYNIN CHLORIDE 5 MG/1
TABLET ORAL
Qty: 270 TABLET | Refills: 1 | Status: SHIPPED | OUTPATIENT
Start: 2022-02-11 | End: 2022-08-31 | Stop reason: SDUPTHER

## 2022-04-01 ENCOUNTER — TELEPHONE (OUTPATIENT)
Dept: FAMILY MEDICINE CLINIC | Facility: CLINIC | Age: 62
End: 2022-04-01

## 2022-04-01 DIAGNOSIS — S03.40XA SPRAIN OF TEMPOROMANDIBULAR JOINT, INITIAL ENCOUNTER: Primary | ICD-10-CM

## 2022-04-01 NOTE — TELEPHONE ENCOUNTER
Law Rivera,  ROSALIA wrote the order.  Please forward it to the appropriate facility.  Thanks, Doc

## 2022-04-01 NOTE — TELEPHONE ENCOUNTER
Caller: Cassie Winter    Relationship: Self    Best call back number: [phone] AFTER 1:30      What is the provider, practice or medical service name: [facility]     What is the office location: [address], [place], [place]    What is the office phone number: [phone]    Any additional details: SHE HAS TMJ AND NEEDS A REFERRAL TO PHYSICAL THERAPY.  HER DENTIST SENT ONE BUT THEY ADVISED THAT UNLESS DR. CARRASQUILLO SENT ONE INSURANCE WOULDN'T COVER IT.

## 2022-04-28 ENCOUNTER — TREATMENT (OUTPATIENT)
Dept: PHYSICAL THERAPY | Facility: CLINIC | Age: 62
End: 2022-04-28

## 2022-04-28 DIAGNOSIS — M26.629 TMJ SYNDROME: Primary | ICD-10-CM

## 2022-04-28 PROCEDURE — 97162 PT EVAL MOD COMPLEX 30 MIN: CPT | Performed by: PHYSICAL THERAPIST

## 2022-04-28 NOTE — PROGRESS NOTES
Physical Therapy Initial Evaluation and Plan of Care    Patient: Cassie Winter   : 1960  Diagnosis/ICD-10 Code:  TMJ syndrome [M26.629]  Referring practitioner: Cristopher Garcia Jr., MD  Date of Initial Visit: 2022  Today's Date: 2022  Patient seen for 1 sessions           Subjective Questionnaire: TDD (Temporomandibular Disorder Disability) index: 19 (severe dysfunction)      Subjective Evaluation    History of Present Illness  Onset date: 2022 - ear and TMJ.  Mechanism of injury: 60 y/o female referred with c/o pain in TMJ's, ears and HA's (frontal region); mild neck pain and restricted movements; tingling around TMJ and stabbing sensation B; occasional tinnitus (years): (R>L).     Seen by Dentist and PCP: both diagnosed with TMJ B. Did not prescribe night splint/guard. Wears dentures on both top and bottom of mouth. Seems to fit well with no decrease in pain with removal at night. History of clinching teeth, but unsure if she does it anymore.    Next Dental appt: not scheduled.    Worse: laying head down and at night; chewing hard foods; yawning; opening mouth; work and talking.    Better: nothing as yet    PMH: reviewed in Epic.       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 Pain  Current pain ratin  At best pain rating: 3  At worst pain rating: 10  Location: B TMJ and cervical spine  Quality: discomfort, dull ache, sharp and radiating  Alleviating factors: nothing.  Progression: worsening    Social Support  Lives in: apartment (no steps)    Diagnostic Tests  CT scan: abnormal (multilevel degenerative changes in cervical spine)    Patient Goals  Patient goals for therapy: decreased pain and increased motion             Objective        Special Questions  Patient is experiencing headaches and tinnitus.     Additional Special Questions  TMJ and hx of cervical pain; tinnitus.      Postural Observations  Seated posture: fair  Standing posture:  fair    Additional Postural Observation Details  FHP with rounded shoulders; slow and guarded with gait and transfers.     Tenderness     Additional Tenderness Details  TMJ B and internally B (dentures present and block palpation); upper and mid-cervical soft tissue/UT's     Neurological Testing     Sensation   Cervical/Thoracic   Left   Paresthesia: light touch    Right   Diminished: light touch    Reflexes   Left   Biceps (C5/C6): absent (0)  Brachioradialis (C6): absent (0)  Triceps (C7): absent (0)    Right   Biceps (C5/C6): absent (0)  Brachioradialis (C6): absent (0)  Triceps (C7): absent (0)    Additional Neurological Details  B fascial region at TMJ    Active Range of Motion   Cervical/Thoracic Spine   Cervical    Flexion: Neck active flexion: R TMJ. WFL and with pain  Extension: 36 (posterior UT's) degrees with pain  Left lateral flexion: 28 degrees   Right lateral flexion: 31 degrees   Left rotation: 15 degrees   Right rotation: 47 degrees     Additional Active Range of Motion Details  AROM TMJ: Pain with opening and lateral movements  Protraction: 5 mm  Retraction: 3 mm  R lateral deviation: 6 mm  L lateral deviation: 6 mm  Openin mm    AROM: B UE's limited and painful > 90 degrees elevation and exhibits min limitations with IR/ER functionally    [place]: (+) B same side  Weight bearing test: (+) B    BMI: 40.5    Strength/Myotome Testing     Additional Strength Details  Grossly 3+/5 or greater B UE's          Assessment & Plan     Assessment  Impairments: abnormal muscle firing, activity intolerance, lacks appropriate home exercise program and pain with function  Other impairment: chewing; opending and clossing mouth; sleeping; ROM of RMJ for functional activities.    Assessment details: Patient presents with symptoms and clinical presentation consistent with TMD B and upper/mid-cervical dysfunction that contributes to pain and symptoms: she is appropriate for PT and has the potential to benefit from  therapy.        Barriers to therapy: complex medical history  Prognosis: good    Goals  Plan Goals: STG's: 2 weeks  1. Decrease pain with chewing, yawning and opening mouth > 25% or more.  2. Independent with initial HEP and able to tolerate progression.   3. Able to open mouth with improved ROM and decreased crepitus.    LTG's by d/c  1. Outcome measure to show significant improvement.  2. ROM of TMJ WNL's without pain.  3. Good understanding of importance of managing condition and able to correct exacerbating activities.  4. Pain B jaws reduced > 75% with yawning, chewing food and opening mouth for functional activities.      Plan  Therapy options: will be seen for skilled therapy services  Planned modality interventions: ultrasound, iontophoresis, electrical stimulation/Russian stimulation and dry needling  Planned therapy interventions: joint mobilization, home exercise program, functional ROM exercises, flexibility, postural training, neuromuscular re-education, soft tissue mobilization, strengthening, stretching, therapeutic activities and body mechanics training  Frequency: 2x week  Duration in visits: 10  Treatment plan discussed with: patient        History # of Personal Factors and/or Comorbidities: MODERATE (1-2)  Examination of Body System(s): # of elements: MODERATE (3)  Clinical Presentation: UNSTABLE   Clinical Decision Making: MODERATE      Timed:         Manual Therapy:         mins  81512;     Therapeutic Exercise:         mins  33392;     Neuromuscular Mele:        mins  69251;    Therapeutic Activity:          mins  09546;     Gait Training:          mins  54884;     Ultrasound:          mins  51643;    Ionto                                   mins   55287  Self Care                            mins   09448  Aquatic                               mins 45169      Un-Timed:  Electrical Stimulation:        mins  47641 ( );  Dry Needling          mins self-pay  Traction          mins 40896  Low  Eval          Mins  40717  Mod Eval     45     Mins  11068  High Eval                            Mins  90210  Re-Eval                               mins  23062        Timed Treatment:      mins   Total Treatment:     45   mins    PT SIGNATURE: Calvin Tesfaye PT   [place] License#: [id number]       Electronically signed by Calvin Tesfaye PT, 04/28/22, 3:24 PM EDT      Initial Certification  Certification Period:  4/28/2022 thru 7/26/2022  I certify that the therapy services are furnished while this patient is under my care.  The services outlined above are required by this patient, and will be reviewed every 90 days.       Physician Signature:__________________________________________________    PHYSICIAN: Cristopher Garcia Jr., MD      DATE:     Please sign and return via fax to [phone].. Thank you, [facility].

## 2022-05-02 RX ORDER — METOPROLOL SUCCINATE 25 MG/1
25 TABLET, EXTENDED RELEASE ORAL DAILY
Qty: 90 TABLET | Refills: 1 | Status: SHIPPED | OUTPATIENT
Start: 2022-05-02 | End: 2022-06-06

## 2022-05-02 NOTE — TELEPHONE ENCOUNTER
Rx Refill Note  Requested Prescriptions     Pending Prescriptions Disp Refills   • metoprolol succinate XL (TOPROL-XL) 25 MG 24 hr tablet [Pharmacy Med Name: METOPROLOL ER SUCCINATE 25MG TABS] 90 tablet 0     Sig: TAKE 1 TABLET BY MOUTH DAILY      Last office visit with prescribing clinician: 12/20/2021      Next office visit with prescribing clinician: 12/12/2022            Shawna Cruz MA  05/02/22, 08:22 EDT

## 2022-05-09 ENCOUNTER — OFFICE VISIT (OUTPATIENT)
Dept: FAMILY MEDICINE CLINIC | Facility: CLINIC | Age: 62
End: 2022-05-09

## 2022-05-09 ENCOUNTER — LAB (OUTPATIENT)
Dept: LAB | Facility: HOSPITAL | Age: 62
End: 2022-05-09

## 2022-05-09 VITALS
OXYGEN SATURATION: 97 % | DIASTOLIC BLOOD PRESSURE: 84 MMHG | SYSTOLIC BLOOD PRESSURE: 138 MMHG | WEIGHT: 253 LBS | HEIGHT: 65 IN | HEART RATE: 61 BPM | BODY MASS INDEX: 42.15 KG/M2 | TEMPERATURE: 96.8 F

## 2022-05-09 DIAGNOSIS — E03.9 ACQUIRED HYPOTHYROIDISM: Primary | ICD-10-CM

## 2022-05-09 DIAGNOSIS — N32.81 OVERACTIVE BLADDER: ICD-10-CM

## 2022-05-09 DIAGNOSIS — F33.41 RECURRENT MAJOR DEPRESSIVE DISORDER, IN PARTIAL REMISSION: Chronic | ICD-10-CM

## 2022-05-09 DIAGNOSIS — M26.629 TMJ SYNDROME: ICD-10-CM

## 2022-05-09 DIAGNOSIS — E53.8 B12 DEFICIENCY: Chronic | ICD-10-CM

## 2022-05-09 DIAGNOSIS — J45.20 MILD INTERMITTENT ASTHMA, UNSPECIFIED WHETHER COMPLICATED: Chronic | ICD-10-CM

## 2022-05-09 DIAGNOSIS — E03.9 ACQUIRED HYPOTHYROIDISM: ICD-10-CM

## 2022-05-09 DIAGNOSIS — K58.2 IRRITABLE BOWEL SYNDROME WITH BOTH CONSTIPATION AND DIARRHEA: Chronic | ICD-10-CM

## 2022-05-09 DIAGNOSIS — I25.10 CHRONIC CORONARY ARTERY DISEASE: ICD-10-CM

## 2022-05-09 DIAGNOSIS — J30.9 ALLERGIC RHINITIS, UNSPECIFIED SEASONALITY, UNSPECIFIED TRIGGER: Chronic | ICD-10-CM

## 2022-05-09 PROBLEM — Z96.1 BILATERAL PSEUDOPHAKIA: Status: ACTIVE | Noted: 2018-06-04

## 2022-05-09 PROBLEM — H43.819 POSTERIOR VITREOUS DETACHMENT: Status: RESOLVED | Noted: 2017-03-27 | Resolved: 2022-05-09

## 2022-05-09 PROBLEM — H43.819 POSTERIOR VITREOUS DETACHMENT: Status: ACTIVE | Noted: 2017-03-27

## 2022-05-09 PROBLEM — H43.399 VITREOUS FLOATERS: Status: ACTIVE | Noted: 2018-06-04

## 2022-05-09 PROBLEM — H43.399 VITREOUS FLOATERS: Status: RESOLVED | Noted: 2018-06-04 | Resolved: 2022-05-09

## 2022-05-09 PROBLEM — Z96.1 BILATERAL PSEUDOPHAKIA: Status: RESOLVED | Noted: 2018-06-04 | Resolved: 2022-05-09

## 2022-05-09 LAB
ANION GAP SERPL CALCULATED.3IONS-SCNC: 10 MMOL/L (ref 5–15)
BUN SERPL-MCNC: 14 MG/DL (ref 8–23)
BUN/CREAT SERPL: 12.8 (ref 7–25)
CALCIUM SPEC-SCNC: 9.6 MG/DL (ref 8.6–10.5)
CHLORIDE SERPL-SCNC: 107 MMOL/L (ref 98–107)
CO2 SERPL-SCNC: 23 MMOL/L (ref 22–29)
CREAT SERPL-MCNC: 1.09 MG/DL (ref 0.57–1)
EGFRCR SERPLBLD CKD-EPI 2021: 57.2 ML/MIN/1.73
GLUCOSE SERPL-MCNC: 82 MG/DL (ref 65–99)
POTASSIUM SERPL-SCNC: 5.1 MMOL/L (ref 3.5–5.2)
SODIUM SERPL-SCNC: 140 MMOL/L (ref 136–145)
T4 FREE SERPL-MCNC: 1.48 NG/DL (ref 0.93–1.7)
TSH SERPL DL<=0.05 MIU/L-ACNC: 5.84 UIU/ML (ref 0.27–4.2)

## 2022-05-09 PROCEDURE — 84443 ASSAY THYROID STIM HORMONE: CPT

## 2022-05-09 PROCEDURE — 36415 COLL VENOUS BLD VENIPUNCTURE: CPT

## 2022-05-09 PROCEDURE — 99214 OFFICE O/P EST MOD 30 MIN: CPT | Performed by: FAMILY MEDICINE

## 2022-05-09 PROCEDURE — 84439 ASSAY OF FREE THYROXINE: CPT

## 2022-05-09 PROCEDURE — 80048 BASIC METABOLIC PNL TOTAL CA: CPT

## 2022-05-09 RX ORDER — SUVOREXANT 20 MG/1
1 TABLET, FILM COATED ORAL NIGHTLY PRN
Qty: 30 TABLET | Refills: 2 | Status: SHIPPED | OUTPATIENT
Start: 2022-05-09 | End: 2022-08-11 | Stop reason: SDUPTHER

## 2022-05-09 RX ORDER — VENLAFAXINE HYDROCHLORIDE 75 MG/1
75 TABLET, EXTENDED RELEASE ORAL
Qty: 30 EACH | Refills: 5 | Status: SHIPPED | OUTPATIENT
Start: 2022-05-09 | End: 2022-09-29 | Stop reason: SDUPTHER

## 2022-05-09 RX ORDER — BREXPIPRAZOLE 2 MG/1
2 TABLET ORAL DAILY
Qty: 30 TABLET | Refills: 5 | Status: SHIPPED | OUTPATIENT
Start: 2022-05-09 | End: 2022-10-25 | Stop reason: SDUPTHER

## 2022-05-09 RX ORDER — ALBUTEROL SULFATE 90 UG/1
2 AEROSOL, METERED RESPIRATORY (INHALATION) EVERY 4 HOURS PRN
Qty: 8 G | Refills: 5 | Status: SHIPPED | OUTPATIENT
Start: 2022-05-09 | End: 2022-08-17 | Stop reason: SDUPTHER

## 2022-05-09 RX ORDER — VENLAFAXINE HYDROCHLORIDE 150 MG/1
150 CAPSULE, EXTENDED RELEASE ORAL DAILY
Qty: 30 CAPSULE | Refills: 5 | Status: SHIPPED | OUTPATIENT
Start: 2022-05-09 | End: 2022-09-29 | Stop reason: SDUPTHER

## 2022-05-09 NOTE — PATIENT INSTRUCTIONS
Continue your current medications and treatment.    Have the follow up labs done  and call for results.    Follow up in the office in 6 months.

## 2022-05-09 NOTE — PROGRESS NOTES
Subjective   Cassie Winter is a 61 y.o. female.     Chief Complaint   Patient presents with   • Depression   • Hypothyroidism     6 month f/u       HPI  Chief complaint: Allergic rhinitis/asthma hypothyroidism depression pancreatic insufficiency    Patient is a 61-year-old white female comes in for follow-up and maintenance of her current problems which include    1. Allergic rhinitis/asthma-deteriorated-the patient is on Singulair 10 mg daily.  She is post to be on Breo 200/25 mcg 1 puff twice a day Spiriva 18 mcg 1 puff daily rescue inhaler.  She ran out of her medications because she could not get into see her pulmonologist.  Patient does have cough.  She would like to resume her medications.    2.  Depression-deteriorated the patient has history of moderately severe recurrent unipolar depression.  Patient is currently on Effexor 225 mg once a day Rexulti 2 mg daily and Belsomra 20 mg at night for sleep.  States it helps her depression.  She however missed her appointment with her psychiatrist.  She needs refills on her meds.    3.  TMJ syndrome-new-patient has pain in both temporomandibular joints.  Patient is scheduled for physical therapy.  She states that it hurts in both TMJ areas when she tries to chew.    4.  B12 deficiency-stable-patient on B12 replacement therapy.    5.  Irritable bowel syndrome-stable-patient is currently on Colestid 1 g twice a day and dicyclomine 20 mg every 12 hours.  States it helps to control her symptoms.    6.  Hypothyroidism-stable-patient on Synthroid 0.2 mg daily.  She denied heat or cold intolerance tremor weight gain or weight loss.    7.  Irritable bladder syndrome-stable after patient on Ditropan 5 mg 3 times a day.        The following portions of the patient's history were reviewed and updated as appropriate: allergies, current medications, past family history, past medical history, past social history, past surgical history and problem list.    Review of  "Systems    Objective     /84 (BP Location: Left arm, Patient Position: Sitting, Cuff Size: Adult)   Pulse 61   Temp 96.8 °F (36 °C) (Infrared)   Ht 165.1 cm (65\")   Wt 115 kg (253 lb)   SpO2 97%   BMI 42.10 kg/m²     Physical Exam      Assessment/Plan   Diagnoses and all orders for this visit:    1. Acquired hypothyroidism (Primary)  -     TSH; Future  -     T4, free; Future    2. Chronic coronary artery disease  -     Basic Metabolic Panel; Future    3. Allergic rhinitis, unspecified seasonality, unspecified trigger    4. TMJ syndrome    5. B12 deficiency    6. Irritable bowel syndrome with both constipation and diarrhea    7. Recurrent major depressive disorder, in partial remission (HCC)    8. Mild intermittent asthma, unspecified whether complicated    9. Overactive bladder    Other orders  -     Rexulti 2 MG tablet; Take 1 tablet by mouth Daily.  Dispense: 30 tablet; Refill: 5  -     venlafaxine XR (Effexor XR) 150 MG 24 hr capsule; Take 1 capsule by mouth Daily.  Dispense: 30 capsule; Refill: 5  -     venlafaxine 75 MG tablet sustained-release 24 hour 24 hr tablet; Take 1 tablet by mouth Daily With Breakfast.  Dispense: 30 each; Refill: 5      Patient Instructions   Continue your current medications and treatment.    Have the follow up labs done  and call for results.    Follow up in the office in 6 months.      Cristopher Garcia Jr., MD    05/09/22  "

## 2022-05-13 ENCOUNTER — TREATMENT (OUTPATIENT)
Dept: PHYSICAL THERAPY | Facility: CLINIC | Age: 62
End: 2022-05-13

## 2022-05-13 DIAGNOSIS — M26.629 TMJ SYNDROME: Primary | ICD-10-CM

## 2022-05-13 PROCEDURE — 97035 APP MDLTY 1+ULTRASOUND EA 15: CPT | Performed by: PHYSICAL THERAPIST

## 2022-05-13 PROCEDURE — 97140 MANUAL THERAPY 1/> REGIONS: CPT | Performed by: PHYSICAL THERAPIST

## 2022-05-13 PROCEDURE — 97110 THERAPEUTIC EXERCISES: CPT | Performed by: PHYSICAL THERAPIST

## 2022-05-13 NOTE — PROGRESS NOTES
Physical Therapy Daily Progress Note     Diagnosis Plan   1. TMJ syndrome         VISIT#: 2    Subjective   Cassie Winter reports: not quite as bad this week; still stabbing in ears.      Objective     See Exercise, Manual, and Modality Logs for complete treatment.     Patient Education: issued HEP  Access Code: [email or web address]  URL: [email or web address]  Date: 05/13/2022  Prepared by: Baldemar Tesfaye    Exercises  Tongue Clicks TMJ - 2 x daily - 7 x weekly - 1 sets - 10 reps  Isometric Jaw Deviation - 2 x daily - 7 x weekly - 1 sets - 10 reps  Seated Cervical Rotation AROM - 1 x daily - 7 x weekly - 3 sets - 10 reps    Assessment/Plan - less pain and symptoms noted today with ROM; tenderness at TMJ with distraction, but better after.     Progress per Plan of Care            Timed:         Manual Therapy:   15      mins  10674;     Therapeutic Exercise:    15     mins  92020;     Neuromuscular Mele:        mins  78199;    Therapeutic Activity:          mins  99652;     Gait Training:           mins  20443;     Ultrasound:     10     mins  51126;    Ionto                                   mins   01498  Self Care                            mins   30536  Canalith Repos                   mins  4209  Aquatic                               mins 99820    Un-Timed:  Electrical Stimulation:         mins  77354 ( );  Dry Needling          mins self-pay  Traction          mins 47793  Low Eval         Mins  59996  Mod Eval          Mins  88728  High Eval                            Mins  49658  Re-Eval                               mins  80696    Timed Treatment:   40   mins   Total Treatment:     40   mins    Calvin Tesfaye, PT PT, DPT, [id number]

## 2022-05-18 RX ORDER — MONTELUKAST SODIUM 10 MG/1
10 TABLET ORAL NIGHTLY
Qty: 30 TABLET | Refills: 0 | OUTPATIENT
Start: 2022-05-18

## 2022-05-19 ENCOUNTER — TREATMENT (OUTPATIENT)
Dept: PHYSICAL THERAPY | Facility: CLINIC | Age: 62
End: 2022-05-19

## 2022-05-19 DIAGNOSIS — M26.629 TMJ SYNDROME: Primary | ICD-10-CM

## 2022-05-19 PROCEDURE — 97035 APP MDLTY 1+ULTRASOUND EA 15: CPT | Performed by: PHYSICAL THERAPIST

## 2022-05-19 PROCEDURE — 97140 MANUAL THERAPY 1/> REGIONS: CPT | Performed by: PHYSICAL THERAPIST

## 2022-05-19 PROCEDURE — 97110 THERAPEUTIC EXERCISES: CPT | Performed by: PHYSICAL THERAPIST

## 2022-05-19 NOTE — PROGRESS NOTES
Physical Therapy Daily Progress Note     Diagnosis Plan   1. TMJ syndrome         VISIT#: 3    Subjective   Cassie Winter reports:       Objective     See Exercise, Manual, and Modality Logs for complete treatment.     Patient Education: updated HEP    Access Code: [email or web address]  URL: [email or web address]  Date: 05/19/2022  Prepared by: Baldemar Tesfaye    Exercises  Sub-Occipital Cervical Stretch - 2 x daily - 7 x weekly - 1 sets - 10 reps - 5 sec - 10sec hold  Supine Cervical Rotation AROM on Pillow - 2 x daily - 7 x weekly - 1 sets - 10 reps  Supine Cervical Retraction with Towel - 2 x daily - 7 x weekly - 1 sets - 10 reps    Assessment/Plan - less tenderness at TMJ (R>L): updated HEP and continued current POC. R TMJ more hypomobile with mobs than L.    Progress per Plan of Care            Timed:         Manual Therapy:   15      mins  84200;     Therapeutic Exercise:    20     mins  92231;     Neuromuscular Mele:        mins  12946;    Therapeutic Activity:          mins  56259;     Gait Training:           mins  77840;     Ultrasound:     8     mins  74118;    Ionto                                   mins   04908  Self Care                            mins   47103  Canalith Repos                   mins  4209  Aquatic                               mins 62584    Un-Timed:  Electrical Stimulation:         mins  11293 ( );  Dry Needling          mins self-pay  Traction          mins 95913  Low Eval         Mins  73001  Mod Eval          Mins  57353  High Eval                            Mins  47727  Re-Eval                               mins  33496    Timed Treatment:   43   mins   Total Treatment:     43   mins    Calvin Tesfaye, PT PT, DPT, [id number]

## 2022-05-20 RX ORDER — MONTELUKAST SODIUM 10 MG/1
10 TABLET ORAL NIGHTLY
Qty: 30 TABLET | Refills: 0 | Status: SHIPPED | OUTPATIENT
Start: 2022-05-20 | End: 2022-05-25 | Stop reason: SDUPTHER

## 2022-05-20 NOTE — TELEPHONE ENCOUNTER
Caller: Cassie Winter    Relationship: Self    Best call back number: [phone] (H)    Requested Prescriptions:   Requested Prescriptions     Pending Prescriptions Disp Refills   • montelukast (SINGULAIR) 10 MG tablet 30 tablet 0     Sig: Take 1 tablet by mouth Every Night. Patient due for an appointment        Pharmacy where request should be sent: [id number] - [place], [place] - [address] AT [address] [phone] [facility] [phone] FX     Additional details provided by patient: COMPLETELY OUT     Does the patient have less than a 3 day supply:  [x] Yes  [] No    Dena Hopson, PCT   05/20/22 13:46 EDT

## 2022-05-24 ENCOUNTER — TREATMENT (OUTPATIENT)
Dept: PHYSICAL THERAPY | Facility: CLINIC | Age: 62
End: 2022-05-24

## 2022-05-24 DIAGNOSIS — M26.629 TMJ SYNDROME: Primary | ICD-10-CM

## 2022-05-24 PROCEDURE — 97035 APP MDLTY 1+ULTRASOUND EA 15: CPT | Performed by: PHYSICAL THERAPIST

## 2022-05-24 PROCEDURE — 97140 MANUAL THERAPY 1/> REGIONS: CPT | Performed by: PHYSICAL THERAPIST

## 2022-05-24 PROCEDURE — 97110 THERAPEUTIC EXERCISES: CPT | Performed by: PHYSICAL THERAPIST

## 2022-05-24 NOTE — PROGRESS NOTES
"Physical Therapy Daily Progress Note     Diagnosis Plan   1. TMJ syndrome         VISIT#: 4    Subjective   Cassie Winter reports: little twinges now and then since last treatment until last night: had meal with \"chewy\"  ham which aggravated TMJ (R>L) - interrupted sleep last night.      Objective     See Exercise, Manual, and Modality Logs for complete treatment.     Patient Education: advised to consider finding a Dentist who specializes in TMJ.    Assessment/Plan - manual distraction reduces pain; patient tends to guard and clinch during treatment and at rest.    Progress per Plan of Care            Timed:         Manual Therapy:   15      mins  64539;     Therapeutic Exercise:    20     mins  38119;     Neuromuscular Mele:        mins  38663;    Therapeutic Activity:          mins  99708;     Gait Training:           mins  05690;     Ultrasound:     8     mins  11178;    Ionto                                   mins   53322  Self Care                            mins   74071  Canalith Repos                   mins  4209  Aquatic                               mins 30589    Un-Timed:  Electrical Stimulation:         mins  45126 ( );  Dry Needling          mins self-pay  Traction          mins 49368  Low Eval         Mins  74403  Mod Eval          Mins  63472  High Eval                            Mins  92589  Re-Eval                               mins  00517    Timed Treatment:   43   mins   Total Treatment:     43   mins    Calvin Tesfaye, PT PT, DPT, [id number]       "

## 2022-05-25 RX ORDER — MONTELUKAST SODIUM 10 MG/1
10 TABLET ORAL NIGHTLY
Qty: 30 TABLET | Refills: 0 | Status: SHIPPED | OUTPATIENT
Start: 2022-05-25 | End: 2022-06-21

## 2022-05-27 ENCOUNTER — TREATMENT (OUTPATIENT)
Dept: PHYSICAL THERAPY | Facility: CLINIC | Age: 62
End: 2022-05-27

## 2022-05-27 ENCOUNTER — TELEPHONE (OUTPATIENT)
Dept: PHYSICAL THERAPY | Facility: CLINIC | Age: 62
End: 2022-05-27

## 2022-05-27 DIAGNOSIS — M26.629 TMJ SYNDROME: Primary | ICD-10-CM

## 2022-05-27 PROCEDURE — 97110 THERAPEUTIC EXERCISES: CPT | Performed by: PHYSICAL THERAPIST

## 2022-05-27 PROCEDURE — 97140 MANUAL THERAPY 1/> REGIONS: CPT | Performed by: PHYSICAL THERAPIST

## 2022-05-27 PROCEDURE — 97035 APP MDLTY 1+ULTRASOUND EA 15: CPT | Performed by: PHYSICAL THERAPIST

## 2022-05-27 NOTE — PROGRESS NOTES
Physical Therapy Daily Progress Note     Diagnosis Plan   1. TMJ syndrome         VISIT#: 5    Subjective   Cassie Winter reports: less intense than before, but still problematic and painful at times; pain less in intensity. Still looking for Dentist.     Objective     See Exercise, Manual, and Modality Logs for complete treatment.     Patient Education: advised to consider finding a Dentist who specializes in TMJ.    Assessment/Plan - Clinching and gaurding still problematic - may benefit from mouth guard for night - discussed importance of finding Dentist who treats TMJ. Also, discussed possible IDN to assist with decreasing pain, but want to consult with colleague regarding precautions/contraindicaitons.    Progress per Plan of Care            Timed:         Manual Therapy:   15      mins  41773;     Therapeutic Exercise:    20     mins  75945;     Neuromuscular Mele:        mins  12335;    Therapeutic Activity:          mins  31625;     Gait Training:           mins  90565;     Ultrasound:     8     mins  50243;    Ionto                                   mins   53186  Self Care                            mins   15020  Canalith Repos                   mins  4209  Aquatic                               mins 78861    Un-Timed:  Electrical Stimulation:         mins  52943 ( );  Dry Needling          mins self-pay  Traction          mins 25550  Low Eval         Mins  16725  Mod Eval          Mins  78012  High Eval                            Mins  98597  Re-Eval                               mins  50234    Timed Treatment:   43   mins   Total Treatment:     43   mins    Calvin Tesfaye, PT PT, DPT, [id number]

## 2022-06-01 ENCOUNTER — TREATMENT (OUTPATIENT)
Dept: PHYSICAL THERAPY | Facility: CLINIC | Age: 62
End: 2022-06-01

## 2022-06-01 PROCEDURE — 97140 MANUAL THERAPY 1/> REGIONS: CPT | Performed by: PHYSICAL THERAPIST

## 2022-06-01 PROCEDURE — 97035 APP MDLTY 1+ULTRASOUND EA 15: CPT | Performed by: PHYSICAL THERAPIST

## 2022-06-01 PROCEDURE — 97110 THERAPEUTIC EXERCISES: CPT | Performed by: PHYSICAL THERAPIST

## 2022-06-01 RX ORDER — LEVOTHYROXINE SODIUM 0.2 MG/1
200 TABLET ORAL DAILY
Qty: 90 TABLET | Refills: 1 | Status: SHIPPED | OUTPATIENT
Start: 2022-06-01 | End: 2022-09-19 | Stop reason: SDUPTHER

## 2022-06-01 NOTE — PROGRESS NOTES
Physical Therapy Daily Progress Note    No diagnosis found.    VISIT#: 6    Subjective   Cassie Winter reports: no complications with HEP - less pain in last few days. May have found Dentist who specializes in TMJ in [place], but will need referral from PCP.    Objective   Palpation; less tenderness and pain B TMJ's and surrounding tissue.   Observation: guarding and grinds teeth consistently.    See Exercise, Manual, and Modality Logs for complete treatment.     Patient Education: reviewed HEP    Assessment/Plan - Clinching and gaurding still problematic - may benefit from mouth guard for night - discussed importance of finding Dentist who treats TMJ. Patient with less pain and seems to be improving slowly.      Plan Goals: STG's: 2 weeks  1. Decrease pain with chewing, yawning and opening mouth > 25% or more. MET  2. Independent with initial HEP and able to tolerate progression. MET  3. Able to open mouth with improved ROM and decreased crepitus. Improving.    LTG's by d/c  1. Outcome measure to show significant improvement.  2. ROM of TMJ WNL's without pain.  3. Good understanding of importance of managing condition and able to correct exacerbating activities.  4. Pain B jaws reduced > 75% with yawning, chewing food and opening mouth for functional activities.    Progress per Plan of Care - send progress note to MD: would benefit from referral to specialist to assess for night splint for TMJ/grinding teeth.            Timed:         Manual Therapy:   15      mins  57689;     Therapeutic Exercise:    22     mins  67202;     Neuromuscular Mele:        mins  56041;    Therapeutic Activity:          mins  18812;     Gait Training:           mins  92949;     Ultrasound:     8     mins  74911;    Ionto                                   mins   63603  Self Care                            mins   80220  Canalith Repos                   mins  4209  Aquatic                               mins 39036    Un-Timed:  Electrical  Stimulation:         mins  87915 ( );  Dry Needling          mins self-pay  Traction          mins 52191  Low Eval         Mins  61514  Mod Eval          Mins  10488  High Eval                            Mins  19486  Re-Eval                               mins  67064    Timed Treatment:   45   mins   Total Treatment:     45   mins    Calvin Tesfaye PT PT, DPT, [id number]

## 2022-06-03 ENCOUNTER — TREATMENT (OUTPATIENT)
Dept: PHYSICAL THERAPY | Facility: CLINIC | Age: 62
End: 2022-06-03

## 2022-06-03 DIAGNOSIS — M26.629 TMJ SYNDROME: Primary | ICD-10-CM

## 2022-06-03 PROCEDURE — 97140 MANUAL THERAPY 1/> REGIONS: CPT | Performed by: PHYSICAL THERAPIST

## 2022-06-03 PROCEDURE — 97035 APP MDLTY 1+ULTRASOUND EA 15: CPT | Performed by: PHYSICAL THERAPIST

## 2022-06-03 PROCEDURE — 97110 THERAPEUTIC EXERCISES: CPT | Performed by: PHYSICAL THERAPIST

## 2022-06-03 NOTE — PROGRESS NOTES
Physical Therapy Daily Progress Note     Diagnosis Plan   1. TMJ syndrome         VISIT#: 7    Subjective   Cassie Winter reports: less TMJ pain since last visit and improving; mainly pain at night - still trying to find Dentist.     Objective   Palpation; less tenderness and pain B TMJ's and surrounding tissue.   Observation: guarding and grinds teeth consistently.    See Exercise, Manual, and Modality Logs for complete treatment.     Patient Education: reviewed HEP    Assessment/Plan - less guarding and pain during treatment; cervical extension reproduced ear pain.      Plan Goals: STG's: 2 weeks  1. Decrease pain with chewing, yawning and opening mouth > 25% or more. MET  2. Independent with initial HEP and able to tolerate progression. MET  3. Able to open mouth with improved ROM and decreased crepitus. Improving.    LTG's by d/c  1. Outcome measure to show significant improvement.  2. ROM of TMJ WNL's without pain.  3. Good understanding of importance of managing condition and able to correct exacerbating activities.  4. Pain B jaws reduced > 75% with yawning, chewing food and opening mouth for functional activities.    Progress per Plan of Care           Timed:         Manual Therapy:   15      mins  88632;     Therapeutic Exercise:    20     mins  51275;     Neuromuscular Mele:        mins  70560;    Therapeutic Activity:          mins  24779;     Gait Training:           mins  41761;     Ultrasound:     8     mins  25731;    Ionto                                   mins   06835  Self Care                            mins   25724  Canalith Repos                   mins  4209  Aquatic                               mins 09394    Un-Timed:  Electrical Stimulation:         mins  72844 ( );  Dry Needling          mins self-pay  Traction          mins 33239  Low Eval         Mins  54174  Mod Eval          Mins  01254  High Eval                            Mins  29538  Re-Eval                               mins   95652    Timed Treatment:   43   mins   Total Treatment:     43   mins    Calvin Tesfaye, PT PT, DPT, [id number]

## 2022-06-06 ENCOUNTER — TELEPHONE (OUTPATIENT)
Dept: FAMILY MEDICINE CLINIC | Facility: CLINIC | Age: 62
End: 2022-06-06

## 2022-06-06 RX ORDER — MOMETASONE FUROATE AND FORMOTEROL FUMARATE DIHYDRATE 200; 5 UG/1; UG/1
2 AEROSOL RESPIRATORY (INHALATION)
Qty: 39 G | Refills: 1 | Status: SHIPPED | OUTPATIENT
Start: 2022-06-06 | End: 2022-11-08 | Stop reason: SDUPTHER

## 2022-06-06 RX ORDER — METOPROLOL SUCCINATE 25 MG/1
25 TABLET, EXTENDED RELEASE ORAL DAILY
Qty: 90 TABLET | Refills: 1 | Status: SHIPPED | OUTPATIENT
Start: 2022-06-06 | End: 2022-12-19 | Stop reason: SDUPTHER

## 2022-06-06 NOTE — TELEPHONE ENCOUNTER
Rx Refill Note  Requested Prescriptions     Pending Prescriptions Disp Refills   • metoprolol succinate XL (TOPROL-XL) 25 MG 24 hr tablet [Pharmacy Med Name: METOPROLOL ER SUCCINATE 25MG TABS] 90 tablet 1     Sig: TAKE 1 TABLET BY MOUTH DAILY      Last office visit with prescribing clinician: 12/20/2021      Next office visit with prescribing clinician: 12/12/2022            Shawna Cruz MA  06/06/22, 08:50 EDT

## 2022-06-06 NOTE — TELEPHONE ENCOUNTER
Spoke with the patient.  She is to find out which alternative medication to Breo her insurance will cover and let me know.  She states she is willing to do this.

## 2022-06-06 NOTE — TELEPHONE ENCOUNTER
Caller: Winter Cassie L     Relationship: Self    Best call back number: [phone]    What is the best time to reach you: ANY TIME    Who are you requesting to speak with (clinical staff, provider,  specific staff member): CLINICAL STAFF    What was the call regarding: PATIENT CALLED IN REGARDS TO HER Fluticasone Furoate-Vilanterol (Breo Ellipta) 200-25 MCG/INH inhaler PRESCRIPTION. INSURANCE IS REQUIRING A PRIOR AUTHORIZATION BE SENT IN. THEY WANTED HER TO FILL GENERIC FORM BUT PHARAMCY ADVISED THERE IS NO GENERIC FOR THE BREO AND WE NEED TO SUBMIT PRIOR AUTH OR RESEND THE PRESCRIPTION AND WRITE 'DISPENSE AS WRITTEN' IN THE INSTRUCTIONS.    PLEASE ADVISE    [id number] - [place], [place] - [address] - [phone]  - [phone] FX  [phone]    Do you require a callback: YES

## 2022-06-06 NOTE — TELEPHONE ENCOUNTER
Caller: Cassie Winter    Relationship: Self    Best call back number: [phone]     What was the call regarding: PATIENT IS CALLING DR CARRASQUILLO BACK TO LET HIM KNOW THAT THE INSURANCE WILL PAY FOR ADVAIR AND DULERA. PLEASE ADVISE.     Do you require a callback: YES

## 2022-06-20 ENCOUNTER — TREATMENT (OUTPATIENT)
Dept: PHYSICAL THERAPY | Facility: CLINIC | Age: 62
End: 2022-06-20

## 2022-06-20 DIAGNOSIS — M26.629 TMJ SYNDROME: Primary | ICD-10-CM

## 2022-06-20 PROCEDURE — 97140 MANUAL THERAPY 1/> REGIONS: CPT | Performed by: PHYSICAL THERAPIST

## 2022-06-20 PROCEDURE — 97035 APP MDLTY 1+ULTRASOUND EA 15: CPT | Performed by: PHYSICAL THERAPIST

## 2022-06-20 PROCEDURE — 97110 THERAPEUTIC EXERCISES: CPT | Performed by: PHYSICAL THERAPIST

## 2022-06-20 NOTE — PROGRESS NOTES
Physical Therapy Daily Progress Note     Diagnosis Plan   1. TMJ syndrome         VISIT#: 8    Subjective   Cassie Winter reports: Minimal to no pain for last 2 weeks; watching what she is eating; found TMJ dentist in [place] - does not accept insurance; current dentist will fit with night splint if needed.     Objective   Palpation: the only tenderness is R TMJ.    Observation: guarding and grinds teeth consistently.    See Exercise, Manual, and Modality Logs for complete treatment.     Patient Education: advised to discuss night splint with current MD.    Assessment/Plan - significantly less pain and tenderness noted; would benefit from night splint.     Plan Goals: STG's: 2 weeks  1. Decrease pain with chewing, yawning and opening mouth > 25% or more. MET  2. Independent with initial HEP and able to tolerate progression. MET  3. Able to open mouth with improved ROM and decreased crepitus. Improving.    LTG's by d/c  1. Outcome measure to show significant improvement.  2. ROM of TMJ WNL's without pain.  3. Good understanding of importance of managing condition and able to correct exacerbating activities.  4. Pain B jaws reduced > 75% with yawning, chewing food and opening mouth for functional activities.    Progress per Plan of Care           Timed:         Manual Therapy:   15      mins  42118;     Therapeutic Exercise:    15     mins  21591;     Neuromuscular Mele:        mins  61715;    Therapeutic Activity:          mins  18841;     Gait Training:           mins  90949;     Ultrasound:     10     mins  59908;    Ionto                                   mins   03049  Self Care                            mins   21390  Canalith Repos                   mins  4209  Aquatic                               mins 12015    Un-Timed:  Electrical Stimulation:         mins  49966 ( );  Dry Needling          mins self-pay  Traction          mins 57919  Low Eval         Mins  88883  Mod Eval          Mins  76379  High  Eval                            Mins  54624  Re-Eval                               mins  42204    Timed Treatment:   40   mins   Total Treatment:     40   mins    Calvin Tesfaye, PT PT, DPT, [id number]

## 2022-06-21 RX ORDER — MONTELUKAST SODIUM 10 MG/1
TABLET ORAL
Qty: 30 TABLET | Refills: 0 | Status: SHIPPED | OUTPATIENT
Start: 2022-06-21 | End: 2022-07-25

## 2022-06-22 ENCOUNTER — TREATMENT (OUTPATIENT)
Dept: PHYSICAL THERAPY | Facility: CLINIC | Age: 62
End: 2022-06-22

## 2022-06-22 DIAGNOSIS — M26.629 TMJ SYNDROME: Primary | ICD-10-CM

## 2022-06-22 PROCEDURE — 97110 THERAPEUTIC EXERCISES: CPT | Performed by: PHYSICAL THERAPIST

## 2022-06-22 PROCEDURE — 97035 APP MDLTY 1+ULTRASOUND EA 15: CPT | Performed by: PHYSICAL THERAPIST

## 2022-06-22 PROCEDURE — 97140 MANUAL THERAPY 1/> REGIONS: CPT | Performed by: PHYSICAL THERAPIST

## 2022-06-22 NOTE — PROGRESS NOTES
Physical Therapy Daily Note     Diagnosis Plan   1. TMJ syndrome         VISIT#: 9    Subjective   Cassie Winter reports: some pain yesterday due to increased stress at work and having to talk for long periods; no pain after last visit. To see Dentist tomorrow to discuss night splints to address clinching of teeth.      Objective     See Exercise, Manual, and Modality Logs for complete treatment.     AROM: full ROM with opening without pain after session.    Patient Education:    Assessment/Plan - mild transient forehead pain with manual distraction - resolved with adjustment of cervical angle and pull.    Plan Goals: STG's: 2 weeks  1. Decrease pain with chewing, yawning and opening mouth > 25% or more. MET  2. Independent with initial HEP and able to tolerate progression. MET  3. Able to open mouth with improved ROM and decreased crepitus. MET    LTG's by d/c  1. Outcome measure to show significant improvement.  2. ROM of TMJ WNL's without pain.  3. Good understanding of importance of managing condition and able to correct exacerbating activities.  4. Pain B jaws reduced > 75% with yawning, chewing food and opening mouth for functional activities.    Progress per Plan of Care            Timed:         Manual Therapy:    15     mins  09499;     Therapeutic Exercise:    15     mins  28408;     Neuromuscular Mele:       mins  17517;    Therapeutic Activity:          mins  84559;     Gait Training:           mins  46143;     Ultrasound:    10      mins  53487;    Ionto                                   mins   48593  Self Care                           mins   00922  Canalith Repos                   mins  4209  Aquatic                               mins 75834    Un-Timed:  Electrical Stimulation:         mins  41178 ( );  Dry Needling          mins self-pay  Traction         mins 69614  Low Eval          Mins  30477  Mod Eval          Mins  29431  High Eval                            Mins  57033  Re-Eval                                mins  55981    Timed Treatment:  40    mins   Total Treatment:    40    mins    Calvin Tesfaye, PT PT, DPT, [id number]

## 2022-06-27 ENCOUNTER — TELEPHONE (OUTPATIENT)
Dept: PHYSICAL THERAPY | Facility: OTHER | Age: 62
End: 2022-06-27

## 2022-07-12 ENCOUNTER — TREATMENT (OUTPATIENT)
Dept: PHYSICAL THERAPY | Facility: CLINIC | Age: 62
End: 2022-07-12

## 2022-07-12 DIAGNOSIS — M26.629 TMJ SYNDROME: Primary | ICD-10-CM

## 2022-07-12 PROCEDURE — 97140 MANUAL THERAPY 1/> REGIONS: CPT | Performed by: PHYSICAL THERAPIST

## 2022-07-12 PROCEDURE — 97110 THERAPEUTIC EXERCISES: CPT | Performed by: PHYSICAL THERAPIST

## 2022-07-12 NOTE — PROGRESS NOTES
Re-Assessment / Re-Certification        Patient: Cassie Winter   : 1960  Diagnosis/ICD-10 Code:  TMJ syndrome [M26.629]  Referring practitioner: Cristopher Garcia Jr., MD  Date of Initial Visit: Type: THERAPY  Noted: 2022  Today's Date: 2022  Patient seen for 10 sessions      Subjective:   Cassie Winter reports: 80% better overall with less pain, HA's and ability to eat/talk. Saw Dentist: she could fit with appliance for clinching after tooth extracted - 529 dollars out of pocket cost for appliance to address clinching. Has to wait until August for oral surgeon to remove tooth; wants to continue PT: helping. Still having ringing in ears, but less pronounced. HA's better but dizzier now. Increased pain today: tried to eat a pork chop.     Patient late for appt due to work schedule.    Pain level: best 2/10 to worst 8/10 - improved since exam    Subjective Questionnaire: TMD: 18 severe  Clinical Progress: improved  Home Program Compliance: Yes  Treatment has included: therapeutic exercise, manual therapy and ultrasound    Subjective   Objective        Special Questions  Patient is experiencing headaches and tinnitus.     Additional Special Questions  TMJ and hx of cervical pain; tinnitus.      Postural Observations  Seated posture: good  Standing posture: fair    Additional Postural Observation Details  FHP with rounded shoulder    Tenderness     Additional Tenderness Details  TMJ B and internally B;  upper and mid-cervical soft tissue/UT's - less painful on L than during exam.    Neurological Testing     Sensation   Cervical/Thoracic   Left   Paresthesia: light touch    Right   Diminished: light touch    Reflexes   Left   Biceps (C5/C6): absent (0)  Brachioradialis (C6): absent (0)  Triceps (C7): absent (0)    Right   Biceps (C5/C6): absent (0)  Brachioradialis (C6): absent (0)  Triceps (C7): absent (0)    Additional Neurological Details  B fascial region at TMJ - mostly on R side.    Active Range of  Motion   Cervical/Thoracic Spine   Cervical    Flexion: Neck active flexion: pulling sensation R UT. WFL and with pain  Extension: 36 (posterior UT's) degrees with pain  Left lateral flexion: 30 degrees   Right lateral flexion: 35 degrees   Left rotation: 45 degrees   Right rotation: 47 degrees     Additional Active Range of Motion Details  AROM TMJ: Pain with opening and lateral movements  Protraction: 6 mm  Retraction: 3 mm  R lateral deviation: 7 mm  L lateral deviation: 7 mm  Openin mm    AROM: B UE's limited and painful > 90 degrees elevation and exhibits min limitations with IR/ER functionally    Lassen: (+) B same side  Weight bearing test: (+) B    BMI: 40.5    Strength/Myotome Testing     Additional Strength Details  Grossly 3+/5 or greater B UE's      Assessment/Plan - patient has improved with regard to chewing and pain but still exhibits tenderness to palpation (TMJ R > L) and bruxism and clinching of teeth that cause ongoing pain (inprocess of having tooth extracted to be able to be fitted for dental appliance to assist with bruxism). Responds well to manual therapy with reduction of HA's, TMJ pain and ability to eat/talk with less pain.     Would benefit from additional visits until appliance can be fitted to reduce effects of bruxism/clinching of teeth.      Progress toward previous goals: Partially Met  Goals  Plan Goals: STG's: 2 weeks  1. Decrease pain with chewing, yawning and opening mouth > 25% or more. MET  2. Independent with initial HEP and able to tolerate progression.  MET  3. Able to open mouth with improved ROM and decreased crepitus. MET    LTG's by d/c  1. Outcome measure to show significant improvement.  2. ROM of TMJ WNL's without pain.  3. Good understanding of importance of managing condition and able to correct exacerbating activities.  4. Pain B jaws reduced > 75% with yawning, chewing food and opening mouth for functional activities. MET        Recommendations: Continue with  recommendations add 8 visits to POC for a total of 18 visits.  Timeframe: 3 months  Prognosis to achieve goals: good    PT: Calvin Tesfaye PT     [place] License #:  [id number]    Electronically signed by Calvin Tesfaye PT, 07/12/22, 1:42 PM EDT    Certification Period: 7/12/2022 thru 10/9/2022  I certify that the therapy services are furnished while this patient is under my care.  The services outlined above are required by this patient, and will be reviewed every 90 days.         Physician Signature:__________________________________________________    PHYSICIAN: Cristopher Garcia Jr., MD      DATE:     Please sign and return via fax to [email or web address] . Thank you, [facility]      Timed:         Manual Therapy:    10     mins  00192;     Therapeutic Exercise:    15     mins  84031;     Neuromuscular Mele:        mins  88435;    Therapeutic Activity:          mins  93833;     Gait Training:           mins  44087;     Ultrasound:     5     mins  87845;    Ionto                                   mins   23506  Self Care                            mins   70540  Aquatic                               mins 85748      Un-Timed:  Electrical Stimulation:         mins  59292 ( );  Dry Needling          mins self-pay  Traction          mins 92384  Low Eval          Mins  23708  Mod Eval          Mins  33434  High Eval                            Mins  64932  Re-Eval                               mins  33559      Timed Treatment:  30    mins   Total Treatment:     30   mins

## 2022-07-25 ENCOUNTER — TREATMENT (OUTPATIENT)
Dept: PHYSICAL THERAPY | Facility: CLINIC | Age: 62
End: 2022-07-25

## 2022-07-25 DIAGNOSIS — M26.629 TMJ SYNDROME: Primary | ICD-10-CM

## 2022-07-25 PROCEDURE — 97035 APP MDLTY 1+ULTRASOUND EA 15: CPT | Performed by: PHYSICAL THERAPIST

## 2022-07-25 PROCEDURE — 97140 MANUAL THERAPY 1/> REGIONS: CPT | Performed by: PHYSICAL THERAPIST

## 2022-07-25 RX ORDER — MONTELUKAST SODIUM 10 MG/1
TABLET ORAL
Qty: 90 TABLET | Refills: 0 | Status: SHIPPED | OUTPATIENT
Start: 2022-07-25 | End: 2022-09-28

## 2022-07-25 NOTE — PROGRESS NOTES
Physical Therapy Daily Note     Diagnosis Plan   1. 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      Objective     See Exercise, Manual, and Modality Logs for complete treatment.     Patient Education:    Assessment/Plan - tenderness to R TMJ and surrounding tissue, but less with talking and opening mouth. Increased manual interventions to today's visit.      Progress per Plan of Care            Timed:         Manual Therapy:    25     mins  89662;     Therapeutic Exercise:    5     mins  11200;     Neuromuscular Mele:        mins  80809;    Therapeutic Activity:         mins  84658;     Gait Training:           mins  54879;     Ultrasound:     10     mins  37373;    Ionto                                   mins   82365  Self Care                            mins   50286  Canalith Repos                   mins  4209  Aquatic                               mins 46232    Un-Timed:  Electrical Stimulation:         mins  70559 ( );  Dry Needling          mins self-pay  Traction          mins 99728  Low Eval          Mins  99620  Mod Eval          Mins  18141  High Eval                            Mins  10515  Re-Eval                               mins  95839    Timed Treatment:   40   mins   Total Treatment:      40  mins    Calvin Tesfaye PT PT, DPT, [id number]

## 2022-07-28 ENCOUNTER — TREATMENT (OUTPATIENT)
Dept: PHYSICAL THERAPY | Facility: CLINIC | Age: 62
End: 2022-07-28

## 2022-07-28 DIAGNOSIS — M26.629 TMJ SYNDROME: Primary | ICD-10-CM

## 2022-07-28 PROCEDURE — 97110 THERAPEUTIC EXERCISES: CPT | Performed by: PHYSICAL THERAPIST

## 2022-07-28 PROCEDURE — 97140 MANUAL THERAPY 1/> REGIONS: CPT | Performed by: PHYSICAL THERAPIST

## 2022-07-28 PROCEDURE — 97035 APP MDLTY 1+ULTRASOUND EA 15: CPT | Performed by: PHYSICAL THERAPIST

## 2022-07-28 NOTE — PROGRESS NOTES
Physical Therapy Daily Note     Diagnosis Plan   1. TMJ syndrome         VISIT#: 12    Subjective   Cassie Winter reports: less pain but some tenderness on R side.    Objective     See Exercise, Manual, and Modality Logs for complete treatment.     Patient Education:  Access Code: [email or web address]  URL: [email or web address]  Date: 07/28/2022  Prepared by: Baldemar Tesfaye    Exercises  Hooklying Jaw Protrusion - 1 x daily - 7 x weekly - 3 sets - 10 reps    Assessment/Plan - tenderness to R TMJ and surrounding tissue; updated and reviewed HEP; continued current interventions.    Progress per Plan of Care            Timed:         Manual Therapy:    20     mins  54941;     Therapeutic Exercise:    10     mins  27019;     Neuromuscular Mele:        mins  59998;    Therapeutic Activity:         mins  21909;     Gait Training:           mins  96125;     Ultrasound:     10     mins  10828;    Ionto                                   mins   38155  Self Care                            mins   27931  Canalith Repos                   mins  4209  Aquatic                               mins 50270    Un-Timed:  Electrical Stimulation:         mins  70617 ( );  Dry Needling          mins self-pay  Traction          mins 19534  Low Eval          Mins  02216  Mod Eval          Mins  66865  High Eval                            Mins  99951  Re-Eval                               mins  50863    Timed Treatment:   40   mins   Total Treatment:      40  mins    Calvin Tesfaye, PT PT, DPT, [id number]

## 2022-08-08 ENCOUNTER — TREATMENT (OUTPATIENT)
Dept: PHYSICAL THERAPY | Facility: CLINIC | Age: 62
End: 2022-08-08

## 2022-08-08 DIAGNOSIS — M26.629 TMJ SYNDROME: Primary | ICD-10-CM

## 2022-08-08 PROCEDURE — 97035 APP MDLTY 1+ULTRASOUND EA 15: CPT | Performed by: PHYSICAL THERAPIST

## 2022-08-08 PROCEDURE — 97140 MANUAL THERAPY 1/> REGIONS: CPT | Performed by: PHYSICAL THERAPIST

## 2022-08-08 PROCEDURE — 97110 THERAPEUTIC EXERCISES: CPT | Performed by: PHYSICAL THERAPIST

## 2022-08-08 NOTE — PROGRESS NOTES
Physical Therapy Daily Note     Diagnosis Plan   1. TMJ syndrome         VISIT#: 13    Subjective   Cassie Winter reports: R side painful, but significantly less overall; able to chew hamburger over weekend; has a lot of stress in life.    Objective     See Exercise, Manual, and Modality Logs for complete treatment.     Patient Education: educated patient on relaxation techniques off of [facility] web site for imagery and breathing.     Assessment/Plan - tenderness to R TMJ and surrounding tissue; advised to begin relaxation techniques to decrease stress and bruxism - focus imagery on relaxing muscles in face, traps and jaw.      Progress per Plan of Care            Timed:         Manual Therapy:    15     mins  04915;     Therapeutic Exercise:    15     mins  33191;     Neuromuscular Mele:        mins  76928;    Therapeutic Activity:         mins  81128;     Gait Training:           mins  13449;     Ultrasound:     10     mins  15979;    Ionto                                   mins   36790  Self Care                            mins   67193  Canalith Repos                   mins  4209  Aquatic                               mins 47971    Un-Timed:  Electrical Stimulation:         mins  97396 (MC );  Dry Needling          mins self-pay  Traction          mins 19916  Low Eval          Mins  04897  Mod Eval          Mins  10193  High Eval                            Mins  12086  Re-Eval                               mins  62544    Timed Treatment:   40   mins   Total Treatment:      40  mins    Calvin Tesfaye, PT PT, DPT, [id number]

## 2022-08-11 ENCOUNTER — TELEPHONE (OUTPATIENT)
Dept: PHYSICAL THERAPY | Facility: CLINIC | Age: 62
End: 2022-08-11

## 2022-08-11 RX ORDER — CYANOCOBALAMIN 1000 UG/ML
1000 INJECTION, SOLUTION INTRAMUSCULAR; SUBCUTANEOUS
Qty: 10 ML | Refills: 0 | Status: SHIPPED | OUTPATIENT
Start: 2022-08-11

## 2022-08-11 RX ORDER — SUVOREXANT 20 MG/1
1 TABLET, FILM COATED ORAL NIGHTLY PRN
Qty: 30 TABLET | Refills: 2 | Status: SHIPPED | OUTPATIENT
Start: 2022-08-11 | End: 2022-10-10 | Stop reason: SDUPTHER

## 2022-08-16 ENCOUNTER — TELEPHONE (OUTPATIENT)
Dept: FAMILY MEDICINE CLINIC | Facility: CLINIC | Age: 62
End: 2022-08-16

## 2022-08-16 RX ORDER — BENZONATATE 200 MG/1
200 CAPSULE ORAL 3 TIMES DAILY PRN
Qty: 21 CAPSULE | Refills: 1 | Status: SHIPPED | OUTPATIENT
Start: 2022-08-16 | End: 2022-11-07

## 2022-08-16 NOTE — TELEPHONE ENCOUNTER
Spoke with the patient.  We discussed evaluation and treatment options  which included the antiviral  medication for COVID.  Patient opted to continue current supportive care.  She is going to the ER with increased shortness of breath lightheadedness dizziness or fever.

## 2022-08-16 NOTE — TELEPHONE ENCOUNTER
Caller: Cassie Winter    Relationship to patient: Self    Best call back number: [phone]    Date of exposure: 08/11/22    Date of positive COVID19 test: 08/15/22    Date if possible COVID19 exposure: 08/11/22    COVID19 symptoms: CONGESTION, COUGH, HEADACHE, FEVER/CHILLS, BODY ACHES    Date of initial quarantine: 08/16/22    Additional information or concerns: IS WANTING GUIDANCE ON WHAT SHE NEEDS TO DO NOW FOR TREATMENT    What is the patients preferred pharmacy: [id number] [place] - [address] AT [address] [facility] [address] [facility] & [address] [phone] [facility] [phone]

## 2022-08-17 RX ORDER — ALBUTEROL SULFATE 90 UG/1
2 AEROSOL, METERED RESPIRATORY (INHALATION) EVERY 4 HOURS PRN
Qty: 8 G | Refills: 5 | Status: SHIPPED | OUTPATIENT
Start: 2022-08-17 | End: 2022-12-30 | Stop reason: SDUPTHER

## 2022-08-31 ENCOUNTER — TELEPHONE (OUTPATIENT)
Dept: FAMILY MEDICINE CLINIC | Facility: CLINIC | Age: 62
End: 2022-08-31

## 2022-08-31 RX ORDER — OXYBUTYNIN CHLORIDE 5 MG/1
5 TABLET ORAL 3 TIMES DAILY
Qty: 270 TABLET | Refills: 1 | Status: SHIPPED | OUTPATIENT
Start: 2022-08-31 | End: 2023-03-15 | Stop reason: SDUPTHER

## 2022-09-08 ENCOUNTER — TREATMENT (OUTPATIENT)
Dept: PHYSICAL THERAPY | Facility: CLINIC | Age: 62
End: 2022-09-08

## 2022-09-08 DIAGNOSIS — M26.629 TMJ SYNDROME: Primary | ICD-10-CM

## 2022-09-08 PROCEDURE — 97110 THERAPEUTIC EXERCISES: CPT | Performed by: PHYSICAL THERAPIST

## 2022-09-08 PROCEDURE — 97140 MANUAL THERAPY 1/> REGIONS: CPT | Performed by: PHYSICAL THERAPIST

## 2022-09-08 PROCEDURE — 97035 APP MDLTY 1+ULTRASOUND EA 15: CPT | Performed by: PHYSICAL THERAPIST

## 2022-09-08 NOTE — PROGRESS NOTES
Addended by: TASHI DIAMOND on: 9/7/2021 01:28 PM     Modules accepted: Orders     Physical Therapy Daily Note     Diagnosis Plan   1. TMJ syndrome         VISIT#: 14    Subjective   Cassie Winter reports: therapy interrupted due to Covid exposure; TMJ much better - only a couple of incidents. Continues to hold her own and believes she will be ready for discharge on next visit. Still about 80% improved: cough has affected neck slightly.     Objective     See Exercise, Manual, and Modality Logs for complete treatment.     AROM: upper cervical rotation R mod limitation with pain.  R TMJ tender to palpation.    Patient Education: reviewed HEP and importance of relaxation techniques to decrease bruxism     Assessment/Plan - tenderness to R TMJ and surrounding tissue with decreased Rot to R in cervical spine. Will continue current POC and discharge on next visit.     Anticipate DC next Visit       Medicare requires progress note be sent to the referring MD every 30 days or 10 visits, whichever comes first.            Timed:         Manual Therapy:    15     mins  63223;     Therapeutic Exercise:    15     mins  05093;     Neuromuscular Mele:        mins  24133;    Therapeutic Activity:         mins  70830;     Gait Training:           mins  42742;     Ultrasound:     10     mins  50927;    Ionto                                   mins   61558  Self Care                            mins   93151  Canalith Repos                   mins  4209  Aquatic                               mins 00976    Un-Timed:  Electrical Stimulation:         mins  69103 ( );  Dry Needling          mins self-pay  Traction          mins 59948  Low Eval          Mins  15747  Mod Eval          Mins  46915  High Eval                            Mins  43762  Re-Eval                               mins  46640    Timed Treatment:   40   mins   Total Treatment:      40  mins    Calvin Tesfaye, PT PT, DPT, [id number]

## 2022-09-13 ENCOUNTER — TREATMENT (OUTPATIENT)
Dept: PHYSICAL THERAPY | Facility: CLINIC | Age: 62
End: 2022-09-13

## 2022-09-13 DIAGNOSIS — M26.629 TMJ SYNDROME: Primary | ICD-10-CM

## 2022-09-13 PROCEDURE — 97110 THERAPEUTIC EXERCISES: CPT | Performed by: PHYSICAL THERAPIST

## 2022-09-13 PROCEDURE — 97035 APP MDLTY 1+ULTRASOUND EA 15: CPT | Performed by: PHYSICAL THERAPIST

## 2022-09-13 PROCEDURE — 97140 MANUAL THERAPY 1/> REGIONS: CPT | Performed by: PHYSICAL THERAPIST

## 2022-09-14 NOTE — PROGRESS NOTES
Physical Therapy Daily/Discharge Note     Diagnosis Plan   1. TMJ syndrome         VISIT#: 15    Subjective     Cassie Winter reports: minimal to no symptoms since last visit: ready for discharge. Still about 80% to 90% improved; no pain in ear and minimal/no pain with chewing; plans to have tooth extracted and to purchase a mouth guard to prevent buxism.     Objective       See Exercise, Manual, and Modality Logs for complete treatment.     AROM: min limitation with opening and clicking R TMJ with lateral excursion, but no pain.  R TMJ minimal tenderness to palpation.    Patient Education: reviewed HEP and importance of relaxation techniques to decrease bruxism; discussed plans for dental work to be able to fit for night splint/guard; eating habits    Assessment/Plan   appears ready for discharge; advised to f/u with MD if complications occur; continue HEP and above considerations.    Goals  Plan Goals: STG's: 2 weeks  1. Decrease pain with chewing, yawning and opening mouth > 25% or more. MET  2. Independent with initial HEP and able to tolerate progression.  MET  3. Able to open mouth with improved ROM and decreased crepitus. MET    LTG's by d/c  1. Outcome measure to show significant improvement. Inadvertently did not score this visit  2. ROM of TMJ WNL's without pain. MET  3. Good understanding of importance of managing condition and able to correct exacerbating activities. MET  4. Pain B jaws reduced > 75% with yawning, chewing food and opening mouth for functional activities. MET     Other - discharge                 Timed:         Manual Therapy:    15     mins  06132;     Therapeutic Exercise:     15   mins  61722;     Neuromuscular Mele:        mins  58562;    Therapeutic Activity:         mins  48355;     Gait Training:           mins  96528;     Ultrasound:     10     mins  13799;    Ionto                                   mins   61843  Self Care                            mins   49708  [facility]                    mins  4209  Aquatic                               mins 39179    Un-Timed:  Electrical Stimulation:         mins  38800 ( );  Dry Needling          mins self-pay  Traction          mins 04876  Low Eval          Mins  02326  Mod Eval          Mins  03760  High Eval                            Mins  80742  Re-Eval                               mins  42571    Timed Treatment:   40   mins   Total Treatment:      40  mins    Calvin Tesfaye, PT PT, DPT, [id number]

## 2022-09-19 RX ORDER — LEVOTHYROXINE SODIUM 0.2 MG/1
200 TABLET ORAL DAILY
Qty: 90 TABLET | Refills: 1 | Status: SHIPPED | OUTPATIENT
Start: 2022-09-19

## 2022-09-28 RX ORDER — MONTELUKAST SODIUM 10 MG/1
TABLET ORAL
Qty: 90 TABLET | Refills: 0 | Status: SHIPPED | OUTPATIENT
Start: 2022-09-28 | End: 2022-11-10

## 2022-09-29 ENCOUNTER — TELEPHONE (OUTPATIENT)
Dept: FAMILY MEDICINE CLINIC | Facility: CLINIC | Age: 62
End: 2022-09-29

## 2022-09-29 RX ORDER — VENLAFAXINE HYDROCHLORIDE 75 MG/1
75 TABLET, EXTENDED RELEASE ORAL
Qty: 30 EACH | Refills: 5 | Status: SHIPPED | OUTPATIENT
Start: 2022-09-29

## 2022-09-29 RX ORDER — VENLAFAXINE HYDROCHLORIDE 150 MG/1
150 CAPSULE, EXTENDED RELEASE ORAL DAILY
Qty: 30 CAPSULE | Refills: 5 | Status: SHIPPED | OUTPATIENT
Start: 2022-09-29 | End: 2023-03-30 | Stop reason: SDUPTHER

## 2022-09-29 NOTE — TELEPHONE ENCOUNTER
Caller: Cheryl Winter    Relationship to patient: Self    Best call back number: [phone]     Patient is needing:     CHERYL CALLED STATES THAT SHE IS TAKING  BOTH STRENGTHS  OF venlafaxine.

## 2022-09-29 NOTE — TELEPHONE ENCOUNTER
I sent the refill for the Spiriva.  I left a message with the patient regarding the venlafaxine.  She is to call back with the dose she is taking.

## 2022-10-10 RX ORDER — SUVOREXANT 20 MG/1
1 TABLET, FILM COATED ORAL NIGHTLY PRN
Qty: 30 TABLET | Refills: 2 | Status: SHIPPED | OUTPATIENT
Start: 2022-10-10 | End: 2023-03-13 | Stop reason: SDUPTHER

## 2022-10-10 NOTE — TELEPHONE ENCOUNTER
Caller: Winter Cassie L    Relationship: Self    Best call back number: [phone]    Requested Prescriptions:   Requested Prescriptions     Pending Prescriptions Disp Refills   • Belsomra 20 MG tablet 30 tablet 2     Sig: Take 20 mg by mouth At Night As Needed (sleep).        Pharmacy where request should be sent: [facility] #[id number] [facility], [place] - [address] AT [address] [phone] [facility] [phone] FX     Additional details provided by patient: PATIENT STATES THAT SHE HAS 10 DAYS LEFT ON HER PRESCRIPTION.      Does the patient have less than a 3 day supply:  [] Yes  [x] No    Fantasma Pollock Rep   10/10/22 10:50 EDT

## 2022-10-25 ENCOUNTER — TELEPHONE (OUTPATIENT)
Dept: FAMILY MEDICINE CLINIC | Facility: CLINIC | Age: 62
End: 2022-10-25

## 2022-10-25 RX ORDER — BREXPIPRAZOLE 2 MG/1
2 TABLET ORAL DAILY
Qty: 30 TABLET | Refills: 0 | Status: SHIPPED | OUTPATIENT
Start: 2022-10-25 | End: 2022-10-27 | Stop reason: SDUPTHER

## 2022-10-27 ENCOUNTER — TELEPHONE (OUTPATIENT)
Dept: FAMILY MEDICINE CLINIC | Facility: CLINIC | Age: 62
End: 2022-10-27

## 2022-10-27 RX ORDER — BREXPIPRAZOLE 2 MG/1
2 TABLET ORAL DAILY
Qty: 30 TABLET | Refills: 0 | Status: SHIPPED | OUTPATIENT
Start: 2022-10-27 | End: 2022-11-04 | Stop reason: SDUPTHER

## 2022-10-27 NOTE — TELEPHONE ENCOUNTER
I sent the prescription and called to let the patient know that I did and that I did so on 10/25/2022.

## 2022-11-04 ENCOUNTER — TELEPHONE (OUTPATIENT)
Dept: FAMILY MEDICINE CLINIC | Facility: CLINIC | Age: 62
End: 2022-11-04

## 2022-11-04 RX ORDER — BREXPIPRAZOLE 2 MG/1
2 TABLET ORAL DAILY
Qty: 30 TABLET | Refills: 2 | Status: SHIPPED | OUTPATIENT
Start: 2022-11-04 | End: 2023-03-06 | Stop reason: SDUPTHER

## 2022-11-07 ENCOUNTER — OFFICE VISIT (OUTPATIENT)
Dept: FAMILY MEDICINE CLINIC | Facility: CLINIC | Age: 62
End: 2022-11-07

## 2022-11-07 VITALS
TEMPERATURE: 96.9 F | DIASTOLIC BLOOD PRESSURE: 80 MMHG | HEART RATE: 69 BPM | WEIGHT: 243.2 LBS | HEIGHT: 65 IN | SYSTOLIC BLOOD PRESSURE: 119 MMHG | RESPIRATION RATE: 18 BRPM | OXYGEN SATURATION: 97 % | BODY MASS INDEX: 40.52 KG/M2

## 2022-11-07 DIAGNOSIS — R42 DIZZINESS: Chronic | ICD-10-CM

## 2022-11-07 DIAGNOSIS — I25.10 CHRONIC CORONARY ARTERY DISEASE: Primary | Chronic | ICD-10-CM

## 2022-11-07 DIAGNOSIS — J30.9 ALLERGIC RHINITIS, UNSPECIFIED SEASONALITY, UNSPECIFIED TRIGGER: Chronic | ICD-10-CM

## 2022-11-07 DIAGNOSIS — K86.89 PANCREATIC INSUFFICIENCY: ICD-10-CM

## 2022-11-07 DIAGNOSIS — J45.20 MILD INTERMITTENT ASTHMA, UNSPECIFIED WHETHER COMPLICATED: Chronic | ICD-10-CM
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
## 2022-11-07 DIAGNOSIS — N32.81 OVERACTIVE BLADDER: ICD-10-CM

## 2022-11-07 DIAGNOSIS — F33.41 RECURRENT MAJOR DEPRESSIVE DISORDER, IN PARTIAL REMISSION: Chronic | ICD-10-CM

## 2022-11-07 DIAGNOSIS — Z13.9 ENCOUNTER FOR HEALTH-RELATED SCREENING: ICD-10-CM

## 2022-11-07 DIAGNOSIS — E03.9 ACQUIRED HYPOTHYROIDISM: Chronic | ICD-10-CM

## 2022-11-07 PROCEDURE — 99214 OFFICE O/P EST MOD 30 MIN: CPT | Performed by: FAMILY MEDICINE

## 2022-11-07 NOTE — PATIENT INSTRUCTIONS
Have the follow up labs and Mammogram done and call for results.    Continue your current medications and treatment.    Follow up in the office in 6 months.

## 2022-11-07 NOTE — PROGRESS NOTES
Subjective   Cassie Winter is a 62 y.o. female.     Chief Complaint   Patient presents with   • Hypothyroidism     6 mth f/u   • Coronary Artery Disease   • Fall     2 weeks ago       HPI  Chief complaint: Coronary artery disease hypothyroidism asthma/allergic rhinitis falls    Patient is a 62-year-old white female comes in for follow-up and maintenance of her current problems which include    1. coronary artery disease-stable-patient on aspirin 81 mg daily and metoprolol 25 mg daily.  She denied chest pain shortness of breath orthopnea or PND.     2.  Hypothyroidism-stable-the patient is currently on Synthroid: 2 mg daily.  She denied heat or cold intolerance tremor weight gain or weight loss.    3.  Allergic rhinitis/asthma-stable-the patient currently takes Singulair 10 mg daily Spiriva 18 mcg 1 puff daily Dulera 200/5 mcg 2 puffs twice a day rescue inhaler and Flonase.  She states she has had worse cough and sinus congestion of late.    4.  Coronary artery disease-stable-the patient is on a beta-blocker and aspirin 81 mg daily.    5.  Pancreatic insufficiency-stable after patient on Creon up to 4 times a day and Colestid.  She states is helped with her diarrhea.  Patient also takes Bentyl as needed.    6.  Depression-stable after patient has history of moderate severe recurrent unipolar depression.  Patient is on Rexulti 2 mg daily and venlafaxine 225 mg daily and Belsomra 20 mg nightly.  Patient's depression is controlled.     7.  Overactive bladder-stable-the patient is on Ditropan 5 mg 3 times a day.    8.  Falls-deteriorated-patient's has history of falling.  Patient states she fell 2 to 3 weeks ago.  She states she landed on her left side and injured her left posterior lateral chest wall area.  Patient did not go to the emergency room.  She did not have things looked into.  She states she still has pain there but does not want to have x-rays.  We also discussed her going back to physical therapy.  She  "wanted to hold off on that also at this time.      The following portions of the patient's history were reviewed and updated as appropriate: allergies, current medications, past family history, past medical history, past social history, past surgical history and problem list.    Review of Systems    Objective     /80 (BP Location: Right arm, Patient Position: Sitting, Cuff Size: Large Adult)   Pulse 69   Temp 96.9 °F (36.1 °C) (Infrared)   Resp 18   Ht 165.1 cm (65\")   Wt 110 kg (243 lb 3.2 oz)   SpO2 97%   BMI 40.47 kg/m²     Physical Exam  Vitals and nursing note reviewed.   Constitutional:       Appearance: She is well-developed. She is obese.   HENT:      Head: Normocephalic and atraumatic.   Eyes:      Pupils: Pupils are equal, round, and reactive to light.   Cardiovascular:      Rate and Rhythm: Normal rate and regular rhythm.      Pulses: Normal pulses.      Heart sounds: Normal heart sounds. No murmur heard.    No friction rub. No gallop.   Pulmonary:      Effort: Pulmonary effort is normal.      Breath sounds: Normal breath sounds.   Abdominal:      General: Bowel sounds are normal.      Palpations: Abdomen is soft.   Musculoskeletal:         General: Normal range of motion.      Cervical back: Neck supple.   Skin:     General: Skin is warm and dry.   Neurological:      Mental Status: She is alert and oriented to person, place, and time.   Psychiatric:         Behavior: Behavior normal.         Thought Content: Thought content normal.         Judgment: Judgment normal.           Assessment & Plan   Diagnoses and all orders for this visit:    1. Chronic coronary artery disease (Primary)  -     CBC & Differential; Future  -     Comprehensive Metabolic Panel; Future    2. Acquired hypothyroidism  -     TSH; Future    3. Encounter for health-related screening  -     Lipid Panel; Future  -     Mammo Screening Digital Tomosynthesis Bilateral With CAD; Future    4. Dizziness  -     Cortisol; " Future    5. Pancreatic insufficiency    6. Overactive bladder    7. Recurrent major depressive disorder, in partial remission (HCC)    8. Mild intermittent asthma, unspecified whether complicated    9. Allergic rhinitis, unspecified seasonality, unspecified trigger      Patient Instructions   Have the follow up labs and Mammogram done and call for results.    Continue your current medications and treatment.    Follow up in the office in 6 months.          Cristopher Garcia Jr., MD    11/07/22

## 2022-11-08 RX ORDER — MOMETASONE FUROATE AND FORMOTEROL FUMARATE DIHYDRATE 200; 5 UG/1; UG/1
2 AEROSOL RESPIRATORY (INHALATION)
Qty: 39 G | Refills: 1 | Status: SHIPPED | OUTPATIENT
Start: 2022-11-08

## 2022-11-10 RX ORDER — MONTELUKAST SODIUM 10 MG/1
TABLET ORAL
Qty: 90 TABLET | Refills: 0 | Status: SHIPPED | OUTPATIENT
Start: 2022-11-10

## 2022-11-15 ENCOUNTER — OFFICE VISIT (OUTPATIENT)
Dept: FAMILY MEDICINE CLINIC | Facility: CLINIC | Age: 62
End: 2022-11-15

## 2022-11-15 VITALS
HEART RATE: 99 BPM | DIASTOLIC BLOOD PRESSURE: 70 MMHG | SYSTOLIC BLOOD PRESSURE: 110 MMHG | OXYGEN SATURATION: 100 % | HEIGHT: 65 IN | TEMPERATURE: 96.8 F | BODY MASS INDEX: 40.72 KG/M2 | WEIGHT: 244.4 LBS

## 2022-11-15 DIAGNOSIS — R05.9 COUGH IN ADULT: Primary | ICD-10-CM

## 2022-11-15 DIAGNOSIS — Z20.828 EXPOSURE TO THE FLU: ICD-10-CM

## 2022-11-15 DIAGNOSIS — J45.20 MILD INTERMITTENT ASTHMA, UNSPECIFIED WHETHER COMPLICATED: Chronic | ICD-10-CM

## 2022-11-15 LAB
EXPIRATION DATE: ABNORMAL
EXPIRATION DATE: NORMAL
FLUAV AG NPH QL: NEGATIVE
FLUBV AG NPH QL: NEGATIVE
INTERNAL CONTROL: ABNORMAL
INTERNAL CONTROL: NORMAL
Lab: ABNORMAL
Lab: NORMAL
SARS-COV-2 AG UPPER RESP QL IA.RAPID: NOT DETECTED

## 2022-11-15 PROCEDURE — 87804 INFLUENZA ASSAY W/OPTIC: CPT | Performed by: NURSE PRACTITIONER

## 2022-11-15 PROCEDURE — 99214 OFFICE O/P EST MOD 30 MIN: CPT | Performed by: NURSE PRACTITIONER

## 2022-11-15 PROCEDURE — 87426 SARSCOV CORONAVIRUS AG IA: CPT | Performed by: NURSE PRACTITIONER

## 2022-11-15 RX ORDER — PREDNISONE 10 MG/1
TABLET ORAL
Qty: 20 TABLET | Refills: 0 | Status: SHIPPED | OUTPATIENT
Start: 2022-11-15 | End: 2022-11-23

## 2022-11-15 RX ORDER — PREDNISONE 10 MG/1
TABLET ORAL
Qty: 20 TABLET | Refills: 0 | Status: SHIPPED | OUTPATIENT
Start: 2022-11-15 | End: 2022-11-15

## 2022-11-15 RX ORDER — GUAIFENESIN AND CODEINE PHOSPHATE 100; 10 MG/5ML; MG/5ML
5 SOLUTION ORAL 3 TIMES DAILY PRN
Qty: 118 ML | Refills: 0 | Status: SHIPPED | OUTPATIENT
Start: 2022-11-15 | End: 2022-11-15

## 2022-11-15 RX ORDER — GUAIFENESIN AND CODEINE PHOSPHATE 100; 10 MG/5ML; MG/5ML
5 SOLUTION ORAL 3 TIMES DAILY PRN
Qty: 118 ML | Refills: 0 | Status: SHIPPED | OUTPATIENT
Start: 2022-11-15

## 2022-11-15 NOTE — PATIENT INSTRUCTIONS
Use cough medication at night to help sleep.  Can use up to 3 times a day  Take the steroids in am with food.  Fluids  Inhalers.   Get seen if worsens

## 2022-11-15 NOTE — PROGRESS NOTES
Subjective        Cassie Winter is a 62 y.o. female.     Chief Complaint   Patient presents with   • Cough     Cough (occasionally productive) Body aches, headache yesterday.  Exposure to Influenza        History of Present Illness  Patient is here for cough since wed last week. By weekend could not quit coughing hurts in her back to cough.   It is occasional productive. She had chills last thurs.   Taking otc [facility] cold medication.   Using her albuterol bid not normally using this often using spiriva and dulera.   She said her granddaughter had flu couple weeks ago.   She is around her every day.   She has not gotten flu shot had covid vaccine no booster. She had covid in august 2022.     The following portions of the patient's history were reviewed and updated as appropriate: allergies, current medications, past family history, past medical history, past social history, past surgical history and problem list.      Current Outpatient Medications:   •  albuterol sulfate  (90 Base) MCG/ACT inhaler, Inhale 2 puffs Every 4 (Four) Hours As Needed for Wheezing., Disp: 8 g, Rfl: 5  •  aspirin 81 MG EC tablet, Daily., Disp: , Rfl:   •  Belsomra 20 MG tablet, Take 20 mg by mouth At Night As Needed (sleep)., Disp: 30 tablet, Rfl: 2  •  chlorhexidine (PERIDEX) 0.12 % solution, , Disp: , Rfl:   •  colestipol (COLESTID) 1 g tablet, Every 12 (Twelve) Hours., Disp: , Rfl:   •  Creon 11737-838697 units capsule delayed-release particles capsule, , Disp: , Rfl:   •  dicyclomine (BENTYL) 20 MG tablet, Every 12 (Twelve) Hours., Disp: , Rfl:   •  levothyroxine (Synthroid) 200 MCG tablet, Take 1 tablet by mouth Daily., Disp: 90 tablet, Rfl: 1  •  metoprolol succinate XL (TOPROL-XL) 25 MG 24 hr tablet, TAKE 1 TABLET BY MOUTH DAILY, Disp: 90 tablet, Rfl: 1  •  mometasone-formoterol (Dulera) 200-5 MCG/ACT inhaler, Inhale 2 puffs 2 (Two) Times a Day., Disp: 39 g, Rfl: 1  •  montelukast (SINGULAIR) 10 MG tablet, TAKE 1 TABLET BY  "MOUTH EVERY NIGHT, Disp: 90 tablet, Rfl: 0  •  Nebulizers (NEBULIZER COMPRESSOR) misc, NEBULIZER COMPRESSOR, Disp: , Rfl:   •  oxybutynin (DITROPAN) 5 MG tablet, Take 1 tablet by mouth 3 (Three) Times a Day., Disp: 270 tablet, Rfl: 1  •  Rexulti 2 MG tablet, Take 1 tablet by mouth Daily., Disp: 30 tablet, Rfl: 2  •  tiotropium (SPIRIVA) 18 MCG per inhalation capsule, Place 1 capsule into inhaler and inhale Daily., Disp: 30 capsule, Rfl: 2  •  venlafaxine 75 MG tablet sustained-release 24 hour 24 hr tablet, Take 1 tablet by mouth Daily With Breakfast., Disp: 30 each, Rfl: 5  •  venlafaxine XR (Effexor XR) 150 MG 24 hr capsule, Take 1 capsule by mouth Daily., Disp: 30 capsule, Rfl: 5  •  Vitamin D, Cholecalciferol, 25 MCG (1000 UT) capsule, Take 90 capsules by mouth Daily., Disp: 90 capsule, Rfl: 3  •  cyanocobalamin 1000 MCG/ML injection, Inject 1 mL into the appropriate muscle as directed by prescriber Every 30 (Thirty) Days., Disp: 10 mL, Rfl: 0  •  guaiFENesin-codeine (GUAIFENESIN AC) 100-10 MG/5ML liquid, Take 5 mL by mouth 3 (Three) Times a Day As Needed for Cough., Disp: 118 mL, Rfl: 0  •  predniSONE (DELTASONE) 10 MG tablet, Take 4 tablets by mouth Daily for 2 days, THEN 3 tablets Daily for 2 days, THEN 2 tablets Daily for 2 days, THEN 1 tablet Daily for 2 days., Disp: 20 tablet, Rfl: 0    No results found for this or any previous visit (from the past 4032 hour(s)).      Review of Systems    Objective     /70   Pulse 99   Temp 96.8 °F (36 °C) (Infrared)   Ht 165.1 cm (65\")   Wt 111 kg (244 lb 6.4 oz)   SpO2 100%   BMI 40.67 kg/m²     Physical Exam  Vitals and nursing note reviewed.   Constitutional:       Appearance: She is obese.   HENT:      Head: Normocephalic.      Right Ear: External ear normal.      Left Ear: External ear normal.      Nose: Congestion present.      Mouth/Throat:      Mouth: Mucous membranes are moist.   Eyes:      Pupils: Pupils are equal, round, and reactive to light. "   Cardiovascular:      Rate and Rhythm: Normal rate and regular rhythm.      Pulses: Normal pulses.      Heart sounds: Normal heart sounds.   Pulmonary:      Effort: Pulmonary effort is normal. No respiratory distress.      Breath sounds: Wheezing present.   Musculoskeletal:      Cervical back: Neck supple.   Skin:     Capillary Refill: Capillary refill takes less than 2 seconds.   Neurological:      General: No focal deficit present.      Mental Status: She is alert and oriented to person, place, and time.   Psychiatric:         Mood and Affect: Mood normal.         Behavior: Behavior normal.         Thought Content: Thought content normal.         Judgment: Judgment normal.         Result Review :                Assessment & Plan    Diagnoses and all orders for this visit:    1. Mild intermittent asthma, unspecified whether complicated  Comments:  continue inhalers take steroids. hydration.   Orders:  -     guaiFENesin-codeine (GUAIFENESIN AC) 100-10 MG/5ML liquid; Take 5 mL by mouth 3 (Three) Times a Day As Needed for Cough.  Dispense: 118 mL; Refill: 0    2. Exposure to the flu  Comments:  flu neg in office. exposure up to 2 weeks ago. continue contact with person with flu.     Other orders  -     predniSONE (DELTASONE) 10 MG tablet; Take 4 tablets by mouth Daily for 2 days, THEN 3 tablets Daily for 2 days, THEN 2 tablets Daily for 2 days, THEN 1 tablet Daily for 2 days.  Dispense: 20 tablet; Refill: 0      Patient Instructions   Use cough medication at night to help sleep.  Can use up to 3 times a day  Take the steroids in am with food.  Fluids  Inhalers.   Get seen if worsens        Follow Up   Return if symptoms worsen or fail to improve.    Patient was given instructions and counseling regarding her condition or for health maintenance advice. Please see specific information pulled into the AVS if appropriate.     Michelle Cage, APRN    11/15/22

## 2022-11-16 ENCOUNTER — TELEPHONE (OUTPATIENT)
Dept: FAMILY MEDICINE CLINIC | Facility: CLINIC | Age: 62
End: 2022-11-16

## 2022-11-16 NOTE — TELEPHONE ENCOUNTER
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

## 2022-11-16 NOTE — TELEPHONE ENCOUNTER
I spoke with the patient and wrote the note. She states her son, Jairo, will pick it up from the office.

## 2022-11-21 ENCOUNTER — TELEPHONE (OUTPATIENT)
Dept: FAMILY MEDICINE CLINIC | Facility: CLINIC | Age: 62
End: 2022-11-21

## 2022-11-21 NOTE — TELEPHONE ENCOUNTER
Caller: Cassie Winter    Relationship: Self    Best call back number: [phone]    What is the best time to reach you: ANY TIME    Who are you requesting to speak with (clinical staff, provider,  specific staff member): DANA HOYT, CLINICAL STAFF    What was the call regarding: PATIENT CALLED STATING THE MEDICATION THAT WAS PRESCRIBED ON 11/15/22 DID NOT HELP WITH SYMPTOMS AND SHE STILL HAS COUGH AND CONGESTION. SHE WANTS TO KNOW IF THERE IS ANY OTHER MEDICATION THAT CAN BE CALLED IN FOR HER OR IF SHE NEEDS TO COME IN FOR ANOTHER APPOINTMENT.    PLEASE ADVISE    Do you require a callback: YES

## 2022-11-22 ENCOUNTER — OFFICE VISIT (OUTPATIENT)
Dept: FAMILY MEDICINE CLINIC | Facility: CLINIC | Age: 62
End: 2022-11-22

## 2022-11-22 VITALS
DIASTOLIC BLOOD PRESSURE: 81 MMHG | TEMPERATURE: 97.7 F | SYSTOLIC BLOOD PRESSURE: 116 MMHG | HEART RATE: 66 BPM | OXYGEN SATURATION: 100 % | WEIGHT: 239 LBS | BODY MASS INDEX: 39.82 KG/M2 | RESPIRATION RATE: 20 BRPM | HEIGHT: 65 IN

## 2022-11-22 DIAGNOSIS — J10.1 INFLUENZA B: Primary | ICD-10-CM

## 2022-11-22 PROCEDURE — 99213 OFFICE O/P EST LOW 20 MIN: CPT | Performed by: NURSE PRACTITIONER

## 2022-11-22 NOTE — PROGRESS NOTES
Subjective   {CC  Problem List  Visit Diagnosis   Encounters  Notes  Medications  Labs  Result Review Imaging  Media :23}     Cassie Winter is a 62 y.o. female.     Chief Complaint   Patient presents with   • Cough     Persistent, nonproductive.       History of Present Illness  Patient is here for cough that continues . She was diagnosed with flu B on 11/15/2022.   She did not take the tamiflu.   She is using inhaler for cough. She is using guafenesin-codeine for cough.  She is using dulera and spiriva daily.   She finished her steroids.     The following portions of the patient's history were reviewed and updated as appropriate: allergies, current medications, past family history, past medical history, past social history, past surgical history and problem list.      Current Outpatient Medications:   •  albuterol sulfate  (90 Base) MCG/ACT inhaler, Inhale 2 puffs Every 4 (Four) Hours As Needed for Wheezing., Disp: 8 g, Rfl: 5  •  aspirin 81 MG EC tablet, Daily., Disp: , Rfl:   •  Belsomra 20 MG tablet, Take 20 mg by mouth At Night As Needed (sleep)., Disp: 30 tablet, Rfl: 2  •  colestipol (COLESTID) 1 g tablet, Every 12 (Twelve) Hours., Disp: , Rfl:   •  Creon [id number] units capsule delayed-release particles capsule, , Disp: , Rfl:   •  cyanocobalamin 1000 MCG/ML injection, Inject 1 mL into the appropriate muscle as directed by prescriber Every 30 (Thirty) Days., Disp: 10 mL, Rfl: 0  •  dicyclomine (BENTYL) 20 MG tablet, Every 12 (Twelve) Hours., Disp: , Rfl:   •  guaiFENesin-codeine (GUAIFENESIN AC) 100-10 MG/5ML liquid, Take 5 mL by mouth 3 (Three) Times a Day As Needed for Cough., Disp: 118 mL, Rfl: 0  •  levothyroxine (Synthroid) 200 MCG tablet, Take 1 tablet by mouth Daily., Disp: 90 tablet, Rfl: 1  •  metoprolol succinate XL (TOPROL-XL) 25 MG 24 hr tablet, TAKE 1 TABLET BY MOUTH DAILY, Disp: 90 tablet, Rfl: 1  •  mometasone-formoterol (Dulera) 200-5 MCG/ACT inhaler, Inhale 2 puffs 2  "(Two) Times a Day., Disp: 39 g, Rfl: 1  •  montelukast (SINGULAIR) 10 MG tablet, TAKE 1 TABLET BY MOUTH EVERY NIGHT, Disp: 90 tablet, Rfl: 0  •  Nebulizers (NEBULIZER COMPRESSOR) misc, NEBULIZER COMPRESSOR, Disp: , Rfl:   •  oxybutynin (DITROPAN) 5 MG tablet, Take 1 tablet by mouth 3 (Three) Times a Day., Disp: 270 tablet, Rfl: 1  •  Rexulti 2 MG tablet, Take 1 tablet by mouth Daily., Disp: 30 tablet, Rfl: 2  •  tiotropium (SPIRIVA) 18 MCG per inhalation capsule, Place 1 capsule into inhaler and inhale Daily., Disp: 30 capsule, Rfl: 2  •  venlafaxine 75 MG tablet sustained-release 24 hour 24 hr tablet, Take 1 tablet by mouth Daily With Breakfast., Disp: 30 each, Rfl: 5  •  venlafaxine XR (Effexor XR) 150 MG 24 hr capsule, Take 1 capsule by mouth Daily., Disp: 30 capsule, Rfl: 5  •  Vitamin D, Cholecalciferol, 25 MCG (1000 UT) capsule, Take 90 capsules by mouth Daily., Disp: 90 capsule, Rfl: 3  •  chlorhexidine (PERIDEX) 0.12 % solution, , Disp: , Rfl:   •  predniSONE (DELTASONE) 10 MG tablet, Take 4 tablets by mouth Daily for 2 days, THEN 3 tablets Daily for 2 days, THEN 2 tablets Daily for 2 days, THEN 1 tablet Daily for 2 days., Disp: 20 tablet, Rfl: 0    Recent Results (from the past 4032 hour(s))   POCT Influenza A/B    Collection Time: 11/15/22  4:13 PM    Specimen: Swab   Result Value Ref Range    Rapid Influenza A Ag Negative Negative    Rapid Influenza B Ag Negative (A) Negative    Internal Control Passed Passed    Lot Number [id number]     Expiration Date 73,124    POCT SARS-CoV-2 Antigen LALIA    Collection Time: 11/15/22  4:17 PM    Specimen: Swab   Result Value Ref Range    SARS Antigen Not Detected Not Detected, Presumptive Negative    Internal Control Passed Passed    Lot Number [id number]     Expiration Date 12,123          Review of Systems    Objective     /81   Pulse 66   Temp 97.7 °F (36.5 °C) (Oral)   Resp 20   Ht 165.1 cm (65\")   Wt 108 kg (239 lb)   SpO2 100%   BMI 39.77 kg/m² "     Physical Exam  Vitals and nursing note reviewed.   Constitutional:       Appearance: She is obese.   HENT:      Head: Normocephalic.      Right Ear: External ear normal.      Left Ear: External ear normal.      Nose: Nose normal.      Mouth/Throat:      Mouth: Mucous membranes are moist.   Eyes:      Pupils: Pupils are equal, round, and reactive to light.   Cardiovascular:      Rate and Rhythm: Normal rate and regular rhythm.      Pulses: Normal pulses.      Heart sounds: Normal heart sounds.   Pulmonary:      Effort: Pulmonary effort is normal.      Breath sounds: Normal breath sounds.   Abdominal:      Palpations: Abdomen is soft.   Skin:     General: Skin is warm.      Capillary Refill: Capillary refill takes less than 2 seconds.   Neurological:      General: No focal deficit present.      Mental Status: She is alert and oriented to person, place, and time.   Psychiatric:         Mood and Affect: Mood normal.         Behavior: Behavior normal.         Thought Content: Thought content normal.         Judgment: Judgment normal.         Result Review :                Assessment & Plan    Diagnoses and all orders for this visit:    1. Influenza B (Primary)  Comments:  continue treat symptoms. use inhlaers. fluids.       Patient Instructions   Use your inhalers as prescribed.  Take delsym at night for cough.  Hydration fluids.   Get seen if short of air or symptoms worsen.         Follow Up   No follow-ups on file.    Patient was given instructions and counseling regarding her condition or for health maintenance advice. Please see specific information pulled into the AVS if appropriate.     Michelle Cage, APRN    11/22/22

## 2022-11-22 NOTE — PATIENT INSTRUCTIONS
Use your inhalers as prescribed.  Take delsym at night for cough.  Hydration fluids.   Get seen if short of air or symptoms worsen.

## 2022-11-29 ENCOUNTER — APPOINTMENT (OUTPATIENT)
Dept: MAMMOGRAPHY | Facility: HOSPITAL | Age: 62
End: 2022-11-29

## 2022-12-19 ENCOUNTER — OFFICE VISIT (OUTPATIENT)
Dept: CARDIOLOGY | Facility: CLINIC | Age: 62
End: 2022-12-19

## 2022-12-19 VITALS
WEIGHT: 245 LBS | SYSTOLIC BLOOD PRESSURE: 147 MMHG | BODY MASS INDEX: 40.82 KG/M2 | HEIGHT: 65 IN | DIASTOLIC BLOOD PRESSURE: 41 MMHG | HEART RATE: 70 BPM | OXYGEN SATURATION: 95 %

## 2022-12-19 DIAGNOSIS — I47.1 PAROXYSMAL ATRIAL TACHYCARDIA: Primary | ICD-10-CM

## 2022-12-19 DIAGNOSIS — I10 ESSENTIAL HYPERTENSION: ICD-10-CM

## 2022-12-19 DIAGNOSIS — E66.9 OBESITY (BMI 30-39.9): ICD-10-CM

## 2022-12-19 DIAGNOSIS — I25.118 CORONARY ARTERY DISEASE OF NATIVE ARTERY OF NATIVE HEART WITH STABLE ANGINA PECTORIS: ICD-10-CM

## 2022-12-19 PROCEDURE — 93000 ELECTROCARDIOGRAM COMPLETE: CPT | Performed by: INTERNAL MEDICINE

## 2022-12-19 PROCEDURE — 99214 OFFICE O/P EST MOD 30 MIN: CPT | Performed by: INTERNAL MEDICINE

## 2022-12-19 RX ORDER — METOPROLOL SUCCINATE 25 MG/1
25 TABLET, EXTENDED RELEASE ORAL DAILY
Qty: 90 TABLET | Refills: 3 | Status: SHIPPED | OUTPATIENT
Start: 2022-12-19 | End: 2023-01-05
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
## 2022-12-30 RX ORDER — ALBUTEROL SULFATE 90 UG/1
2 AEROSOL, METERED RESPIRATORY (INHALATION) EVERY 4 HOURS PRN
Qty: 8 G | Refills: 5 | Status: SHIPPED | OUTPATIENT
Start: 2022-12-30

## 2023-01-05 RX ORDER — METOPROLOL SUCCINATE 25 MG/1
25 TABLET, EXTENDED RELEASE ORAL DAILY
Qty: 90 TABLET | Refills: 3 | Status: SHIPPED | OUTPATIENT
Start: 2023-01-05

## 2023-02-01 ENCOUNTER — TELEPHONE (OUTPATIENT)
Dept: CARDIOLOGY | Facility: CLINIC | Age: 63
End: 2023-02-01
Payer: MEDICAID

## 2023-02-01 NOTE — TELEPHONE ENCOUNTER
DR. EARLINE BLEU  DENTAL EXTRACTIONS  SURGERY TBD  PHONE [phone]  [phone]    PLACED ON DR. ABBY LINTON.

## 2023-03-06 ENCOUNTER — TELEPHONE (OUTPATIENT)
Dept: FAMILY MEDICINE CLINIC | Facility: CLINIC | Age: 63
End: 2023-03-06
Payer: MEDICAID

## 2023-03-06 RX ORDER — BREXPIPRAZOLE 2 MG/1
2 TABLET ORAL DAILY
Qty: 30 TABLET | Refills: 2 | Status: SHIPPED | OUTPATIENT
Start: 2023-03-06

## 2023-03-10 ENCOUNTER — TELEPHONE (OUTPATIENT)
Dept: FAMILY MEDICINE CLINIC | Facility: CLINIC | Age: 63
End: 2023-03-10
Payer: MEDICAID

## 2023-03-13 ENCOUNTER — TELEPHONE (OUTPATIENT)
Dept: FAMILY MEDICINE CLINIC | Facility: CLINIC | Age: 63
End: 2023-03-13
Payer: MEDICAID

## 2023-03-13 RX ORDER — SUVOREXANT 20 MG/1
1 TABLET, FILM COATED ORAL NIGHTLY PRN
Qty: 30 TABLET | Refills: 2 | Status: SHIPPED | OUTPATIENT
Start: 2023-03-13

## 2023-03-15 ENCOUNTER — TELEPHONE (OUTPATIENT)
Dept: FAMILY MEDICINE CLINIC | Facility: CLINIC | Age: 63
End: 2023-03-15
Payer: MEDICAID

## 2023-03-15 RX ORDER — OXYBUTYNIN CHLORIDE 5 MG/1
5 TABLET ORAL 3 TIMES DAILY
Qty: 270 TABLET | Refills: 1 | Status: SHIPPED | OUTPATIENT
Start: 2023-03-15

## 2023-03-30 ENCOUNTER — TELEPHONE (OUTPATIENT)
Dept: FAMILY MEDICINE CLINIC | Facility: CLINIC | Age: 63
End: 2023-03-30
Payer: MEDICAID

## 2023-03-30 RX ORDER — VENLAFAXINE HYDROCHLORIDE 150 MG/1
150 CAPSULE, EXTENDED RELEASE ORAL DAILY
Qty: 30 CAPSULE | Refills: 2 | Status: SHIPPED | OUTPATIENT
Start: 2023-03-30

## 2023-04-27 ENCOUNTER — TELEPHONE (OUTPATIENT)
Dept: FAMILY MEDICINE CLINIC | Facility: CLINIC | Age: 63
End: 2023-04-27
Payer: MEDICAID

## 2023-04-27 RX ORDER — VENLAFAXINE HYDROCHLORIDE 75 MG/1
75 TABLET, EXTENDED RELEASE ORAL
Qty: 30 EACH | Refills: 5 | Status: SHIPPED | OUTPATIENT
Start: 2023-04-27

## 2023-05-09 RX ORDER — MONTELUKAST SODIUM 10 MG/1
TABLET ORAL
Qty: 90 TABLET | Refills: 0 | Status: SHIPPED | OUTPATIENT
Start: 2023-05-09 | End: 2023-05-15

## 2023-05-15 RX ORDER — MONTELUKAST SODIUM 10 MG/1
TABLET ORAL
Qty: 90 TABLET | Refills: 0 | Status: SHIPPED | OUTPATIENT
Start: 2023-05-15

## 2023-05-17 ENCOUNTER — TELEPHONE (OUTPATIENT)
Dept: FAMILY MEDICINE CLINIC | Facility: CLINIC | Age: 63
End: 2023-05-17
Payer: MEDICAID

## 2023-06-12 ENCOUNTER — TELEPHONE (OUTPATIENT)
Dept: CARDIOLOGY | Facility: CLINIC | Age: 63
End: 2023-06-12
Payer: MEDICAID

## 2023-06-12 NOTE — TELEPHONE ENCOUNTER
I SPOKE WITH PT TO RESCHEDULE APPOINTMENT WITH DR MORA ON 12/18/23 TO 12/4/23 AT 1:20 PM DUE TO DR MORA BEING OUT OF THE OFFICE ON 12/18/23.

## 2023-07-25 ENCOUNTER — TELEPHONE (OUTPATIENT)
Dept: FAMILY MEDICINE CLINIC | Facility: CLINIC | Age: 63
End: 2023-07-25

## 2023-07-25 NOTE — TELEPHONE ENCOUNTER
Caller: Cassie Winter    Relationship: Self    Best call back number: [phone]    What orders are you requesting (i.e. lab or imaging): LABS BEFORE APPOINTMENT WITH DANA HOYT     In what timeframe would the patient need to come in: ASAP    Where will you receive your lab/imaging services: OFFICE     Additional notes: PATIENT WANTED TO CHECK IF DANA HOYT WANTS HER TO HAVE LABS DONE BEFORE HER APPOINTMENT FRIDAY

## 2023-07-26 NOTE — TELEPHONE ENCOUNTER
In addition to needing labs ordered, patient is also needing a refill on her Vanlafaxine ER 150mg.   Statement Selected

## 2023-07-28 ENCOUNTER — OFFICE VISIT (OUTPATIENT)
Dept: FAMILY MEDICINE CLINIC | Facility: CLINIC | Age: 63
End: 2023-07-28
Payer: MEDICAID

## 2023-07-28 VITALS
TEMPERATURE: 98.6 F | WEIGHT: 260 LBS | OXYGEN SATURATION: 97 % | HEART RATE: 62 BPM | SYSTOLIC BLOOD PRESSURE: 118 MMHG | HEIGHT: 65 IN | BODY MASS INDEX: 43.32 KG/M2 | DIASTOLIC BLOOD PRESSURE: 78 MMHG

## 2023-07-28 DIAGNOSIS — I25.10 CHRONIC CORONARY ARTERY DISEASE: Chronic | ICD-10-CM

## 2023-07-28 DIAGNOSIS — K86.89 PANCREATIC INSUFFICIENCY: Chronic | ICD-10-CM

## 2023-07-28 DIAGNOSIS — E53.8 B12 DEFICIENCY: Chronic | ICD-10-CM

## 2023-07-28 DIAGNOSIS — J45.20 MILD INTERMITTENT ASTHMA, UNSPECIFIED WHETHER COMPLICATED: Chronic | ICD-10-CM

## 2023-07-28 DIAGNOSIS — K58.2 IRRITABLE BOWEL SYNDROME WITH BOTH CONSTIPATION AND DIARRHEA: Chronic | ICD-10-CM

## 2023-07-28 DIAGNOSIS — N32.81 OVERACTIVE BLADDER: Chronic | ICD-10-CM

## 2023-07-28 DIAGNOSIS — E03.9 ACQUIRED HYPOTHYROIDISM: Chronic | ICD-10-CM

## 2023-07-28 DIAGNOSIS — Z12.31 BREAST CANCER SCREENING BY MAMMOGRAM: Chronic | ICD-10-CM

## 2023-07-28 DIAGNOSIS — G47.39 OTHER SLEEP APNEA: Chronic | ICD-10-CM

## 2023-07-28 DIAGNOSIS — F33.41 RECURRENT MAJOR DEPRESSIVE DISORDER, IN PARTIAL REMISSION: Chronic | ICD-10-CM

## 2023-07-28 PROBLEM — Z20.828 EXPOSURE TO THE FLU: Status: RESOLVED | Noted: 2022-11-15 | Resolved: 2023-07-28

## 2023-07-28 PROBLEM — J10.1 INFLUENZA B: Status: RESOLVED | Noted: 2022-11-22 | Resolved: 2023-07-28

## 2023-07-28 PROCEDURE — 1160F RVW MEDS BY RX/DR IN RCRD: CPT | Performed by: NURSE PRACTITIONER

## 2023-07-28 PROCEDURE — 1159F MED LIST DOCD IN RCRD: CPT | Performed by: NURSE PRACTITIONER

## 2023-07-28 PROCEDURE — 99214 OFFICE O/P EST MOD 30 MIN: CPT | Performed by: NURSE PRACTITIONER

## 2023-07-28 RX ORDER — VENLAFAXINE HYDROCHLORIDE 150 MG/1
150 CAPSULE, EXTENDED RELEASE ORAL DAILY
Qty: 30 CAPSULE | Refills: 2 | Status: SHIPPED | OUTPATIENT
Start: 2023-07-28

## 2023-07-28 NOTE — PATIENT INSTRUCTIONS
Need covid vaccines  Shingles vaccine  Keep appointment with new [provider  You should hear from sleep medicine   You schedule mammogram  Contact [facility] about your stomach meds  You should hear from psych .

## 2023-07-28 NOTE — PROGRESS NOTES
Subjective        Cassie Winter is a 62 y.o. female.     Chief Complaint   Patient presents with    Hypothyroidism     Follow up       Hypothyroidism    Patient is here for management of her chronic medical problems:  Hypothyroidism, CAD, B12 def, IBS, pancreatic insufficency, OA bladder, depression, asthma,    Hypothyroidism: levothyroxine 200 mg daily.     Depression: recurrent major depressive disorder.was followed by psychiatry : denies she is suicidal or homicidal .   Venlafaxine 75 mg am and 150 mg pm rexulti 2 mg daily.   Live alone her dog  year ago.    IBS followed by gastroenterology no recent visit dicyclomine 20 every 12 hr.     Pancreatic insufficiency; followed by gastroenterology no recent visit. Creon 2 with every meal 2 with every snack.     Asthma spiriva daily albuterol as needed. Dulera daily    CAD ;metoprolol succinate XL 25 mg daily moderate OM1 disease bradycardia RVE. Paroxysmal atrial tachycardia. Reviewed the 2022 visit with dr Casillas.     OAB: denies bladder incontinence ditropan 5 mg tid.       The following portions of the patient's history were reviewed and updated as appropriate: allergies, current medications, past family history, past medical history, past social history, past surgical history and problem list.      Current Outpatient Medications:     albuterol sulfate  (90 Base) MCG/ACT inhaler, Inhale 2 puffs Every 4 (Four) Hours As Needed for Wheezing., Disp: 8 g, Rfl: 5    aspirin 81 MG EC tablet, Daily., Disp: , Rfl:     Creon [id number] units capsule delayed-release particles capsule, 2 with every meal 2 with every snack, Disp: , Rfl:     dicyclomine (BENTYL) 20 MG tablet, Every 12 (Twelve) Hours., Disp: , Rfl:     levothyroxine (Synthroid) 200 MCG tablet, Take 1 tablet by mouth Daily., Disp: 90 tablet, Rfl: 0    metoprolol succinate XL (TOPROL-XL) 25 MG 24 hr tablet, TAKE 1 TABLET BY MOUTH DAILY, Disp: 90 tablet, Rfl: 3    mometasone-formoterol (Dulera)  "200-5 MCG/ACT inhaler, Inhale 2 puffs 2 (Two) Times a Day., Disp: 39 g, Rfl: 1    montelukast (SINGULAIR) 10 MG tablet, TAKE 1 TABLET BY MOUTH EVERY NIGHT, Disp: 90 tablet, Rfl: 0    oxybutynin (DITROPAN) 5 MG tablet, Take 1 tablet by mouth 3 (Three) Times a Day., Disp: 270 tablet, Rfl: 1    Rexulti 2 MG tablet, Take 1 tablet by mouth Daily., Disp: 30 tablet, Rfl: 2    tiotropium (SPIRIVA) 18 MCG per inhalation capsule, Place 1 capsule into inhaler and inhale Daily., Disp: 30 capsule, Rfl: 2    venlafaxine 75 MG tablet sustained-release 24 hour 24 hr tablet, Take 1 tablet by mouth Daily With Breakfast., Disp: 30 each, Rfl: 5    venlafaxine XR (Effexor XR) 150 MG 24 hr capsule, Take 1 capsule by mouth Daily., Disp: 30 capsule, Rfl: 2    No results found for this or any previous visit (from the past 4032 hour(s)).      Review of Systems    Objective     /78 (BP Location: Left arm, Patient Position: Sitting, Cuff Size: Large Adult)   Pulse 62   Temp 98.6 °F (37 °C) (Infrared)   Ht 165.1 cm (65\")   Wt 118 kg (260 lb)   SpO2 97%   BMI 43.27 kg/m²     Physical Exam  Vitals and nursing note reviewed.       Result Review :                Assessment & Plan    Diagnoses and all orders for this visit:    1. Mild intermittent asthma, unspecified whether complicated  Comments:  stable    2. Other sleep apnea  Comments:  referred for sleep study  Assessment & Plan:  She no longer has machine lost it in past. At least year     Orders:  -     Ambulatory Referral to Sleep Medicine    3. Recurrent major depressive disorder, in partial remission  Comments:  refrerred to pscy managed on medications  Orders:  -     Ambulatory Referral to Psychiatry    4. Overactive bladder  Comments:  stable    5. Irritable bowel syndrome with both constipation and diarrhea  Comments:  she is followed by [facility]    6. Pancreatic insufficiency  Comments:  followed by [facility]    7. B12 deficiency  Comments:  labs ordered  Orders:  -     Vitamin " B12    8. Acquired hypothyroidism  Comments:  labs ordered  Orders:  -     TSH; Future  -     T4, free; Future    9. Chronic coronary artery disease  Comments:  stable followed by cardiology  Orders:  -     Lipid Panel; Future  -     Comprehensive Metabolic Panel; Future    10. Breast cancer screening by mammogram  Comments:  mammogram ordered  Orders:  -     Mammo Screening Digital Tomosynthesis Bilateral With CAD      Patient Instructions   Need covid vaccines  Shingles vaccine  Keep appointment with new [provider  You should hear from sleep medicine   You schedule mammogram  Contact [facility] about your stomach meds  You should hear from psych .     Follow Up   No follow-ups on file.    Patient was given instructions and counseling regarding her condition or for health maintenance advice. Please see specific information pulled into the AVS if appropriate.     Michelle Cage, APRN    07/28/23

## 2023-07-31 ENCOUNTER — LAB (OUTPATIENT)
Dept: LAB | Facility: HOSPITAL | Age: 63
End: 2023-07-31
Payer: MEDICAID

## 2023-07-31 ENCOUNTER — TELEPHONE (OUTPATIENT)
Dept: FAMILY MEDICINE CLINIC | Facility: CLINIC | Age: 63
End: 2023-07-31
Payer: MEDICAID

## 2023-07-31 DIAGNOSIS — I25.10 CHRONIC CORONARY ARTERY DISEASE: Chronic | ICD-10-CM

## 2023-07-31 DIAGNOSIS — E03.9 ACQUIRED HYPOTHYROIDISM: Chronic | ICD-10-CM

## 2023-07-31 DIAGNOSIS — K58.2 IRRITABLE BOWEL SYNDROME WITH BOTH CONSTIPATION AND DIARRHEA: Primary | Chronic | ICD-10-CM

## 2023-07-31 LAB
ALBUMIN SERPL-MCNC: 3.5 G/DL (ref 3.5–5.2)
ALBUMIN/GLOB SERPL: 1.3 G/DL
ALP SERPL-CCNC: 109 U/L (ref 39–117)
ALT SERPL W P-5'-P-CCNC: 19 U/L (ref 1–33)
ANION GAP SERPL CALCULATED.3IONS-SCNC: 11.5 MMOL/L (ref 5–15)
AST SERPL-CCNC: 22 U/L (ref 1–32)
BILIRUB SERPL-MCNC: <0.2 MG/DL (ref 0–1.2)
BUN SERPL-MCNC: 16 MG/DL (ref 8–23)
BUN/CREAT SERPL: 17.8 (ref 7–25)
CALCIUM SPEC-SCNC: 9.4 MG/DL (ref 8.6–10.5)
CHLORIDE SERPL-SCNC: 108 MMOL/L (ref 98–107)
CHOLEST SERPL-MCNC: 171 MG/DL (ref 0–200)
CO2 SERPL-SCNC: 22.5 MMOL/L (ref 22–29)
CREAT SERPL-MCNC: 0.9 MG/DL (ref 0.57–1)
EGFRCR SERPLBLD CKD-EPI 2021: 71.5 ML/MIN/1.73
GLOBULIN UR ELPH-MCNC: 2.6 GM/DL
GLUCOSE SERPL-MCNC: 83 MG/DL (ref 65–99)
HDLC SERPL-MCNC: 62 MG/DL (ref 40–60)
LDLC SERPL CALC-MCNC: 88 MG/DL (ref 0–100)
LDLC/HDLC SERPL: 1.37 {RATIO}
POTASSIUM SERPL-SCNC: 4.4 MMOL/L (ref 3.5–5.2)
PROT SERPL-MCNC: 6.1 G/DL (ref 6–8.5)
SODIUM SERPL-SCNC: 142 MMOL/L (ref 136–145)
T4 FREE SERPL-MCNC: 1.14 NG/DL (ref 0.93–1.7)
TRIGL SERPL-MCNC: 119 MG/DL (ref 0–150)
TSH SERPL DL<=0.05 MIU/L-ACNC: 1.63 UIU/ML (ref 0.27–4.2)
VIT B12 BLD-MCNC: 175 PG/ML (ref 211–946)
VLDLC SERPL-MCNC: 21 MG/DL (ref 5–40)

## 2023-07-31 PROCEDURE — 80053 COMPREHEN METABOLIC PANEL: CPT

## 2023-07-31 PROCEDURE — 80061 LIPID PANEL: CPT

## 2023-07-31 PROCEDURE — 82607 VITAMIN B-12: CPT | Performed by: NURSE PRACTITIONER

## 2023-07-31 PROCEDURE — 84439 ASSAY OF FREE THYROXINE: CPT

## 2023-07-31 PROCEDURE — 84443 ASSAY THYROID STIM HORMONE: CPT

## 2023-08-01 DIAGNOSIS — K86.89 PANCREATIC INSUFFICIENCY: Primary | ICD-10-CM

## 2023-08-22 ENCOUNTER — TELEPHONE (OUTPATIENT)
Dept: FAMILY MEDICINE CLINIC | Facility: CLINIC | Age: 63
End: 2023-08-22
Payer: MEDICAID

## 2023-08-22 RX ORDER — LEVOTHYROXINE SODIUM 0.2 MG/1
200 TABLET ORAL DAILY
Qty: 90 TABLET | Refills: 0 | Status: SHIPPED | OUTPATIENT
Start: 2023-08-22

## 2023-09-05 ENCOUNTER — OFFICE (AMBULATORY)
Dept: URBAN - METROPOLITAN AREA CLINIC 64 | Facility: CLINIC | Age: 63
End: 2023-09-05

## 2023-09-05 VITALS
WEIGHT: 256 LBS | HEART RATE: 59 BPM | SYSTOLIC BLOOD PRESSURE: 105 MMHG | DIASTOLIC BLOOD PRESSURE: 65 MMHG | HEIGHT: 65 IN

## 2023-09-05 DIAGNOSIS — R19.7 DIARRHEA, UNSPECIFIED: ICD-10-CM

## 2023-09-05 DIAGNOSIS — K90.9 INTESTINAL MALABSORPTION, UNSPECIFIED: ICD-10-CM

## 2023-09-05 PROCEDURE — 99214 OFFICE O/P EST MOD 30 MIN: CPT | Performed by: NURSE PRACTITIONER

## 2023-09-05 RX ORDER — COLESTIPOL HYDROCHLORIDE 1 G/1
TABLET, FILM COATED ORAL
Qty: 60 | Refills: 11 | Status: ACTIVE

## 2023-09-05 RX ORDER — PANCRELIPASE 36000; 180000; 114000 [USP'U]/1; [USP'U]/1; [USP'U]/1
CAPSULE, DELAYED RELEASE PELLETS ORAL
Qty: 540 | Refills: 0 | Status: ACTIVE

## 2023-09-05 RX ORDER — DICYCLOMINE HYDROCHLORIDE 20 MG/1
TABLET ORAL
Qty: 60 | Refills: 11 | Status: ACTIVE

## 2023-09-21 RX ORDER — VENLAFAXINE HYDROCHLORIDE 75 MG/1
75 TABLET, EXTENDED RELEASE ORAL
Qty: 30 TABLET | Refills: 0 | Status: SHIPPED | OUTPATIENT
Start: 2023-09-21

## 2023-10-03 ENCOUNTER — OFFICE VISIT (OUTPATIENT)
Dept: FAMILY MEDICINE CLINIC | Facility: CLINIC | Age: 63
End: 2023-10-03
Payer: MEDICARE

## 2023-10-03 VITALS
OXYGEN SATURATION: 98 % | BODY MASS INDEX: 44.35 KG/M2 | DIASTOLIC BLOOD PRESSURE: 62 MMHG | SYSTOLIC BLOOD PRESSURE: 104 MMHG | HEIGHT: 65 IN | WEIGHT: 266.2 LBS | HEART RATE: 55 BPM | RESPIRATION RATE: 16 BRPM

## 2023-10-03 DIAGNOSIS — F33.41 RECURRENT MAJOR DEPRESSIVE DISORDER, IN PARTIAL REMISSION: Primary | Chronic | ICD-10-CM

## 2023-10-03 DIAGNOSIS — G47.33 OSA (OBSTRUCTIVE SLEEP APNEA): ICD-10-CM

## 2023-10-03 DIAGNOSIS — Z78.0 POSTMENOPAUSE: ICD-10-CM

## 2023-10-03 DIAGNOSIS — N32.81 OVERACTIVE BLADDER: ICD-10-CM

## 2023-10-03 DIAGNOSIS — E03.9 ACQUIRED HYPOTHYROIDISM: Chronic | ICD-10-CM

## 2023-10-03 DIAGNOSIS — Z23 NEED FOR INFLUENZA VACCINATION: ICD-10-CM

## 2023-10-03 DIAGNOSIS — K86.89 PANCREATIC INSUFFICIENCY: ICD-10-CM

## 2023-10-03 DIAGNOSIS — K58.9 IRRITABLE BOWEL SYNDROME, UNSPECIFIED TYPE: ICD-10-CM

## 2023-10-03 DIAGNOSIS — I25.10 CHRONIC CORONARY ARTERY DISEASE: Chronic | ICD-10-CM

## 2023-10-03 RX ORDER — MONTELUKAST SODIUM 4 MG/1
TABLET, CHEWABLE ORAL
COMMUNITY
Start: 2023-09-05

## 2023-10-03 NOTE — PATIENT INSTRUCTIONS
Up to date with labs    Recommend following up with DEXA scan    Follow up with specialists as scheduled: psych, sleep medicine, GI, cardiology    Follow up for sleep study as scheduled    Medications:  Continue current medications as prescribed    Encourage healthy diet and exercise    Follow up early next year

## 2023-10-03 NOTE — PROGRESS NOTES
Chief Complaint  Hypothyroidism and Hypertension (Establishing from Dr. Garcia)    HPI:    Cassie Winter presents to [facility]    Coronary artery disease  Previous cath 2/20/17 moderate OM1 disease. Follows with cardiology. Currently on Aspirin 81 mg daily. Not on statin currently and reportedly has not been.      Allergic rhinitis/mild asthma  Currently Spiriva, Singulair, Dulera, albuterol. No recent exacerbations or admission. Sometimes can be worse with cold and activity.     Hypothyroidism  Levothyroxine 200 mcg daily. Up to date with TSH.     Overactive bladder  Oxybutynin 5 mg 3 times daily.    Previous gastric bypass in 1985. 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 Since fistula and prior abdominal surgeries has had IBS and pancreatic insufficiency. Currently on Bentyl 20 mg twice daily and Creon. Follows with GI with GI.     Depression:  Previously referred to psychiatry and still has not heard anything. Currently on Rexulti 2 mg daily and Effexor 225 mg daily. She has been on both medication for about two years. Mood overall been pretty good recently and does not have any specific trigger. Denies suicidal/homicidal ideations.     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    Past Medical History:   Diagnosis Date    Allergic rhinitis     Anemia     Asthma     B12 deficiency     Bradycardia     Broken arm 2019    Left arm broken     Chronic back pain     Chronic bronchitis     Coronary artery disease     Diarrhea     Hypothyroidism      MRSA (methicillin resistant Staphylococcus aureus)     Neuropathic pain of foot     Severe back pain 2010    Sleep apnea, obstructive          Current Outpatient Medications:     albuterol sulfate  (90 Base) MCG/ACT inhaler, Inhale 2 puffs Every 4 (Four) Hours As Needed for Wheezing., Disp: 8 g, Rfl: 5    aspirin 81 MG EC tablet, Daily., Disp: , Rfl:     colestipol (COLESTID) 1 g tablet, , Disp: , Rfl:     Creon 74862-958120 units capsule delayed-release particles capsule, 2 with every meal 2 with every snack, Disp: , Rfl:     dicyclomine (BENTYL) 20 MG tablet, Every 12 (Twelve) Hours., Disp: , Rfl:     levothyroxine (Synthroid) 200 MCG tablet, Take 1 tablet by mouth Daily., Disp: 90 tablet, Rfl: 0    metoprolol succinate XL (TOPROL-XL) 25 MG 24 hr tablet, TAKE 1 TABLET BY MOUTH DAILY, Disp: 90 tablet, Rfl: 3    mometasone-formoterol (Dulera) 200-5 MCG/ACT inhaler, Inhale 2 puffs 2 (Two) Times a Day., Disp: 39 g, Rfl: 1    montelukast (SINGULAIR) 10 MG tablet, TAKE 1 TABLET BY MOUTH EVERY NIGHT, Disp: 90 tablet, Rfl: 0    oxybutynin (DITROPAN) 5 MG tablet, Take 1 tablet by mouth 3 (Three) Times a Day., Disp: 270 tablet, Rfl: 1    Rexulti 2 MG tablet, Take 1 tablet by mouth Daily., Disp: 30 tablet, Rfl: 2    tiotropium (SPIRIVA) 18 MCG per inhalation capsule, Place 1 capsule into inhaler and inhale Daily., Disp: 30 capsule, Rfl: 2    venlafaxine 75 MG tablet sustained-release 24 hour 24 hr tablet, TAKE 1 TABLET BY MOUTH DAILY WITH BREAKFAST, Disp: 30 tablet, Rfl: 0    venlafaxine XR (Effexor XR) 150 MG 24 hr capsule, Take 1 capsule by mouth Daily., Disp: 30 capsule, Rfl: 2    Social History     Socioeconomic History    Marital status:    Tobacco Use    Smoking status: Never    Smokeless tobacco: Never   Vaping Use    Vaping Use: Never used   Substance and Sexual Activity    Alcohol use: No    Drug use: No    Sexual activity: Defer        Objective   Vital Signs:  There were no vitals taken for this  "visit.  Estimated body mass index is 43.27 kg/m² as calculated from the following:    Height as of 7/28/23: 165.1 cm (65\").    Weight as of 7/28/23: 118 kg (260 lb).    Physical Exam:  General: Well-appearing patient, no apparent distress  HEENT: No posterior pharynx erythema, no tonsillar erythema or exudates,   Cardiac: Regular rate and rhythm, normal S1/S2, no murmur, rubs or gallops, no lower extremity edema  Lungs: Clear to auscultation bilaterally, no crackles or wheezes  Abdomen: Soft, non-tender, no guarding or rebound tenderness, no hepatosplenomegaly  Skin: No significant rashes or lesions, well-healing abdominal surgical scars  MSK: Grossly normal tone and strength  Neuro: Alert and oriented x3, CN II-XII grossly intact  Psych: Appropriate mood and affect    Assessment and Plan:    (F33.41) Recurrent major depressive disorder, in partial remission -   Assessment: Mood overall good on current medication regimen including Rexulti and Effexor.  Denied suicidal/homicidal ideations.  Previously had been referred to psychiatry by prior PCP, but patient has not heard.  Placing additional psych referral to help expedite process. Planning on prescribing current medications for now.  Plan:  - Continue Rexulti and Effexor as prescribed  - Encouraged patient to do things they enjoy, reduce stressors  - Ambulatory Referral to Psychiatry    (I25.10) Chronic coronary artery disease   Assessment: Previous coronary artery disease noted on coronary catheterization.  Follows with cardiology and has not had recent cardiopulmonary symptoms.  Currently on aspirin without statin.  Plan:  - Follow-up with cardiology as prescribed  - Continue daily aspirin    (E03.9) Acquired hypothyroidism  Assessment: Currently on levothyroxine 200 mcg daily.  Up-to-date with TSH.  Denies hypo-/hyperthyroid symptoms.   Plan:   - Continue levothyroxine    (N32.81) Overactive bladder  Assessment: Symptoms well controlled on oxybutynin.  Plan:   - " Continue oxybutynin    (G47.33) WILFRID (obstructive sleep apnea) -   Assessment: Previously on CPAP which she lost during period of homelessness.  Previously recommended by prior PCP to have sleep study performed for reevaluation.  Plan:   - Ambulatory Referral to Sleep Medicine    (K58.9) Irritable bowel syndrome, unspecified type  (K86.89) Pancreatic insufficiency  Assessment: Follows with GI.  Symptoms have been well controlled on Creon and Bentyl.  Plan:   -Follow-up with GI as scheduled  -Continue Creon and Bentyl as prescribed    (Z23) Need for influenza vaccination - Plan: Fluzone High-Dose 65+yrs (8617-6359)    (Z78.0) Postmenopause - Plan: DEXA Bone Density Axial     Patient planning on following up with pharmacy so she can get her additional vaccines up-to-date.     Patient was given instructions and counseling regarding her condition or for health maintenance advice. Please see specific information pulled into the AVS if appropriate.       Dr Colby Vines   Internal Medicine Physician  [facility]  [address], [address]  [place] [address]

## 2023-10-16 ENCOUNTER — HOSPITAL ENCOUNTER (OUTPATIENT)
Dept: BONE DENSITY | Facility: HOSPITAL | Age: 63
Discharge: HOME | End: 2023-10-16
Admitting: INTERNAL MEDICINE
Payer: MEDICARE

## 2023-10-16 DIAGNOSIS — Z78.0 POSTMENOPAUSE: ICD-10-CM

## 2023-10-16 PROCEDURE — 77080 DXA BONE DENSITY AXIAL: CPT

## 2023-10-20 ENCOUNTER — TELEPHONE (OUTPATIENT)
Dept: FAMILY MEDICINE CLINIC | Facility: CLINIC | Age: 63
End: 2023-10-20
Payer: MEDICARE

## 2023-11-11 RX ORDER — VENLAFAXINE HYDROCHLORIDE 75 MG/1
75 TABLET, EXTENDED RELEASE ORAL
Qty: 30 TABLET | Refills: 0 | OUTPATIENT
Start: 2023-11-11

## 2023-11-14 RX ORDER — VENLAFAXINE HYDROCHLORIDE 75 MG/1
75 TABLET, EXTENDED RELEASE ORAL
Qty: 90 TABLET | Refills: 1 | Status: SHIPPED | OUTPATIENT
Start: 2023-11-14

## 2023-11-14 RX ORDER — VENLAFAXINE HYDROCHLORIDE 150 MG/1
150 CAPSULE, EXTENDED RELEASE ORAL DAILY
Qty: 90 CAPSULE | Refills: 1 | Status: SHIPPED | OUTPATIENT
Start: 2023-11-14

## 2023-11-19 RX ORDER — MONTELUKAST SODIUM 10 MG/1
TABLET ORAL
Qty: 90 TABLET | Refills: 0 | OUTPATIENT
Start: 2023-11-19

## 2023-12-01 RX ORDER — LEVOTHYROXINE SODIUM 0.2 MG/1
200 TABLET ORAL DAILY
Qty: 90 TABLET | Refills: 0 | OUTPATIENT
Start: 2023-12-01
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
## 2023-12-04 ENCOUNTER — OFFICE VISIT (OUTPATIENT)
Dept: CARDIOLOGY | Facility: CLINIC | Age: 63
End: 2023-12-04
Payer: MEDICARE

## 2023-12-04 VITALS
HEART RATE: 53 BPM | HEIGHT: 65 IN | BODY MASS INDEX: 43.49 KG/M2 | OXYGEN SATURATION: 99 % | DIASTOLIC BLOOD PRESSURE: 52 MMHG | WEIGHT: 261 LBS | SYSTOLIC BLOOD PRESSURE: 120 MMHG

## 2023-12-04 DIAGNOSIS — E78.5 DYSLIPIDEMIA: ICD-10-CM

## 2023-12-04 DIAGNOSIS — E66.01 MORBID OBESITY WITH BMI OF 40.0-44.9, ADULT: ICD-10-CM

## 2023-12-04 DIAGNOSIS — I25.118 CORONARY ARTERY DISEASE OF NATIVE ARTERY OF NATIVE HEART WITH STABLE ANGINA PECTORIS: Primary | ICD-10-CM

## 2023-12-04 DIAGNOSIS — I10 ESSENTIAL HYPERTENSION: ICD-10-CM

## 2023-12-04 PROCEDURE — 99214 OFFICE O/P EST MOD 30 MIN: CPT | Performed by: INTERNAL MEDICINE

## 2023-12-04 PROCEDURE — 1160F RVW MEDS BY RX/DR IN RCRD: CPT | Performed by: INTERNAL MEDICINE

## 2023-12-04 PROCEDURE — 93000 ELECTROCARDIOGRAM COMPLETE: CPT | Performed by: INTERNAL MEDICINE

## 2023-12-04 PROCEDURE — 1159F MED LIST DOCD IN RCRD: CPT | Performed by: INTERNAL MEDICINE

## 2023-12-04 RX ORDER — ERGOCALCIFEROL (VITAMIN D2) 10 MCG
400 TABLET ORAL DAILY
COMMUNITY

## 2024-01-15 RX ORDER — BREXPIPRAZOLE 2 MG/1
2 TABLET ORAL DAILY
Qty: 30 TABLET | Refills: 2 | OUTPATIENT
Start: 2024-01-15

## 2024-01-19 RX ORDER — BREXPIPRAZOLE 2 MG/1
2 TABLET ORAL DAILY
Qty: 30 TABLET | Refills: 2 | OUTPATIENT
Start: 2024-01-19

## 2024-01-21 ENCOUNTER — APPOINTMENT (OUTPATIENT)
Dept: GENERAL RADIOLOGY | Facility: HOSPITAL | Age: 64
End: 2024-01-21
Payer: MEDICARE

## 2024-01-21 ENCOUNTER — HOSPITAL ENCOUNTER (EMERGENCY)
Facility: HOSPITAL | Age: 64
Discharge: HOME | End: 2024-01-21
Attending: EMERGENCY MEDICINE | Admitting: EMERGENCY MEDICINE
Payer: MEDICARE

## 2024-01-21 VITALS
DIASTOLIC BLOOD PRESSURE: 71 MMHG | WEIGHT: 253.31 LBS | OXYGEN SATURATION: 100 % | BODY MASS INDEX: 42.2 KG/M2 | RESPIRATION RATE: 18 BRPM | HEART RATE: 62 BPM | SYSTOLIC BLOOD PRESSURE: 155 MMHG | HEIGHT: 65 IN | TEMPERATURE: 98.3 F
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
## 2024-01-21 DIAGNOSIS — M79.671 RIGHT FOOT PAIN: ICD-10-CM

## 2024-01-21 DIAGNOSIS — S92.414A CLOSED NONDISPLACED FRACTURE OF PROXIMAL PHALANX OF RIGHT GREAT TOE, INITIAL ENCOUNTER: Primary | ICD-10-CM

## 2024-01-21 PROCEDURE — 73630 X-RAY EXAM OF FOOT: CPT

## 2024-01-21 PROCEDURE — 99283 EMERGENCY DEPT VISIT LOW MDM: CPT

## 2024-01-21 RX ORDER — OXYCODONE HYDROCHLORIDE AND ACETAMINOPHEN 5; 325 MG/1; MG/1
1 TABLET ORAL EVERY 8 HOURS PRN
Qty: 12 TABLET | Refills: 0 | Status: SHIPPED | OUTPATIENT
Start: 2024-01-21

## 2024-01-21 NOTE — ED PROVIDER NOTES
Subjective   History of Present Illness  Patient is a 63-year-old female presents to the ED with complaints of acute severe right foot pain after she tripped over carpet yesterday.  She reports significant bruising and swelling along the top of her foot most of the pain is in her great toe.  She denies any lacerations or abrasions patient states she tried taking Tylenol with no improvement patient is able to ambulate but states it is painful.  She denies any new numbness or weakness of her lower extremity.  No reports of head injury or loss of consciousness.  Patient has no other complaints    History provided by:  Patient      Review of Systems   Constitutional: Negative.    Respiratory:  Negative for shortness of breath.    Cardiovascular:  Negative for chest pain.   Musculoskeletal:  Positive for arthralgias.   Skin:  Positive for color change.   Neurological:  Negative for weakness and numbness.       Past Medical History:   Diagnosis Date    Allergic rhinitis     Anemia     Asthma     B12 deficiency     Bradycardia     Broken arm 2019    Left arm broken     Chronic back pain     Chronic bronchitis     Coronary artery disease     Diarrhea     Hypothyroidism     MRSA (methicillin resistant Staphylococcus aureus)     Neuropathic pain of foot     Severe back pain 2010    Sleep apnea, obstructive        Allergies   Allergen Reactions    Hydrocodone-Acetaminophen Hives     Can tolerate with Benadryl    Oxycodone-Acetaminophen Hives     Can tolerate with Benadryl    Ketorolac Tromethamine Itching    Promethazine Itching    Tramadol Itching       Past Surgical History:   Procedure Laterality Date    ABDOMINAL HERNIA REPAIR  2003    repair of abdominal wall hernia, intraabdominal hernia and hiatal hernia, 2004 incisional hernia repair.     ACHILLES TENDON SURGERY  2011    rupture    ANKLE FUSION Right 11/06/2015    CARDIAC CATHETERIZATION  02/20/2017    CHOLECYSTECTOMY  1992    GASTRIC BYPASS  1986    LAMINECTOMY  2007     that was complicated by a staph infection     OTHER SURGICAL HISTORY Left 2019    L arm     REMOVAL EXTERNAL FIXATOR      WRIST FRACTURE SURGERY Right        Family History   Problem Relation Age of Onset    Heart disease Mother     COPD Mother     Lung cancer Mother     Lung cancer Father     Other Father         Bladder cancer    Hypertension Sister     Diabetes Sister     Other Sister         Heart Valve Replacement        Social History     Socioeconomic History    Marital status: Single   Tobacco Use    Smoking status: Never    Smokeless tobacco: Never   Vaping Use    Vaping Use: Never used   Substance and Sexual Activity    Alcohol use: No    Drug use: No    Sexual activity: Defer           Objective   Physical Exam  Vitals and nursing note reviewed.   Constitutional:       General: She is not in acute distress.     Appearance: Normal appearance. She is well-developed. She is not ill-appearing, toxic-appearing or diaphoretic.   HENT:      Head: Normocephalic and atraumatic.      Mouth/Throat:      Mouth: Mucous membranes are moist.      Pharynx: Oropharynx is clear.   Eyes:      General: No scleral icterus.     Extraocular Movements: Extraocular movements intact.      Pupils: Pupils are equal, round, and reactive to light.   Cardiovascular:      Rate and Rhythm: Normal rate and regular rhythm.      Pulses: Normal pulses.      Heart sounds: No murmur heard.     No friction rub. No gallop.   Pulmonary:      Effort: Pulmonary effort is normal. No respiratory distress.      Breath sounds: Normal breath sounds. No stridor. No wheezing, rhonchi or rales.   Chest:      Chest wall: No tenderness.   Musculoskeletal:      Right foot: Decreased range of motion. Normal capillary refill. Swelling and bony tenderness present. Normal pulse.      Left foot: Normal.        Legs:       Comments: Peripheral pulses are intact compartments are soft of bilateral lower extremities.  Patient has significant bruising noted along  "the top of the right foot near the great toe.  Tenderness to palpation in this region range of motion is intact with flexion extension inversion and eversion there is pain noted throughout ROM.  Normal sensation.  Negative Aguilar test no tenderness into the ankle.   Skin:     General: Skin is warm.      Capillary Refill: Capillary refill takes less than 2 seconds.      Coloration: Skin is not cyanotic, jaundiced or pale.      Findings: No rash.   Neurological:      General: No focal deficit present.      Mental Status: She is alert and oriented to person, place, and time.   Psychiatric:         Mood and Affect: Mood normal.         Behavior: Behavior normal.         Procedures           ED Course    /75 (BP Location: Left arm, Patient Position: Sitting)   Pulse 96   Temp 97.6 °F (36.4 °C) (Oral)   Resp 16   Ht 165.1 cm (65\")   Wt 115 kg (253 lb 4.9 oz)   SpO2 98%   BMI 42.15 kg/m²   Medications - No data to display  Labs Reviewed - No data to display  XR Foot 3+ View Right   Final Result   Impression:   1.Nondisplaced fracture proximal phalanx first digit.   2.Osseous demineralization.   3.Degenerative change   4.Osseous changes in the area of the distal Achilles tendon that could reflect sequela to remote trauma.   5.Evidence for previous surgery about the ankle.         Electronically Signed: Rashard Hendricks MD     1/21/2024 2:11 PM EST     Workstation ID: [id number]                                                 Medical Decision Making  Chart Review: Cardiology note reviewed from 12/4/2023 follow-up on chronic comorbidities EKG was reviewed with complaints of chest pain recommended medical management    Differentials: Fracture dislocation contusion sprain strain      ;this list is not all inclusive and does not constitute the entirety of considered causes  Radiology: My interpretation fracture of the proximal 1st phalanx.  XR Foot 3+ View Right   Final Result    Impression:    1.Nondisplaced fracture " proximal phalanx first digit.    2.Osseous demineralization.    3.Degenerative change    4.Osseous changes in the area of the distal Achilles tendon that could reflect sequela to remote trauma.    5.Evidence for previous surgery about the ankle.            Electronically Signed: Rashard Hendricks MD      1/21/2024 2:11 PM EST      Workstation ID: [id number]     Disposition/Treatment:  Appropriate PPE was worn during exam and throughout all encounters with the patient.  Patient presents to the ED with complaints of right foot pain after mechanical fall yesterday.  She does have bruising noted on the top of her foot.  X-ray was obtained which was significant for nondisplaced fracture of the proximal phalanx of the first digit.  Findings were discussed with the patient at bedside who voiced understanding of discharge along with signs and symptoms to return.  Patient was placed in a postop shoe prior to discharge and was distally neurovascularly intact after placement with good capillary refill.  Inspect was queried unremarkable she last had a 5-day course of Percocet filled on 1/9/2024 for dental procedure.  Patient states she has tolerated Percocet in the past when she takes Benadryl.  She was in agreement with plan of prescription for Percocet.  She was continue follow-up with podiatry for further evaluation management.  Patient voiced understanding and discharge along with signs symptoms to return she was in agreement plan all questions were answered    This document is intended for medical expert use only. Reading of this document by patients and/or patient's family without participating medical staff guidance may result in misinterpretation and unintended morbidity.  Any interpretation of such data is the responsibility of the patient and/or family member responsible for the patient in concert with their primary or specialist providers, not to be left for sources of online searches such as [employer], [employer] or similar  queries. Relying on these approaches to knowledge may result in misinterpretation, misguided goals of care and even death should patients or family members try recommendations outside of the realm of professional medical care in a supervised inpatient environment.       Amount and/or Complexity of Data Reviewed  Radiology: ordered. Decision-making details documented in ED Course.        Final diagnoses:   Closed nondisplaced fracture of proximal phalanx of right great toe, initial encounter   Fall, initial encounter   Right foot pain       ED Disposition  ED Disposition       ED Disposition   Discharge    Condition   Stable    Comment   --               Colby Vines MD  [address]  [address]  [place] [id number] [phone]    Schedule an appointment as soon as possible for a visit in 3 days      [facility] EMERGENCY DEPARTMENT  [address] [address] [phone]  Go to   If symptoms worsen    Edward Titus Jr., DPM  [address]  [address]  [place] [address] [phone]    Call   As needed         Medication List        New Prescriptions      oxyCODONE-acetaminophen 5-325 MG per tablet  Commonly known as: PERCOCET  Take 1 tablet by mouth Every 8 (Eight) Hours As Needed for Moderate Pain.               Where to Get Your Medications        These medications were sent to [id number] - [place], [place] - [address] AT [address] - [phone]  - [phone] FX  [address] [place] [address]      Phone: [phone]   oxyCODONE-acetaminophen 5-325 MG per tablet            Jeremiah Elmore PA  01/21/24 0034

## 2024-01-21 NOTE — DISCHARGE INSTRUCTIONS
Use postop shoe to the left foot for the next 5-7 days; apply ice for 20 minutes at a time for the next 48 hours to reduce swelling. Return for worsening symptoms including increased pain, swelling, discoloration, or coldness of a extremity.    Take pain medication as directed.  Do not operate heavy machinery or drink alcohol taking this medication    Follow-up with podiatry as desired    Follow-up with your primary care provider in 3-5 days.  If you do not have a primary care provider call [phone] for help in finding one, or you may follow up with [facility] at [phone].    Return to ED for any new or worsening symptom

## 2024-01-22 ENCOUNTER — OFFICE VISIT (OUTPATIENT)
Dept: SLEEP MEDICINE | Facility: CLINIC | Age: 64
End: 2024-01-22
Payer: MEDICARE

## 2024-01-22 ENCOUNTER — PATIENT OUTREACH (OUTPATIENT)
Dept: CASE MANAGEMENT | Facility: OTHER | Age: 64
End: 2024-01-22
Payer: MEDICARE

## 2024-01-22 VITALS
HEIGHT: 65 IN | OXYGEN SATURATION: 98 % | BODY MASS INDEX: 41.29 KG/M2 | WEIGHT: 247.8 LBS | SYSTOLIC BLOOD PRESSURE: 127 MMHG | DIASTOLIC BLOOD PRESSURE: 67 MMHG | HEART RATE: 70 BPM

## 2024-01-22 DIAGNOSIS — G47.30 SLEEP APNEA, UNSPECIFIED TYPE: Primary | ICD-10-CM

## 2024-01-22 DIAGNOSIS — G47.8 NON-RESTORATIVE SLEEP: ICD-10-CM

## 2024-01-22 DIAGNOSIS — G47.10 HYPERSOMNIA: ICD-10-CM

## 2024-01-22 DIAGNOSIS — R06.83 SNORING: ICD-10-CM

## 2024-01-22 DIAGNOSIS — E66.01 MORBID OBESITY: ICD-10-CM

## 2024-01-22 PROCEDURE — 99204 OFFICE O/P NEW MOD 45 MIN: CPT | Performed by: FAMILY MEDICINE

## 2024-01-22 PROCEDURE — 1159F MED LIST DOCD IN RCRD: CPT | Performed by: FAMILY MEDICINE

## 2024-01-22 PROCEDURE — G0463 HOSPITAL OUTPT CLINIC VISIT: HCPCS

## 2024-01-22 PROCEDURE — 1160F RVW MEDS BY RX/DR IN RCRD: CPT | Performed by: FAMILY MEDICINE

## 2024-01-22 NOTE — OUTREACH NOTE
AMBULATORY CASE MANAGEMENT NOTE    Name and Relationship of Patient/Support Person: Cassie Winter L - Self  Self    Patient Outreach    Pt discharged from [facility] ED on 1/21/24, seen for right foot pain s/p trip and fall. RN-ACM outreach call made to pt. Explained role of RN-ACM and reason for call. Pt c/o right foot pain. Reviewed ED AVS with pt. Education provided. Pt reports she will follow up with podiatrist if needed, states she has one. She has next routine PCP appt scheduled. No questions or needs per pt, advised her to call with any. Follow up outreach prn.     Adult Patient Profile  Questions/Answers      Flowsheet Row Most Recent Value   Primary Source of Support/Comfort child(puja)   People in Home alone   Current Living Arrangements home          Send Education  Questions/Answers      Flowsheet Row Most Recent Value   Annual Wellness Visit:  Patient Has Completed  [scheduled for 4/3/24]   Other Patient Education/Resources  24/7 [facility] Nurse Call Line   24/7 Nurse Call Line Education Method Verbal   Advanced Directives: Patient Has          SDOH updated and reviewed with the patient during this program:  Questionnaire sent via [facility]  Ambulatory Case Management    1/22/2024, 15:40 EST

## 2024-01-22 NOTE — PROGRESS NOTES
[facility] New Patient/Consultation       Reason for Consultation: WILFRID      Patient Care Team:  Colby Vines MD as PCP - General (Internal Medicine)  Bonnie Landa PT as Physical Therapist (Physical Therapy)  Pablito Casillas MD as Consulting Physician (Cardiology)  Naomi Lorenzo, RN as Ambulatory  (Population Health)  Abbe Morris MD as Consulting Physician (Sleep Medicine)      History of present illness:  Thank you for asking me to see your patient.  The patient is a 63 y.o. female presents today to reestablish care for WILFRID.  On CPAP in the past; last machine during a time period of homelessness.  Would like to get back into care.  Patient reports hypersomnia nonrestorative sleep naps 1-2 times a day no rotating shifts.  Reports weight gain of 20 pounds over the past 5 years witnessed apneas waking up gasping for breath waking up coughing/choking waking with dry mouth urge sensations nocturia 4-5 times a night.  BMI 41.2.  Last study over 5 years ago.  Last received machine over 5 years ago.    Medical Conditions (PMH):   CAD  Allergic rhinitis  Mild asthma  Hypothyroidism  Overactive bladder  Gastric bypass 1985  Depression    Social history:  Do you drive a commercial vehicle:  No   Shift work:  No   Tobacco use:  No   Alcohol use: 0 per week  Caffeinated drinks: 4 per day  Occupation: Retired    Family History (parents and siblings) (pertaining to sleep medicine):  Stroke  Obesity  Heart disease  Hypertension    Allergies:  Hydrocodone-acetaminophen, Oxycodone-acetaminophen, Ketorolac tromethamine, Promethazine, and Tramadol       Current Outpatient Medications:     albuterol sulfate  (90 Base) MCG/ACT inhaler, Inhale 2 puffs Every 4 (Four) Hours As Needed for Wheezing., Disp: 8 g, Rfl: 5    aspirin 81 MG EC tablet, Daily., Disp: , Rfl:     Calcium 200 MG tablet, Take  by mouth., Disp: , Rfl:     colestipol (COLESTID) 1 g tablet, , Disp: , Rfl:     Creon  "52330-007172 units capsule delayed-release particles capsule, 2 with every meal 2 with every snack, Disp: , Rfl:     dicyclomine (BENTYL) 20 MG tablet, Every 12 (Twelve) Hours., Disp: , Rfl:     levothyroxine (Synthroid) 200 MCG tablet, Take 1 tablet by mouth Daily., Disp: 90 tablet, Rfl: 0    metoprolol succinate XL (TOPROL-XL) 25 MG 24 hr tablet, TAKE 1 TABLET BY MOUTH DAILY, Disp: 90 tablet, Rfl: 3    mometasone-formoterol (Dulera) 200-5 MCG/ACT inhaler, Inhale 2 puffs 2 (Two) Times a Day., Disp: 39 g, Rfl: 1    montelukast (SINGULAIR) 10 MG tablet, TAKE 1 TABLET BY MOUTH EVERY NIGHT, Disp: 90 tablet, Rfl: 0    oxybutynin (DITROPAN) 5 MG tablet, Take 1 tablet by mouth 3 (Three) Times a Day., Disp: 270 tablet, Rfl: 1    oxyCODONE-acetaminophen (PERCOCET) 5-325 MG per tablet, Take 1 tablet by mouth Every 8 (Eight) Hours As Needed for Moderate Pain., Disp: 12 tablet, Rfl: 0    Rexulti 2 MG tablet, Take 1 tablet by mouth Daily., Disp: 30 tablet, Rfl: 2    tiotropium (SPIRIVA) 18 MCG per inhalation capsule, Place 1 capsule into inhaler and inhale Daily., Disp: 30 capsule, Rfl: 2    venlafaxine 75 MG tablet sustained-release 24 hour 24 hr tablet, Take 1 tablet by mouth Daily With Breakfast., Disp: 90 tablet, Rfl: 1    venlafaxine XR (Effexor XR) 150 MG 24 hr capsule, Take 1 capsule by mouth Daily., Disp: 90 capsule, Rfl: 1    Vitamin D, Cholecalciferol, (CHOLECALCIFEROL) 10 MCG (400 UNIT) tablet, Take 1 tablet by mouth Daily., Disp: , Rfl:     Vital Signs:    Vitals:    01/22/24 1300   BP: 127/67   BP Location: Left arm   Patient Position: Sitting   Pulse: 70   SpO2: 98%   Weight: 112 kg (247 lb 12.8 oz)   Height: 165.1 cm (65\")      Body mass index is 41.24 kg/m².  Neck Circumference: 16.5 inches      REVIEW OF SYSTEMS:  Pertinent positive symptoms are:  Snoring  Witnessed apnea yes  [place] Sleepiness Scale of Total score: 19   Fatigue  Frequent urination  Shortness of breath  Irregular " "heartbeat  Anxiety  Depression      Physical exam:  Vitals:    01/22/24 1300   BP: 127/67   BP Location: Left arm   Patient Position: Sitting   Pulse: 70   SpO2: 98%   Weight: 112 kg (247 lb 12.8 oz)   Height: 165.1 cm (65\")    Body mass index is 41.24 kg/m². Neck Circumference: 16.5 inches  HEENT: Head is atraumatic, normocephalic  Eyes: pupils are round equal and reacting to light and accommodation, conjunctiva normal  Throat: tongue normal  NECK:Neck Circumference: 16.5 inches  RESPIRATORY SYSTEM: Regular respirations  CARDIOVASULAR SYSTEM: Regular rate  EXTREMITES: No cyanosis, clubbing  NEUROLOGICAL SYSTEM: Oriented x 3, no gross motor defects      Impression:  1. Sleep apnea, unspecified type    2. Hypersomnia    3. Non-restorative sleep    4. Morbid obesity    5. Snoring        Plan:    Office note(s) from care team reviewed. Office note(s) reviewed: 10/3/2023 PCP    Labs/ Test Results Reviewed:  TSH          7/31/2023    10:49   TSH   TSH 1.630        Thyroid labs normal.          ASSESSMENT AND PLAN:   Witnessed apnea: patient's symptoms and physical examination are concerning for possible sleep apnea.   I discussed the signs, symptoms, and pathophysiology of sleep apnea with this patient.  I also discussed the potential complications of untreated sleep apnea including but not limited to resistant hypertension, insulin resistance, pulmonary hypertension, atrial fibrillation, heart attack, stroke, nonrestorative sleep with hypersomnia which can increase risk for motor vehicle accidents, etc.   Different testing methods including home-based and lab based sleep studies were discussed with this patient.   Based on patient history and physical examination, will proceed with home sleep study.  The order for the sleep study is placed in [facility].  The test will be scheduled after prior authorization has been obtained through patient's insurance.  Treatment and management will be discussed in more detail with this " patient after the test is completed.  All questions were answered to patient's satisfaction.   Snoring: snoring is the sound created by turbulent airflow vibrating upper airway soft tissue.  I have also discussed factors affecting snoring including sleep deprivation, sleeping on the back and alcohol ingestion. To minimize snoring, patient is advised to have adequate sleep, sleep on their side, and avoid alcohol and sedative medications around bedtime.   Excessive daytime sleepiness:  [place] Sleepiness Scale of Total score: 19.  There are many causes of excessive daytime sleepiness.  Rule out sleep apnea as a contributing factor, as above.  Do not drive, operate heavy machinery, or do activities that require high concentration if feeling tired/drowsy.  Obesity; morbidly obese: Body mass index is 41.24 kg/m².. Patients who are overweight or obese are at increased risk of sleep apnea/ sleep disordered breathing. Weight reduction and healthy lifestyle are encouraged in overweight/ obese patients as part of a comprehensive approach to sleep apnea treatment.        I have also discussed with the patient the following  Sleep hygiene: try to maintain a regular bed time and wake time, avoid watching TV/ using electronic devices in bed (including cell phones), limit caffeinated and alcoholic beverages before bed, try to maintain a cool and quiet sleep environment, avoid daytime naps  Adequate amount of sleep: most people need around 7 to 8 hours of sleep each night      Patient will follow-up after study, 31 to 90 days after PAP therapy initiated if applicable, or contact the office sooner for questions or concerns. Patient's questions were answered.      Thank you for allowing me to participate in your patient's care.    Abbe Morris MD  Sleep Medicine  01/22/24  13:14 EST

## 2024-01-27 RX ORDER — BREXPIPRAZOLE 2 MG/1
2 TABLET ORAL DAILY
Qty: 30 TABLET | Refills: 2 | OUTPATIENT
Start: 2024-01-27

## 2024-03-11 RX ORDER — METOPROLOL SUCCINATE 25 MG/1
25 TABLET, EXTENDED RELEASE ORAL DAILY
Qty: 90 TABLET | Refills: 2 | Status: SHIPPED | OUTPATIENT
Start: 2024-03-11

## 2024-03-11 NOTE — TELEPHONE ENCOUNTER
Rx Refill Note  Requested Prescriptions     Pending Prescriptions Disp Refills    metoprolol succinate XL (TOPROL-XL) 25 MG 24 hr tablet [Pharmacy Med Name: METOPROLOL ER SUCCINATE 25MG TABS] 90 tablet 3     Sig: TAKE 1 TABLET BY MOUTH DAILY      Last office visit with prescribing clinician: 12/4/2023   Last telemedicine visit with prescribing clinician: Visit date not found   Next office visit with prescribing clinician: 12/4/2024                         Would you like a call back once the refill request has been completed: [] Yes [] No    If the office needs to give you a call back, can they leave a voicemail: [] Yes [] No    Shawna Cruz MA  03/11/24, 09:09 EDT

## 2024-04-03 ENCOUNTER — OFFICE VISIT (OUTPATIENT)
Dept: FAMILY MEDICINE CLINIC | Facility: CLINIC | Age: 64
End: 2024-04-03
Payer: MEDICARE

## 2024-04-03 ENCOUNTER — HOSPITAL ENCOUNTER (INPATIENT)
Facility: HOSPITAL | Age: 64
LOS: 5 days | Discharge: NURSING, REHAB OR LONG-TERM CARE | End: 2024-04-10
Attending: EMERGENCY MEDICINE | Admitting: EMERGENCY MEDICINE
Payer: MEDICARE

## 2024-04-03 ENCOUNTER — APPOINTMENT (OUTPATIENT)
Dept: GENERAL RADIOLOGY | Facility: HOSPITAL | Age: 64
End: 2024-04-03
Payer: MEDICARE

## 2024-04-03 ENCOUNTER — APPOINTMENT (OUTPATIENT)
Dept: CT IMAGING | Facility: HOSPITAL | Age: 64
End: 2024-04-03
Payer: MEDICARE

## 2024-04-03 VITALS
BODY MASS INDEX: 39.69 KG/M2 | DIASTOLIC BLOOD PRESSURE: 64 MMHG | RESPIRATION RATE: 18 BRPM | SYSTOLIC BLOOD PRESSURE: 112 MMHG | HEART RATE: 62 BPM | HEIGHT: 65 IN | OXYGEN SATURATION: 94 % | WEIGHT: 238.2 LBS

## 2024-04-03 DIAGNOSIS — R42 DIZZINESS: ICD-10-CM

## 2024-04-03 DIAGNOSIS — R07.9 CHEST PAIN, UNSPECIFIED TYPE: ICD-10-CM

## 2024-04-03 DIAGNOSIS — R06.02 SHORTNESS OF BREATH: Primary | ICD-10-CM

## 2024-04-03 DIAGNOSIS — R53.83 FATIGUE, UNSPECIFIED TYPE: ICD-10-CM

## 2024-04-03 DIAGNOSIS — N17.9 ACUTE KIDNEY INJURY: ICD-10-CM

## 2024-04-03 PROBLEM — K90.9 FATTY STOOL: Status: ACTIVE | Noted: 2024-04-03

## 2024-04-03 PROBLEM — G47.00 INSOMNIA: Status: ACTIVE | Noted: 2024-04-03

## 2024-04-03 PROBLEM — K80.20 GALLSTONES: Status: ACTIVE | Noted: 2024-04-03

## 2024-04-03 PROBLEM — K29.70 GASTRITIS, UNSPECIFIED, WITHOUT BLEEDING: Status: ACTIVE | Noted: 2018-09-10

## 2024-04-03 PROBLEM — R74.8 ALKALINE PHOSPHATASE RAISED: Status: ACTIVE | Noted: 2024-04-03

## 2024-04-03 PROBLEM — I24.0 BLOCKAGE OF CORONARY ARTERY OF HEART: Status: ACTIVE | Noted: 2024-04-03

## 2024-04-03 PROBLEM — I10 HIGH BLOOD PRESSURE: Status: ACTIVE | Noted: 2024-04-03

## 2024-04-03 PROBLEM — J30.2 SEASONAL ALLERGIES: Status: ACTIVE | Noted: 2024-04-03

## 2024-04-03 PROBLEM — K90.9 INTESTINAL MALABSORPTION: Status: ACTIVE | Noted: 2024-04-03

## 2024-04-03 PROBLEM — D50.9 ANEMIA, IRON DEFICIENCY: Status: ACTIVE | Noted: 2018-09-10

## 2024-04-03 PROBLEM — N32.89 OTHER SPECIFIED DISORDERS OF BLADDER: Status: ACTIVE | Noted: 2024-04-03

## 2024-04-03 PROBLEM — E07.9 THYROID DISEASE: Status: ACTIVE | Noted: 2024-04-03

## 2024-04-03 PROBLEM — Z51.89 ENCOUNTER FOR BLOOD TRANSFUSION: Status: ACTIVE | Noted: 2024-04-03

## 2024-04-03 PROBLEM — Z12.31 ENCOUNTER FOR SCREENING MAMMOGRAM FOR MALIGNANT NEOPLASM OF BREAST: Status: ACTIVE | Noted: 2024-04-03

## 2024-04-03 PROBLEM — M19.90 ARTHRITIS: Status: ACTIVE | Noted: 2024-04-03

## 2024-04-03 PROBLEM — R10.84 GENERALIZED ABDOMINAL PAIN: Status: ACTIVE | Noted: 2024-04-03

## 2024-04-03 LAB
ALBUMIN SERPL-MCNC: 4 G/DL (ref 3.5–5.2)
ALBUMIN/GLOB SERPL: 1.4 G/DL
ALP SERPL-CCNC: 112 U/L (ref 39–117)
ALT SERPL W P-5'-P-CCNC: 27 U/L (ref 1–33)
ANION GAP SERPL CALCULATED.3IONS-SCNC: 14 MMOL/L (ref 5–15)
APTT PPP: 26.6 SECONDS (ref 24–31)
AST SERPL-CCNC: 50 U/L (ref 1–32)
BASOPHILS # BLD AUTO: 0.1 10*3/MM3 (ref 0–0.2)
BASOPHILS NFR BLD AUTO: 1.1 % (ref 0–1.5)
BILIRUB SERPL-MCNC: 0.3 MG/DL (ref 0–1.2)
BUN SERPL-MCNC: 23 MG/DL (ref 8–23)
BUN/CREAT SERPL: 13.4 (ref 7–25)
CALCIUM SPEC-SCNC: 9.4 MG/DL (ref 8.6–10.5)
CHLORIDE SERPL-SCNC: 110 MMOL/L (ref 98–107)
CO2 SERPL-SCNC: 17 MMOL/L (ref 22–29)
CREAT SERPL-MCNC: 1.72 MG/DL (ref 0.57–1)
DEPRECATED RDW RBC AUTO: 56.1 FL (ref 37–54)
EGFRCR SERPLBLD CKD-EPI 2021: 32.7 ML/MIN/1.73
EOSINOPHIL # BLD AUTO: 0.32 10*3/MM3 (ref 0–0.4)
EOSINOPHIL NFR BLD AUTO: 3.6 % (ref 0.3–6.2)
ERYTHROCYTE [DISTWIDTH] IN BLOOD BY AUTOMATED COUNT: 15.7 % (ref 12.3–15.4)
GEN 5 2HR TROPONIN T REFLEX: 41 NG/L
GLOBULIN UR ELPH-MCNC: 2.8 GM/DL
GLUCOSE SERPL-MCNC: 101 MG/DL (ref 65–99)
HCT VFR BLD AUTO: 38.3 % (ref 34–46.6)
HGB BLD-MCNC: 12.6 G/DL (ref 12–15.9)
HOLD SPECIMEN: NORMAL
HOLD SPECIMEN: NORMAL
IMM GRANULOCYTES # BLD AUTO: 0.04 10*3/MM3 (ref 0–0.05)
IMM GRANULOCYTES NFR BLD AUTO: 0.4 % (ref 0–0.5)
INR PPP: 0.94 (ref 0.93–1.1)
LYMPHOCYTES # BLD AUTO: 3.44 10*3/MM3 (ref 0.7–3.1)
LYMPHOCYTES NFR BLD AUTO: 38.5 % (ref 19.6–45.3)
MCH RBC QN AUTO: 32 PG (ref 26.6–33)
MCHC RBC AUTO-ENTMCNC: 32.9 G/DL (ref 31.5–35.7)
MCV RBC AUTO: 97.2 FL (ref 79–97)
MONOCYTES # BLD AUTO: 0.65 10*3/MM3 (ref 0.1–0.9)
MONOCYTES NFR BLD AUTO: 7.3 % (ref 5–12)
NEUTROPHILS NFR BLD AUTO: 4.39 10*3/MM3 (ref 1.7–7)
NEUTROPHILS NFR BLD AUTO: 49.1 % (ref 42.7–76)
NRBC BLD AUTO-RTO: 0 /100 WBC (ref 0–0.2)
NT-PROBNP SERPL-MCNC: 248.5 PG/ML (ref 0–900)
PLATELET # BLD AUTO: 327 10*3/MM3 (ref 140–450)
PMV BLD AUTO: 10.3 FL (ref 6–12)
POTASSIUM SERPL-SCNC: 4.5 MMOL/L (ref 3.5–5.2)
PROT SERPL-MCNC: 6.8 G/DL (ref 6–8.5)
PROTHROMBIN TIME: 10.3 SECONDS (ref 9.6–11.7)
RBC # BLD AUTO: 3.94 10*6/MM3 (ref 3.77–5.28)
SODIUM SERPL-SCNC: 141 MMOL/L (ref 136–145)
T4 FREE SERPL-MCNC: <0.1 NG/DL (ref 0.93–1.7)
TROPONIN T DELTA: -5 NG/L
TROPONIN T SERPL HS-MCNC: 46 NG/L
TSH SERPL DL<=0.05 MIU/L-ACNC: 115.2 UIU/ML (ref 0.27–4.2)
WBC NRBC COR # BLD AUTO: 8.94 10*3/MM3 (ref 3.4–10.8)
WHOLE BLOOD HOLD COAG: NORMAL

## 2024-04-03 PROCEDURE — 25510000001 IOPAMIDOL PER 1 ML: Performed by: EMERGENCY MEDICINE

## 2024-04-03 PROCEDURE — 93005 ELECTROCARDIOGRAM TRACING: CPT | Performed by: EMERGENCY MEDICINE

## 2024-04-03 PROCEDURE — 25810000003 SODIUM CHLORIDE 0.9 % SOLUTION: Performed by: PHYSICIAN ASSISTANT

## 2024-04-03 PROCEDURE — 71045 X-RAY EXAM CHEST 1 VIEW: CPT

## 2024-04-03 PROCEDURE — 25010000002 ONDANSETRON PER 1 MG: Performed by: NURSE PRACTITIONER

## 2024-04-03 PROCEDURE — G0378 HOSPITAL OBSERVATION PER HR: HCPCS

## 2024-04-03 PROCEDURE — 80053 COMPREHEN METABOLIC PANEL: CPT | Performed by: NURSE PRACTITIONER

## 2024-04-03 PROCEDURE — 84443 ASSAY THYROID STIM HORMONE: CPT | Performed by: NURSE PRACTITIONER

## 2024-04-03 PROCEDURE — 85730 THROMBOPLASTIN TIME PARTIAL: CPT | Performed by: NURSE PRACTITIONER

## 2024-04-03 PROCEDURE — 83880 ASSAY OF NATRIURETIC PEPTIDE: CPT | Performed by: NURSE PRACTITIONER

## 2024-04-03 PROCEDURE — 99285 EMERGENCY DEPT VISIT HI MDM: CPT

## 2024-04-03 PROCEDURE — 85025 COMPLETE CBC W/AUTO DIFF WBC: CPT | Performed by: NURSE PRACTITIONER

## 2024-04-03 PROCEDURE — 93005 ELECTROCARDIOGRAM TRACING: CPT

## 2024-04-03 PROCEDURE — 84439 ASSAY OF FREE THYROXINE: CPT | Performed by: INTERNAL MEDICINE

## 2024-04-03 PROCEDURE — 99214 OFFICE O/P EST MOD 30 MIN: CPT | Performed by: INTERNAL MEDICINE

## 2024-04-03 PROCEDURE — 71275 CT ANGIOGRAPHY CHEST: CPT

## 2024-04-03 PROCEDURE — 70450 CT HEAD/BRAIN W/O DYE: CPT

## 2024-04-03 PROCEDURE — 85610 PROTHROMBIN TIME: CPT | Performed by: NURSE PRACTITIONER

## 2024-04-03 PROCEDURE — 36415 COLL VENOUS BLD VENIPUNCTURE: CPT

## 2024-04-03 PROCEDURE — 99204 OFFICE O/P NEW MOD 45 MIN: CPT | Performed by: INTERNAL MEDICINE

## 2024-04-03 PROCEDURE — 84484 ASSAY OF TROPONIN QUANT: CPT | Performed by: NURSE PRACTITIONER

## 2024-04-03 PROCEDURE — 99222 1ST HOSP IP/OBS MODERATE 55: CPT | Performed by: INTERNAL MEDICINE

## 2024-04-03 RX ORDER — SODIUM CHLORIDE 0.9 % (FLUSH) 0.9 %
10 SYRINGE (ML) INJECTION EVERY 12 HOURS SCHEDULED
Status: DISCONTINUED | OUTPATIENT
Start: 2024-04-03 | End: 2024-04-10 | Stop reason: HOSPADM

## 2024-04-03 RX ORDER — SODIUM CHLORIDE 9 MG/ML
40 INJECTION, SOLUTION INTRAVENOUS AS NEEDED
Status: DISCONTINUED | OUTPATIENT
Start: 2024-04-03 | End: 2024-04-10 | Stop reason: HOSPADM

## 2024-04-03 RX ORDER — LEVOTHYROXINE SODIUM 0.12 MG/1
250 TABLET ORAL ONCE
Status: DISCONTINUED | OUTPATIENT
Start: 2024-04-03 | End: 2024-04-04

## 2024-04-03 RX ORDER — ASPIRIN 81 MG/1
81 TABLET ORAL DAILY
Status: DISCONTINUED | OUTPATIENT
Start: 2024-04-03 | End: 2024-04-10 | Stop reason: HOSPADM

## 2024-04-03 RX ORDER — PANTOPRAZOLE SODIUM 40 MG/10ML
80 INJECTION, POWDER, LYOPHILIZED, FOR SOLUTION INTRAVENOUS ONCE
Status: COMPLETED | OUTPATIENT
Start: 2024-04-03 | End: 2024-04-03

## 2024-04-03 RX ORDER — ONDANSETRON 4 MG/1
4 TABLET, ORALLY DISINTEGRATING ORAL EVERY 6 HOURS PRN
Status: DISCONTINUED | OUTPATIENT
Start: 2024-04-03 | End: 2024-04-10 | Stop reason: HOSPADM

## 2024-04-03 RX ORDER — ONDANSETRON 2 MG/ML
4 INJECTION INTRAMUSCULAR; INTRAVENOUS EVERY 6 HOURS PRN
Status: DISCONTINUED | OUTPATIENT
Start: 2024-04-03 | End: 2024-04-10 | Stop reason: HOSPADM

## 2024-04-03 RX ORDER — SODIUM CHLORIDE 0.9 % (FLUSH) 0.9 %
10 SYRINGE (ML) INJECTION AS NEEDED
Status: DISCONTINUED | OUTPATIENT
Start: 2024-04-03 | End: 2024-04-10 | Stop reason: HOSPADM

## 2024-04-03 RX ORDER — OXYBUTYNIN CHLORIDE 5 MG/1
5 TABLET ORAL 3 TIMES DAILY
Status: DISCONTINUED | OUTPATIENT
Start: 2024-04-03 | End: 2024-04-10 | Stop reason: HOSPADM

## 2024-04-03 RX ORDER — LEVOTHYROXINE SODIUM 0.12 MG/1
250 TABLET ORAL
Status: DISCONTINUED | OUTPATIENT
Start: 2024-04-04 | End: 2024-04-06

## 2024-04-03 RX ORDER — ALUMINA, MAGNESIA, AND SIMETHICONE 2400; 2400; 240 MG/30ML; MG/30ML; MG/30ML
15 SUSPENSION ORAL EVERY 6 HOURS PRN
Status: DISCONTINUED | OUTPATIENT
Start: 2024-04-03 | End: 2024-04-10 | Stop reason: HOSPADM

## 2024-04-03 RX ORDER — METOPROLOL SUCCINATE 25 MG/1
25 TABLET, EXTENDED RELEASE ORAL DAILY
COMMUNITY
End: 2024-04-10 | Stop reason: HOSPADM

## 2024-04-03 RX ORDER — ACETAMINOPHEN 325 MG/1
650 TABLET ORAL EVERY 4 HOURS PRN
Status: DISCONTINUED | OUTPATIENT
Start: 2024-04-03 | End: 2024-04-10 | Stop reason: HOSPADM

## 2024-04-03 RX ORDER — VENLAFAXINE HYDROCHLORIDE 75 MG/1
75 CAPSULE, EXTENDED RELEASE ORAL DAILY
Status: DISCONTINUED | OUTPATIENT
Start: 2024-04-04 | End: 2024-04-03 | Stop reason: SDUPTHER

## 2024-04-03 RX ORDER — IPRATROPIUM BROMIDE AND ALBUTEROL SULFATE 2.5; .5 MG/3ML; MG/3ML
3 SOLUTION RESPIRATORY (INHALATION) EVERY 4 HOURS PRN
Status: DISCONTINUED | OUTPATIENT
Start: 2024-04-03 | End: 2024-04-10 | Stop reason: HOSPADM

## 2024-04-03 RX ORDER — PANCRELIPASE 36000; 180000; 114000 [USP'U]/1; [USP'U]/1; [USP'U]/1
36000 CAPSULE, DELAYED RELEASE PELLETS ORAL
COMMUNITY

## 2024-04-03 RX ORDER — ONDANSETRON 2 MG/ML
4 INJECTION INTRAMUSCULAR; INTRAVENOUS ONCE
Status: COMPLETED | OUTPATIENT
Start: 2024-04-03 | End: 2024-04-03

## 2024-04-03 RX ORDER — DICYCLOMINE HYDROCHLORIDE 10 MG/1
20 CAPSULE ORAL 2 TIMES DAILY
Status: DISCONTINUED | OUTPATIENT
Start: 2024-04-03 | End: 2024-04-10 | Stop reason: HOSPADM

## 2024-04-03 RX ORDER — SODIUM CHLORIDE 9 MG/ML
75 INJECTION, SOLUTION INTRAVENOUS CONTINUOUS
Status: DISCONTINUED | OUTPATIENT
Start: 2024-04-03 | End: 2024-04-05

## 2024-04-03 RX ORDER — IBUPROFEN 800 MG/1
1 TABLET ORAL 3 TIMES DAILY
COMMUNITY
Start: 2024-01-09 | End: 2024-04-03

## 2024-04-03 RX ORDER — VENLAFAXINE HYDROCHLORIDE 150 MG/1
150 CAPSULE, EXTENDED RELEASE ORAL NIGHTLY
COMMUNITY

## 2024-04-03 RX ORDER — LEVOTHYROXINE SODIUM 0.1 MG/1
200 TABLET ORAL
Status: DISCONTINUED | OUTPATIENT
Start: 2024-04-03 | End: 2024-04-03

## 2024-04-03 RX ORDER — VENLAFAXINE HYDROCHLORIDE 75 MG/1
75 TABLET, EXTENDED RELEASE ORAL NIGHTLY
COMMUNITY
End: 2024-04-10 | Stop reason: HOSPADM

## 2024-04-03 RX ORDER — VENLAFAXINE HYDROCHLORIDE 75 MG/1
225 CAPSULE, EXTENDED RELEASE ORAL NIGHTLY
Status: DISCONTINUED | OUTPATIENT
Start: 2024-04-03 | End: 2024-04-10 | Stop reason: HOSPADM

## 2024-04-03 RX ORDER — METOPROLOL SUCCINATE 25 MG/1
25 TABLET, EXTENDED RELEASE ORAL DAILY
Status: DISCONTINUED | OUTPATIENT
Start: 2024-04-04 | End: 2024-04-09

## 2024-04-03 RX ADMIN — PANTOPRAZOLE SODIUM 80 MG: 40 INJECTION, POWDER, FOR SOLUTION INTRAVENOUS at 11:14

## 2024-04-03 RX ADMIN — OXYBUTYNIN CHLORIDE 5 MG: 5 TABLET ORAL at 21:22

## 2024-04-03 RX ADMIN — SODIUM CHLORIDE 75 ML/HR: 9 INJECTION, SOLUTION INTRAVENOUS at 16:55

## 2024-04-03 RX ADMIN — OXYBUTYNIN CHLORIDE 5 MG: 5 TABLET ORAL at 15:26

## 2024-04-03 RX ADMIN — IOPAMIDOL 100 ML: 755 INJECTION, SOLUTION INTRAVENOUS at 17:56

## 2024-04-03 RX ADMIN — ASPIRIN 81 MG: 81 TABLET, COATED ORAL at 15:26

## 2024-04-03 RX ADMIN — ONDANSETRON 4 MG: 2 INJECTION INTRAMUSCULAR; INTRAVENOUS at 11:14

## 2024-04-03 RX ADMIN — LEVOTHYROXINE SODIUM 200 MCG: 0.1 TABLET ORAL at 15:26

## 2024-04-03 RX ADMIN — DICYCLOMINE HYDROCHLORIDE 20 MG: 10 CAPSULE ORAL at 21:21

## 2024-04-03 RX ADMIN — VENLAFAXINE HYDROCHLORIDE 225 MG: 75 CAPSULE, EXTENDED RELEASE ORAL at 21:21

## 2024-04-03 NOTE — PLAN OF CARE
Problem: Adult Inpatient Plan of Care  Goal: Plan of Care Review  Outcome: Ongoing, Progressing  Goal: Patient-Specific Goal (Individualized)  Outcome: Ongoing, Progressing  Goal: Absence of Hospital-Acquired Illness or Injury  Outcome: Ongoing, Progressing  Goal: Optimal Comfort and Wellbeing  Outcome: Ongoing, Progressing  Goal: Readiness for Transition of Care  Outcome: Ongoing, Progressing  Intervention: Mutually Develop Transition Plan  Recent Flowsheet Documentation  Taken 4/3/2024 1504 by Frank Merino RN  Transportation Anticipated: family or friend will provide  Patient/Family Anticipated Services at Transition: none  Patient/Family Anticipates Transition to: home with family  Taken 4/3/2024 1502 by Frank Merino, RN  Equipment Currently Used at Home: none     Problem: Asthma Comorbidity  Goal: Maintenance of Asthma Control  Outcome: Ongoing, Progressing     Problem: Skin Injury Risk Increased  Goal: Skin Health and Integrity  Outcome: Ongoing, Progressing     Problem: Breathing Pattern Ineffective  Goal: Effective Breathing Pattern  Outcome: Ongoing, Progressing   Goal Outcome Evaluation:   Pt admitted for having shortness of breath with occasional chest pain and bradycardia

## 2024-04-03 NOTE — CONSULTS
ENDOCRINE INITIAL CONSULT NOTE  DATE OF SERVICE: 24  Patient Care Team:  Colby Vines MD as PCP - General (Internal Medicine)  Bonnie Landa PT as Physical Therapist (Physical Therapy)  Pablito Casillas MD as Consulting Physician (Cardiology)        PATIENT NAME: Cassie Winter  PATIENT : 1960 AGE: 63 y.o.  MRN NUMBER: [id number]  PRIMARY CARE: Colby Vines MD    ==========================================================================    REASON FOR CONSULT: Hypothyroidism    HPI / SUBJECTIVE  63 y.o. female with history of hypothyroidism for more than 20 years presented to the hospital with shortness of breath.  Patient was evaluated in the ED and was found to have significantly elevated TSH and endocrine consult for evaluation.  Patient reports to be currently taking levothyroxine 200 mcg every day and reports compliance denied any recent change in levothyroxine dosing.  But is taking levothyroxine with all the medications in the morning but maintaining fast for around 40 to 45 minutes after taking levothyroxine dose.  Patient have a history significant for chronic diarrhea and pancreatic insufficiency for which she is currently taking Creon therapy as outpatient.  Denied any previous evaluation by endocrinologist.  Denied any other complaint except for shortness of breath.    ==========================================================================                                                PAST MEDICAL HISTORY    Past Medical History:   Diagnosis Date    Allergic rhinitis     Anemia     Asthma     B12 deficiency     Bradycardia     Broken arm 2019    Left arm broken     Chronic back pain     Chronic bronchitis     Coronary artery disease     Diarrhea     Encounter for blood transfusion 4/3/2024    Hypothyroidism     MRSA (methicillin resistant Staphylococcus aureus)     Neuropathic pain of foot     Severe back pain     Sleep apnea, obstructive         ==========================================================================    PAST SURGICAL HISTORY    Past Surgical History:   Procedure Laterality Date    ABDOMINAL HERNIA REPAIR  2003    repair of abdominal wall hernia, intraabdominal hernia and hiatal hernia, 2004 incisional hernia repair.     ACHILLES TENDON SURGERY  2011    rupture    ANKLE FUSION Right 11/06/2015    CARDIAC CATHETERIZATION  02/20/2017    CHOLECYSTECTOMY  1992    GASTRIC BYPASS  1986    LAMINECTOMY  2007    that was complicated by a staph infection     OTHER SURGICAL HISTORY Left 2019    L arm     REMOVAL EXTERNAL FIXATOR      WRIST FRACTURE SURGERY Right        ==========================================================================    FAMILY HISTORY    Family History   Problem Relation Age of Onset    Heart disease Mother     COPD Mother     Lung cancer Mother     Lung cancer Father     Other Father         Bladder cancer    Sleep apnea Sister     Hypertension Sister     Diabetes Sister     Other Sister         Heart Valve Replacement        ==========================================================================    SOCIAL HISTORY    Social History     Socioeconomic History    Marital status: Single   Tobacco Use    Smoking status: Never    Smokeless tobacco: Never   Vaping Use    Vaping status: Never Used   Substance and Sexual Activity    Alcohol use: No    Drug use: No    Sexual activity: Defer       ==========================================================================    HOME MEDICATIONS REPORTED ON ADMISSION      Current Facility-Administered Medications:     acetaminophen (TYLENOL) tablet 650 mg, 650 mg, Oral, Q4H VEEN, Kelley Sesay PA-C    aluminum-magnesium hydroxide-simethicone (MAALOX MAX) 400-400-40 MG/5ML suspension 15 mL, 15 mL, Oral, Q6H PRN, Kelley Sesay PA-C    aspirin EC tablet 81 mg, 81 mg, Oral, Daily, Kelley Sesay PA-C, 81 mg at 04/03/24 1526    dicyclomine (BENTYL) capsule 20 mg, 20 mg, Oral,  BID, Kelley Sesay PA-C    ipratropium-albuterol (DUO-NEB) nebulizer solution 3 mL, 3 mL, Nebulization, Q4H PRN, Kelley Sesay PA-C    levothyroxine (SYNTHROID, LEVOTHROID) tablet 200 mcg, 200 mcg, Oral, Q AM, Kelley Sesay PA-C, 200 mcg at 04/03/24 1526    [Held by provider] metoprolol succinate XL (TOPROL-XL) 24 hr tablet 25 mg, 25 mg, Oral, Daily, Kelley Sesay PA-C    ondansetron ODT (ZOFRAN-ODT) disintegrating tablet 4 mg, 4 mg, Oral, Q6H PRN **OR** ondansetron (ZOFRAN) injection 4 mg, 4 mg, Intravenous, Q6H PRN, Kelley Sesay PA-C    oxybutynin (DITROPAN) tablet 5 mg, 5 mg, Oral, TID, Kelley Sesay PA-C, 5 mg at 04/03/24 1526    Pancrelipase (Lip-Prot-Amyl) (CREON) capsule 36,000 units of lipase, 36,000 units of lipase, Oral, With Snacks, Kelley Sesay PA-C    [START ON 4/4/2024] Pancrelipase (Lip-Prot-Amyl) (CREON) capsule 72,000 units of lipase, 72,000 units of lipase, Oral, Daily With Breakfast & Lunch, Kelley Sesay PA-C    sodium chloride 0.9 % flush 10 mL, 10 mL, Intravenous, PRN, Jorge Prescott MD    [COMPLETED] Insert Peripheral IV, , , Once **AND** sodium chloride 0.9 % flush 10 mL, 10 mL, Intravenous, PRN, Soraida Orozco, MADDIE    sodium chloride 0.9 % flush 10 mL, 10 mL, Intravenous, Q12H, Kelley Sesay PA-YOHAN    sodium chloride 0.9 % flush 10 mL, 10 mL, Intravenous, PRN, Kelley Sesay PA-YOHAN    sodium chloride 0.9 % infusion 40 mL, 40 mL, Intravenous, PRN, Kelley Sesay PA-YOHAN    sodium chloride 0.9 % infusion, 75 mL/hr, Intravenous, Continuous, Kelley Sesay PA-C, Last Rate: 75 mL/hr at 04/03/24 1655, 75 mL/hr at 04/03/24 1655    venlafaxine XR (EFFEXOR-XR) 24 hr capsule 225 mg, 225 mg, Oral, Nightly, Kelley Sesay PA-C    ==========================================================================    ALLERGIES    Allergies   Allergen Reactions    Hydrocodone-Acetaminophen Hives     Can tolerate with Benadryl    Oxycodone-Acetaminophen Hives     Can tolerate with  "Benadryl    Ketorolac Tromethamine Itching    Promethazine Itching    Tramadol Itching       ==========================================================================    ACTIVE HOSPITAL MEDICATIONS    Scheduled Medications:  aspirin, 81 mg, Oral, Daily  dicyclomine, 20 mg, Oral, BID  levothyroxine, 200 mcg, Oral, Q AM  [Held by provider] metoprolol succinate XL, 25 mg, Oral, Daily  oxybutynin, 5 mg, Oral, TID  [START ON 4/4/2024] Pancrelipase (Lip-Prot-Amyl), 72,000 units of lipase, Oral, Daily With Breakfast & Lunch  sodium chloride, 10 mL, Intravenous, Q12H  venlafaxine XR, 225 mg, Oral, Nightly         PRN Medications:    acetaminophen    aluminum-magnesium hydroxide-simethicone    ipratropium-albuterol    ondansetron ODT **OR** ondansetron    Pancrelipase (Lip-Prot-Amyl)    sodium chloride    [COMPLETED] Insert Peripheral IV **AND** sodium chloride    sodium chloride    sodium chloride     ==========================================================================    OBJECTIVE    Vitals:    04/03/24 1707   BP: 99/84   Pulse: 84   Resp: 16   Temp: 97.6 °F (36.4 °C)   SpO2: 98%      Body mass index is 39.62 kg/m².     General: Alert, cooperative, no acute distress    ==========================================================================    LABORATORY DATA    Lab Results   Component Value Date    GLUCOSE 101 (H) 04/03/2024    BUN 23 04/03/2024    CREATININE 1.72 (H) 04/03/2024    EGFRIFNONA 52 (L) 11/22/2021    BCR 13.4 04/03/2024    K 4.5 04/03/2024    CO2 17.0 (L) 04/03/2024    CALCIUM 9.4 04/03/2024    ALBUMIN 4.0 04/03/2024    LABIL2 1.3 02/11/2019    AST 50 (H) 04/03/2024    ALT 27 04/03/2024       No results found for: \"HGBA1C\"  Lab Results   Component Value Date    CREATININE 1.72 (H) 04/03/2024      Latest Reference Range & Units 11/22/21 14:16 05/09/22 09:51 07/31/23 10:49 04/03/24 12:12   TSH Baseline 0.270 - 4.200 uIU/mL 17.800 (H) 5.840 (H) 1.630 115.200 (H)   Free T4 0.93 - 1.70 ng/dL  1.48 1.14  " "  (H): Data is abnormally high    ==========================================================================    ASSESSMENT AND PLAN    # Hypothyroidism currently on levothyroxine replacement therapy  - Patient have TSH of 115.2 today  - Patient is awake alert and oriented x 3, no signs for mental status dysfunction  - Reports to have shortness of breath  - Called [facility] and last refill was in November 2023  - Unsure if this is secondary to noncompliance or due to GI disorder and patient is taking medication with multiple other medications with underlying history of pancreatic insufficiency leading to chronic diarrhea  - Given significantly elevated TSH level increase the dose of levothyroxine to 50 mcg and repeat thyroid function in 48 hours  - In the past patient had high free T4 up to 1.48 therefore patient needs to be closely monitored  - Most likely patient have erratic levothyroxine absorption due to GI dysfunction    # Shortness of breath  - Care as per primary team    Thank you for courtesy of consultation.  Will follow with you.  Rest as per primary care.    This note was created using voice recognition software and is inherently subject to errors including those of syntax and \"sound-alike\" substitutions which may escape proofreading.  In such instances, original meaning may be extrapolated by contextual derivation.    The time of this note does not reflect the time I saw the patient but the time that this note was written.    =======================================  Cuco Sanchez MD  Department of Endocrine, Diabetes and Metabolism  [place], [place]  =======================================    "

## 2024-04-03 NOTE — PROGRESS NOTES
The ABCs of the Annual Wellness Visit  Subsequent Medicare Wellness Visit    Subjective    Cassie Winter is a 63 y.o. female who presents for a Subsequent Medicare Wellness Visit.    The following portions of the patient's history were reviewed and   updated as appropriate: allergies, current medications, past family history, past medical history, past social history, past surgical history, and problem list.    Compared to one year ago, the patient feels her physical   health is the same.    Compared to one year ago, the patient feels her mental   health is worse.    Recent Hospitalizations:  She was not admitted to the hospital during the last year.       Current Medical Providers:  Patient Care Team:  Colby Vines MD as PCP - General (Internal Medicine)  Bonnie Landa PT as Physical Therapist (Physical Therapy)  Pablito Casillas MD as Consulting Physician (Cardiology)    Outpatient Medications Prior to Visit   Medication Sig Dispense Refill    albuterol sulfate  (90 Base) MCG/ACT inhaler Inhale 2 puffs Every 4 (Four) Hours As Needed for Wheezing. 8 g 5    aspirin 81 MG EC tablet Daily.      Calcium 200 MG tablet Take  by mouth.      colestipol (COLESTID) 1 g tablet       Creon 49011-087174 units capsule delayed-release particles capsule 2 with every meal 2 with every snack      dicyclomine (BENTYL) 20 MG tablet Every 12 (Twelve) Hours.      ibuprofen (ADVIL,MOTRIN) 800 MG tablet Take 1 tablet by mouth 3 times a day.      levothyroxine (Synthroid) 200 MCG tablet Take 1 tablet by mouth Daily. 90 tablet 0    metoprolol succinate XL (TOPROL-XL) 25 MG 24 hr tablet TAKE 1 TABLET BY MOUTH DAILY 90 tablet 2    mometasone-formoterol (Dulera) 200-5 MCG/ACT inhaler Inhale 2 puffs 2 (Two) Times a Day. 39 g 1    oxybutynin (DITROPAN) 5 MG tablet Take 1 tablet by mouth 3 (Three) Times a Day. 270 tablet 1    venlafaxine 75 MG tablet sustained-release 24 hour 24 hr tablet Take 1 tablet by mouth Daily With  Breakfast. 90 tablet 1    venlafaxine XR (Effexor XR) 150 MG 24 hr capsule Take 1 capsule by mouth Daily. 90 capsule 1    Vitamin D, Cholecalciferol, (CHOLECALCIFEROL) 10 MCG (400 UNIT) tablet Take 1 tablet by mouth Daily.      montelukast (SINGULAIR) 10 MG tablet TAKE 1 TABLET BY MOUTH EVERY NIGHT (Patient not taking: Reported on 4/3/2024) 90 tablet 0    oxyCODONE-acetaminophen (PERCOCET) 5-325 MG per tablet Take 1 tablet by mouth Every 8 (Eight) Hours As Needed for Moderate Pain. (Patient not taking: Reported on 4/3/2024) 12 tablet 0    Rexulti 2 MG tablet Take 1 tablet by mouth Daily. (Patient not taking: Reported on 4/3/2024) 30 tablet 2    tiotropium (SPIRIVA) 18 MCG per inhalation capsule Place 1 capsule into inhaler and inhale Daily. (Patient not taking: Reported on 4/3/2024) 30 capsule 2     No facility-administered medications prior to visit.       Opioid medication/s are on active medication list.  and I have evaluated her active treatment plan and pain score trends (see table).  There were no vitals filed for this visit.  I have reviewed the chart for potential of high risk medication and harmful drug interactions in the elderly.          Aspirin is on active medication list. Aspirin use is indicated based on review of current medical condition/s. Pros and cons of this therapy have been discussed today. Benefits of this medication outweigh potential harm.  Patient has been encouraged to continue taking this medication.  .      Patient Active Problem List   Diagnosis    Allergic rhinitis    Anemia    Asthma    B12 deficiency    Chronic coronary artery disease    Chronic low back pain    Depression    H/O laminectomy    Hypothyroidism    Neuropathy    Irritable bowel syndrome    Spinal stenosis of lumbar region    Dizziness    Narrow complex tachycardia    Other sleep apnea    TMJ syndrome    Overactive bladder    Pancreatic insufficiency    Fall    Blockage of coronary artery of heart    Alkaline  "phosphatase raised    Anemia, iron deficiency    Fatty stool    Gallstones    Arthritis    Gastritis, unspecified, without bleeding    Generalized abdominal pain    High blood pressure    Insomnia    Intestinal malabsorption    Seasonal allergies    Second degree hemorrhoids    Encounter for blood transfusion    Other specified disorders of bladder    Thyroid disease     Advance Care Planning   Advance Care Planning     Advance Directive is on file.  ACP discussion was held with the patient during this visit. Patient has an advance directive in EMR which is still valid.      Objective    Vitals:    24 0822   BP: 112/64   Pulse: 62   Resp: 18   SpO2: 94%   Weight: 108 kg (238 lb 3.2 oz)   Height: 165.1 cm (65\")     Estimated body mass index is 39.64 kg/m² as calculated from the following:    Height as of this encounter: 165.1 cm (65\").    Weight as of this encounter: 108 kg (238 lb 3.2 oz).    {Class 2 Severe Obesity (BMI >=35 and <=39.9.:3671880843}      Does the patient have evidence of cognitive impairment? No          HEALTH RISK ASSESSMENT    Smoking Status:  Social History     Tobacco Use   Smoking Status Never   Smokeless Tobacco Never     Alcohol Consumption:  Social History     Substance and Sexual Activity   Alcohol Use No     Fall Risk Screen:    STEADI Fall Risk Assessment was completed, and patient is at HIGH risk for falls. Assessment completed on:4/3/2024    Depression Screenin/3/2024     8:19 AM   PHQ-2/PHQ-9 Depression Screening   Little Interest or Pleasure in Doing Things 0-->not at all   Feeling Down, Depressed or Hopeless 3-->nearly every day   Trouble Falling or Staying Asleep, or Sleeping Too Much 3-->nearly every day   Feeling Tired or Having Little Energy 3-->nearly every day   Poor Appetite or Overeating 0-->not at all   Feeling Bad about Yourself - or that You are a Failure or Have Let Yourself or Your Family Down 0-->not at all   Trouble Concentrating on Things, Such as " Reading the Newspaper or Watching Television 0-->not at all   Moving or Speaking So Slowly that Other People Could Have Noticed? Or the Opposite - Being So Fidgety 3-->nearly every day   Thoughts that You Would be Better Off Dead or of Hurting Yourself in Some Way 0-->not at all   PHQ-9: Brief Depression Severity Measure Score 12   If You Checked Off Any Problems, How Difficult Have These Problems Made It For You to Do Your Work, Take Care of Things at Home, or Get Along with Other People? not difficult at all       Health Habits and Functional and Cognitive Screenin/3/2024     8:20 AM   Functional & Cognitive Status   Do you have difficulty preparing food and eating? No   Do you have difficulty bathing yourself, getting dressed or grooming yourself? No   Do you have difficulty using the toilet? No   Do you have difficulty moving around from place to place? No   Do you have trouble with steps or getting out of a bed or a chair? Yes   Current Diet Other   Dental Exam Up to date   Eye Exam Up to date   Exercise (times per week) 0 times per week   Current Exercises Include No Regular Exercise   Do you need help using the phone?  No   Are you deaf or do you have serious difficulty hearing?  No   Do you need help to go to places out of walking distance? Yes   Do you need help shopping? No   Do you need help preparing meals?  No   Do you need help with housework?  No   Do you need help with laundry? No   Do you need help taking your medications? No   Do you need help managing money? No   Do you ever drive or ride in a car without wearing a seat belt? No   Have you felt unusual stress, anger or loneliness in the last month? No   Who do you live with? Alone   If you need help, do you have trouble finding someone available to you? No   Do you have difficulty concentrating, remembering or making decisions? No       Age-appropriate Screening Schedule:  Refer to the list below for future screening recommendations based  "on patient's age, sex and/or medical conditions. Orders for these recommended tests are listed in the plan section. The patient has been provided with a written plan.    Health Maintenance   Topic Date Due    TDAP/TD VACCINES (1 - Tdap) Never done    BMI FOLLOWUP  04/01/2023    MAMMOGRAM  05/27/2023    PAP SMEAR  08/03/2023    ZOSTER VACCINE (2 of 2) 10/16/2023    INFLUENZA VACCINE  08/01/2024    ANNUAL WELLNESS VISIT  04/03/2025    DXA SCAN  10/16/2025    COLORECTAL CANCER SCREENING  08/05/2029    HEPATITIS C SCREENING  Completed    COVID-19 Vaccine  Completed    RSV Vaccine - Adults  Completed    Pneumococcal Vaccine 65+  Completed                  CMS Preventative Services Quick Reference  Risk Factors Identified During Encounter  {Medicare Wellness Risk Factors:92086}  The above risks/problems have been discussed with the patient.  Pertinent information has been shared with the patient in the After Visit Summary.  An After Visit Summary and PPPS were made available to the patient.    Follow Up:   Next Medicare Wellness visit to be scheduled in 1 year.   {Wrapup  Review (Popup)  Ad[facility] Care Planning  Labs  CC  Problem List  Visit Diagnosis  Medications  Result Review  Imaging  [facility] Maintenance  Quality  BestPractice  SmartSets  SnapShot  Encounters  Notes  Media  Procedures :23}    Additional E&M Note during same encounter follows:  Patient has multiple medical problems which are significant and separately identifiable that require additional work above and beyond the Medicare Wellness Visit.      Chief Complaint  Medicare Wellness-subsequent    Subjective    {Problem List  Visit Diagnosis   Encounters  Notes  Medications  Labs  Result Review Imaging  Media :23}    HPI  Cassie Winter is also being seen today for ***         Objective   Vital Signs:  /64   Pulse 62   Resp 18   Ht 165.1 cm (65\")   Wt 108 kg (238 lb 3.2 oz)   SpO2 94%   BMI 39.64 kg/m²     Physical Exam "     {The following data was reviewed by (Optional):59724}  {Ambulatory Labs (Optional):17432}  {Data reviewed (Optional):74324:::1}           Assessment and Plan {CC Problem List  Visit Diagnosis   ROS  Review (Popup)  Health Maintenance  Quality  BestPractice  Medications  SmartSets  SnapShot Encounters  Media :23}  There are no diagnoses linked to this encounter.       {Time Spent (Optional):29268}  Follow Up {Instructions Charge Capture  Follow-up Communications :23}  No follow-ups on file.  Patient was given instructions and counseling regarding her condition or for health maintenance advice. Please see specific information pulled into the AVS if appropriate.
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
## 2024-04-03 NOTE — H&P
[facility]ation [facility] H&P    Patient Name: Cassie Winter  : 1960  MRN: [id number]  Primary Care Physician: Colby Vines MD  Date of admission: 4/3/2024     Patient Care Team:  Colby Vines MD as PCP - General (Internal Medicine)  Bonnie Landa PT as Physical Therapist (Physical Therapy)  Pablito Casillas MD as Consulting Physician (Cardiology)          Subjective   History Present Illness     Chief Complaint:   Chief Complaint   Patient presents with    Chest Pain     Chest pain for one week. soA, nausea,   Pt had fall and hit head on Monday.  Pt was at PMD and sent in for evaluation.         Chest pain, dyspnea    Chest Pain   Associated symptoms include palpitations and shortness of breath.       ED  63-year-old white female with a history of thyroid dysfunction, CAD. She presents today from her primary care provider's office with complaints of shortness of breath and chest pain. She states has been ongoing for the last 2 months or so. She has symptoms at rest but her symptoms are much worse with exertion. She denies any fever or cough. No leg pain or swelling. She denies any black or bloody stools. She does report some frequent belching. No abdominal pain nausea or vomiting.     Observation 4/3/24  Cardiology consulted. Endocrinology consulted.  Ct chest pending    Review of Systems   Cardiovascular:  Positive for chest pain and palpitations.   Respiratory:  Positive for shortness of breath.              Personal History     Past Medical History:   Past Medical History:   Diagnosis Date    Allergic rhinitis     Anemia     Asthma     B12 deficiency     Bradycardia     Broken arm 2019    Left arm broken     Chronic back pain     Chronic bronchitis     Coronary artery disease     Diarrhea     Encounter for blood transfusion 4/3/2024    Hypothyroidism     MRSA (methicillin resistant Staphylococcus aureus)     Neuropathic pain of foot     Severe back pain 2010    Sleep apnea, obstructive         Surgical History:      Past Surgical History:   Procedure Laterality Date    ABDOMINAL HERNIA REPAIR  2003    repair of abdominal wall hernia, intraabdominal hernia and hiatal hernia, 2004 incisional hernia repair.     ACHILLES TENDON SURGERY  2011    rupture    ANKLE FUSION Right 11/06/2015    CARDIAC CATHETERIZATION  02/20/2017    CHOLECYSTECTOMY  1992    GASTRIC BYPASS  1986    LAMINECTOMY  2007    that was complicated by a staph infection     OTHER SURGICAL HISTORY Left 2019    L arm     REMOVAL EXTERNAL FIXATOR      WRIST FRACTURE SURGERY Right            Family History: family history includes COPD in her mother; Diabetes in her sister; Heart disease in her mother; Hypertension in her sister; Lung cancer in her father and mother; Other in her father and sister; Sleep apnea in her sister. Otherwise pertinent FHx was reviewed and unremarkable.     Social History:  reports that she has never smoked. She has never used smokeless tobacco. She reports that she does not drink alcohol and does not use drugs.      Medications:  Prior to Admission medications    Medication Sig Start Date End Date Taking? Authorizing Provider   aspirin 81 MG EC tablet Take 1 tablet by mouth Daily. 5/20/19  Yes Leighann Solitario MD   Calcium 200 MG tablet Take 1 tablet by mouth Daily.   Yes Leighann Solitario MD   colestipol (COLESTID) 1 g tablet Take 1 tablet by mouth 2 (Two) Times a Day. 9/5/23  Yes Leighann Solitario MD   dicyclomine (BENTYL) 20 MG tablet Every 12 (Twelve) Hours. 11/28/16  Yes Leighann Solitario MD   levothyroxine (Synthroid) 200 MCG tablet Take 1 tablet by mouth Daily. 8/22/23  Yes Cristopher Garcia Jr., MD   metoprolol succinate XL (TOPROL-XL) 25 MG 24 hr tablet Take 1 tablet by mouth Daily.   Yes Leighann Solitario MD   Pancrelipase, Lip-Prot-Amyl, (Creon) 65393-316591 units capsule delayed-release particles capsule Take 1 capsule by mouth With Snacks for Snacks.   Yes Leighann Solitario MD    Pancrelipase, Lip-Prot-Amyl, (CREON) 88827-901503 units capsule delayed-release particles capsule Take 2 capsules by mouth Daily With Breakfast & Lunch.   Yes Leighann Solitario MD   venlafaxine 75 MG tablet sustained-release 24 hour 24 hr tablet Take 1 tablet by mouth Every Night. In addition to 150mg for a total of 225mg at bedtime   Yes Leighann Solitario MD   venlafaxine XR (EFFEXOR-XR) 150 MG 24 hr capsule Take 1 capsule by mouth Every Night. In addition to 75mg for a total of 225mg at bedtime   Yes Leighann Solitario MD   Creon 98072-150092 units capsule delayed-release particles capsule 2 capsules Daily With Breakfast & Lunch. 10/22/21 4/3/24 Yes Leighann Solitario MD   albuterol sulfate  (90 Base) MCG/ACT inhaler Inhale 2 puffs Every 4 (Four) Hours As Needed for Wheezing. 12/30/22   Cristopher Garcia Jr., MD   oxybutynin (DITROPAN) 5 MG tablet Take 1 tablet by mouth 3 (Three) Times a Day. 6/20/23   Cristopher Garcia Jr., MD   Vitamin D, Cholecalciferol, (CHOLECALCIFEROL) 10 MCG (400 UNIT) tablet Take 1 tablet by mouth Daily.    Leighann Solitario MD   ibuprofen (ADVIL,MOTRIN) 800 MG tablet Take 1 tablet by mouth 3 times a day. 1/9/24 4/3/24  Leighann Solitario MD   metoprolol succinate XL (TOPROL-XL) 25 MG 24 hr tablet TAKE 1 TABLET BY MOUTH DAILY 3/11/24 4/3/24  Pablito Casillas MD   mometasone-formoterol (Dulera) 200-5 MCG/ACT inhaler Inhale 2 puffs 2 (Two) Times a Day. 11/8/22 4/3/24  Cristopher Garcia Jr., MD   montelukast (SINGULAIR) 10 MG tablet TAKE 1 TABLET BY MOUTH EVERY NIGHT  Patient not taking: Reported on 4/3/2024 5/15/23 4/3/24  Cristopher Garcia Jr., MD   oxyCODONE-acetaminophen (PERCOCET) 5-325 MG per tablet Take 1 tablet by mouth Every 8 (Eight) Hours As Needed for Moderate Pain.  Patient not taking: Reported on 4/3/2024 1/21/24 4/3/24  Jeremiah Elmore PA Rexulti 2 MG tablet Take 1 tablet by mouth Daily.  Patient not taking: Reported on 4/3/2024  7/7/23 4/3/24  Cristopher Garcia Jr., MD   tiotropium (SPIRIVA) 18 MCG per inhalation capsule Place 1 capsule into inhaler and inhale Daily.  Patient not taking: Reported on 4/3/2024 5/17/23 4/3/24  Michelle CageMADDIE   venlafaxine 75 MG tablet sustained-release 24 hour 24 hr tablet Take 1 tablet by mouth Daily With Breakfast.  Patient taking differently: Take 1 tablet by mouth Every Night. 11/14/23 4/3/24  Colby Vines MD   venlafaxine XR (Effexor XR) 150 MG 24 hr capsule Take 1 capsule by mouth Daily. 11/14/23 4/3/24  Colby Vines MD       Allergies:    Allergies   Allergen Reactions    Hydrocodone-Acetaminophen Hives     Can tolerate with Benadryl    Oxycodone-Acetaminophen Hives     Can tolerate with Benadryl    Ketorolac Tromethamine Itching    Promethazine Itching    Tramadol Itching       Objective   Objective     Vital Signs  Temp:  [97.5 °F (36.4 °C)] 97.5 °F (36.4 °C)  Heart Rate:  [44-62] 58  Resp:  [16-19] 17  BP: (107-164)/(41-91) 122/91  SpO2:  [80 %-100 %] 100 %  on  Flow (L/min):  [2] 2;   Device (Oxygen Therapy): nasal cannula  Body mass index is 39.62 kg/m².    Physical Exam  Constitutional:       Appearance: Normal appearance.   HENT:      Mouth/Throat:      Mouth: Mucous membranes are moist.   Cardiovascular:      Rate and Rhythm: Regular rhythm. Bradycardia present.      Pulses: Normal pulses.      Heart sounds: Normal heart sounds.   Pulmonary:      Effort: Pulmonary effort is normal.      Breath sounds: Normal breath sounds.   Skin:     General: Skin is warm and dry.   Neurological:      General: No focal deficit present.      Mental Status: She is alert and oriented to person, place, and time.   Psychiatric:         Mood and Affect: Mood normal.         Behavior: Behavior normal.           Results Review:  I have personally reviewed most recent cardiac tracings, lab results, and radiology images and interpretations and agree with findings, most notably: cbc, cmp, trop, bnp,  tsh, inr, chest xray, ekg.    Results from last 7 days   Lab Units 04/03/24  0958   WBC 10*3/mm3 8.94   HEMOGLOBIN g/dL 12.6   HEMATOCRIT % 38.3   PLATELETS 10*3/mm3 327   INR  0.94     Results from last 7 days   Lab Units 04/03/24  1212 04/03/24  0959   SODIUM mmol/L  --  141   POTASSIUM mmol/L  --  4.5   CHLORIDE mmol/L  --  110*   CO2 mmol/L  --  17.0*   BUN mg/dL  --  23   CREATININE mg/dL  --  1.72*   GLUCOSE mg/dL  --  101*   CALCIUM mg/dL  --  9.4   ALK PHOS U/L  --  112   ALT (SGPT) U/L  --  27   AST (SGOT) U/L  --  50*   HSTROP T ng/L 41* 46*   PROBNP pg/mL  --  248.5     Estimated Creatinine Clearance: 39.8 mL/min (A) (by C-G formula based on SCr of 1.72 mg/dL (H)).  Brief Urine Lab Results       None            Microbiology Results (last 10 days)       ** No results found for the last 240 hours. **            ECG/EMG Results (most recent)       Procedure Component Value Units Date/Time    ECG 12 Lead Chest Pain [[id number]] Collected: 04/03/24 0947     Updated: 04/03/24 0948     QT Interval 453 ms      QTC Interval 397 ms     Narrative:      HEART RATE= 46  bpm  RR Interval= 1300  ms  VT Interval= 38  ms  P Horizontal Axis= 50  deg  P Front Axis= 0  deg  QRSD Interval= 111  ms  QT Interval= 453  ms  QTcB= 397  ms  QRS Axis= -57  deg  T Wave Axis= 238  deg  - ABNORMAL ECG -  Sinus bradycardia  Incomplete left bundle branch block  LVH with secondary repolarization abnormality  Anterior Q waves, possibly due to LVH  Electronically Signed By:   Date and Time of Study: 2024-04-03 09:47:32                    XR Chest 1 View    Result Date: 4/3/2024  Impression: No evidence of acute cardiopulmonary disease. Resolution of questionable opacity in the left upper lobe after repositioning of EKG lead. Electronically Signed: Martin Roca MD  4/3/2024 4:33 PM EDT  Workstation ID: [id number]    CT Head Without Contrast    Result Date: 4/3/2024  Impression: No acute intracranial abnormality. Electronically Signed: Mansi  MD Hua  4/3/2024 12:00 PM EDT  Workstation ID: [id number]    XR Chest 1 View    Result Date: 4/3/2024  Impression: 1.Questionable nodular shadow left upper lobe versus summation of shadows. Follow-up recommended to reassess Electronically Signed: Rashard Hendricks MD  4/3/2024 11:30 AM EDT  Workstation ID: [id number]       Estimated Creatinine Clearance: 39.8 mL/min (A) (by C-G formula based on SCr of 1.72 mg/dL (H)).    Assessment & Plan   Assessment/Plan       Active Hospital Problems    Diagnosis  POA    **Shortness of breath [R06.02]  Yes      Resolved Hospital Problems   No resolved problems to display.     Chest pain/dyspnea  Lab Results   Component Value Date    TROPONINT 41 (H) 04/03/2024    TROPONINT 46 (H) 04/03/2024     -02 placed at 2 lpm nc  - cbc, inr, bnp unremarkable  -Chest X-ray: reviewed and showing no acute cardiopulmonary process  -EKG:rate 46, sinus ludwin, incomplete LBBB  -ct chest pending  - cardiology consulted  -Telemetry      Hypothyroidism  - tsh 115  - pt on synthroid  - pt noncompliant      VTE Prophylaxis -   Mechanical Order History:        Ordered        04/03/24 1451  Place Sequential Compression Device  Once            04/03/24 1451  Maintain Sequential Compression Device  Continuous                          Pharmalogical Order History:       None            CODE STATUS:    Code Status and Medical Interventions:   Ordered at: 04/03/24 1444     Code Status (Patient has no pulse and is not breathing):    CPR (Attempt to Resuscitate)     Medical Interventions (Patient has pulse or is breathing):    Full Support       This patient has been examined wearing personal protective equipment.     I discussed the patient's findings and my recommendations with patient and nursing staff.      Signature:Electronically signed by Kelley Sesay PA-C, 04/03/24, 4:47 PM EDT.

## 2024-04-03 NOTE — PROGRESS NOTES
Chief Complaint  Medicare Wellness-subsequent    HPI:    Cassie Winter presents to [facility]    Patient is a 63-year-old female presenting for Medicare wellness visit.    Patient reports that she has not been feeling well over the last about three weeks. She has been feeling not her usual self and has been feeling just unwell and some shortness of breath. She notices the shortness of breath both at rest and with exertion. She also reports intermittent chest discomfort and feelings of palpitations.  Denies orthopnea, PND, lower extremity edema, palpitations, tachycardia. Denies bleeding or blood in the stool. Denies recent illness. The last time she felt this way was when her thyroid was abnormal, about 10 years ago.  She does have a history of coronary artery disease with previous cath 2/20/17 moderate OM1 disease. Follows with cardiology. Currently on Aspirin 81 mg daily. Not on statin currently and reportedly has not been as lipids have overall been normal with elevated HDL.  Follows with cardiology.    Patient overall feels progressively worse over the last 3 weeks and Is currently experiencing shortness of breath, dizziness, lightheadedness.  She consider going to the emergency room and instead came to the clinic for scheduled appointment.      Review of Systems:  ROS negative unless otherwise noted in HPI above.    Past Medical History:   Diagnosis Date    Allergic rhinitis     Anemia     Asthma     B12 deficiency     Bradycardia     Broken arm 2019    Left arm broken     Chronic back pain     Chronic bronchitis     Coronary artery disease     Diarrhea     Encounter for blood transfusion 4/3/2024    Hypothyroidism     MRSA (methicillin resistant Staphylococcus aureus)     Neuropathic pain of foot     Severe back pain 2010    Sleep apnea, obstructive          Current Outpatient Medications:     albuterol sulfate  (90 Base) MCG/ACT inhaler, Inhale 2 puffs Every 4 (Four)  Hours As Needed for Wheezing., Disp: 8 g, Rfl: 5    aspirin 81 MG EC tablet, Daily., Disp: , Rfl:     Calcium 200 MG tablet, Take  by mouth., Disp: , Rfl:     colestipol (COLESTID) 1 g tablet, , Disp: , Rfl:     Creon 64101-573285 units capsule delayed-release particles capsule, 2 with every meal 2 with every snack, Disp: , Rfl:     dicyclomine (BENTYL) 20 MG tablet, Every 12 (Twelve) Hours., Disp: , Rfl:     ibuprofen (ADVIL,MOTRIN) 800 MG tablet, Take 1 tablet by mouth 3 times a day., Disp: , Rfl:     levothyroxine (Synthroid) 200 MCG tablet, Take 1 tablet by mouth Daily., Disp: 90 tablet, Rfl: 0    metoprolol succinate XL (TOPROL-XL) 25 MG 24 hr tablet, TAKE 1 TABLET BY MOUTH DAILY, Disp: 90 tablet, Rfl: 2    mometasone-formoterol (Dulera) 200-5 MCG/ACT inhaler, Inhale 2 puffs 2 (Two) Times a Day., Disp: 39 g, Rfl: 1    oxybutynin (DITROPAN) 5 MG tablet, Take 1 tablet by mouth 3 (Three) Times a Day., Disp: 270 tablet, Rfl: 1    venlafaxine 75 MG tablet sustained-release 24 hour 24 hr tablet, Take 1 tablet by mouth Daily With Breakfast., Disp: 90 tablet, Rfl: 1    venlafaxine XR (Effexor XR) 150 MG 24 hr capsule, Take 1 capsule by mouth Daily., Disp: 90 capsule, Rfl: 1    Vitamin D, Cholecalciferol, (CHOLECALCIFEROL) 10 MCG (400 UNIT) tablet, Take 1 tablet by mouth Daily., Disp: , Rfl:     oxyCODONE-acetaminophen (PERCOCET) 5-325 MG per tablet, Take 1 tablet by mouth Every 8 (Eight) Hours As Needed for Moderate Pain. (Patient not taking: Reported on 4/3/2024), Disp: 12 tablet, Rfl: 0    Rexulti 2 MG tablet, Take 1 tablet by mouth Daily. (Patient not taking: Reported on 4/3/2024), Disp: 30 tablet, Rfl: 2    tiotropium (SPIRIVA) 18 MCG per inhalation capsule, Place 1 capsule into inhaler and inhale Daily. (Patient not taking: Reported on 4/3/2024), Disp: 30 capsule, Rfl: 2    Social History     Socioeconomic History    Marital status: Single   Tobacco Use    Smoking status: Never    Smokeless tobacco: Never   Vaping  "Use    Vaping status: Never Used   Substance and Sexual Activity    Alcohol use: No    Drug use: No    Sexual activity: Defer        Objective   Vital Signs:  /64   Pulse 62   Resp 18   Ht 165.1 cm (65\")   Wt 108 kg (238 lb 3.2 oz)   SpO2 94%   BMI 39.64 kg/m²   Estimated body mass index is 39.64 kg/m² as calculated from the following:    Height as of this encounter: 165.1 cm (65\").    Weight as of this encounter: 108 kg (238 lb 3.2 oz).    Physical Exam:  General: Ill appearing elderly female, frequently taking deep breaths  HEENT: No posterior pharynx erythema, no tonsillar erythema or exudates,   Cardiac: Regular rate and rhythm, normal S1/S2, no murmur, rubs or gallops, no lower extremity edema  Lungs: Clear to auscultation bilaterally, faint crackles in the bases bilaterally, improved with cough  Abdomen: Soft, non-tender, no guarding or rebound tenderness, no hepatosplenomegaly  Skin: No significant rashes or lesions  MSK: Grossly normal tone and strength  Neuro: Alert and oriented x3, CN II-XII grossly intact  Psych: Appropriate mood and affect    Assessment and Plan:    (R06.02) Shortness of breath   (R07.9) Chest pain, unspecified type  (R42) Dizziness  (R53.83) Fatigue, unspecified type  Assessment: Patient with approximately 3 weeks of progressively worsening shortness of breath, dizziness, and fatigue, intermittently associated with chest discomfort and palpitations.  Patient overall looks unwell and reports symptoms have been worsening to the point she consider going to ED.  ECG notable for new left anterior fascicular block, not previously noted on most recent ECG.  Patient does have a history of coronary artery disease status post stenting in 2017.  Most recent ECG without evidence of acute ischemia.  Patient agrees to present to ED for further evaluation, including but not limited to, labs, imaging, telemetry, and appropriate treatment.  Concerns discussed with patient's family member who " agrees to drive her directly to [facility].  Plan:  - ECG  -Patient to present to emergency department for further evaluation    Patient to return in future date to complete Medicare wellness visit, which was the initially scheduled visit.  Will address acute ongoing conditions and address preventative care at future visit..        Patient was given instructions and counseling regarding her condition or for health maintenance advice. Please see specific information pulled into the AVS if appropriate.       Dr Colby Vines   Internal Medicine Physician  [facility]  [address], [address]  [place] [address]

## 2024-04-03 NOTE — ED PROVIDER NOTES
Subjective   History of Present Illness  Patient is a 63-year-old white female with a history of thyroid dysfunction, CAD.  She presents today from her primary care provider's office with complaints of shortness of breath and chest pain.  She states has been ongoing for the last 2 months or so.  She has symptoms at rest but her symptoms are much worse with exertion.  She denies any fever or cough.  No leg pain or swelling.  She denies any black or bloody stools.  She does report some frequent belching.  No abdominal pain nausea or vomiting.      Review of Systems   Constitutional:  Negative for fever.   Respiratory:  Positive for shortness of breath. Negative for cough.    Cardiovascular:  Positive for chest pain. Negative for leg swelling.   Gastrointestinal:  Negative for abdominal pain, blood in stool, diarrhea, nausea and vomiting.        Belching       Past Medical History:   Diagnosis Date    Allergic rhinitis     Anemia     Asthma     B12 deficiency     Bradycardia     Broken arm 2019    Left arm broken     Chronic back pain     Chronic bronchitis     Coronary artery disease     Diarrhea     Encounter for blood transfusion 4/3/2024    Hypothyroidism     MRSA (methicillin resistant Staphylococcus aureus)     Neuropathic pain of foot     Severe back pain 2010    Sleep apnea, obstructive        Allergies   Allergen Reactions    Hydrocodone-Acetaminophen Hives     Can tolerate with Benadryl    Oxycodone-Acetaminophen Hives     Can tolerate with Benadryl    Ketorolac Tromethamine Itching    Promethazine Itching    Tramadol Itching       Past Surgical History:   Procedure Laterality Date    ABDOMINAL HERNIA REPAIR  2003    repair of abdominal wall hernia, intraabdominal hernia and hiatal hernia, 2004 incisional hernia repair.     ACHILLES TENDON SURGERY  2011    rupture    ANKLE FUSION Right 11/06/2015    CARDIAC CATHETERIZATION  02/20/2017    CHOLECYSTECTOMY  1992    GASTRIC BYPASS  1986    LAMINECTOMY  2007     that was complicated by a staph infection     OTHER SURGICAL HISTORY Left 2019    L arm     REMOVAL EXTERNAL FIXATOR      WRIST FRACTURE SURGERY Right        Family History   Problem Relation Age of Onset    Heart disease Mother     COPD Mother     Lung cancer Mother     Lung cancer Father     Other Father         Bladder cancer    Sleep apnea Sister     Hypertension Sister     Diabetes Sister     Other Sister         Heart Valve Replacement        Social History     Socioeconomic History    Marital status: Single   Tobacco Use    Smoking status: Never    Smokeless tobacco: Never   Vaping Use    Vaping status: Never Used   Substance and Sexual Activity    Alcohol use: No    Drug use: No    Sexual activity: Defer           Objective   Physical Exam  Vital signs and triage nurse note reviewed.  Constitutional: Awake, alert; well-developed and well-nourished. No acute distress is noted.  HEENT: Normocephalic, atraumatic; pupils are PERRL with intact EOM; oropharynx is pink and moist without exudate or erythema.  No drooling or pooling of oral secretions.  Neck: Supple, full range of motion without pain; no cervical lymphadenopathy. Normal phonation.  Cardiovascular: Mildly bradycardic rate and rhythm, normal S1-S2.  No murmur noted.  Pulmonary: Respiratory effort regular nonlabored, breath sounds clear to auscultation all fields.  Abdomen: Soft, nontender, nondistended with normoactive bowel sounds; no rebound or guarding.  Musculoskeletal: Independent range of motion of all extremities with no palpable tenderness or edema.  Neuro: Alert oriented x3, speech is clear and appropriate, GCS 15.    Skin: Pale, warm, dry, intact; no erythematous or petechial rash or lesion.    Procedures           ED Course      Labs Reviewed   COMPREHENSIVE METABOLIC PANEL - Abnormal; Notable for the following components:       Result Value    Glucose 101 (*)     Creatinine 1.72 (*)     Chloride 110 (*)     CO2 17.0 (*)     AST (SGOT) 50  (*)     eGFR 32.7 (*)     All other components within normal limits    Narrative:     GFR Normal >60  Chronic Kidney Disease <60  Kidney Failure <15     TROPONIN - Abnormal; Notable for the following components:    HS Troponin T 46 (*)     All other components within normal limits    Narrative:     High Sensitive Troponin T Reference Range:  <14.0 ng/L- Negative Female for AMI  <22.0 ng/L- Negative Male for AMI  >=14 - Abnormal Female indicating possible myocardial injury.  >=22 - Abnormal Male indicating possible myocardial injury.   Clinicians would have to utilize clinical acumen, EKG, Troponin, and serial changes to determine if it is an Acute Myocardial Infarction or myocardial injury due to an underlying chronic condition.        CBC WITH AUTO DIFFERENTIAL - Abnormal; Notable for the following components:    MCV 97.2 (*)     RDW 15.7 (*)     RDW-SD 56.1 (*)     Lymphocytes, Absolute 3.44 (*)     All other components within normal limits   HIGH SENSITIVITIY TROPONIN T 2HR - Abnormal; Notable for the following components:    HS Troponin T 41 (*)     Troponin T Delta -5 (*)     All other components within normal limits    Narrative:     High Sensitive Troponin T Reference Range:  <14.0 ng/L- Negative Female for AMI  <22.0 ng/L- Negative Male for AMI  >=14 - Abnormal Female indicating possible myocardial injury.  >=22 - Abnormal Male indicating possible myocardial injury.   Clinicians would have to utilize clinical acumen, EKG, Troponin, and serial changes to determine if it is an Acute Myocardial Infarction or myocardial injury due to an underlying chronic condition.        PROTIME-INR - Normal   APTT - Normal   BNP (IN-HOUSE) - Normal    Narrative:     This assay is used as an aid in the diagnosis of individuals suspected of having heart failure. It can be used as an aid in the diagnosis of acute decompensated heart failure (ADHF) in patients presenting with signs and symptoms of ADHF to the emergency department  (ED). In addition, NT-proBNP of <300 pg/mL indicates ADHF is not likely.    Age Range Result Interpretation  NT-proBNP Concentration (pg/mL:      <50             Positive            >450                   Gray                 300-450                    Negative             <300    50-75           Positive            >900                  Gray                300-900                  Negative            <300      >75             Positive            >1800                  Gray                300-1800                  Negative            <300   RAINBOW DRAW    Narrative:     The following orders were created for panel order [place] Draw.  Procedure                               Abnormality         Status                     ---------                               -----------         ------                     Green Top (Gel)[[id number]]                                  Final result               Lavender Top[[id number]]                                                                Gold Top - SST[[id number]]                                   Final result               Light Blue Top[[id number]]                                   Final result                 Please view results for these tests on the individual orders.   TSH   GREEN TOP   GOLD TOP - SST   LIGHT BLUE TOP   CBC AND DIFFERENTIAL    Narrative:     The following orders were created for panel order CBC & Differential.  Procedure                               Abnormality         Status                     ---------                               -----------         ------                     CBC Auto Differential[[id number]]        Abnormal            Final result                 Please view results for these tests on the individual orders.     CT Head Without Contrast    Result Date: 4/3/2024  Impression: No acute intracranial abnormality. Electronically Signed: Mansi Sequeira MD  4/3/2024 12:00 PM EDT  Workstation ID: [id number]    XR Chest 1 View    Result Date:  4/3/2024  Impression: 1.Questionable nodular shadow left upper lobe versus summation of shadows. Follow-up recommended to reassess Electronically Signed: Rashard Hendricks MD  4/3/2024 11:30 AM EDT  Workstation ID: [id number]   Medications   sodium chloride 0.9 % flush 10 mL (has no administration in time range)   sodium chloride 0.9 % flush 10 mL (has no administration in time range)   pantoprazole (PROTONIX) injection 80 mg (80 mg Intravenous Given 4/3/24 1114)   ondansetron (ZOFRAN) injection 4 mg (4 mg Intravenous Given 4/3/24 1114)                                            Medical Decision Making  Patient presents today with the above complaint.    She had the above exam and evaluation.  He is placed on continuous cardiac monitor.  IV was established.  Labs EKG and chest x-ray were obtained.  She was given dose of IV Protonix and Zofran for her belching.    Workup: EKG independently interpreted by Dr. Prescott and myself and shows sinus bradycardia with ventricular rate of 46, incomplete left bundle branch block, left anterior hemiblock, compared to previous nonspecific ST-T wave abnormality has improved.    CBC is unremarkable.  Metabolic panel is significant for creatinine of 1.72.  proBNP within normal limits at 248.  Initial high-sensitivity troponin is 46, repeat is 41.  Chest x-ray shows questionable nodular shadow left upper lobe versus summation of shadows.    On reexamination patient resting quietly no distress.  She has no new complaints.  She remains pale but otherwise well-appearing and in no distress.  She remains mildly bradycardic but blood pressure has remained stable throughout her ED stay.  No hypoxia.    Findings were discussed with her and family at bedside.  Patient will be placed in observation unit for further cardiac evaluation.  Case and plan discussed with the observation unit PA.    Problems Addressed:  Acute kidney injury: complicated acute illness or injury  Chest pain, unspecified type:  complicated acute illness or injury  Shortness of breath: complicated acute illness or injury    Amount and/or Complexity of Data Reviewed  Labs: ordered.  Radiology: ordered.  ECG/medicine tests: ordered.    Risk  Prescription drug management.  Decision regarding hospitalization.        Final diagnoses:   Shortness of breath   Chest pain, unspecified type   Acute kidney injury       ED Disposition  ED Disposition       ED Disposition   Decision to Admit    Condition   --    Comment   --               No follow-up provider specified.       Medication List      No changes were made to your prescriptions during this visit.            Soraida Orozco, APRN  04/03/24 4529

## 2024-04-03 NOTE — ED NOTES
Patient states that she has been having intermittent chest pain and SOA [id number]. She states that the episodes have just gotten worse. She states that she did have a fall on Monday where she hit her head on the fridge. She denies LOC and blood thinners. She states she was seen by PCP this morning and was told her HR was too low, She states she has history of bradycardia but hasn't had any bradycardic episodes for sometime now.

## 2024-04-04 LAB
ANION GAP SERPL CALCULATED.3IONS-SCNC: 9 MMOL/L (ref 5–15)
BACTERIA UR QL AUTO: ABNORMAL /HPF
BASOPHILS # BLD AUTO: 0.06 10*3/MM3 (ref 0–0.2)
BASOPHILS NFR BLD AUTO: 0.7 % (ref 0–1.5)
BILIRUB UR QL STRIP: NEGATIVE
BUN SERPL-MCNC: 21 MG/DL (ref 8–23)
BUN/CREAT SERPL: 11.4 (ref 7–25)
CALCIUM SPEC-SCNC: 8.8 MG/DL (ref 8.6–10.5)
CHLORIDE SERPL-SCNC: 110 MMOL/L (ref 98–107)
CLARITY UR: CLEAR
CO2 SERPL-SCNC: 20 MMOL/L (ref 22–29)
COLOR UR: YELLOW
CREAT SERPL-MCNC: 1.84 MG/DL (ref 0.57–1)
DEPRECATED RDW RBC AUTO: 56.9 FL (ref 37–54)
EGFRCR SERPLBLD CKD-EPI 2021: 30.1 ML/MIN/1.73
EOSINOPHIL # BLD AUTO: 0.32 10*3/MM3 (ref 0–0.4)
EOSINOPHIL NFR BLD AUTO: 3.7 % (ref 0.3–6.2)
ERYTHROCYTE [DISTWIDTH] IN BLOOD BY AUTOMATED COUNT: 15.9 % (ref 12.3–15.4)
GLUCOSE SERPL-MCNC: 88 MG/DL (ref 65–99)
GLUCOSE UR STRIP-MCNC: NEGATIVE MG/DL
HCT VFR BLD AUTO: 34.5 % (ref 34–46.6)
HGB BLD-MCNC: 10.9 G/DL (ref 12–15.9)
HGB UR QL STRIP.AUTO: NEGATIVE
HYALINE CASTS UR QL AUTO: ABNORMAL /LPF
IMM GRANULOCYTES # BLD AUTO: 0.04 10*3/MM3 (ref 0–0.05)
IMM GRANULOCYTES NFR BLD AUTO: 0.5 % (ref 0–0.5)
KETONES UR QL STRIP: NEGATIVE
LEUKOCYTE ESTERASE UR QL STRIP.AUTO: ABNORMAL
LYMPHOCYTES # BLD AUTO: 4.05 10*3/MM3 (ref 0.7–3.1)
LYMPHOCYTES NFR BLD AUTO: 46.7 % (ref 19.6–45.3)
MCH RBC QN AUTO: 31.2 PG (ref 26.6–33)
MCHC RBC AUTO-ENTMCNC: 31.6 G/DL (ref 31.5–35.7)
MCV RBC AUTO: 98.9 FL (ref 79–97)
MONOCYTES # BLD AUTO: 0.72 10*3/MM3 (ref 0.1–0.9)
MONOCYTES NFR BLD AUTO: 8.3 % (ref 5–12)
NEUTROPHILS NFR BLD AUTO: 3.49 10*3/MM3 (ref 1.7–7)
NEUTROPHILS NFR BLD AUTO: 40.1 % (ref 42.7–76)
NITRITE UR QL STRIP: NEGATIVE
NRBC BLD AUTO-RTO: 0 /100 WBC (ref 0–0.2)
PH UR STRIP.AUTO: 5.5 [PH] (ref 5–8)
PLATELET # BLD AUTO: 260 10*3/MM3 (ref 140–450)
PMV BLD AUTO: 10.6 FL (ref 6–12)
POTASSIUM SERPL-SCNC: 4.4 MMOL/L (ref 3.5–5.2)
PROT UR QL STRIP: NEGATIVE
QT INTERVAL: 453 MS
QTC INTERVAL: 397 MS
RBC # BLD AUTO: 3.49 10*6/MM3 (ref 3.77–5.28)
RBC # UR STRIP: ABNORMAL /HPF
REF LAB TEST METHOD: ABNORMAL
SODIUM SERPL-SCNC: 139 MMOL/L (ref 136–145)
SP GR UR STRIP: 1.03 (ref 1–1.03)
SQUAMOUS #/AREA URNS HPF: ABNORMAL /HPF
TROPONIN T SERPL HS-MCNC: 40 NG/L
TSH SERPL DL<=0.05 MIU/L-ACNC: 106.2 UIU/ML (ref 0.27–4.2)
UROBILINOGEN UR QL STRIP: ABNORMAL
WBC # UR STRIP: ABNORMAL /HPF
WBC NRBC COR # BLD AUTO: 8.68 10*3/MM3 (ref 3.4–10.8)

## 2024-04-04 PROCEDURE — 84443 ASSAY THYROID STIM HORMONE: CPT | Performed by: PHYSICIAN ASSISTANT

## 2024-04-04 PROCEDURE — 81001 URINALYSIS AUTO W/SCOPE: CPT | Performed by: INTERNAL MEDICINE

## 2024-04-04 PROCEDURE — 85025 COMPLETE CBC W/AUTO DIFF WBC: CPT | Performed by: PHYSICIAN ASSISTANT

## 2024-04-04 PROCEDURE — G0378 HOSPITAL OBSERVATION PER HR: HCPCS

## 2024-04-04 PROCEDURE — 80048 BASIC METABOLIC PNL TOTAL CA: CPT | Performed by: PHYSICIAN ASSISTANT

## 2024-04-04 PROCEDURE — 25810000003 SODIUM CHLORIDE 0.9 % SOLUTION: Performed by: PHYSICIAN ASSISTANT

## 2024-04-04 PROCEDURE — 99232 SBSQ HOSP IP/OBS MODERATE 35: CPT | Performed by: INTERNAL MEDICINE

## 2024-04-04 PROCEDURE — 84484 ASSAY OF TROPONIN QUANT: CPT | Performed by: PHYSICIAN ASSISTANT

## 2024-04-04 RX ORDER — SODIUM CHLORIDE 450 MG/100ML
75 INJECTION, SOLUTION INTRAVENOUS CONTINUOUS
Status: DISCONTINUED | OUTPATIENT
Start: 2024-04-04 | End: 2024-04-05

## 2024-04-04 RX ADMIN — OXYBUTYNIN CHLORIDE 5 MG: 5 TABLET ORAL at 15:50

## 2024-04-04 RX ADMIN — VENLAFAXINE HYDROCHLORIDE 225 MG: 75 CAPSULE, EXTENDED RELEASE ORAL at 20:46

## 2024-04-04 RX ADMIN — DICYCLOMINE HYDROCHLORIDE 20 MG: 10 CAPSULE ORAL at 08:01

## 2024-04-04 RX ADMIN — PANCRELIPASE 72000 UNITS OF LIPASE: 36000; 180000; 114000 CAPSULE, DELAYED RELEASE PELLETS ORAL at 12:12

## 2024-04-04 RX ADMIN — OXYBUTYNIN CHLORIDE 5 MG: 5 TABLET ORAL at 08:00

## 2024-04-04 RX ADMIN — PANCRELIPASE 72000 UNITS OF LIPASE: 36000; 180000; 114000 CAPSULE, DELAYED RELEASE PELLETS ORAL at 08:00

## 2024-04-04 RX ADMIN — DICYCLOMINE HYDROCHLORIDE 20 MG: 10 CAPSULE ORAL at 20:46

## 2024-04-04 RX ADMIN — SODIUM CHLORIDE 75 ML/HR: 9 INJECTION, SOLUTION INTRAVENOUS at 07:58

## 2024-04-04 RX ADMIN — ASPIRIN 81 MG: 81 TABLET, COATED ORAL at 08:01

## 2024-04-04 RX ADMIN — OXYBUTYNIN CHLORIDE 5 MG: 5 TABLET ORAL at 20:46

## 2024-04-04 RX ADMIN — LEVOTHYROXINE SODIUM 250 MCG: 0.12 TABLET ORAL at 08:01

## 2024-04-04 NOTE — PLAN OF CARE
Goal Outcome Evaluation:      Pt did not have any c/o chest pain, some SOA which was eased with 2 liters oxygen via nasal cannula. Pt resting comfortably will continue to monitor.

## 2024-04-04 NOTE — PLAN OF CARE
Problem: Adult Inpatient Plan of Care  Goal: Plan of Care Review  Outcome: Ongoing, Progressing  Goal: Patient-Specific Goal (Individualized)  Outcome: Ongoing, Progressing  Goal: Absence of Hospital-Acquired Illness or Injury  Outcome: Ongoing, Progressing  Intervention: Identify and Manage Fall Risk  Recent Flowsheet Documentation  Taken 4/4/2024 1200 by Frank Merino RN  Safety Promotion/Fall Prevention: safety round/check completed  Taken 4/4/2024 1000 by Frank Merino RN  Safety Promotion/Fall Prevention: safety round/check completed  Taken 4/4/2024 0800 by Frank Merino RN  Safety Promotion/Fall Prevention: safety round/check completed  Intervention: Prevent Skin Injury  Recent Flowsheet Documentation  Taken 4/4/2024 0800 by Frank Merino RN  Body Position: position changed independently  Intervention: Prevent and Manage VTE (Venous Thromboembolism) Risk  Recent Flowsheet Documentation  Taken 4/4/2024 0800 by Frank Merino RN  Activity Management: bedrest  Goal: Optimal Comfort and Wellbeing  Outcome: Ongoing, Progressing  Intervention: Provide Person-Centered Care  Recent Flowsheet Documentation  Taken 4/4/2024 0800 by Frank Merino RN  Trust Relationship/Rapport: care explained  Goal: Readiness for Transition of Care  Outcome: Ongoing, Progressing   Goal Outcome Evaluation:      Pt progressing. Cardiology Endo and Nephrology following pt.

## 2024-04-04 NOTE — CONSULTS
Consults  [facility] Consult Note      Patient Name: Cassie Winter  : 1960  MRN: [id number]  Primary Care Physician:  Colby Vines MD  Referring Physician: No Known Provider  Date of admission: 4/3/2024    Subjective     Reason for Consult:  gillian    HPI:   Cassie Winter is a 63 y.o. female who was found to have an elevated serum creatinine prompting renal consultation. She was admitted for chest pain and dyspnea and a fall.  She does not take nsaids and denies tobacco use. She denies orthostatic dizziness but did fall prior to admission and hit her head. Ct was negative. She has no voiding difficulties or complaints.    Review of Systems:   14 point review of systems is otherwise negative except for mentioned above on HPI    Personal History     Past Medical History:   Diagnosis Date    Allergic rhinitis     Anemia     Asthma     B12 deficiency     Bradycardia     Broken arm 2019    Left arm broken     Chronic back pain     Chronic bronchitis     Coronary artery disease     Diarrhea     Encounter for blood transfusion 4/3/2024    Hypothyroidism     MRSA (methicillin resistant Staphylococcus aureus)     Neuropathic pain of foot     Severe back pain 2010    Sleep apnea, obstructive        Past Surgical History:   Procedure Laterality Date    ABDOMINAL HERNIA REPAIR  2003    repair of abdominal wall hernia, intraabdominal hernia and hiatal hernia, 2004 incisional hernia repair.     ACHILLES TENDON SURGERY  2011    rupture    ANKLE FUSION Right 2015    CARDIAC CATHETERIZATION  2017    CHOLECYSTECTOMY  1992    GASTRIC BYPASS  1986    LAMINECTOMY      that was complicated by a staph infection     OTHER SURGICAL HISTORY Left 2019    L arm     REMOVAL EXTERNAL FIXATOR      WRIST FRACTURE SURGERY Right        Family History: family history includes COPD in her mother; Diabetes in her sister; Heart disease in her mother; Hypertension in her sister; Lung cancer in her  father and mother; Other in her father and sister; Sleep apnea in her sister.    Social History:  reports that she has never smoked. She has never used smokeless tobacco. She reports that she does not drink alcohol and does not use drugs.    Home Medications:  Prior to Admission medications    Medication Sig Start Date End Date Taking? Authorizing Provider   aspirin 81 MG EC tablet Take 1 tablet by mouth Daily. 5/20/19  Yes Leighann Solitario MD   Calcium 200 MG tablet Take 1 tablet by mouth Daily.   Yes Leighann Solitario MD   colestipol (COLESTID) 1 g tablet Take 1 tablet by mouth 2 (Two) Times a Day. 9/5/23  Yes Leighann Solitario MD   dicyclomine (BENTYL) 20 MG tablet Every 12 (Twelve) Hours. 11/28/16  Yes Leighann Solitario MD   levothyroxine (Synthroid) 200 MCG tablet Take 1 tablet by mouth Daily. 8/22/23  Yes Cristopher Garcia Jr., MD   metoprolol succinate XL (TOPROL-XL) 25 MG 24 hr tablet Take 1 tablet by mouth Daily.   Yes Leighann Solitario MD   Pancrelipase, Lip-Prot-Amyl, (Creon) 92123-570364 units capsule delayed-release particles capsule Take 1 capsule by mouth With Snacks for Snacks.   Yes Leighann Solitario MD   Pancrelipase, Lip-Prot-Amyl, (CREON) 68296-856844 units capsule delayed-release particles capsule Take 2 capsules by mouth Daily With Breakfast & Lunch.   Yes Leighann Solitario MD   venlafaxine 75 MG tablet sustained-release 24 hour 24 hr tablet Take 1 tablet by mouth Every Night. In addition to 150mg for a total of 225mg at bedtime   Yes Leighann Solitario MD   venlafaxine XR (EFFEXOR-XR) 150 MG 24 hr capsule Take 1 capsule by mouth Every Night. In addition to 75mg for a total of 225mg at bedtime   Yes Leighann Solitario MD   albuterol sulfate  (90 Base) MCG/ACT inhaler Inhale 2 puffs Every 4 (Four) Hours As Needed for Wheezing. 12/30/22   Cristopher Garcia Jr., MD   oxybutynin (DITROPAN) 5 MG tablet Take 1 tablet by mouth 3 (Three) Times a Day. 6/20/23    Cristopher Garcia Jr., MD   Vitamin D, Cholecalciferol, (CHOLECALCIFEROL) 10 MCG (400 UNIT) tablet Take 1 tablet by mouth Daily.    Provider, MD Leighann       Allergies:  Allergies   Allergen Reactions    Hydrocodone-Acetaminophen Hives     Can tolerate with Benadryl    Oxycodone-Acetaminophen Hives     Can tolerate with Benadryl    Ketorolac Tromethamine Itching    Promethazine Itching    Tramadol Itching       Objective     Vitals:   Temp:  [97 °F (36.1 °C)-98 °F (36.7 °C)] 97 °F (36.1 °C)  Heart Rate:  [45-84] 56  Resp:  [12-17] 12  BP: ()/(54-91) 124/64  Flow (L/min):  [2] 2    Intake/Output Summary (Last 24 hours) at 4/4/2024 1137  Last data filed at 4/4/2024 1036  Gross per 24 hour   Intake 820 ml   Output 600 ml   Net 220 ml       Physical Exam:    General Appearance: alert, oriented x 3, no acute distress   Skin: warm and dry  HEENT: oral mucosa normal, nonicteric sclera  Neck: supple, no JVD  Lungs: CTA  Heart: RRR, normal S1 and S2  Abdomen: soft, nontender, nondistended  : no palpable bladder  Extremities: no edema, cyanosis or clubbing  Neuro: normal speech and mental status     Scheduled Meds:     aspirin, 81 mg, Oral, Daily  dicyclomine, 20 mg, Oral, BID  levothyroxine, 250 mcg, Oral, Q AM  levothyroxine, 250 mcg, Oral, Once  [Held by provider] metoprolol succinate XL, 25 mg, Oral, Daily  oxybutynin, 5 mg, Oral, TID  Pancrelipase (Lip-Prot-Amyl), 72,000 units of lipase, Oral, Daily With Breakfast & Lunch  sodium chloride, 10 mL, Intravenous, Q12H  venlafaxine XR, 225 mg, Oral, Nightly      IV Meds:   sodium chloride, 75 mL/hr, Last Rate: 75 mL/hr (04/04/24 1012)        Results Reviewed:   I have personally reviewed the results from the time of this admission to 4/4/2024 11:37 EDT     Lab Results   Component Value Date    GLUCOSE 88 04/04/2024    CALCIUM 8.8 04/04/2024     04/04/2024    K 4.4 04/04/2024    CO2 20.0 (L) 04/04/2024     (H) 04/04/2024    BUN 21 04/04/2024     CREATININE 1.84 (H) 04/04/2024    EGFRIFNONA 52 (L) 11/22/2021    BCR 11.4 04/04/2024    ANIONGAP 9.0 04/04/2024      Lab Results   Component Value Date    ALBUMIN 4.0 04/03/2024           Assessment / Plan     ASSESSMENT:  Shawn-likely prerenal, may also have underlying ckd as well  Hypovolemia-apparent on exam  Obstructive sleep apnea    PLAN:  Check renal us  Avoid nephrotoxins  Check ua  Gently hydrate  Check labin am    Thank you for involving us in the care of Cassie Santoroers.  Please feel free to call with any questions.    Jairo Elmore MD  04/04/24  11:37 EDT    [facility]  [phone]

## 2024-04-04 NOTE — PROGRESS NOTES
[facility] Progress note    Patient Name: Cassie Winter  : 1960  MRN: [id number]  Primary Care Physician: Colby Vines MD  Date of admission: 4/3/2024     Patient Care Team:  Colby Vines MD as PCP - General (Internal Medicine)  Bonnie Landa PT as Physical Therapist (Physical Therapy)  Pablito Casillas MD as Consulting Physician (Cardiology)          Subjective   History Present Illness     Chief Complaint:   Chief Complaint   Patient presents with    Chest Pain     Chest pain for one week. soA, nausea,   Pt had fall and hit head on Monday.  Pt was at PMD and sent in for evaluation.         Dyspnea, cp, palpitations    History of Present Illness    ED  63-year-old white female with a history of thyroid dysfunction, CAD. She presents today from her primary care provider's office with complaints of shortness of breath and chest pain. She states has been ongoing for the last 2 months or so. She has symptoms at rest but her symptoms are much worse with exertion. She denies any fever or cough. No leg pain or swelling. She denies any black or bloody stools. She does report some frequent belching. No abdominal pain nausea or vomiting.      Observation 4/3/24  Cardiology consulted. Endocrinology consulted.  Ct chest pending    Observation 24  Pt looks much better today HR in the 60s. Pt denies cp and dyspnea today. Endocrinology consulted yesterday. Increased synthroid dosage and wishes to monitor and recheck in 48 hrs.       ROS    Cardiovascular:  Positive for chest pain and palpitations.   Respiratory:  Positive for shortness of breath.        Personal History     Past Medical History:   Past Medical History:   Diagnosis Date    Allergic rhinitis     Anemia     Asthma     B12 deficiency     Bradycardia     Broken arm 2019    Left arm broken     Chronic back pain     Chronic bronchitis     Coronary artery disease     Diarrhea     Encounter for blood transfusion 4/3/2024     Hypothyroidism     MRSA (methicillin resistant Staphylococcus aureus)     Neuropathic pain of foot     Severe back pain 2010    Sleep apnea, obstructive        Surgical History:      Past Surgical History:   Procedure Laterality Date    ABDOMINAL HERNIA REPAIR  2003    repair of abdominal wall hernia, intraabdominal hernia and hiatal hernia, 2004 incisional hernia repair.     ACHILLES TENDON SURGERY  2011    rupture    ANKLE FUSION Right 11/06/2015    CARDIAC CATHETERIZATION  02/20/2017    CHOLECYSTECTOMY  1992    GASTRIC BYPASS  1986    LAMINECTOMY  2007    that was complicated by a staph infection     OTHER SURGICAL HISTORY Left 2019    L arm     REMOVAL EXTERNAL FIXATOR      WRIST FRACTURE SURGERY Right            Family History: family history includes COPD in her mother; Diabetes in her sister; Heart disease in her mother; Hypertension in her sister; Lung cancer in her father and mother; Other in her father and sister; Sleep apnea in her sister. Otherwise pertinent FHx was reviewed and unremarkable.     Social History:  reports that she has never smoked. She has never used smokeless tobacco. She reports that she does not drink alcohol and does not use drugs.      Medications:  Prior to Admission medications    Medication Sig Start Date End Date Taking? Authorizing Provider   aspirin 81 MG EC tablet Take 1 tablet by mouth Daily. 5/20/19  Yes Leighann Solitario MD   Calcium 200 MG tablet Take 1 tablet by mouth Daily.   Yes Leighann Solitario MD   colestipol (COLESTID) 1 g tablet Take 1 tablet by mouth 2 (Two) Times a Day. 9/5/23  Yes Leighann Solitario MD   dicyclomine (BENTYL) 20 MG tablet Every 12 (Twelve) Hours. 11/28/16  Yes Leighann Solitario MD   levothyroxine (Synthroid) 200 MCG tablet Take 1 tablet by mouth Daily. 8/22/23  Yes Cristopher Garcia Jr., MD   metoprolol succinate XL (TOPROL-XL) 25 MG 24 hr tablet Take 1 tablet by mouth Daily.   Yes Leighann Solitario MD   Pancrelipase,  Lip-Prot-Amyl, (Creon) 82779-765516 units capsule delayed-release particles capsule Take 1 capsule by mouth With Snacks for Snacks.   Yes Leighann Solitario MD   Pancrelipase, Lip-Prot-Amyl, (CREON) 58568-109555 units capsule delayed-release particles capsule Take 2 capsules by mouth Daily With Breakfast & Lunch.   Yes Leighann Solitario MD   venlafaxine 75 MG tablet sustained-release 24 hour 24 hr tablet Take 1 tablet by mouth Every Night. In addition to 150mg for a total of 225mg at bedtime   Yes Leighann Solitario MD   venlafaxine XR (EFFEXOR-XR) 150 MG 24 hr capsule Take 1 capsule by mouth Every Night. In addition to 75mg for a total of 225mg at bedtime   Yes Leighann Solitario MD   albuterol sulfate  (90 Base) MCG/ACT inhaler Inhale 2 puffs Every 4 (Four) Hours As Needed for Wheezing. 12/30/22   Cristopher Garcia Jr., MD   oxybutynin (DITROPAN) 5 MG tablet Take 1 tablet by mouth 3 (Three) Times a Day. 6/20/23   Cristopher Garcia Jr., MD   Vitamin D, Cholecalciferol, (CHOLECALCIFEROL) 10 MCG (400 UNIT) tablet Take 1 tablet by mouth Daily.    Provider, MD Leighann       Allergies:    Allergies   Allergen Reactions    Hydrocodone-Acetaminophen Hives     Can tolerate with Benadryl    Oxycodone-Acetaminophen Hives     Can tolerate with Benadryl    Ketorolac Tromethamine Itching    Promethazine Itching    Tramadol Itching       Objective   Objective     Vital Signs  Temp:  [97.5 °F (36.4 °C)-98 °F (36.7 °C)] 98 °F (36.7 °C)  Heart Rate:  [44-84] 64  Resp:  [13-19] 16  BP: ()/(41-91) 108/60  SpO2:  [80 %-100 %] 98 %  on  Flow (L/min):  [2] 2;   Device (Oxygen Therapy): nasal cannula  Body mass index is 40.54 kg/m².    Physical Exam  Constitutional:       Appearance: Normal appearance.   HENT:      Mouth/Throat:      Mouth: Mucous membranes are moist.   Cardiovascular:      Rate and Rhythm: Regular rhythm. Bradycardia present.      Pulses: Normal pulses.      Heart sounds: Normal heart  sounds.   Pulmonary:      Effort: Pulmonary effort is normal.      Breath sounds: Normal breath sounds.   Skin:     General: Skin is warm and dry.   Neurological:      General: No focal deficit present.      Mental Status: She is alert and oriented to person, place, and time.   Psychiatric:         Mood and Affect: Mood normal.         Behavior: Behavior normal.     Results Review:  I have personally reviewed most recent cardiac tracings, lab results, and radiology images and interpretations and agree with findings, most notably: cbc, cmp, bnp, trop, tsh, inr, chest xray, ekg.    Results from last 7 days   Lab Units 04/04/24  0554 04/03/24  0958   WBC 10*3/mm3 8.68 8.94   HEMOGLOBIN g/dL 10.9* 12.6   HEMATOCRIT % 34.5 38.3   PLATELETS 10*3/mm3 260 327   INR   --  0.94     Results from last 7 days   Lab Units 04/04/24  0554 04/03/24  1212 04/03/24  0959   SODIUM mmol/L 139  --  141   POTASSIUM mmol/L 4.4  --  4.5   CHLORIDE mmol/L 110*  --  110*   CO2 mmol/L 20.0*  --  17.0*   BUN mg/dL 21  --  23   CREATININE mg/dL 1.84*  --  1.72*   GLUCOSE mg/dL 88  --  101*   CALCIUM mg/dL 8.8  --  9.4   ALK PHOS U/L  --   --  112   ALT (SGPT) U/L  --   --  27   AST (SGOT) U/L  --   --  50*   HSTROP T ng/L 40* 41* 46*   PROBNP pg/mL  --   --  248.5     Estimated Creatinine Clearance: 37.8 mL/min (A) (by C-G formula based on SCr of 1.84 mg/dL (H)).  Brief Urine Lab Results       None            Microbiology Results (last 10 days)       ** No results found for the last 240 hours. **            ECG/EMG Results (most recent)       Procedure Component Value Units Date/Time    ECG 12 Lead Chest Pain [[id number]] Collected: 04/03/24 0947     Updated: 04/04/24 0656     QT Interval 453 ms      QTC Interval 397 ms     Narrative:      HEART RATE= 46  bpm  RR Interval= 1300  ms  ND Interval= 38  ms  P Horizontal Axis= 50  deg  P Front Axis= 0  deg  QRSD Interval= 111  ms  QT Interval= 453  ms  QTcB= 397  ms  QRS Axis= -57  deg  T Wave Axis=  238  deg  - ABNORMAL ECG -  Sinus bradycardia  Incomplete left bundle branch block  LVH with secondary repolarization abnormality  Anterior Q waves, possibly due to LVH  LAHB  Electronically Signed By: Jorge Prescott ([facility]) 04-Apr-2024 06:56:04  Date and Time of Study: 2024-04-03 09:47:32                    CT Angiogram Chest Pulmonary Embolism    Result Date: 4/3/2024  No pulmonary embolus. No acute cardiopulmonary disease. Electronically Signed: Baldemar Chapa MD  4/3/2024 6:05 PM EDT  Workstation ID: [id number]    XR Chest 1 View    Result Date: 4/3/2024  Impression: No evidence of acute cardiopulmonary disease. Resolution of questionable opacity in the left upper lobe after repositioning of EKG lead. Electronically Signed: Martin Roca MD  4/3/2024 4:33 PM EDT  Workstation ID: [id number]    CT Head Without Contrast    Result Date: 4/3/2024  Impression: No acute intracranial abnormality. Electronically Signed: Mansi Sequeira MD  4/3/2024 12:00 PM EDT  Workstation ID: [id number]    XR Chest 1 View    Result Date: 4/3/2024  Impression: 1.Questionable nodular shadow left upper lobe versus summation of shadows. Follow-up recommended to reassess Electronically Signed: Rashard Hendricks MD  4/3/2024 11:30 AM EDT  Workstation ID: [id number]       Estimated Creatinine Clearance: 37.8 mL/min (A) (by C-G formula based on SCr of 1.84 mg/dL (H)).    Assessment & Plan   Assessment/Plan       Active Hospital Problems    Diagnosis  POA    **Shortness of breath [R06.02]  Yes      Resolved Hospital Problems   No resolved problems to display.       Chest pain/dyspnea        Lab Results   Component Value Date     TROPONINT 41 (H) 04/03/2024     TROPONINT 46 (H) 04/03/2024      -02 placed at 2 lpm nc  - cbc, inr, bnp unremarkable  -Chest X-ray: reviewed and showing no acute cardiopulmonary process  -EKG:rate 46, sinus ludwin, incomplete LBBB  -ct chest reviewed and showed no pe or cardiopulmonary process  - cardiology consulted  -Telemetry         Hypothyroidism  - tsh 115  - pt on synthroid  - endocrinology consulted  - increased synthroid  - recheck in 48hrs    Chronic pancreatic insufficiency  - creon, colestid, bentyl      VTE Prophylaxis -   Mechanical Order History:        Ordered        04/03/24 1451  Place Sequential Compression Device  Once            04/03/24 1451  Maintain Sequential Compression Device  Continuous                          Pharmalogical Order History:       None            CODE STATUS:    Code Status and Medical Interventions:   Ordered at: 04/03/24 1444     Code Status (Patient has no pulse and is not breathing):    CPR (Attempt to Resuscitate)     Medical Interventions (Patient has pulse or is breathing):    Full Support       This patient has been examined wearing personal protective equipment.     I discussed the patient's findings and my recommendations with patient and nursing staff.      Signature:Electronically signed by Kelley Sesay PA-C, 04/04/24, 9:34 AM EDT.

## 2024-04-04 NOTE — CASE MANAGEMENT/SOCIAL WORK
Discharge Planning Assessment   [facility]     Patient Name: Cassie Winter  MRN: [id number]  Today's Date: 4/4/2024    Admit Date: 4/3/2024    Plan: Return home alone. Son in law to transport   Discharge Needs Assessment       Row Name 04/04/24 1504       Living Environment    People in Home alone    Current Living Arrangements home;apartment    Potentially Unsafe Housing Conditions none    In the past 12 months has the electric, gas, oil, or water company threatened to shut off services in your home? No    Primary Care Provided by self    Provides Primary Care For no one    Family Caregiver if Needed child(puja), adult    Family Caregiver Names son in law    Quality of Family Relationships helpful;involved;supportive    Able to Return to Prior Arrangements yes       Resource/Environmental Concerns    Resource/Environmental Concerns none    Transportation Concerns none       Transportation Needs    In the past 12 months, has lack of transportation kept you from medical appointments or from getting medications? no    In the past 12 months, has lack of transportation kept you from meetings, work, or from getting things needed for daily living? No       Food Insecurity    Within the past 12 months, you worried that your food would run out before you got the money to buy more. Never true    Within the past 12 months, the food you bought just didn't last and you didn't have money to get more. Never true       Transition Planning    Patient/Family Anticipates Transition to home    Patient/Family Anticipated Services at Transition none    Transportation Anticipated car, drives self;family or friend will provide       Discharge Needs Assessment    Readmission Within the Last 30 Days no previous admission in last 30 days    Equipment Currently Used at Home none    Concerns to be Addressed no discharge needs identified    Anticipated Changes Related to Illness none    Equipment Needed After Discharge none                    Discharge Plan       Row Name 04/04/24 1508       Plan    Plan Return home alone. Son in law to transport    Patient/Family in Agreement with Plan yes    Plan Comments CM met with Mrs. Winter who stated she lives alone, and is independent but does not drive. She does not use any medical equipment and is working on getting a new CPAP. Her son in law provides transportation and will pick her up at discharge. She denied any financial concerns. Verified PCP and pharmacy. CM will follow as needed                Demographic Summary       Row Name 04/04/24 150       General Information    Admission Type observation    Arrived From emergency department    Required Notices Provided Observation Status Notice    Referral Source admission list    Reason for Consult discharge planning    Preferred Language English       Contact Information    Permission Granted to Share Info With                    Functional Status       Row Name 04/04/24 1501       Functional Status    Usual Activity Tolerance good    Current Activity Tolerance moderate       Functional Status, IADL    Medications independent    Meal Preparation independent    Housekeeping independent    Laundry independent    Shopping independent       Mental Status    General Appearance WDL WDL       Mental Status Summary    Recent Changes in Mental Status/Cognitive Functioning no changes       Employment/    Employment Status retired                               Diane AMBROSIO,RN Case Manager  [facility]  Phone: Desk- [phone] cell- [phone]

## 2024-04-04 NOTE — PROGRESS NOTES
ENDOCRINE CONSULT PROGRESS NOTE  DATE OF SERVICE: 24        PATIENT NAME: Cassie Winter  PATIENT : 1960 AGE: 63 y.o.  MRN NUMBER: [id number]    ==========================================================================    CHIEF COMPLAINT: Hypothyroidism    CARE TEAM:   Patient Care Team:  Colby Vines MD as PCP - General (Internal Medicine)  Bonine Landa PT as Physical Therapist (Physical Therapy)  Pablito Casillas MD as Consulting Physician (Cardiology)    SUBJECTIVE  Pt seen and examined.  No other acute event overnight.    ==========================================================================    CURRENT ACTIVE HOSPITAL MEDICATIONS    Scheduled Medications:  aspirin, 81 mg, Oral, Daily  dicyclomine, 20 mg, Oral, BID  levothyroxine, 250 mcg, Oral, Q AM  [Held by provider] metoprolol succinate XL, 25 mg, Oral, Daily  oxybutynin, 5 mg, Oral, TID  Pancrelipase (Lip-Prot-Amyl), 72,000 units of lipase, Oral, Daily With Breakfast & Lunch  sodium chloride, 10 mL, Intravenous, Q12H  venlafaxine XR, 225 mg, Oral, Nightly         PRN Medications:    acetaminophen    aluminum-magnesium hydroxide-simethicone    ipratropium-albuterol    ondansetron ODT **OR** ondansetron    Pancrelipase (Lip-Prot-Amyl)    sodium chloride    [COMPLETED] Insert Peripheral IV **AND** sodium chloride    sodium chloride    sodium chloride     ==========================================================================    OBJECTIVE    Vitals:    24 1409   BP: 123/66   Pulse: 71   Resp: 16   Temp: 97.4 °F (36.3 °C)   SpO2: 100%      Body mass index is 40.54 kg/m².     General - A&Ox3, NAD, Calm    ==========================================================================    LAB EVALUATION    Lab Results   Component Value Date    GLUCOSE 88 2024    BUN 21 2024    CREATININE 1.84 (H) 2024    EGFRIFNONA 52 (L) 2021    BCR 11.4 2024    K 4.4 2024    CO2 20.0 (L) 2024     "CALCIUM 8.8 04/04/2024    ALBUMIN 4.0 04/03/2024    LABIL2 1.3 02/11/2019    AST 50 (H) 04/03/2024    ALT 27 04/03/2024       No results found for: \"HGBA1C\"  Lab Results   Component Value Date    CREATININE 1.84 (H) 04/04/2024         ==========================================================================    ASSESSMENT AND PLAN    # Hypothyroidism  - Discussed with patient in detail and on recollection patient may have missed dosing at home as her last prescription was filled in November 2023  - Continue levothyroxine 250 mcg every day  - Will repeat thyroid function back again tomorrow morning  - Whenever patient is ready for discharge from primary team standpoint patient needs to be discharged on levothyroxine 200 mcg again on daily basis and outpatient endocrinology follow-up and repeat thyroid function    # Shortness of breath  # Acute kidney injury  - Care as per primary team    Will follow with you.  Rest as per primary team.    This note was created using voice recognition software and is inherently subject to errors including those of syntax and \"sound-alike\" substitutions which may escape proofreading.  In such instances, original meaning may be extrapolated by contextual derivation.    Note: Portions of this note may have been copied from previous notes but documentation have been reviewed and edited as necessary to support clinical decision making for today's visit.  The time of this note does not reflect the time I saw the patient but the time that this note was written.    ==========================================================================  Cuco Sanchez MD  Department of Endocrine, Diabetes and Metabolism  [place], [place]  ==========================================================================    "
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
## 2024-04-05 ENCOUNTER — APPOINTMENT (OUTPATIENT)
Dept: CARDIOLOGY | Facility: HOSPITAL | Age: 64
End: 2024-04-05
Payer: MEDICARE

## 2024-04-05 ENCOUNTER — APPOINTMENT (OUTPATIENT)
Dept: ULTRASOUND IMAGING | Facility: HOSPITAL | Age: 64
End: 2024-04-05
Payer: MEDICARE

## 2024-04-05 PROBLEM — R06.00 DYSPNEA: Status: ACTIVE | Noted: 2024-04-05

## 2024-04-05 LAB
ALBUMIN SERPL-MCNC: 3.2 G/DL (ref 3.5–5.2)
ALBUMIN/GLOB SERPL: 1.3 G/DL
ALP SERPL-CCNC: 98 U/L (ref 39–117)
ALT SERPL W P-5'-P-CCNC: 18 U/L (ref 1–33)
ANION GAP SERPL CALCULATED.3IONS-SCNC: 9 MMOL/L (ref 5–15)
AST SERPL-CCNC: 32 U/L (ref 1–32)
BASOPHILS # BLD AUTO: 0.07 10*3/MM3 (ref 0–0.2)
BASOPHILS NFR BLD AUTO: 0.9 % (ref 0–1.5)
BILIRUB SERPL-MCNC: 0.2 MG/DL (ref 0–1.2)
BILIRUB UR QL STRIP: NEGATIVE
BUN SERPL-MCNC: 19 MG/DL (ref 8–23)
BUN/CREAT SERPL: 12.8 (ref 7–25)
CALCIUM SPEC-SCNC: 8.6 MG/DL (ref 8.6–10.5)
CHLORIDE SERPL-SCNC: 109 MMOL/L (ref 98–107)
CLARITY UR: CLEAR
CO2 SERPL-SCNC: 21 MMOL/L (ref 22–29)
COLOR UR: YELLOW
CREAT SERPL-MCNC: 1.48 MG/DL (ref 0.57–1)
DEPRECATED RDW RBC AUTO: 57.4 FL (ref 37–54)
EGFRCR SERPLBLD CKD-EPI 2021: 39.1 ML/MIN/1.73
EOSINOPHIL # BLD AUTO: 0.39 10*3/MM3 (ref 0–0.4)
EOSINOPHIL NFR BLD AUTO: 5.1 % (ref 0.3–6.2)
ERYTHROCYTE [DISTWIDTH] IN BLOOD BY AUTOMATED COUNT: 15.9 % (ref 12.3–15.4)
GLOBULIN UR ELPH-MCNC: 2.4 GM/DL
GLUCOSE SERPL-MCNC: 86 MG/DL (ref 65–99)
GLUCOSE UR STRIP-MCNC: NEGATIVE MG/DL
HCT VFR BLD AUTO: 31.4 % (ref 34–46.6)
HGB BLD-MCNC: 10 G/DL (ref 12–15.9)
HGB UR QL STRIP.AUTO: NEGATIVE
IMM GRANULOCYTES # BLD AUTO: 0.04 10*3/MM3 (ref 0–0.05)
IMM GRANULOCYTES NFR BLD AUTO: 0.5 % (ref 0–0.5)
KETONES UR QL STRIP: NEGATIVE
LEUKOCYTE ESTERASE UR QL STRIP.AUTO: NEGATIVE
LYMPHOCYTES # BLD AUTO: 2.91 10*3/MM3 (ref 0.7–3.1)
LYMPHOCYTES NFR BLD AUTO: 38.2 % (ref 19.6–45.3)
MCH RBC QN AUTO: 31.3 PG (ref 26.6–33)
MCHC RBC AUTO-ENTMCNC: 31.8 G/DL (ref 31.5–35.7)
MCV RBC AUTO: 98.4 FL (ref 79–97)
MONOCYTES # BLD AUTO: 0.61 10*3/MM3 (ref 0.1–0.9)
MONOCYTES NFR BLD AUTO: 8 % (ref 5–12)
NEUTROPHILS NFR BLD AUTO: 3.6 10*3/MM3 (ref 1.7–7)
NEUTROPHILS NFR BLD AUTO: 47.3 % (ref 42.7–76)
NITRITE UR QL STRIP: NEGATIVE
NRBC BLD AUTO-RTO: 0 /100 WBC (ref 0–0.2)
PH UR STRIP.AUTO: 5.5 [PH] (ref 5–8)
PLATELET # BLD AUTO: 262 10*3/MM3 (ref 140–450)
PMV BLD AUTO: 9.9 FL (ref 6–12)
POTASSIUM SERPL-SCNC: 4.5 MMOL/L (ref 3.5–5.2)
PROT SERPL-MCNC: 5.6 G/DL (ref 6–8.5)
PROT UR QL STRIP: NEGATIVE
RBC # BLD AUTO: 3.19 10*6/MM3 (ref 3.77–5.28)
SODIUM SERPL-SCNC: 139 MMOL/L (ref 136–145)
SP GR UR STRIP: 1.01 (ref 1–1.03)
T4 FREE SERPL-MCNC: 0.38 NG/DL (ref 0.93–1.7)
TSH SERPL DL<=0.05 MIU/L-ACNC: 97.1 UIU/ML (ref 0.27–4.2)
UROBILINOGEN UR QL STRIP: NORMAL
WBC NRBC COR # BLD AUTO: 7.62 10*3/MM3 (ref 3.4–10.8)

## 2024-04-05 PROCEDURE — 93306 TTE W/DOPPLER COMPLETE: CPT

## 2024-04-05 PROCEDURE — 99232 SBSQ HOSP IP/OBS MODERATE 35: CPT | Performed by: INTERNAL MEDICINE

## 2024-04-05 PROCEDURE — 76775 US EXAM ABDO BACK WALL LIM: CPT

## 2024-04-05 PROCEDURE — 81003 URINALYSIS AUTO W/O SCOPE: CPT | Performed by: INTERNAL MEDICINE

## 2024-04-05 PROCEDURE — 80053 COMPREHEN METABOLIC PANEL: CPT | Performed by: INTERNAL MEDICINE

## 2024-04-05 PROCEDURE — 84443 ASSAY THYROID STIM HORMONE: CPT | Performed by: INTERNAL MEDICINE

## 2024-04-05 PROCEDURE — 25010000002 SULFUR HEXAFLUORIDE MICROSPH 60.7-25 MG RECONSTITUTED SUSPENSION: Performed by: HOSPITALIST

## 2024-04-05 PROCEDURE — 84439 ASSAY OF FREE THYROXINE: CPT | Performed by: INTERNAL MEDICINE

## 2024-04-05 PROCEDURE — 25810000003 SODIUM CHLORIDE 0.9 % SOLUTION: Performed by: PHYSICIAN ASSISTANT

## 2024-04-05 PROCEDURE — 85025 COMPLETE CBC W/AUTO DIFF WBC: CPT | Performed by: PHYSICIAN ASSISTANT

## 2024-04-05 PROCEDURE — 93306 TTE W/DOPPLER COMPLETE: CPT | Performed by: INTERNAL MEDICINE

## 2024-04-05 RX ADMIN — VENLAFAXINE HYDROCHLORIDE 225 MG: 75 CAPSULE, EXTENDED RELEASE ORAL at 21:53

## 2024-04-05 RX ADMIN — SODIUM CHLORIDE 75 ML/HR: 9 INJECTION, SOLUTION INTRAVENOUS at 09:38

## 2024-04-05 RX ADMIN — OXYBUTYNIN CHLORIDE 5 MG: 5 TABLET ORAL at 17:47

## 2024-04-05 RX ADMIN — DICYCLOMINE HYDROCHLORIDE 20 MG: 10 CAPSULE ORAL at 08:17

## 2024-04-05 RX ADMIN — PANCRELIPASE 72000 UNITS OF LIPASE: 36000; 180000; 114000 CAPSULE, DELAYED RELEASE PELLETS ORAL at 13:19

## 2024-04-05 RX ADMIN — ASPIRIN 81 MG: 81 TABLET, COATED ORAL at 08:17

## 2024-04-05 RX ADMIN — LEVOTHYROXINE SODIUM 250 MCG: 0.12 TABLET ORAL at 05:05

## 2024-04-05 RX ADMIN — SULFUR HEXAFLUORIDE 2 ML: KIT at 16:52

## 2024-04-05 RX ADMIN — Medication 10 ML: at 08:18

## 2024-04-05 RX ADMIN — DICYCLOMINE HYDROCHLORIDE 20 MG: 10 CAPSULE ORAL at 21:53

## 2024-04-05 RX ADMIN — OXYBUTYNIN CHLORIDE 5 MG: 5 TABLET ORAL at 08:17

## 2024-04-05 RX ADMIN — Medication 10 ML: at 21:54

## 2024-04-05 RX ADMIN — PANCRELIPASE 72000 UNITS OF LIPASE: 36000; 180000; 114000 CAPSULE, DELAYED RELEASE PELLETS ORAL at 08:17

## 2024-04-05 RX ADMIN — OXYBUTYNIN CHLORIDE 5 MG: 5 TABLET ORAL at 21:53

## 2024-04-05 NOTE — CONSULTS
"[facility] Medicine Services  Consult Note    Patient Name: Cassie Winter  : 1960  MRN: [id number]  Primary Care Physician:  Colby Vines MD  Referring Physician: No Known Provider  Date of admission: 4/3/2024  Date and Time of Care: 24  at 15:48 EDT      Inpatient Hospitalist Consult  Consult performed by: Gabbi Stringer MD  Consult ordered by: Kelley Sesay PA-C        Reason for Consult/ Chief Complaint: Medical management    Consult Requested By: Kelley Sesay    Subjective:     History of Present Illness:   Per the documentation by Kelley Sesay, dated ,  \"63-year-old white female with a history of thyroid dysfunction, CAD. She presents today from her primary care provider's office with complaints of shortness of breath and chest pain. She states has been ongoing for the last 2 months or so. She has symptoms at rest but her symptoms are much worse with exertion. She denies any fever or cough. No leg pain or swelling. She denies any black or bloody stools. She does report some frequent belching. No abdominal pain nausea or vomiting. \"    Hospitalist team has been consulted for further medical management.      Review of Systems:   Review of Systems  Patient denies any complaints at this time.  Personal History:     Past Medical History:   Diagnosis Date    Allergic rhinitis     Anemia     Asthma     B12 deficiency     Bradycardia     Broken arm 2019    Left arm broken     Chronic back pain     Chronic bronchitis     Coronary artery disease     Diarrhea     Encounter for blood transfusion 4/3/2024    Hypothyroidism     MRSA (methicillin resistant Staphylococcus aureus)     Neuropathic pain of foot     Severe back pain 2010    Sleep apnea, obstructive        Past Surgical History:   Procedure Laterality Date    ABDOMINAL HERNIA REPAIR  2003    repair of abdominal wall hernia, intraabdominal hernia and hiatal hernia, 2004 incisional hernia repair.     ACHILLES " TENDON SURGERY  2011    rupture    ANKLE FUSION Right 11/06/2015    CARDIAC CATHETERIZATION  02/20/2017    CHOLECYSTECTOMY  1992    GASTRIC BYPASS  1986    LAMINECTOMY  2007    that was complicated by a staph infection     OTHER SURGICAL HISTORY Left 2019    L arm     REMOVAL EXTERNAL FIXATOR      WRIST FRACTURE SURGERY Right        Family History: family history includes COPD in her mother; Diabetes in her sister; Heart disease in her mother; Hypertension in her sister; Lung cancer in her father and mother; Other in her father and sister; Sleep apnea in her sister. Otherwise pertinent FHx was reviewed and not pertinent to current issue.    Social History:  reports that she has never smoked. She has never used smokeless tobacco. She reports that she does not drink alcohol and does not use drugs.    Home Medications:   Calcium, Pancrelipase (Lip-Prot-Amyl), Vitamin D (Cholecalciferol), albuterol sulfate HFA, aspirin, colestipol, dicyclomine, levothyroxine, metoprolol succinate XL, oxybutynin, venlafaxine, and venlafaxine XR    Allergies:  Allergies   Allergen Reactions    Hydrocodone-Acetaminophen Hives     Can tolerate with Benadryl    Oxycodone-Acetaminophen Hives     Can tolerate with Benadryl    Ketorolac Tromethamine Itching    Promethazine Itching    Tramadol Itching         Objective:     Vital Signs  Temp:  [97 °F (36.1 °C)-97.8 °F (36.6 °C)] 97.8 °F (36.6 °C)  Heart Rate:  [57-75] 57  Resp:  [11-17] 17  BP: (100-133)/(57-74) 124/69  Flow (L/min):  [2] 2   Body mass index is 40.54 kg/m².    Physical Exam  Physical Exam  General Appearance: AOO x 4, cooperative, no distress, appropriate for age  Head:  Normocephalic, without obvious abnormality  Eyes:  PERRL, EOM's intact, conjunctivae and cornea clear  Nose:  Nares symmetrical, septum midline, mucosa pink  Throat:  Lips, tongue, and mucosa are moist, pink, and intact  Neck:  Supple; symmetrical, trachea midline, no adenopathy  Back:  Symmetrical, ROM normal,  no CVA tenderness  Lungs: Respirations unlabored, no audible wheeze  Heart: Regular rate & rhythm, S1 and S2 normal  Abdomen:  Soft, nontender, bowel sounds active all four quadrants  Musculoskeletal: Tone and strength strong and symmetrical, all extremities; no joint pain or edema         Skin/Hair/Nails:  Skin warm, dry and intact, no rashes or abnormal dyspigmentation       Scheduled Meds   aspirin, 81 mg, Oral, Daily  dicyclomine, 20 mg, Oral, BID  levothyroxine, 250 mcg, Oral, Q AM  [Held by provider] metoprolol succinate XL, 25 mg, Oral, Daily  oxybutynin, 5 mg, Oral, TID  Pancrelipase (Lip-Prot-Amyl), 72,000 units of lipase, Oral, Daily With Breakfast & Lunch  sodium chloride, 10 mL, Intravenous, Q12H  venlafaxine XR, 225 mg, Oral, Nightly       PRN Meds     acetaminophen    aluminum-magnesium hydroxide-simethicone    ipratropium-albuterol    ondansetron ODT **OR** ondansetron    Pancrelipase (Lip-Prot-Amyl)    sodium chloride    [COMPLETED] Insert Peripheral IV **AND** sodium chloride    sodium chloride    sodium chloride   Infusions         Diagnostic Data    Results from last 7 days   Lab Units 04/05/24  0739   WBC 10*3/mm3 7.62   HEMOGLOBIN g/dL 10.0*   HEMATOCRIT % 31.4*   PLATELETS 10*3/mm3 262   GLUCOSE mg/dL 86   CREATININE mg/dL 1.48*   BUN mg/dL 19   SODIUM mmol/L 139   POTASSIUM mmol/L 4.5   AST (SGOT) U/L 32   ALT (SGPT) U/L 18   ALK PHOS U/L 98   BILIRUBIN mg/dL 0.2   ANION GAP mmol/L 9.0       US Renal Bilateral    Result Date: 4/5/2024  Impression: Unremarkable exam Electronically Signed: Michael Robb MD  4/5/2024 9:11 AM EDT  Workstation ID: [id number]    CT Angiogram Chest Pulmonary Embolism    Result Date: 4/3/2024  No pulmonary embolus. No acute cardiopulmonary disease. Electronically Signed: Baldemar Chapa MD  4/3/2024 6:05 PM EDT  Workstation ID: [id number]    XR Chest 1 View    Result Date: 4/3/2024  Impression: No evidence of acute cardiopulmonary disease. Resolution of questionable opacity  in the left upper lobe after repositioning of EKG lead. Electronically Signed: Martin Roca MD  4/3/2024 4:33 PM EDT  Workstation ID: [id number]       I reviewed the patient's new clinical results.    Assessment/Plan:     Active and Resolved Problems  Active Hospital Problems    Diagnosis  POA    **Shortness of breath [R06.02]  Yes      Resolved Hospital Problems   No resolved problems to display.     Chest pain/dyspnea   -Currently on 2 L nasal cannula  -EKG:rate 46, sinus ludwin, incomplete LBBB  - cardiology consulted  -Troponin flat  - echo pending  -Continue telemetry  Continue medical management with aspirin, metoprolol succinate as tolerated.         Hypothyroidism  - tsh 115, free T4 0.10  - pt on synthroid  - endocrinology consulted  - increased synthroid to 250  - recheck as outpt     ALL  -Creatinine trending down  -IV fluids stopped.  - us renal unremarkable  - nephrology on board    Chronic pancreatic insufficiency  - creon, colestid, bentyl    DVT prophylaxis:  Mechanical DVT prophylaxis orders are present.         Code status is   Code Status and Medical Interventions:   Ordered at: 04/03/24 1444     Code Status (Patient has no pulse and is not breathing):    CPR (Attempt to Resuscitate)     Medical Interventions (Patient has pulse or is breathing):    Full Support       Time: 30 minutes        Signature: Electronically signed by Gabbi Stringer MD, 04/05/24, 15:43 EDT.  [facility] Hospitalist Team
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
## 2024-04-05 NOTE — CASE MANAGEMENT/SOCIAL WORK
Continued Stay Note  [facility]     Patient Name: Cassie Winter  MRN: [id number]  Today's Date: 4/5/2024    Admit Date: 4/3/2024    Plan: Return home alone. Son in law to transport   Discharge Plan       Row Name 04/05/24 1405       Plan    Plan Comments Barriers: Abnormal labwork.. Endocrinology following. Echo.  IP hospitalist consult pending. At discharge plans to return home alone                      Diane AMBROSIO,RN Case Manager  [facility]  Phone: Desk- [phone] cell- [phone]

## 2024-04-05 NOTE — PLAN OF CARE
Goal Outcome Evaluation:     Problem: Adult Inpatient Plan of Care  Goal: Plan of Care Review  Outcome: Ongoing, Progressing  Goal: Patient-Specific Goal (Individualized)  Outcome: Ongoing, Progressing  Goal: Absence of Hospital-Acquired Illness or Injury  Outcome: Ongoing, Progressing  Intervention: Identify and Manage Fall Risk  Recent Flowsheet Documentation  Taken 4/5/2024 1800 by Joanna Coon RN  Safety Promotion/Fall Prevention:   safety round/check completed   room organization consistent   clutter free environment maintained   assistive device/personal items within reach  Taken 4/5/2024 1600 by Joanna Coon RN  Safety Promotion/Fall Prevention:   safety round/check completed   room organization consistent   clutter free environment maintained   assistive device/personal items within reach  Taken 4/5/2024 1400 by Joanna Coon RN  Safety Promotion/Fall Prevention:   safety round/check completed   room organization consistent   clutter free environment maintained   assistive device/personal items within reach  Taken 4/5/2024 1200 by Joanna Coon RN  Safety Promotion/Fall Prevention:   safety round/check completed   patient off unit   clutter free environment maintained   assistive device/personal items within reach  Taken 4/5/2024 1000 by Joanna Coon RN  Safety Promotion/Fall Prevention:   room organization consistent   safety round/check completed   clutter free environment maintained  Taken 4/5/2024 0820 by Joanna Coon RN  Safety Promotion/Fall Prevention:   safety round/check completed   room organization consistent   clutter free environment maintained   assistive device/personal items within reach  Intervention: Prevent Skin Injury  Recent Flowsheet Documentation  Taken 4/5/2024 0820 by Joanna Coon RN  Body Position: weight shifting  Skin Protection: adhesive use limited  Intervention: Prevent Infection  Recent Flowsheet Documentation  Taken 4/5/2024 1800 by Joanna Coon RN  Infection  Prevention:   hand hygiene promoted   personal protective equipment utilized   rest/sleep promoted  Taken 4/5/2024 1600 by Joanna Coon RN  Infection Prevention:   hand hygiene promoted   personal protective equipment utilized   rest/sleep promoted  Taken 4/5/2024 1400 by Joanna Coon RN  Infection Prevention:   hand hygiene promoted   personal protective equipment utilized   rest/sleep promoted  Taken 4/5/2024 1200 by Joanna Coon RN  Infection Prevention:   hand hygiene promoted   personal protective equipment utilized   rest/sleep promoted  Taken 4/5/2024 1000 by Joanna Coon RN  Infection Prevention:   hand hygiene promoted   personal protective equipment utilized   rest/sleep promoted  Taken 4/5/2024 0820 by Joanna Coon RN  Infection Prevention:   hand hygiene promoted   personal protective equipment utilized   rest/sleep promoted  Goal: Optimal Comfort and Wellbeing  Outcome: Ongoing, Progressing  Intervention: Provide Person-Centered Care  Recent Flowsheet Documentation  Taken 4/5/2024 0820 by Joanna Coon RN  Trust Relationship/Rapport:   care explained   questions answered   thoughts/feelings acknowledged  Goal: Readiness for Transition of Care  Outcome: Ongoing, Progressing     Problem: Asthma Comorbidity  Goal: Maintenance of Asthma Control  Outcome: Ongoing, Progressing  Intervention: Maintain Asthma Symptom Control  Recent Flowsheet Documentation  Taken 4/5/2024 0820 by Joanna Coon RN  Medication Review/Management: medications reviewed     Problem: Skin Injury Risk Increased  Goal: Skin Health and Integrity  Outcome: Ongoing, Progressing  Intervention: Optimize Skin Protection  Recent Flowsheet Documentation  Taken 4/5/2024 0820 by Joanna Coon RN  Pressure Reduction Techniques:   frequent weight shift encouraged   weight shift assistance provided  Pressure Reduction Devices: pressure-redistributing mattress utilized  Skin Protection: adhesive use limited     Problem: Breathing Pattern  Ineffective  Goal: Effective Breathing Pattern  Outcome: Ongoing, Progressing  Intervention: Promote Improved Breathing Pattern  Recent Flowsheet Documentation  Taken 4/5/2024 0820 by Joanna Coon RN  Supportive Measures: active listening utilized     Problem: Chest Pain  Goal: Resolution of Chest Pain Symptoms  Outcome: Ongoing, Progressing     Problem: Fall Injury Risk  Goal: Absence of Fall and Fall-Related Injury  Outcome: Ongoing, Progressing  Intervention: Identify and Manage Contributors  Recent Flowsheet Documentation  Taken 4/5/2024 0820 by Joanna Coon RN  Medication Review/Management: medications reviewed  Intervention: Promote Injury-Free Environment  Recent Flowsheet Documentation  Taken 4/5/2024 1800 by Joanna Coon RN  Safety Promotion/Fall Prevention:   safety round/check completed   room organization consistent   clutter free environment maintained   assistive device/personal items within reach  Taken 4/5/2024 1600 by Joanna Coon RN  Safety Promotion/Fall Prevention:   safety round/check completed   room organization consistent   clutter free environment maintained   assistive device/personal items within reach  Taken 4/5/2024 1400 by Joanna Coon RN  Safety Promotion/Fall Prevention:   safety round/check completed   room organization consistent   clutter free environment maintained   assistive device/personal items within reach  Taken 4/5/2024 1200 by Joanna Coon RN  Safety Promotion/Fall Prevention:   safety round/check completed   patient off unit   clutter free environment maintained   assistive device/personal items within reach  Taken 4/5/2024 1000 by Joanna Coon RN  Safety Promotion/Fall Prevention:   room organization consistent   safety round/check completed   clutter free environment maintained  Taken 4/5/2024 0820 by Joanna Coon RN  Safety Promotion/Fall Prevention:   safety round/check completed   room organization consistent   clutter free environment maintained   assistive  device/personal items within reach

## 2024-04-05 NOTE — PROGRESS NOTES
[facility] Progress Note      Patient Name: Cassie Winter  : 1960  MRN: [id number]  Primary Care Physician:  Colby Vines MD  Date of admission: 4/3/2024    Subjective     Interval History:   Less soa, no further chest pain, feels stronger    Review of Systems:   14 point review of systems is otherwise negative except for mentioned above on HPI    Objective     Vitals:   Temp:  [97 °F (36.1 °C)-97.5 °F (36.4 °C)] 97 °F (36.1 °C)  Heart Rate:  [59-75] 67  Resp:  [11-17] 11  BP: (100-133)/(57-74) 100/57  Flow (L/min):  [2] 2    Intake/Output Summary (Last 24 hours) at 2024 1232  Last data filed at 2024 0528  Gross per 24 hour   Intake 940 ml   Output 2300 ml   Net -1360 ml       Physical Exam:    General Appearance: alert, oriented x 3, no acute distress   Skin: warm and dry  HEENT: oral mucosa normal, nonicteric sclera  Neck: supple, no JVD  Lungs: CTA  Heart: RRR, normal S1 and S2  Abdomen: soft, nontender, nondistended  : no palpable bladder  Extremities: no edema, cyanosis or clubbing  Neuro: normal speech and mental status     Scheduled Meds:     aspirin, 81 mg, Oral, Daily  dicyclomine, 20 mg, Oral, BID  levothyroxine, 250 mcg, Oral, Q AM  [Held by provider] metoprolol succinate XL, 25 mg, Oral, Daily  oxybutynin, 5 mg, Oral, TID  Pancrelipase (Lip-Prot-Amyl), 72,000 units of lipase, Oral, Daily With Breakfast & Lunch  sodium chloride, 10 mL, Intravenous, Q12H  venlafaxine XR, 225 mg, Oral, Nightly      IV Meds:   sodium chloride, 75 mL/hr, Last Rate: 75 mL/hr (24 1652)  sodium chloride, 75 mL/hr, Last Rate: 75 mL/hr (24 0938)        Results Reviewed:   I have personally reviewed the results from the time of this admission to 2024 12:32 EDT     Results from last 7 days   Lab Units 24  0739 24  0554 24  0959   SODIUM mmol/L 139 139 141   POTASSIUM mmol/L 4.5 4.4 4.5   CHLORIDE mmol/L 109* 110* 110*   CO2 mmol/L 21.0* 20.0*  17.0*   BUN mg/dL 19 21 23   CREATININE mg/dL 1.48* 1.84* 1.72*   CALCIUM mg/dL 8.6 8.8 9.4   BILIRUBIN mg/dL 0.2  --  0.3   ALK PHOS U/L 98  --  112   ALT (SGPT) U/L 18  --  27   AST (SGOT) U/L 32  --  50*   GLUCOSE mg/dL 86 88 101*     Estimated Creatinine Clearance: 47 mL/min (A) (by C-G formula based on SCr of 1.48 mg/dL (H)).          Results from last 7 days   Lab Units 04/05/24  0739 04/04/24  0554 04/03/24  0958   WBC 10*3/mm3 7.62 8.68 8.94   HEMOGLOBIN g/dL 10.0* 10.9* 12.6   PLATELETS 10*3/mm3 262 260 327     Results from last 7 days   Lab Units 04/03/24  0958   INR  0.94       Assessment / Plan     ASSESSMENT:  Shawn-suspect due to contrast nephropathy, resolving nicely with normal renal us and no proteinuria  Pyuria, f/u urine culture  Hypovolemia-better    PLAN:  [place] ivf  Avoid nephrotoxins  Check lab in am  Dc is ok from a renal standpoint    Thank you for involving us in the care of Cassie Winter.  Please feel free to call with any questions.    Jairo Elmore MD  04/05/24  12:32 EDT    [facility]  [phone]

## 2024-04-05 NOTE — PROGRESS NOTES
ENDOCRINE CONSULT PROGRESS NOTE  DATE OF SERVICE: 24        PATIENT NAME: Cassie Winter  PATIENT : 1960 AGE: 63 y.o.  MRN NUMBER: [id number]    ==========================================================================    CHIEF COMPLAINT: Hypothyroidism    CARE TEAM:   Patient Care Team:  Colby Vines MD as PCP - General (Internal Medicine)  Bonnie Landa PT as Physical Therapist (Physical Therapy)  Pablito Casillas MD as Consulting Physician (Cardiology)    SUBJECTIVE  Patient seen and examined.  No other acute event overnight.  Tolerating medication.    ==========================================================================    CURRENT ACTIVE HOSPITAL MEDICATIONS    Scheduled Medications:  aspirin, 81 mg, Oral, Daily  dicyclomine, 20 mg, Oral, BID  levothyroxine, 250 mcg, Oral, Q AM  [Held by provider] metoprolol succinate XL, 25 mg, Oral, Daily  oxybutynin, 5 mg, Oral, TID  Pancrelipase (Lip-Prot-Amyl), 72,000 units of lipase, Oral, Daily With Breakfast & Lunch  sodium chloride, 10 mL, Intravenous, Q12H  venlafaxine XR, 225 mg, Oral, Nightly         PRN Medications:    acetaminophen    aluminum-magnesium hydroxide-simethicone    ipratropium-albuterol    ondansetron ODT **OR** ondansetron    Pancrelipase (Lip-Prot-Amyl)    sodium chloride    [COMPLETED] Insert Peripheral IV **AND** sodium chloride    sodium chloride    sodium chloride     ==========================================================================    OBJECTIVE    Vitals:    24 1415   BP: 124/69   Pulse: 57   Resp: 17   Temp: 97.8 °F (36.6 °C)   SpO2: 100%      Body mass index is 40.54 kg/m².     General - A&Ox3, NAD, Calm    ==========================================================================    LAB EVALUATION    Lab Results   Component Value Date    GLUCOSE 86 2024    BUN 19 2024    CREATININE 1.48 (H) 2024    EGFRIFNONA 52 (L) 2021    BCR 12.8 2024    K 4.5 2024  "   CO2 21.0 (L) 04/05/2024    CALCIUM 8.6 04/05/2024    ALBUMIN 3.2 (L) 04/05/2024    LABIL2 1.3 02/11/2019    AST 32 04/05/2024    ALT 18 04/05/2024       No results found for: \"HGBA1C\"  Lab Results   Component Value Date    CREATININE 1.48 (H) 04/05/2024            Latest Reference Range & Units 04/03/24 12:12 04/04/24 05:54 04/05/24 07:39   TSH Baseline 0.270 - 4.200 uIU/mL 115.200 (H) 106.200 (H) 97.100 (H)   Free T4 0.93 - 1.70 ng/dL <0.10 (L)  0.38 (L)   (H): Data is abnormally high  (L): Data is abnormally low    ==========================================================================    ASSESSMENT AND PLAN    # Hypothyroidism  - Noncompliance to medication unintentional  - Continue levothyroxine to 50 mcg every day (250 mcg, addendum)  - Free T4 improving  - TSH dropped from 106 to 97  - If patient is planned for discharge can be discharged on levothyroxine 200 mcg p.o. daily and outpatient endocrinology next    # Shortness of breath  # Acute kidney injury  - Care as per primary team    Will follow with you.  Rest as per primary team.    This note was created using voice recognition software and is inherently subject to errors including those of syntax and \"sound-alike\" substitutions which may escape proofreading.  In such instances, original meaning may be extrapolated by contextual derivation.    Note: Portions of this note may have been copied from previous notes but documentation have been reviewed and edited as necessary to support clinical decision making for today's visit.  The time of this note does not reflect the time I saw the patient but the time that this note was written.    ==========================================================================  Cuco Sanchez MD  Department of Endocrine, Diabetes and Metabolism  [facility], [place]  ==========================================================================    "

## 2024-04-05 NOTE — PLAN OF CARE
Goal Outcome Evaluation:      Pt continues to rest comfortably without any c/o of chest pain. Will continue to monitor.

## 2024-04-05 NOTE — PROGRESS NOTES
[facility]    Patient Name: Cassie Winter  : 1960  MRN: [id number]  Primary Care Physician: Colby Vines MD  Date of admission: 4/3/2024     Patient Care Team:  Colby Vines MD as PCP - General (Internal Medicine)  Bonnie Landa PT as Physical Therapist (Physical Therapy)  Pablito Casillas MD as Consulting Physician (Cardiology)          Subjective   History Present Illness     Chief Complaint:   Chief Complaint   Patient presents with    Chest Pain     Chest pain for one week. soA, nausea,   Pt had fall and hit head on Monday.  Pt was at PMD and sent in for evaluation.         Chest pain    History of Present Illness  ED  63-year-old white female with a history of thyroid dysfunction, CAD. She presents today from her primary care provider's office with complaints of shortness of breath and chest pain. She states has been ongoing for the last 2 months or so. She has symptoms at rest but her symptoms are much worse with exertion. She denies any fever or cough. No leg pain or swelling. She denies any black or bloody stools. She does report some frequent belching. No abdominal pain nausea or vomiting.      Observation 4/3/24  Cardiology consulted. Endocrinology consulted.  Ct chest pending     Observation 24  Pt looks much better today HR in the 60s. Pt denies cp and dyspnea today. Endocrinology consulted yesterday. Increased synthroid dosage and wishes to monitor and recheck in 48 hrs.     Observation 24  Nephrology consulted due to gillian. Iv fluids. Renal us normal.    ROS    Cardiovascular:  Positive for chest pain and palpitations.   Respiratory:  Positive for shortness of breath.           Personal History     Past Medical History:   Past Medical History:   Diagnosis Date    Allergic rhinitis     Anemia     Asthma     B12 deficiency     Bradycardia     Broken arm 2019    Left arm broken     Chronic back pain     Chronic bronchitis     Coronary artery disease      Diarrhea     Encounter for blood transfusion 4/3/2024    Hypothyroidism     MRSA (methicillin resistant Staphylococcus aureus)     Neuropathic pain of foot     Severe back pain 2010    Sleep apnea, obstructive        Surgical History:      Past Surgical History:   Procedure Laterality Date    ABDOMINAL HERNIA REPAIR  2003    repair of abdominal wall hernia, intraabdominal hernia and hiatal hernia, 2004 incisional hernia repair.     ACHILLES TENDON SURGERY  2011    rupture    ANKLE FUSION Right 11/06/2015    CARDIAC CATHETERIZATION  02/20/2017    CHOLECYSTECTOMY  1992    GASTRIC BYPASS  1986    LAMINECTOMY  2007    that was complicated by a staph infection     OTHER SURGICAL HISTORY Left 2019    L arm     REMOVAL EXTERNAL FIXATOR      WRIST FRACTURE SURGERY Right            Family History: family history includes COPD in her mother; Diabetes in her sister; Heart disease in her mother; Hypertension in her sister; Lung cancer in her father and mother; Other in her father and sister; Sleep apnea in her sister. Otherwise pertinent FHx was reviewed and unremarkable.     Social History:  reports that she has never smoked. She has never used smokeless tobacco. She reports that she does not drink alcohol and does not use drugs.      Medications:  Prior to Admission medications    Medication Sig Start Date End Date Taking? Authorizing Provider   aspirin 81 MG EC tablet Take 1 tablet by mouth Daily. 5/20/19  Yes Leighann Solitario MD   Calcium 200 MG tablet Take 1 tablet by mouth Daily.   Yes Leighann Solitario MD   colestipol (COLESTID) 1 g tablet Take 1 tablet by mouth 2 (Two) Times a Day. 9/5/23  Yes Leighann Solitario MD   dicyclomine (BENTYL) 20 MG tablet Every 12 (Twelve) Hours. 11/28/16  Yes Leighann Solitario MD   levothyroxine (Synthroid) 200 MCG tablet Take 1 tablet by mouth Daily. 8/22/23  Yes Cristopher Garcia Jr., MD   metoprolol succinate XL (TOPROL-XL) 25 MG 24 hr tablet Take 1 tablet by mouth Daily.    Yes Leighann Solitario MD   Pancrelipase, Lip-Prot-Amyl, (Creon) 50520-109707 units capsule delayed-release particles capsule Take 1 capsule by mouth With Snacks for Snacks.   Yes Leighann Solitario MD   Pancrelipase, Lip-Prot-Amyl, (CREON) 74759-537220 units capsule delayed-release particles capsule Take 2 capsules by mouth Daily With Breakfast & Lunch.   Yes Leighann Solitario MD   venlafaxine 75 MG tablet sustained-release 24 hour 24 hr tablet Take 1 tablet by mouth Every Night. In addition to 150mg for a total of 225mg at bedtime   Yes Leighann Solitario MD   venlafaxine XR (EFFEXOR-XR) 150 MG 24 hr capsule Take 1 capsule by mouth Every Night. In addition to 75mg for a total of 225mg at bedtime   Yes Leighann Solitario MD   albuterol sulfate  (90 Base) MCG/ACT inhaler Inhale 2 puffs Every 4 (Four) Hours As Needed for Wheezing. 12/30/22   Cristopher Garcia Jr., MD   oxybutynin (DITROPAN) 5 MG tablet Take 1 tablet by mouth 3 (Three) Times a Day. 6/20/23   Cristopher Garcia Jr., MD   Vitamin D, Cholecalciferol, (CHOLECALCIFEROL) 10 MCG (400 UNIT) tablet Take 1 tablet by mouth Daily.    ProviderLeighann MD       Allergies:    Allergies   Allergen Reactions    Hydrocodone-Acetaminophen Hives     Can tolerate with Benadryl    Oxycodone-Acetaminophen Hives     Can tolerate with Benadryl    Ketorolac Tromethamine Itching    Promethazine Itching    Tramadol Itching       Objective   Objective     Vital Signs  Temp:  [97 °F (36.1 °C)-97.5 °F (36.4 °C)] 97 °F (36.1 °C)  Heart Rate:  [59-75] 67  Resp:  [11-17] 11  BP: (100-133)/(57-74) 100/57  SpO2:  [98 %-100 %] 98 %  on  Flow (L/min):  [2] 2;   Device (Oxygen Therapy): nasal cannula  Body mass index is 40.54 kg/m².    Physical Exam  Constitutional:       Appearance: Normal appearance.   HENT:      Mouth/Throat:      Mouth: Mucous membranes are moist.   Cardiovascular:      Rate and Rhythm: Regular rhythm. Bradycardia present.      Pulses:  Normal pulses.      Heart sounds: Normal heart sounds.   Pulmonary:      Effort: Pulmonary effort is normal.      Breath sounds: Normal breath sounds.   Skin:     General: Skin is warm and dry.   Neurological:      General: No focal deficit present.      Mental Status: She is alert and oriented to person, place, and time.   Psychiatric:         Mood and Affect: Mood normal.         Behavior: Behavior normal.          Results Review:  I have personally reviewed most recent cardiac tracings, lab results, and radiology images and interpretations and agree with findings, most notably: cbc, cmp, bnp, trop, tsh, inr, chest xray, EKG .    Results from last 7 days   Lab Units 04/05/24  0739 04/04/24  0554 04/03/24  0958   WBC 10*3/mm3 7.62   < > 8.94   HEMOGLOBIN g/dL 10.0*   < > 12.6   HEMATOCRIT % 31.4*   < > 38.3   PLATELETS 10*3/mm3 262   < > 327   INR   --   --  0.94    < > = values in this interval not displayed.     Results from last 7 days   Lab Units 04/05/24  0739 04/04/24  0554 04/03/24  1212 04/03/24  0959   SODIUM mmol/L 139 139  --  141   POTASSIUM mmol/L 4.5 4.4  --  4.5   CHLORIDE mmol/L 109* 110*  --  110*   CO2 mmol/L 21.0* 20.0*  --  17.0*   BUN mg/dL 19 21  --  23   CREATININE mg/dL 1.48* 1.84*  --  1.72*   GLUCOSE mg/dL 86 88  --  101*   CALCIUM mg/dL 8.6 8.8  --  9.4   ALK PHOS U/L 98  --   --  112   ALT (SGPT) U/L 18  --   --  27   AST (SGOT) U/L 32  --   --  50*   HSTROP T ng/L  --  40* 41* 46*   PROBNP pg/mL  --   --   --  248.5     Estimated Creatinine Clearance: 47 mL/min (A) (by C-G formula based on SCr of 1.48 mg/dL (H)).  Brief Urine Lab Results  (Last result in the past 365 days)        Color   Clarity   Blood   Leuk Est   Nitrite   Protein   CREAT   Urine HCG        04/05/24 0940 Yellow   Clear   Negative   Negative   Negative   Negative                   Microbiology Results (last 10 days)       ** No results found for the last 240 hours. **            ECG/EMG Results (most recent)        Procedure Component Value Units Date/Time    ECG 12 Lead Chest Pain [[id number]] Collected: 04/03/24 0947     Updated: 04/04/24 0656     QT Interval 453 ms      QTC Interval 397 ms     Narrative:      HEART RATE= 46  bpm  RR Interval= 1300  ms  KY Interval= 38  ms  P Horizontal Axis= 50  deg  P Front Axis= 0  deg  QRSD Interval= 111  ms  QT Interval= 453  ms  QTcB= 397  ms  QRS Axis= -57  deg  T Wave Axis= 238  deg  - ABNORMAL ECG -  Sinus bradycardia  Incomplete left bundle branch block  LVH with secondary repolarization abnormality  Anterior Q waves, possibly due to LVH  LAHB  Electronically Signed By: Jorge Prescott ([facility]) 04-Apr-2024 06:56:04  Date and Time of Study: 2024-04-03 09:47:32    Telemetry Scan [[id number]] Resulted: 04/03/24     Updated: 04/05/24 0807    Telemetry Scan [[id number]] Resulted: 04/03/24     Updated: 04/05/24 1120    Telemetry Scan [[id number]] Resulted: 04/03/24     Updated: 04/05/24 1120                    US Renal Bilateral    Result Date: 4/5/2024  Impression: Unremarkable exam Electronically Signed: Michael Robb MD  4/5/2024 9:11 AM EDT  Workstation ID: [id number]    CT Angiogram Chest Pulmonary Embolism    Result Date: 4/3/2024  No pulmonary embolus. No acute cardiopulmonary disease. Electronically Signed: Baldemar Chapa MD  4/3/2024 6:05 PM EDT  Workstation ID: [id number]    XR Chest 1 View    Result Date: 4/3/2024  Impression: No evidence of acute cardiopulmonary disease. Resolution of questionable opacity in the left upper lobe after repositioning of EKG lead. Electronically Signed: Martin Roca MD  4/3/2024 4:33 PM EDT  Workstation ID: [id number]    CT Head Without Contrast    Result Date: 4/3/2024  Impression: No acute intracranial abnormality. Electronically Signed: Mansi Sequeira MD  4/3/2024 12:00 PM EDT  Workstation ID: [id number]    XR Chest 1 View    Result Date: 4/3/2024  Impression: 1.Questionable nodular shadow left upper lobe versus summation of shadows. Follow-up recommended to  reassess Electronically Signed: Rashard Hendricks MD  4/3/2024 11:30 AM EDT  Workstation ID: [id number]       Estimated Creatinine Clearance: 47 mL/min (A) (by C-G formula based on SCr of 1.48 mg/dL (H)).    Assessment & Plan   Assessment/Plan       Active Hospital Problems    Diagnosis  POA    **Shortness of breath [R06.02]  Yes      Resolved Hospital Problems   No resolved problems to display.     Chest pain/dyspnea            Lab Results   Component Value Date     TROPONINT 41 (H) 04/03/2024     TROPONINT 46 (H) 04/03/2024      -02 placed at 2 lpm nc  - cbc, inr, bnp unremarkable  -Chest X-ray: reviewed and showing no acute cardiopulmonary process  -EKG:rate 46, sinus ludwin, incomplete LBBB  -ct chest reviewed and showed no pe or cardiopulmonary process  - cardiology consulted  - echo penidng  -Telemetry        Hypothyroidism  - tsh 115  - pt on synthroid  - endocrinology consulted  - increased synthroid to 250  - recheck as outpt     Chronic pancreatic insufficiency  - creon, colestid, bentyl    ALL  - iv fluids  - us renal unremarkable  - nephrology consulted    VTE Prophylaxis -   Mechanical Order History:        Ordered        04/03/24 1451  Place Sequential Compression Device  Once            04/03/24 1451  Maintain Sequential Compression Device  Continuous                          Pharmalogical Order History:       None            CODE STATUS:    Code Status and Medical Interventions:   Ordered at: 04/03/24 1444     Code Status (Patient has no pulse and is not breathing):    CPR (Attempt to Resuscitate)     Medical Interventions (Patient has pulse or is breathing):    Full Support       This patient has been examined wearing personal protective equipment.     I discussed the patient's findings and my recommendations with patient and nursing staff.      Signature:Electronically signed by Kelley Sesay PA-C, 04/05/24, 11:39 AM EDT.

## 2024-04-06 ENCOUNTER — APPOINTMENT (OUTPATIENT)
Dept: NUCLEAR MEDICINE | Facility: HOSPITAL | Age: 64
End: 2024-04-06
Payer: MEDICARE

## 2024-04-06 LAB
ANION GAP SERPL CALCULATED.3IONS-SCNC: 9 MMOL/L (ref 5–15)
BASOPHILS # BLD AUTO: 0.07 10*3/MM3 (ref 0–0.2)
BASOPHILS NFR BLD AUTO: 1.1 % (ref 0–1.5)
BH CV ECHO MEAS - AI P1/2T: 622.3 MSEC
BH CV ECHO MEAS - AO MAX PG: 10.8 MMHG
BH CV ECHO MEAS - AO MEAN PG: 6 MMHG
BH CV ECHO MEAS - AO V2 MAX: 164 CM/SEC
BH CV ECHO MEAS - AO V2 VTI: 32 CM
BH CV ECHO MEAS - AVA(I,D): 2.07 CM2
BH CV ECHO MEAS - EDV(CUBED): 91.1 ML
BH CV ECHO MEAS - EDV(MOD-SP2): 104 ML
BH CV ECHO MEAS - EDV(MOD-SP4): 140 ML
BH CV ECHO MEAS - EF(MOD-BP): 34.5 %
BH CV ECHO MEAS - EF(MOD-SP2): 35.6 %
BH CV ECHO MEAS - EF(MOD-SP4): 35.9 %
BH CV ECHO MEAS - ESV(CUBED): 39.3 ML
BH CV ECHO MEAS - ESV(MOD-SP2): 67 ML
BH CV ECHO MEAS - ESV(MOD-SP4): 89.7 ML
BH CV ECHO MEAS - FS: 24.4 %
BH CV ECHO MEAS - IVS/LVPW: 0.78 CM
BH CV ECHO MEAS - IVSD: 0.7 CM
BH CV ECHO MEAS - LA DIMENSION: 3.4 CM
BH CV ECHO MEAS - LAT PEAK E' VEL: 8.8 CM/SEC
BH CV ECHO MEAS - LV DIASTOLIC VOL/BSA (35-75): 65.2 CM2
BH CV ECHO MEAS - LV MASS(C)D: 113.6 GRAMS
BH CV ECHO MEAS - LV MAX PG: 4.1 MMHG
BH CV ECHO MEAS - LV MEAN PG: 2 MMHG
BH CV ECHO MEAS - LV SYSTOLIC VOL/BSA (12-30): 41.7 CM2
BH CV ECHO MEAS - LV V1 MAX: 101 CM/SEC
BH CV ECHO MEAS - LV V1 VTI: 21.1 CM
BH CV ECHO MEAS - LVIDD: 4.5 CM
BH CV ECHO MEAS - LVIDS: 3.4 CM
BH CV ECHO MEAS - LVOT AREA: 3.1 CM2
BH CV ECHO MEAS - LVOT DIAM: 2 CM
BH CV ECHO MEAS - LVPWD: 0.9 CM
BH CV ECHO MEAS - MED PEAK E' VEL: 7 CM/SEC
BH CV ECHO MEAS - MV A MAX VEL: 68.1 CM/SEC
BH CV ECHO MEAS - MV DEC SLOPE: 182 CM/SEC2
BH CV ECHO MEAS - MV DEC TIME: 0.16 SEC
BH CV ECHO MEAS - MV E MAX VEL: 52.9 CM/SEC
BH CV ECHO MEAS - MV E/A: 0.78
BH CV ECHO MEAS - MV MAX PG: 1.75 MMHG
BH CV ECHO MEAS - MV MEAN PG: 1 MMHG
BH CV ECHO MEAS - MV P1/2T: 100.7 MSEC
BH CV ECHO MEAS - MV V2 VTI: 23.1 CM
BH CV ECHO MEAS - MVA(P1/2T): 2.18 CM2
BH CV ECHO MEAS - MVA(VTI): 2.9 CM2
BH CV ECHO MEAS - PA V2 MAX: 125 CM/SEC
BH CV ECHO MEAS - PULM A REVS DUR: 0.11 SEC
BH CV ECHO MEAS - PULM A REVS VEL: 40.3 CM/SEC
BH CV ECHO MEAS - PULM DIAS VEL: 31.7 CM/SEC
BH CV ECHO MEAS - PULM S/D: 1.73
BH CV ECHO MEAS - PULM SYS VEL: 54.9 CM/SEC
BH CV ECHO MEAS - RAP SYSTOLE: 8 MMHG
BH CV ECHO MEAS - RV MAX PG: 0.77 MMHG
BH CV ECHO MEAS - RV V1 MAX: 44 CM/SEC
BH CV ECHO MEAS - RV V1 VTI: 9.1 CM
BH CV ECHO MEAS - RVDD: 3.1 CM
BH CV ECHO MEAS - RVSP: 25.3 MMHG
BH CV ECHO MEAS - SV(LVOT): 66.3 ML
BH CV ECHO MEAS - SV(MOD-SP2): 37 ML
BH CV ECHO MEAS - SV(MOD-SP4): 50.3 ML
BH CV ECHO MEAS - SVI(MOD-SP2): 17.2 ML/M2
BH CV ECHO MEAS - SVI(MOD-SP4): 23.4 ML/M2
BH CV ECHO MEAS - TAPSE (>1.6): 2.11 CM
BH CV ECHO MEAS - TR MAX PG: 17.3 MMHG
BH CV ECHO MEAS - TR MAX VEL: 208 CM/SEC
BH CV ECHO MEASUREMENTS AVERAGE E/E' RATIO: 6.7
BH CV REST NUCLEAR ISOTOPE DOSE: 9 MCI
BH CV STRESS BP STAGE 1: NORMAL
BH CV STRESS BP STAGE 2: NORMAL
BH CV STRESS COMMENTS STAGE 1: NORMAL
BH CV STRESS COMMENTS STAGE 2: NORMAL
BH CV STRESS DOSE REGADENOSON STAGE 1: 0.4
BH CV STRESS DURATION MIN STAGE 1: 0
BH CV STRESS DURATION MIN STAGE 2: 4
BH CV STRESS DURATION SEC STAGE 1: 10
BH CV STRESS DURATION SEC STAGE 2: 0
BH CV STRESS HR STAGE 1: 79
BH CV STRESS HR STAGE 2: 73
BH CV STRESS NUCLEAR ISOTOPE DOSE: 30 MCI
BH CV STRESS PROTOCOL 1: NORMAL
BH CV STRESS RECOVERY BP: NORMAL MMHG
BH CV STRESS RECOVERY HR: 72 BPM
BH CV STRESS STAGE 1: 1
BH CV STRESS STAGE 2: 2
BH CV XLRA - TDI S': 9.3 CM/SEC
BUN SERPL-MCNC: 17 MG/DL (ref 8–23)
BUN/CREAT SERPL: 11.4 (ref 7–25)
CALCIUM SPEC-SCNC: 8.9 MG/DL (ref 8.6–10.5)
CHLORIDE SERPL-SCNC: 109 MMOL/L (ref 98–107)
CO2 SERPL-SCNC: 22 MMOL/L (ref 22–29)
CREAT SERPL-MCNC: 1.49 MG/DL (ref 0.57–1)
DEPRECATED RDW RBC AUTO: 54.2 FL (ref 37–54)
EGFRCR SERPLBLD CKD-EPI 2021: 38.8 ML/MIN/1.73
EOSINOPHIL # BLD AUTO: 0.29 10*3/MM3 (ref 0–0.4)
EOSINOPHIL NFR BLD AUTO: 4.7 % (ref 0.3–6.2)
ERYTHROCYTE [DISTWIDTH] IN BLOOD BY AUTOMATED COUNT: 15.6 % (ref 12.3–15.4)
GLUCOSE SERPL-MCNC: 101 MG/DL (ref 65–99)
HCT VFR BLD AUTO: 32.4 % (ref 34–46.6)
HGB BLD-MCNC: 10.9 G/DL (ref 12–15.9)
IMM GRANULOCYTES # BLD AUTO: 0.04 10*3/MM3 (ref 0–0.05)
IMM GRANULOCYTES NFR BLD AUTO: 0.6 % (ref 0–0.5)
LEFT ATRIUM VOLUME INDEX: 20.6 ML/M2
LV EF NUC BP: 42 %
LYMPHOCYTES # BLD AUTO: 2.22 10*3/MM3 (ref 0.7–3.1)
LYMPHOCYTES NFR BLD AUTO: 35.9 % (ref 19.6–45.3)
MAXIMAL PREDICTED HEART RATE: 155 BPM
MCH RBC QN AUTO: 32.2 PG (ref 26.6–33)
MCHC RBC AUTO-ENTMCNC: 33.6 G/DL (ref 31.5–35.7)
MCV RBC AUTO: 95.6 FL (ref 79–97)
MONOCYTES # BLD AUTO: 0.48 10*3/MM3 (ref 0.1–0.9)
MONOCYTES NFR BLD AUTO: 7.8 % (ref 5–12)
NEUTROPHILS NFR BLD AUTO: 3.09 10*3/MM3 (ref 1.7–7)
NEUTROPHILS NFR BLD AUTO: 49.9 % (ref 42.7–76)
NRBC BLD AUTO-RTO: 0 /100 WBC (ref 0–0.2)
PERCENT MAX PREDICTED HR: 50.97 %
PLATELET # BLD AUTO: 271 10*3/MM3 (ref 140–450)
PMV BLD AUTO: 10.1 FL (ref 6–12)
POTASSIUM SERPL-SCNC: 4.6 MMOL/L (ref 3.5–5.2)
RBC # BLD AUTO: 3.39 10*6/MM3 (ref 3.77–5.28)
SINUS: 2.6 CM
SODIUM SERPL-SCNC: 140 MMOL/L (ref 136–145)
STRESS BASELINE BP: NORMAL MMHG
STRESS BASELINE HR: 70 BPM
STRESS PERCENT HR: 60 %
STRESS POST PEAK BP: NORMAL MMHG
STRESS POST PEAK HR: 79 BPM
STRESS TARGET HR: 132 BPM
WBC NRBC COR # BLD AUTO: 6.19 10*3/MM3 (ref 3.4–10.8)

## 2024-04-06 PROCEDURE — 25010000002 REGADENOSON 0.4 MG/5ML SOLUTION: Performed by: INTERNAL MEDICINE

## 2024-04-06 PROCEDURE — 93017 CV STRESS TEST TRACING ONLY: CPT

## 2024-04-06 PROCEDURE — 93016 CV STRESS TEST SUPVJ ONLY: CPT | Performed by: NURSE PRACTITIONER

## 2024-04-06 PROCEDURE — 99232 SBSQ HOSP IP/OBS MODERATE 35: CPT | Performed by: INTERNAL MEDICINE

## 2024-04-06 PROCEDURE — 78452 HT MUSCLE IMAGE SPECT MULT: CPT | Performed by: INTERNAL MEDICINE

## 2024-04-06 PROCEDURE — 78452 HT MUSCLE IMAGE SPECT MULT: CPT

## 2024-04-06 PROCEDURE — 99232 SBSQ HOSP IP/OBS MODERATE 35: CPT | Performed by: STUDENT IN AN ORGANIZED HEALTH CARE EDUCATION/TRAINING PROGRAM

## 2024-04-06 PROCEDURE — 0 TECHNETIUM TETROFOSMIN KIT: Performed by: INTERNAL MEDICINE

## 2024-04-06 PROCEDURE — 85025 COMPLETE CBC W/AUTO DIFF WBC: CPT | Performed by: PHYSICIAN ASSISTANT

## 2024-04-06 PROCEDURE — 93018 CV STRESS TEST I&R ONLY: CPT | Performed by: INTERNAL MEDICINE

## 2024-04-06 PROCEDURE — 97161 PT EVAL LOW COMPLEX 20 MIN: CPT

## 2024-04-06 PROCEDURE — 80048 BASIC METABOLIC PNL TOTAL CA: CPT | Performed by: PHYSICIAN ASSISTANT

## 2024-04-06 PROCEDURE — A9502 TC99M TETROFOSMIN: HCPCS | Performed by: INTERNAL MEDICINE

## 2024-04-06 PROCEDURE — 36415 COLL VENOUS BLD VENIPUNCTURE: CPT | Performed by: PHYSICIAN ASSISTANT

## 2024-04-06 RX ORDER — REGADENOSON 0.08 MG/ML
0.4 INJECTION, SOLUTION INTRAVENOUS
Status: COMPLETED | OUTPATIENT
Start: 2024-04-06 | End: 2024-04-06

## 2024-04-06 RX ORDER — LEVOTHYROXINE SODIUM 0.1 MG/1
200 TABLET ORAL
Status: DISCONTINUED | OUTPATIENT
Start: 2024-04-07 | End: 2024-04-10 | Stop reason: HOSPADM

## 2024-04-06 RX ORDER — SIMETHICONE 80 MG
80 TABLET,CHEWABLE ORAL 4 TIMES DAILY PRN
Status: DISCONTINUED | OUTPATIENT
Start: 2024-04-06 | End: 2024-04-10 | Stop reason: HOSPADM

## 2024-04-06 RX ORDER — CALCIUM CARBONATE 500 MG/1
2 TABLET, CHEWABLE ORAL 3 TIMES DAILY PRN
Status: DISCONTINUED | OUTPATIENT
Start: 2024-04-06 | End: 2024-04-10 | Stop reason: HOSPADM

## 2024-04-06 RX ADMIN — ACETAMINOPHEN 650 MG: 325 TABLET, FILM COATED ORAL at 22:04

## 2024-04-06 RX ADMIN — TETROFOSMIN 1 DOSE: 1.38 INJECTION, POWDER, LYOPHILIZED, FOR SOLUTION INTRAVENOUS at 12:49

## 2024-04-06 RX ADMIN — PANCRELIPASE 36000 UNITS OF LIPASE: 36000; 180000; 114000 CAPSULE, DELAYED RELEASE PELLETS ORAL at 14:41

## 2024-04-06 RX ADMIN — OXYBUTYNIN CHLORIDE 5 MG: 5 TABLET ORAL at 16:06

## 2024-04-06 RX ADMIN — PANCRELIPASE 72000 UNITS OF LIPASE: 36000; 180000; 114000 CAPSULE, DELAYED RELEASE PELLETS ORAL at 11:38

## 2024-04-06 RX ADMIN — SIMETHICONE 80 MG: 80 TABLET, CHEWABLE ORAL at 14:01

## 2024-04-06 RX ADMIN — OXYBUTYNIN CHLORIDE 5 MG: 5 TABLET ORAL at 21:57

## 2024-04-06 RX ADMIN — LEVOTHYROXINE SODIUM 250 MCG: 0.12 TABLET ORAL at 05:37

## 2024-04-06 RX ADMIN — TETROFOSMIN 1 DOSE: 1.38 INJECTION, POWDER, LYOPHILIZED, FOR SOLUTION INTRAVENOUS at 10:56

## 2024-04-06 RX ADMIN — DICYCLOMINE HYDROCHLORIDE 20 MG: 10 CAPSULE ORAL at 11:38

## 2024-04-06 RX ADMIN — Medication 10 ML: at 21:57

## 2024-04-06 RX ADMIN — Medication 10 ML: at 11:38

## 2024-04-06 RX ADMIN — ASPIRIN 81 MG: 81 TABLET, COATED ORAL at 11:38

## 2024-04-06 RX ADMIN — VENLAFAXINE HYDROCHLORIDE 225 MG: 75 CAPSULE, EXTENDED RELEASE ORAL at 21:56

## 2024-04-06 RX ADMIN — REGADENOSON 0.4 MG: 0.08 INJECTION, SOLUTION INTRAVENOUS at 12:48

## 2024-04-06 RX ADMIN — OXYBUTYNIN CHLORIDE 5 MG: 5 TABLET ORAL at 11:38

## 2024-04-06 RX ADMIN — DICYCLOMINE HYDROCHLORIDE 20 MG: 10 CAPSULE ORAL at 21:56

## 2024-04-06 NOTE — PROGRESS NOTES
"[facility] MEDICINE SERVICE  DAILY PROGRESS NOTE    NAME: Cassie Winter  : 1960  MRN: [id number]      LOS: 1 day     PROVIDER OF SERVICE: Gordy Blue MD    Chief Complaint: Shortness of breath    Subjective:     Interval History:  History taken from: patient    History Present Illness       Per the documentation by Kelley Sesay, dated ,  \"63-year-old white female with a history of thyroid dysfunction, CAD. She presents today from her primary care provider's office with complaints of shortness of breath and chest pain. She states has been ongoing for the last 2 months or so. She has symptoms at rest but her symptoms are much worse with exertion. She denies any fever or cough. No leg pain or swelling. She denies any black or bloody stools. She does report some frequent belching. No abdominal pain nausea or vomiting. \"     24-Hospitalist team has been consulted for further medical management.       24 seen and examined in bed no acute distress, vital signs stable, discussed with RN.  Denies any chest pain or shortness of breath feeling feeling better    Review of Systems  Patient denies any complaints at this time.  Objective:     Vital Signs  Temp:  [97.8 °F (36.6 °C)-98 °F (36.7 °C)] 97.8 °F (36.6 °C)  Heart Rate:  [67-84] 76  Resp:  [16-18] 16  BP: (120-129)/(48-72) 121/69  Flow (L/min):  [2] 2   Body mass index is 40.54 kg/m².      Physical Exam  general Appearance: AOO x 4, cooperative, no distress, appropriate for age  Head:  Normocephalic, without obvious abnormality  Eyes:  PERRL, EOM's intact, conjunctivae and cornea clear  Nose:  Nares symmetrical, septum midline, mucosa pink  Throat:  Lips, tongue, and mucosa are moist, pink, and intact  Neck:  Supple; symmetrical, trachea midline, no adenopathy  Back:  Symmetrical, ROM normal, no CVA tenderness  Lungs: Respirations unlabored, no audible wheeze  Heart: Regular rate & rhythm, S1 and S2 normal  Abdomen:  Soft, " nontender, bowel sounds active all four quadrants  Musculoskeletal: Tone and strength strong and symmetrical, all extremities; no joint pain or edema         Skin/Hair/Nails:  Skin warm, dry and intact, no rashes or abnormal dyspigmentation        Scheduled Meds   aspirin, 81 mg, Oral, Daily  dicyclomine, 20 mg, Oral, BID  [START ON 4/7/2024] levothyroxine, 200 mcg, Oral, Q AM  [Held by provider] metoprolol succinate XL, 25 mg, Oral, Daily  oxybutynin, 5 mg, Oral, TID  Pancrelipase (Lip-Prot-Amyl), 72,000 units of lipase, Oral, Daily With Breakfast & Lunch  sodium chloride, 10 mL, Intravenous, Q12H  venlafaxine XR, 225 mg, Oral, Nightly       PRN Meds     acetaminophen    aluminum-magnesium hydroxide-simethicone    calcium carbonate    ipratropium-albuterol    ondansetron ODT **OR** ondansetron    Pancrelipase (Lip-Prot-Amyl)    simethicone    sodium chloride    [COMPLETED] Insert Peripheral IV **AND** sodium chloride    sodium chloride    sodium chloride   Infusions         Diagnostic Data    Results from last 7 days   Lab Units 04/06/24  0638 04/05/24  0739   WBC 10*3/mm3 6.19 7.62   HEMOGLOBIN g/dL 10.9* 10.0*   HEMATOCRIT % 32.4* 31.4*   PLATELETS 10*3/mm3 271 262   GLUCOSE mg/dL 101* 86   CREATININE mg/dL 1.49* 1.48*   BUN mg/dL 17 19   SODIUM mmol/L 140 139   POTASSIUM mmol/L 4.6 4.5   AST (SGOT) U/L  --  32   ALT (SGPT) U/L  --  18   ALK PHOS U/L  --  98   BILIRUBIN mg/dL  --  0.2   ANION GAP mmol/L 9.0 9.0       US Renal Bilateral    Result Date: 4/5/2024  Impression: Unremarkable exam Electronically Signed: Michael Robb MD  4/5/2024 9:11 AM EDT  Workstation ID: [id number]       I reviewed the patient's new clinical results.    Assessment/Plan:     Active and Resolved Problems  Active Hospital Problems    Diagnosis  POA    **Shortness of breath [R06.02]  Yes    Dyspnea [R06.00]  Yes      Resolved Hospital Problems   No resolved problems to display.     Chest pain/dyspnea   -Currently on 2 L nasal  cannula  -EKG:rate 46, sinus ludwin, incomplete LBBB  - cardiology consulted.  stress test  -Troponin flat  - echo pending  -Continue telemetry  Continue medical management with aspirin, metoprolol succinate as tolerated.      Hypothyroidism  - tsh 115, free T4 0.10  - pt on synthroid  - endocrinology consulted>If patient is planned for discharge can be discharged on levothyroxine 200 mcg p.o. daily and outpatient endocrinology next   - increased synthroid to 250  - recheck as outpt     ALL  -Creatinine trending down  -IV fluids stopped.  - us renal unremarkable  - nephrology on board>Dc is ok from a renal standpoint      Chronic pancreatic insufficiency  - creon, colestid, bentyl    DVT prophylaxis:  Mechanical DVT prophylaxis orders are present.    Code status is   Code Status and Medical Interventions:   Ordered at: 04/03/24 1444     Code Status (Patient has no pulse and is not breathing):    CPR (Attempt to Resuscitate)     Medical Interventions (Patient has pulse or is breathing):    Full Support       Plan for disposition:Recommending SNF at d/c and will plan to see 3x/week at [facility] for progression of mobility. PPE: gloves  in 1 days    Time: 30 minutes    Signature: Electronically signed by Gordy Blue MD, 04/06/24, 14:57 EDT.  [facility] Hospitalist Team

## 2024-04-06 NOTE — PLAN OF CARE
Problem: Adult Inpatient Plan of Care  Goal: Plan of Care Review  Outcome: Ongoing, Progressing  Flowsheets (Taken 4/6/2024 1751)  Progress: no change  Plan of Care Reviewed With: patient  Goal: Patient-Specific Goal (Individualized)  Outcome: Ongoing, Progressing  Goal: Absence of Hospital-Acquired Illness or Injury  Outcome: Ongoing, Progressing  Intervention: Identify and Manage Fall Risk  Recent Flowsheet Documentation  Taken 4/6/2024 1600 by Amanda Solares, RN  Safety Promotion/Fall Prevention:   safety round/check completed   room organization consistent   nonskid shoes/slippers when out of bed   lighting adjusted   clutter free environment maintained   assistive device/personal items within reach   activity supervised  Intervention: Prevent Skin Injury  Recent Flowsheet Documentation  Taken 4/6/2024 1600 by Amanda Solares, RN  Body Position: position changed independently  Intervention: Prevent Infection  Recent Flowsheet Documentation  Taken 4/6/2024 1600 by Amanda Solares, RN  Infection Prevention:   single patient room provided   rest/sleep promoted   personal protective equipment utilized   hand hygiene promoted  Goal: Optimal Comfort and Wellbeing  Outcome: Ongoing, Progressing  Goal: Readiness for Transition of Care  Outcome: Ongoing, Progressing   Goal Outcome Evaluation:

## 2024-04-06 NOTE — PROGRESS NOTES
Procedure   Procedures    [facility] Progress Note      Patient Name: Cassie Winter  : 1960  MRN: [id number]  Primary Care Physician:  Colby Vines MD  Date of admission: 4/3/2024    Subjective     Interval History:  no further soa or chest pain, feels stronger    Review of Systems:   14 point review of systems is otherwise negative except for mentioned above on HPI    Objective     Vitals:   Temp:  [97.8 °F (36.6 °C)-98 °F (36.7 °C)] 97.8 °F (36.6 °C)  Heart Rate:  [57-84] 76  Resp:  [16-18] 16  BP: (120-129)/(48-72) 121/69  Flow (L/min):  [2] 2    Intake/Output Summary (Last 24 hours) at 2024 1103  Last data filed at 2024 0856  Gross per 24 hour   Intake --   Output 1500 ml   Net -1500 ml       Physical Exam:    General Appearance: alert, oriented x 3, no acute distress   Skin: warm and dry  HEENT: oral mucosa normal, nonicteric sclera  Neck: supple, no JVD  Lungs: CTA  Heart: RRR, normal S1 and S2  Abdomen: soft, nontender, nondistended  : no palpable bladder  Extremities: no edema, cyanosis or clubbing  Neuro: normal speech and mental status     Scheduled Meds:     aspirin, 81 mg, Oral, Daily  dicyclomine, 20 mg, Oral, BID  [START ON 2024] levothyroxine, 200 mcg, Oral, Q AM  [Held by provider] metoprolol succinate XL, 25 mg, Oral, Daily  oxybutynin, 5 mg, Oral, TID  Pancrelipase (Lip-Prot-Amyl), 72,000 units of lipase, Oral, Daily With Breakfast & Lunch  sodium chloride, 10 mL, Intravenous, Q12H  venlafaxine XR, 225 mg, Oral, Nightly      IV Meds:          Results Reviewed:   I have personally reviewed the results from the time of this admission to 2024 11:03 EDT     Results from last 7 days   Lab Units 24  0638 24  0739 24  0554 24  0959   SODIUM mmol/L 140 139 139 141   POTASSIUM mmol/L 4.6 4.5 4.4 4.5   CHLORIDE mmol/L 109* 109* 110* 110*   CO2 mmol/L 22.0 21.0* 20.0* 17.0*   BUN mg/dL 17 19 21 23   CREATININE mg/dL 1.49* 1.48*  1.84* 1.72*   CALCIUM mg/dL 8.9 8.6 8.8 9.4   BILIRUBIN mg/dL  --  0.2  --  0.3   ALK PHOS U/L  --  98  --  112   ALT (SGPT) U/L  --  18  --  27   AST (SGOT) U/L  --  32  --  50*   GLUCOSE mg/dL 101* 86 88 101*     Estimated Creatinine Clearance: 46.7 mL/min (A) (by C-G formula based on SCr of 1.49 mg/dL (H)).          Results from last 7 days   Lab Units 04/06/24  0638 04/05/24  0739 04/04/24  0554 04/03/24  0958   WBC 10*3/mm3 6.19 7.62 8.68 8.94   HEMOGLOBIN g/dL 10.9* 10.0* 10.9* 12.6   PLATELETS 10*3/mm3 271 262 260 327     Results from last 7 days   Lab Units 04/03/24  0958   INR  0.94       Assessment / Plan     ASSESSMENT:  Shawn-suspect due to contrast nephropathy, resolving nicely with normal renal us and no proteinuria  Pyuria, f/u urine culture  Hypovolemia-better    PLAN:  Heplock ivf  Avoid nephrotoxins  Check lab in am  Dc is ok from a renal standpoint    Thank you for involving us in the care of Cassie Winter.  Please feel free to call with any questions.    Jairo Elmore MD  04/06/24  11:03 EDT    [facility]  [phone]

## 2024-04-06 NOTE — PROGRESS NOTES
Referring Provider: Gordy Blue MD       SUBJECTIVE    Stress test performed today which was normal.     ROS  Review of all systems negative except as indicated above    Personal History:    Past Medical History:   Diagnosis Date    Allergic rhinitis     Anemia     Asthma     B12 deficiency     Bradycardia     Broken arm 2019    Left arm broken     Chronic back pain     Chronic bronchitis     Coronary artery disease     Diarrhea     Encounter for blood transfusion 4/3/2024    Hypothyroidism     MRSA (methicillin resistant Staphylococcus aureus)     Neuropathic pain of foot     Severe back pain 2010    Sleep apnea, obstructive        Past Surgical History:   Procedure Laterality Date    ABDOMINAL HERNIA REPAIR  2003    repair of abdominal wall hernia, intraabdominal hernia and hiatal hernia, 2004 incisional hernia repair.     ACHILLES TENDON SURGERY  2011    rupture    ANKLE FUSION Right 11/06/2015    CARDIAC CATHETERIZATION  02/20/2017    CHOLECYSTECTOMY  1992    GASTRIC BYPASS  1986    LAMINECTOMY  2007    that was complicated by a staph infection     OTHER SURGICAL HISTORY Left 2019    L arm     REMOVAL EXTERNAL FIXATOR      WRIST FRACTURE SURGERY Right        Family History   Problem Relation Age of Onset    Heart disease Mother     COPD Mother     Lung cancer Mother     Lung cancer Father     Other Father         Bladder cancer    Sleep apnea Sister     Hypertension Sister     Diabetes Sister     Other Sister         Heart Valve Replacement        Social History     Tobacco Use    Smoking status: Never    Smokeless tobacco: Never   Vaping Use    Vaping status: Never Used   Substance Use Topics    Alcohol use: No    Drug use: No        Home meds:  Prior to Admission medications    Medication Sig Start Date End Date Taking? Authorizing Provider   aspirin 81 MG EC tablet Take 1 tablet by mouth Daily. 5/20/19  Yes Provider, MD Leighann   Calcium 200 MG tablet Take 1 tablet by mouth Daily.   Yes Provider,  MD Leighann   colestipol (COLESTID) 1 g tablet Take 1 tablet by mouth 2 (Two) Times a Day. 9/5/23  Yes Leighann Solitario MD   dicyclomine (BENTYL) 20 MG tablet Every 12 (Twelve) Hours. 11/28/16  Yes Leighann Solitario MD   levothyroxine (Synthroid) 200 MCG tablet Take 1 tablet by mouth Daily. 8/22/23  Yes Cristopher Garcia Jr., MD   metoprolol succinate XL (TOPROL-XL) 25 MG 24 hr tablet Take 1 tablet by mouth Daily.   Yes Leighann Solitario MD   Pancrelipase, Lip-Prot-Amyl, (Creon) 59158-112380 units capsule delayed-release particles capsule Take 1 capsule by mouth With Snacks for Snacks.   Yes Leighann Solitario MD   Pancrelipase, Lip-Prot-Amyl, (CREON) 33998-142151 units capsule delayed-release particles capsule Take 2 capsules by mouth Daily With Breakfast & Lunch.   Yes Leighann Solitario MD   venlafaxine 75 MG tablet sustained-release 24 hour 24 hr tablet Take 1 tablet by mouth Every Night. In addition to 150mg for a total of 225mg at bedtime   Yes Leighann Solitario MD   venlafaxine XR (EFFEXOR-XR) 150 MG 24 hr capsule Take 1 capsule by mouth Every Night. In addition to 75mg for a total of 225mg at bedtime   Yes Leighann Solitario MD   albuterol sulfate  (90 Base) MCG/ACT inhaler Inhale 2 puffs Every 4 (Four) Hours As Needed for Wheezing. 12/30/22   Cristopher Garcia Jr., MD   oxybutynin (DITROPAN) 5 MG tablet Take 1 tablet by mouth 3 (Three) Times a Day. 6/20/23   Cristopher Garcia Jr., MD   Vitamin D, Cholecalciferol, (CHOLECALCIFEROL) 10 MCG (400 UNIT) tablet Take 1 tablet by mouth Daily.    ProviderLeighann MD       Allergies:  Hydrocodone-acetaminophen, Oxycodone-acetaminophen, Ketorolac tromethamine, Promethazine, and Tramadol      OBJECTIVE    Vital Signs  Vitals:    04/05/24 2254 04/06/24 0245 04/06/24 0614 04/06/24 1500   BP: 129/48 120/68 121/69 127/75   BP Location: Left arm Left arm Left arm Left arm   Patient Position: Lying Lying Lying Lying   Pulse: 74 84 76  "88   Resp: 18 18 16 16   Temp: 97.9 °F (36.6 °C) 98 °F (36.7 °C) 97.8 °F (36.6 °C) 97.6 °F (36.4 °C)   TempSrc: Oral Oral Oral Axillary   SpO2: 90% 99% 90% 100%   Weight:       Height:           Flowsheet Rows      Flowsheet Row First Filed Value   Admission Height 165.1 cm (65\") Documented at 04/03/2024 0935   Admission Weight 108 kg (238 lb 1.6 oz) Documented at 04/03/2024 0935              Intake/Output Summary (Last 24 hours) at 4/6/2024 1522  Last data filed at 4/6/2024 1500  Gross per 24 hour   Intake --   Output 2300 ml   Net -2300 ml              Physical Exam:  General-no acute distress  No elevated JVP noted.  Cardiovascular-S1-S2 normal, no murmurs noted.  Respiratory-normal breath sounds, no wheezing/crackles.  GI-abdomen is soft and nontender  No pedal edema        Results Review:    BNP        TROPONIN  Results from last 7 days   Lab Units 04/04/24  0554   HSTROP T ng/L 40*       CoAg  Results from last 7 days   Lab Units 04/03/24  0958   INR  0.94   APTT seconds 26.6       Creatinine Clearance  Estimated Creatinine Clearance: 46.7 mL/min (A) (by C-G formula based on SCr of 1.49 mg/dL (H)).      Radiology  US Renal Bilateral    Result Date: 4/5/2024  Impression: Unremarkable exam Electronically Signed: Michael Robb MD  4/5/2024 9:11 AM EDT  Workstation ID: [id number]       EKG  I personally viewed and interpreted the patient's EKG/Telemetry data:  ECG 12 Lead Chest Pain   Final Result   HEART RATE= 46  bpm   RR Interval= 1300  ms   NH Interval= 38  ms   P Horizontal Axis= 50  deg   P Front Axis= 0  deg   QRSD Interval= 111  ms   QT Interval= 453  ms   QTcB= 397  ms   QRS Axis= -57  deg   T Wave Axis= 238  deg   - ABNORMAL ECG -   Sinus bradycardia   Incomplete left bundle branch block   LVH with secondary repolarization abnormality   Anterior Q waves, possibly due to LVH   LAHB   Electronically Signed By: Jorge Prescott ([facility]) 04-Apr-2024 06:56:04   Date and Time of Study: 2024-04-03 09:47:32    "   Telemetry Scan   Final Result      Telemetry Scan   Final Result      Telemetry Scan   Final Result            Echocardiogram:    Results for orders placed during the hospital encounter of 04/03/24    Adult Transthoracic Echo Complete w/ Color, Spectral and Contrast if Necessary Per Protocol    Interpretation Summary    Left ventricular systolic function is moderately decreased. Calculated left ventricular EF = 34.5%    Left ventricular diastolic function is consistent with (grade II w/high LAP) pseudonormalization.    The left atrial cavity is mildly dilated.    Estimated right ventricular systolic pressure from tricuspid regurgitation is normal (<35 mmHg).        Stress Test:  Results for orders placed during the hospital encounter of 04/03/24    Stress Test With Myocardial Perfusion One Day    Interpretation Summary    Myocardial perfusion imaging indicates a normal myocardial perfusion study with no evidence of ischemia. Impressions are consistent with a low risk study.    Left ventricular ejection fraction is mildly reduced (Calculated EF = 42%).    This is normal Cardiolite imaging stress test with no evidence of ischemia or myocardial infarction.  Left ventricle size and function is normal on gated SPECT imaging.  No wall motion abnormality was noted.  Clinical correlation recommended.  Further recommendation as per ordering physician. .    Findings consistent with a normal ECG stress test.         Cardiac Catheterization:  No results found for this or any previous visit.         Other:         ASSESSMENT & PLAN:    New diagnosis of heart failure with reduced ejection fraction  Nuclear stress test 4/6/2024 without evidence of myocardial ischemia/infarction  Unclear etiology  Could be in setting of significant hypothyroidism and occult arrhythmia.  Metoprolol discontinued in setting of bradycardia  Initiate low-dose ACE inhibitor once renal function stabilizes  Event monitor on  discharge to rule out occult  arrhythmia as cause of heart failure  Outpatient follow-up with Dr. Casillas    Severe hypothyroidism  Reports noncompliance to levothyroxine  Endocrinology team following  Stressed on compliance    Coronary angiogram 2017 with distal LAD disease otherwise nonobstructive CAD  Continue baby aspirin          Part of this note may be an electronic transcription/translation of spoken language to printed text using the Dragon Dictation System.    Jacob Velásquez MD  04/06/24  15:22 EDT

## 2024-04-06 NOTE — THERAPY EVALUATION
Patient Name: Cassie iWnter  : 1960    MRN: [id number]                              Today's Date: 2024       Admit Date: 4/3/2024    Visit Dx:     ICD-10-CM ICD-9-CM   1. Shortness of breath  R06.02 786.05   2. Chest pain, unspecified type  R07.9 786.50   3. Acute kidney injury  N17.9 584.9     Patient Active Problem List   Diagnosis    Allergic rhinitis    Anemia    Asthma    B12 deficiency    Chronic coronary artery disease    Chronic low back pain    Depression    H/O laminectomy    Hypothyroidism    Neuropathy    Irritable bowel syndrome    Spinal stenosis of lumbar region    Dizziness    Narrow complex tachycardia    Other sleep apnea    TMJ syndrome    Overactive bladder    Pancreatic insufficiency    Fall    Blockage of coronary artery of heart    Alkaline phosphatase raised    Anemia, iron deficiency    Fatty stool    Gallstones    Arthritis    Gastritis, unspecified, without bleeding    Generalized abdominal pain    High blood pressure    Insomnia    Intestinal malabsorption    Seasonal allergies    Second degree hemorrhoids    Encounter for screening mammogram for malignant neoplasm of breast    Other specified disorders of bladder    Thyroid disease    Shortness of breath    Dyspnea     Past Medical History:   Diagnosis Date    Allergic rhinitis     Anemia     Asthma     B12 deficiency     Bradycardia     Broken arm 2019    Left arm broken     Chronic back pain     Chronic bronchitis     Coronary artery disease     Diarrhea     Encounter for blood transfusion 4/3/2024    Hypothyroidism     MRSA (methicillin resistant Staphylococcus aureus)     Neuropathic pain of foot     Severe back pain 2010    Sleep apnea, obstructive      Past Surgical History:   Procedure Laterality Date    ABDOMINAL HERNIA REPAIR  2003    repair of abdominal wall hernia, intraabdominal hernia and hiatal hernia, 2004 incisional hernia repair.     ACHILLES TENDON SURGERY  2011    rupture    ANKLE FUSION Right 2015     CARDIAC CATHETERIZATION  02/20/2017    CHOLECYSTECTOMY  1992    GASTRIC BYPASS  1986    LAMINECTOMY  2007    that was complicated by a staph infection     OTHER SURGICAL HISTORY Left 2019    L arm     REMOVAL EXTERNAL FIXATOR      WRIST FRACTURE SURGERY Right       General Information       Row Name 04/06/24 1055          Physical Therapy Time and Intention    Document Type evaluation  -AO     Mode of Treatment physical therapy  -AO       Row Name 04/06/24 1055          General Information    Patient Profile Reviewed yes  -AO     Prior Level of Function --  Pt typically independent w/ all mobility/ADLs w/ no AD, reports 5 falls since January 2024, son-in-law drives to appointments  -AO     Existing Precautions/Restrictions fall  -AO     Barriers to Rehab none identified  -AO       Row Name 04/06/24 1055          Living Environment    People in Home alone  Has supportive daughter and son-in-law who check in daily and provide assist as needed  -AO       Row Name 04/06/24 1055          Home Main Entrance    Number of Stairs, Main Entrance other (see comments)  15  -AO     Stair Railings, Main Entrance railings on both sides of stairs  -AO       Row Name 04/06/24 1055          Stairs Within Home, Primary    Number of Stairs, Within Home, Primary one  -AO       Row Name 04/06/24 1055          Cognition    Orientation Status (Cognition) oriented x 4  -AO       Row Name 04/06/24 1055          Safety Issues, Functional Mobility    Impairments Affecting Function (Mobility) balance;endurance/activity tolerance;shortness of breath;strength  -AO               User Key  (r) = Recorded By, (t) = Taken By, (c) = Cosigned By      Initials Name Provider Type    AO Zaira Delvalle, PT Physical Therapist                   Mobility       Row Name 04/06/24 1055          Bed Mobility    Bed Mobility supine-sit;sit-supine  -AO     Supine-Sit [place] (Bed Mobility) standby assist  -AO     Sit-Supine [place] (Bed Mobility)  standby assist  -AO       Row Name 04/06/24 1055          Sit-Stand Transfer    Sit-Stand [place] (Transfers) contact guard  -AO     Assistive Device (Sit-Stand Transfers) --  HHA  -AO       Row Name 04/06/24 1055          Gait/Stairs (Locomotion)    [place] Level (Gait) unable to assess  -AO               User Key  (r) = Recorded By, (t) = Taken By, (c) = Cosigned By      Initials Name Provider Type    AO Zaira Delvalle, PT Physical Therapist                   Obj/Interventions       Row Name 04/06/24 1055          Range of Motion Comprehensive    General Range of Motion no range of motion deficits identified  -AO       Row Name 04/06/24 1055          Strength Comprehensive (MMT)    General Manual Muscle Testing (MMT) Assessment lower extremity strength deficits identified  -AO     Comment, General Manual Muscle Testing (MMT) Assessment B hip flexion: 3+/5, B knee flexion/extension: 4/5  -AO       Row Name 04/06/24 1055          Balance    Balance Assessment sitting static balance;sitting dynamic balance;sit to stand dynamic balance;standing static balance;standing dynamic balance  -AO     Static Sitting Balance standby assist  -AO     Dynamic Sitting Balance standby assist  -AO     Position, Sitting Balance unsupported;sitting edge of bed  -AO     Sit to Stand Dynamic Balance contact guard  -AO     Static Standing Balance contact guard  -AO     Dynamic Standing Balance minimal assist  -AO     Position/Device Used, Standing Balance --  HHA  -AO       Row Name 04/06/24 1055          Sensory Assessment (Somatosensory)    Sensory Assessment (Somatosensory) sensation intact  -AO               User Key  (r) = Recorded By, (t) = Taken By, (c) = Cosigned By      Initials Name Provider Type    Zaira Figueroa, PT Physical Therapist                   Goals/Plan       Row Name 04/06/24 1055          Bed Mobility Goal 1 (PT)    Activity/Assistive Device (Bed Mobility Goal 1, PT) bed mobility activities, all   -AO     [place] Level/Cues Needed (Bed Mobility Goal 1, PT) modified independence  -AO     Time Frame (Bed Mobility Goal 1, PT) long term goal (LTG);2 weeks  -AO     Progress/Outcomes (Bed Mobility Goal 1, PT) goal not met  -AO       Row Name 04/06/24 1055          Transfer Goal 1 (PT)    Activity/Assistive Device (Transfer Goal 1, PT) transfers, all  -AO     [place] Level/Cues Needed (Transfer Goal 1, PT) modified independence  -AO     Time Frame (Transfer Goal 1, PT) long term goal (LTG);2 weeks  -AO     Progress/Outcome (Transfer Goal 1, PT) goal not met  -AO       Row Name 04/06/24 1055          Gait Training Goal 1 (PT)    Activity/Assistive Device (Gait Training Goal 1, PT) gait (walking locomotion);walker, rolling  -AO     [place] Level (Gait Training Goal 1, PT) supervision required  -AO     Distance (Gait Training Goal 1, PT) 100 ft  -AO     Time Frame (Gait Training Goal 1, PT) long term goal (LTG);2 weeks  -AO     Progress/Outcome (Gait Training Goal 1, PT) goal not met  -AO       Row Name 04/06/24 1055          Therapy Assessment/Plan (PT)    Planned Therapy Interventions (PT) balance training;bed mobility training;gait training;home exercise program;neuromuscular re-education;patient/family education;strengthening;transfer training  -AO               User Key  (r) = Recorded By, (t) = Taken By, (c) = Cosigned By      Initials Name Provider Type    AO Zaira Delvalle, PT Physical Therapist                   Clinical Impression       Row Name 04/06/24 1055          Pain    Pretreatment Pain Rating 0/10 - no pain  -AO     Posttreatment Pain Rating 0/10 - no pain  -AO       Row Name 04/06/24 1055          Plan of Care Review    Plan of Care Reviewed With patient  -AO     Progress no change  -AO     Outcome Evaluation Pt is a 62 y/o F who presented to [facility] w/ c/o chest pain/dyspnea and SOA (EKG: sinus bradycardia w/ incomplete LBBB, troponin (-), chronic pancreatic insuffiency, ALL,  hypothyroidism, and with 5 falls since 2024 with most recent fall striking head (CT head (-) acute changes). At baseline, pt lives alone in a duplex apartment with [address], was independent w/ all mobility/ADLs w/ no AD, supportive daughter and son-in-law who assist as needed. During eval, pt requires SBA for supine to/from sit transfer, CGA for sit to/from stand transfers x 2 with c/o dizziness and BLE weakness requiring return to sitting EOB on both attempts (BP standin/63 mmHg and sitting EOB: 110/83 mmHg). Pt also with increased SOA, though unable to obtain good pleth line with both finger and forehead probes for Sp02 monitor. At this time, pt appears to be functioning below baseline and not safe for home alone. Recommending SNF at d/c and will plan to see 3x/week at [facility] for progression of mobility. PPE: gloves  -AO       Row Name 24 105          Therapy Assessment/Plan (PT)    Rehab Potential (PT) good, to achieve stated therapy goals  -AO     Criteria for Skilled Interventions Met (PT) yes;meets criteria  -AO     Therapy Frequency (PT) 3 times/wk  -AO     Predicted Duration of Therapy Intervention (PT) until d/c  -AO       Row Name 24 1054          Vital Signs    Recovery Time BP standin/63 mmHg and sitting EOB: 110/83 mmHg). Pt also with increased SOA, though unable to obtain good pleth line with both finger and forehead probes for Sp02 monitor  -AO       Row Name 24 1058          Positioning and Restraints    Pre-Treatment Position in bed  -AO     Post Treatment Position bed  Pt to have stress test soon and requests return to supine  -AO     In Bed notified nsg;supine;exit alarm on;call light within reach;encouraged to call for assist  -AO               User Key  (r) = Recorded By, (t) = Taken By, (c) = Cosigned By      Initials Name Provider Type    Zaira Figueroa, PT Physical Therapist                   Outcome Measures       Row Name 24 1050          How much  help from another person do you currently need...    Turning from your back to your side while in flat bed without using bedrails? 4  -AO     Moving from lying on back to sitting on the side of a flat bed without bedrails? 4  -AO     Moving to and from a bed to a chair (including a wheelchair)? 3  -AO     Standing up from a chair using your arms (e.g., wheelchair, bedside chair)? 3  -AO     Climbing 3-5 steps with a railing? 1  -AO     To walk in hospital room? 2  -AO     AM-PAC 6 Clicks Score (PT) 17  -AO     Highest Level of Mobility Goal 5 --> Static standing  -AO       Row Name 04/06/24 1055          Functional Assessment    Outcome Measure Options AM-PAC 6 Clicks Basic Mobility (PT)  -AO               User Key  (r) = Recorded By, (t) = Taken By, (c) = Cosigned By      Initials Name Provider Type    AO Zaira Delvalle, PT Physical Therapist                                 Physical Therapy Education       Title: PT OT SLP Therapies (Done)       Topic: Physical Therapy (Done)       Point: Mobility training (Done)       Learning Progress Summary             Patient Acceptance, E,TB, VU by AO at 4/6/2024 1129                                         User Key       Initials Effective Dates Name Provider Type Discipline    AO 06/16/21 -  Zaira Delvalle, PT Physical Therapist PT                  PT Recommendation and Plan  Planned Therapy Interventions (PT): balance training, bed mobility training, gait training, home exercise program, neuromuscular re-education, patient/family education, strengthening, transfer training  Plan of Care Reviewed With: patient  Progress: no change  Outcome Evaluation: Pt is a 64 y/o F who presented to [facility] w/ c/o chest pain/dyspnea and SOA (EKG: sinus bradycardia w/ incomplete LBBB, troponin (-), chronic pancreatic insuffiency, ALL, hypothyroidism, and with 5 falls since January 2024 with most recent fall striking head (CT head (-) acute changes). At baseline, pt lives alone in a duplex  apartment with [address], was independent w/ all mobility/ADLs w/ no AD, supportive daughter and son-in-law who assist as needed. During eval, pt requires SBA for supine to/from sit transfer, CGA for sit to/from stand transfers x 2 with c/o dizziness and BLE weakness requiring return to sitting EOB on both attempts (BP standin/63 mmHg and sitting EOB: 110/83 mmHg). Pt also with increased SOA, though unable to obtain good pleth line with both finger and forehead probes for Sp02 monitor. At this time, pt appears to be functioning below baseline and not safe for home alone. Recommending SNF at d/c and will plan to see 3x/week at [facility] for progression of mobility. PPE: gloves     Time Calculation:   PT Evaluation Complexity  History, PT Evaluation Complexity: 3 or more personal factors and/or comorbidities  Examination of Body Systems (PT Eval Complexity): total of 3 or more elements  Clinical Presentation (PT Evaluation Complexity): stable  Clinical Decision Making (PT Evaluation Complexity): low complexity  Overall Complexity (PT Evaluation Complexity): low complexity     PT Charges       Row Name 24 1130             Time Calculation    Start Time 1055  -AO      Stop Time 1120  -AO      Time Calculation (min) 25 min  -AO      PT Received On 24  -AO      PT - Next Appointment 24  -AO      PT Goal Re-Cert Due Date 24  -AO         Time Calculation- PT    Total Timed Code Minutes- PT 0 minute(s)  -AO                User Key  (r) = Recorded By, (t) = Taken By, (c) = Cosigned By      Initials Name Provider Type    AO Zaira Delvalle, PT Physical Therapist                  Therapy Charges for Today       Code Description Service Date Service Provider Modifiers Qty    [id number]  PT EVAL LOW COMPLEXITY 4 2024 Zaira Delvalle, PT GP 1            PT G-Codes  Outcome Measure Options: AM-PAC 6 Clicks Basic Mobility (PT)  AM-PAC 6 Clicks Score (PT): 17  PT Discharge Summary  Anticipated Discharge  Disposition (PT): skilled nursing facility    Zaira Delvalle, PT  4/6/2024

## 2024-04-06 NOTE — PLAN OF CARE
Goal Outcome Evaluation:  Plan of Care Reviewed With: patient        Progress: no change  Outcome Evaluation: Pt is a 62 y/o F who presented to [facility] w/ c/o chest pain/dyspnea and SOA (EKG: sinus bradycardia w/ incomplete LBBB, troponin (-), chronic pancreatic insuffiency, ALL, hypothyroidism, and with 5 falls since 2024 with most recent fall striking head (CT head (-) acute changes). At baseline, pt lives alone in a duplex apartment with [address], was independent w/ all mobility/ADLs w/ no AD, supportive daughter and son-in-law who assist as needed. During eval, pt requires SBA for supine to/from sit transfer, CGA for sit to/from stand transfers x 2 with c/o dizziness and BLE weakness requiring return to sitting EOB on both attempts (BP standin/63 mmHg and sitting EOB: 110/83 mmHg). Pt also with increased SOA, though unable to obtain good pleth line with both finger and forehead probes for Sp02 monitor. At this time, pt appears to be functioning below baseline and not safe for home alone. Recommending SNF at d/c and will plan to see 3x/week at [facility] for progression of mobility. PPE: gloves      Anticipated Discharge Disposition (PT): skilled nursing facility

## 2024-04-06 NOTE — PLAN OF CARE
Goal Outcome Evaluation:              Outcome Evaluation: Pt rested through the night no complaints of pain or discomfort will continue to montior.

## 2024-04-06 NOTE — PROGRESS NOTES
ENDOCRINE CONSULT PROGRESS NOTE  DATE OF SERVICE: 24        PATIENT NAME: Cassie Winter  PATIENT : 1960 AGE: 63 y.o.  MRN NUMBER: [id number]    ==========================================================================    CHIEF COMPLAINT: Hypothyroidism    CARE TEAM:   Patient Care Team:  Colby Vines MD as PCP - General (Internal Medicine)  Bonnie Landa PT as Physical Therapist (Physical Therapy)  Pablito Casillas MD as Consulting Physician (Cardiology)    SUBJECTIVE  Patient seen and examined.  No other acute event overnight.  Tolerating medication.    ==========================================================================    CURRENT ACTIVE HOSPITAL MEDICATIONS    Scheduled Medications:  aspirin, 81 mg, Oral, Daily  dicyclomine, 20 mg, Oral, BID  [START ON 2024] levothyroxine, 200 mcg, Oral, Q AM  [Held by provider] metoprolol succinate XL, 25 mg, Oral, Daily  oxybutynin, 5 mg, Oral, TID  Pancrelipase (Lip-Prot-Amyl), 72,000 units of lipase, Oral, Daily With Breakfast & Lunch  sodium chloride, 10 mL, Intravenous, Q12H  venlafaxine XR, 225 mg, Oral, Nightly         PRN Medications:    acetaminophen    aluminum-magnesium hydroxide-simethicone    calcium carbonate    ipratropium-albuterol    ondansetron ODT **OR** ondansetron    Pancrelipase (Lip-Prot-Amyl)    simethicone    sodium chloride    [COMPLETED] Insert Peripheral IV **AND** sodium chloride    sodium chloride    sodium chloride     ==========================================================================    OBJECTIVE    Vitals:    24   BP: 121/69   Pulse: 76   Resp: 16   Temp: 97.8 °F (36.6 °C)   SpO2: 90%      Body mass index is 40.54 kg/m².     General - A&Ox3, NAD, Calm    ==========================================================================    LAB EVALUATION    Lab Results   Component Value Date    GLUCOSE 101 (H) 2024    BUN 17 2024    CREATININE 1.49 (H) 2024    EGFRIFNONA 52  "(L) 11/22/2021    BCR 11.4 04/06/2024    K 4.6 04/06/2024    CO2 22.0 04/06/2024    CALCIUM 8.9 04/06/2024    ALBUMIN 3.2 (L) 04/05/2024    LABIL2 1.3 02/11/2019    AST 32 04/05/2024    ALT 18 04/05/2024       No results found for: \"HGBA1C\"  Lab Results   Component Value Date    CREATININE 1.49 (H) 04/06/2024         ==========================================================================    ASSESSMENT AND PLAN    # Hypothyroidism with noncompliance, unintentional  - Will de-escalate levothyroxine therapy to 200 mcg every day  - Continue to clinically monitor  - If patient is planned for discharge can be discharged on levothyroxine 200 mcg p.o. daily and outpatient endocrinology next    # Shortness of breath  # Acute kidney injury  - Care as per primary team    Will follow with you.  Rest as per primary team.    This note was created using voice recognition software and is inherently subject to errors including those of syntax and \"sound-alike\" substitutions which may escape proofreading.  In such instances, original meaning may be extrapolated by contextual derivation.    Note: Portions of this note may have been copied from previous notes but documentation have been reviewed and edited as necessary to support clinical decision making for today's visit.  The time of this note does not reflect the time I saw the patient but the time that this note was written.    ==========================================================================  Cuco Sanchez MD  Department of Endocrine, Diabetes and Metabolism  [facility], [place]  ==========================================================================    "

## 2024-04-07 LAB
T4 FREE SERPL-MCNC: 0.59 NG/DL (ref 0.93–1.7)
TSH SERPL DL<=0.05 MIU/L-ACNC: 87.59 UIU/ML (ref 0.27–4.2)

## 2024-04-07 PROCEDURE — 99232 SBSQ HOSP IP/OBS MODERATE 35: CPT | Performed by: STUDENT IN AN ORGANIZED HEALTH CARE EDUCATION/TRAINING PROGRAM

## 2024-04-07 PROCEDURE — 84439 ASSAY OF FREE THYROXINE: CPT | Performed by: INTERNAL MEDICINE

## 2024-04-07 PROCEDURE — 80053 COMPREHEN METABOLIC PANEL: CPT | Performed by: INTERNAL MEDICINE

## 2024-04-07 PROCEDURE — 84443 ASSAY THYROID STIM HORMONE: CPT | Performed by: INTERNAL MEDICINE

## 2024-04-07 PROCEDURE — 99232 SBSQ HOSP IP/OBS MODERATE 35: CPT | Performed by: INTERNAL MEDICINE

## 2024-04-07 RX ORDER — NITROGLYCERIN 0.4 MG/1
0.4 TABLET SUBLINGUAL
Status: DISCONTINUED | OUTPATIENT
Start: 2024-04-07 | End: 2024-04-10 | Stop reason: HOSPADM

## 2024-04-07 RX ADMIN — PANCRELIPASE 36000 UNITS OF LIPASE: 36000; 180000; 114000 CAPSULE, DELAYED RELEASE PELLETS ORAL at 17:41

## 2024-04-07 RX ADMIN — VENLAFAXINE HYDROCHLORIDE 225 MG: 75 CAPSULE, EXTENDED RELEASE ORAL at 20:30

## 2024-04-07 RX ADMIN — Medication 10 ML: at 09:18

## 2024-04-07 RX ADMIN — PANCRELIPASE 72000 UNITS OF LIPASE: 36000; 180000; 114000 CAPSULE, DELAYED RELEASE PELLETS ORAL at 13:20

## 2024-04-07 RX ADMIN — OXYBUTYNIN CHLORIDE 5 MG: 5 TABLET ORAL at 20:30

## 2024-04-07 RX ADMIN — DICYCLOMINE HYDROCHLORIDE 20 MG: 10 CAPSULE ORAL at 09:17

## 2024-04-07 RX ADMIN — PANCRELIPASE 72000 UNITS OF LIPASE: 36000; 180000; 114000 CAPSULE, DELAYED RELEASE PELLETS ORAL at 09:17

## 2024-04-07 RX ADMIN — LEVOTHYROXINE SODIUM 200 MCG: 0.1 TABLET ORAL at 06:17

## 2024-04-07 RX ADMIN — OXYBUTYNIN CHLORIDE 5 MG: 5 TABLET ORAL at 09:17

## 2024-04-07 RX ADMIN — ASPIRIN 81 MG: 81 TABLET, COATED ORAL at 09:17

## 2024-04-07 RX ADMIN — ACETAMINOPHEN 650 MG: 325 TABLET, FILM COATED ORAL at 22:35

## 2024-04-07 RX ADMIN — DICYCLOMINE HYDROCHLORIDE 20 MG: 10 CAPSULE ORAL at 20:30

## 2024-04-07 RX ADMIN — OXYBUTYNIN CHLORIDE 5 MG: 5 TABLET ORAL at 17:41

## 2024-04-07 NOTE — DISCHARGE SUMMARY
[facility] Medicine Services  Discharge Summary    Date of Service: 24  Patient Name: Cassie Winter  : 1960  MRN: [id number]    Date of Admission: 4/3/2024  Discharge Diagnosis: Chest pain/dyspnea   Date of Discharge:  24  Primary Care Physician: Colby Vines MD      Presenting Problem:   Shortness of breath [R06.02]  Acute kidney injury [N17.9]  Chest pain, unspecified type [R07.9]  Dyspnea [R06.00]    Active and Resolved Hospital Problems:  Active Hospital Problems    Diagnosis POA    **Shortness of breath [R06.02] Yes    Dyspnea [R06.00] Yes    Other sleep apnea [G47.39] Yes    Asthma [J45.909] Yes    Chronic coronary artery disease [I25.10] Yes    Irritable bowel syndrome [K58.9] Yes    Chronic low back pain [M54.50, G89.29] Yes    Neuropathy [G62.9] Yes    Depression [F32.A] Yes    B12 deficiency [E53.8] Yes    Anemia [D64.9] Yes    Hypothyroidism [E03.9] Yes      Resolved Hospital Problems   No resolved problems to display.     Chest pain/dyspnea   -Currently on 2 L nasal cannula  -EKG:rate 46, sinus ludwin, incomplete LBBB  - cardiology consulted.  stress test  -Troponin flat  - Myocardial perfusion imaging indicates a normal myocardial perfusion study with no evidence of ischemia. Impressions are consistent with a low risk study   -Continue telemetry  Continue medical management with aspirin, metoprolol succinate as tolerated.      Hypothyroidism  - tsh 115, free T4 0.10  - pt on synthroid  - endocrinology consulted>If patient is planned for discharge can be discharged on levothyroxine 200 mcg p.o. daily and outpatient endocrinology next   - increased synthroid to 250  - recheck as outpt     ALL  -Creatinine trending down  -IV fluids stopped.  - us renal unremarkable  - nephrology on board>Dc is ok from a renal standpoint      Chronic pancreatic insufficiency  - creon, colestid, bentyl    Hospital Course     HPI:Per the documentation by Kelley Sesay, dated  "04/03,  \"63-year-old white female with a history of thyroid dysfunction, CAD. She presents today from her primary care provider's office with complaints of shortness of breath and chest pain. She states has been ongoing for the last 2 months or so. She has symptoms at rest but her symptoms are much worse with exertion. She denies any fever or cough. No leg pain or swelling. She denies any black or bloody stools. She does report some frequent belching. No abdominal pain nausea or vomiting. \"     4/5/24-Hospitalist team has been consulted for further medical management.    4/6/24 seen and examined in bed no acute distress, vital signs stable, discussed with RN.  Denies any chest pain or shortness of breath feeling feeling better  4/7/24 doing better today, Myocardial perfusion imaging indicates a normal myocardial perfusion study with no evidence of ischemia. Impressions are consistent with a low risk study.  will dc home. Condition on dc stable    DISCHARGE Follow Up Recommendations for labs and diagnostics: follow with pcp in on e wee  Event monitor on  discharge to rule out occult arrhythmia as cause of heart failure  Outpatient follow-up with Dr. Casillas 2 weeks    Reasons For Change In Medications and Indications for New Medications:  CHANGE how you take:  venlafaxine XR (EFFEXOR-XR) -- Another medication with the same name was removed. Continue taking this medication, and follow the directions you see here.   STOP taking:  metoprolol succinate XL 25 MG 24 hr tablet (TOPROL-XL)     Day of Discharge     Vital Signs:  Temp:  [97.6 °F (36.4 °C)-98 °F (36.7 °C)] 98 °F (36.7 °C)  Heart Rate:  [75-89] 75  Resp:  [16-20] 16  BP: (110-133)/(60-78) 115/72    Physical Exam   general Appearance: AOO x 4, cooperative, no distress, appropriate for age  Head:  Normocephalic, without obvious abnormality  Eyes:  PERRL, EOM's intact, conjunctivae and cornea clear  Nose:  Nares symmetrical, septum midline, mucosa pink  Throat:  " Lips, tongue, and mucosa are moist, pink, and intact  Neck:  Supple; symmetrical, trachea midline, no adenopathy  Back:  Symmetrical, ROM normal, no CVA tenderness  Lungs: Respirations unlabored, no audible wheeze  Heart: Regular rate & rhythm, S1 and S2 normal  Abdomen:  Soft, nontender, bowel sounds active all four quadrants  Musculoskeletal: Tone and strength strong and symmetrical, all extremities; no joint pain or edema         Skin/Hair/Nails:  Skin warm, dry and intact, no rashes or abnormal dyspigmentation         Pertinent  and/or Most Recent Results     LAB RESULTS:      Lab 04/06/24  0638 04/05/24  0739 04/04/24  0554 04/03/24  0958   WBC 6.19 7.62 8.68 8.94   HEMOGLOBIN 10.9* 10.0* 10.9* 12.6   HEMATOCRIT 32.4* 31.4* 34.5 38.3   PLATELETS 271 262 260 327   NEUTROS ABS 3.09 3.60 3.49 4.39   IMMATURE GRANS (ABS) 0.04 0.04 0.04 0.04   LYMPHS ABS 2.22 2.91 4.05* 3.44*   MONOS ABS 0.48 0.61 0.72 0.65   EOS ABS 0.29 0.39 0.32 0.32   MCV 95.6 98.4* 98.9* 97.2*   PROTIME  --   --   --  10.3   APTT  --   --   --  26.6         Lab 04/07/24  0611 04/06/24  0638 04/05/24  0739 04/04/24  0554 04/03/24  1212 04/03/24  0959   SODIUM  --  140 139 139  --  141   POTASSIUM  --  4.6 4.5 4.4  --  4.5   CHLORIDE  --  109* 109* 110*  --  110*   CO2  --  22.0 21.0* 20.0*  --  17.0*   ANION GAP  --  9.0 9.0 9.0  --  14.0   BUN  --  17 19 21  --  23   CREATININE  --  1.49* 1.48* 1.84*  --  1.72*   EGFR  --  38.8* 39.1* 30.1*  --  32.7*   GLUCOSE  --  101* 86 88  --  101*   CALCIUM  --  8.9 8.6 8.8  --  9.4   TSH 87.590*  --  97.100* 106.200* 115.200*  --          Lab 04/05/24  0739 04/03/24  0959   TOTAL PROTEIN 5.6* 6.8   ALBUMIN 3.2* 4.0   GLOBULIN 2.4 2.8   ALT (SGPT) 18 27   AST (SGOT) 32 50*   BILIRUBIN 0.2 0.3   ALK PHOS 98 112         Lab 04/04/24  0554 04/03/24  1212 04/03/24  0959 04/03/24  0958   PROBNP  --   --  248.5  --    HSTROP T 40* 41* 46*  --    PROTIME  --   --   --  10.3   INR  --   --   --  0.94                  Brief Urine Lab Results  (Last result in the past 365 days)        Color   Clarity   Blood   Leuk Est   Nitrite   Protein   CREAT   Urine HCG        04/05/24 0940 Yellow   Clear   Negative   Negative   Negative   Negative                 Microbiology Results (last 10 days)       ** No results found for the last 240 hours. **            US Renal Bilateral    Result Date: 4/5/2024  Impression: Impression: Unremarkable exam Electronically Signed: Michael Robb MD  4/5/2024 9:11 AM EDT  Workstation ID: [id number]    CT Angiogram Chest Pulmonary Embolism    Result Date: 4/3/2024  Impression: No pulmonary embolus. No acute cardiopulmonary disease. Electronically Signed: Baldemar Chapa MD  4/3/2024 6:05 PM EDT  Workstation ID: [id number]    XR Chest 1 View    Result Date: 4/3/2024  Impression: Impression: No evidence of acute cardiopulmonary disease. Resolution of questionable opacity in the left upper lobe after repositioning of EKG lead. Electronically Signed: Martin Roca MD  4/3/2024 4:33 PM EDT  Workstation ID: [id number]    CT Head Without Contrast    Result Date: 4/3/2024  Impression: Impression: No acute intracranial abnormality. Electronically Signed: Mansi Sequeira MD  4/3/2024 12:00 PM EDT  Workstation ID: [id number]    XR Chest 1 View    Result Date: 4/3/2024  Impression: Impression: 1.Questionable nodular shadow left upper lobe versus summation of shadows. Follow-up recommended to reassess Electronically Signed: Rashard Hendricks MD  4/3/2024 11:30 AM EDT  Workstation ID: [id number]             Results for orders placed during the hospital encounter of 04/03/24    Adult Transthoracic Echo Complete w/ Color, Spectral and Contrast if Necessary Per Protocol    Interpretation Summary    Left ventricular systolic function is moderately decreased. Calculated left ventricular EF = 34.5%    Left ventricular diastolic function is consistent with (grade II w/high LAP) pseudonormalization.    The left atrial cavity is mildly  dilated.    Estimated right ventricular systolic pressure from tricuspid regurgitation is normal (<35 mmHg).      Labs Pending at Discharge:      Procedures Performed           Consults:   Consults       Date and Time Order Name Status Description    4/5/2024 12:12 PM Inpatient Hospitalist Consult Completed     4/3/2024  5:12 PM Inpatient Nephrology Consult      4/3/2024  4:33 PM Inpatient Endocrinology Consult Completed     4/3/2024  2:45 PM Inpatient Cardiology Consult Completed                ASSESSMENT & PLAN:     New diagnosis of heart failure with reduced ejection fraction  Nuclear stress test 4/6/2024 without evidence of myocardial ischemia/infarction  Unclear etiology  Could be in setting of significant hypothyroidism and occult arrhythmia.  Metoprolol discontinued in setting of bradycardia  Initiate low-dose ACE inhibitor once renal function stabilizes  Event monitor on  discharge to rule out occult arrhythmia as cause of heart failure  Outpatient follow-up with Dr. Casillas     Severe hypothyroidism  Reports noncompliance to levothyroxine  Endocrinology team following  Stressed on compliance     Coronary angiogram 2017 with distal LAD disease otherwise nonobstructive CAD  Continue baby aspirin             Part of this note may be an electronic transcription/translation of spoken language to printed text using the Dragon Dictation System.     Jacob Velásquez MD  04/06/24  15:22 EDT          Discharge Details        Discharge Medications        Changes to Medications        Instructions Start Date   venlafaxine  MG 24 hr capsule  Commonly known as: EFFEXOR-XR  What changed: Another medication with the same name was removed. Continue taking this medication, and follow the directions you see here.   150 mg, Oral, Nightly, In addition to 75mg for a total of 225mg at bedtime             Continue These Medications        Instructions Start Date   albuterol sulfate  (90 Base) MCG/ACT inhaler  Commonly  known as: PROVENTIL HFA;VENTOLIN HFA;PROAIR HFA   2 puffs, Inhalation, Every 4 Hours PRN      aspirin 81 MG EC tablet   81 mg, Oral, Daily      Calcium 200 MG tablet   1 tablet, Oral, Daily      colestipol 1 g tablet  Commonly known as: COLESTID   Take 1 tablet by mouth 2 (Two) Times a Day.      Creon 65460-535403 units capsule delayed-release particles capsule  Generic drug: Pancrelipase (Lip-Prot-Amyl)   36,000 units of lipase, Oral, With Snacks      Pancrelipase (Lip-Prot-Amyl) 03100-894120 units capsule delayed-release particles capsule  Commonly known as: CREON   72,000 units of lipase, Oral, Daily With Breakfast & Lunch      dicyclomine 20 MG tablet  Commonly known as: BENTYL   Every 12 Hours      levothyroxine 200 MCG tablet  Commonly known as: Synthroid   200 mcg, Oral, Daily      oxybutynin 5 MG tablet  Commonly known as: DITROPAN   5 mg, Oral, 3 Times Daily      Vitamin D (Cholecalciferol) 10 MCG (400 UNIT) tablet  Commonly known as: CHOLECALCIFEROL   400 Units, Oral, Daily             Stop These Medications      metoprolol succinate XL 25 MG 24 hr tablet  Commonly known as: TOPROL-XL              Allergies   Allergen Reactions    Hydrocodone-Acetaminophen Hives     Can tolerate with Benadryl    Oxycodone-Acetaminophen Hives     Can tolerate with Benadryl    Ketorolac Tromethamine Itching    Promethazine Itching    Tramadol Itching         Discharge Disposition:   Home or Self Care    Diet:  Hospital:  Diet Order   Procedures    Diet: Regular/House; Fluid Consistency: Thin (IDDSI 0)         Discharge Activity:   Activity Instructions       Gradually Increase Activity Until at Pre-Hospitalization Level                CODE STATUS:  Code Status and Medical Interventions:   Ordered at: 04/03/24 1444     Code Status (Patient has no pulse and is not breathing):    CPR (Attempt to Resuscitate)     Medical Interventions (Patient has pulse or is breathing):    Full Support         Future Appointments   Date Time  Provider Department [place]   12/4/2024 11:40 AM Pablito Casillas MD [facility]       Additional Instructions for the Follow-ups that You Need to Schedule       Discharge Follow-up with PCP   As directed       Currently Documented PCP:    Colby Vines MD    PCP Phone Number:    [phone]     Follow Up Details: 1 week        Discharge Follow-up with Specified Provider: cardiology; 2 Weeks   As directed      To: cardiology   Follow Up: 2 Weeks                Time spent on Discharge including face to face service:  >30 minutes    Signature: Electronically signed by Gordy Blue MD, 04/07/24, 10:33 EDT.  [facility] [place] Hospitalist Team

## 2024-04-07 NOTE — CASE MANAGEMENT/SOCIAL WORK
Continued Stay Note  [facility]     Patient Name: Cassie Winter  MRN: [id number]  Today's Date: 4/7/2024    Admit Date: 4/3/2024    Plan: PT recommending SNF; pending pt choices. Will require precert and PASRR.   Discharge Plan       Row Name 04/07/24 7559       Plan    Plan PT recommending SNF; pending pt choices. Will require precert and PASRR.    Patient/Family in Agreement with Plan yes    Provided Post Acute Provider List? Yes    Post Acute Provider List Nursing Home    Plan Comments PT recommending SNF. Pt agreeable. CM met with pt at bedside and provided SNF list. Pt to review list with family tomorrow and pick top 3 choices. Will require precert and PASRR.

## 2024-04-07 NOTE — PROGRESS NOTES
Daily Progress Note    Patient Care Team:  Colby Vines MD as PCP - General (Internal Medicine)  Bonnie Landa PT as Physical Therapist (Physical Therapy)  Pablito Casillas MD as Consulting Physician (Cardiology)    Chief Complaint: Follow-up hypothyroidism    HPI: Patient seen, clinically doing well.  No complaints at this time.  Currently on levothyroxine 200 mcg p.o. daily.  She tells me that she plans to take her thyroid medications on regular basis from here onwards.    ROS:   Constitutional:  Denies fatigue, tiredness.    Respiratory: denies cough, shortness of breath.   Cardiovascular:  denies chest pain, edema   GI:  Denies abdominal pain, nausea, vomiting.         Vitals:    04/07/24 1051   BP: 113/69   Pulse:    Resp: 16   Temp: 98.3 °F (36.8 °C)   SpO2:      Body mass index is 40.54 kg/m².    Physical Exam:  GEN: NAD, conversant  PSYCH: Awake and coherent      Results Review:     I reviewed the patient's new clinical results.    Glucose   Date Value Ref Range Status   04/06/2024 101 (H) 65 - 99 mg/dL Final     Sodium   Date Value Ref Range Status   04/06/2024 140 136 - 145 mmol/L Final     Potassium   Date Value Ref Range Status   04/06/2024 4.6 3.5 - 5.2 mmol/L Final     Comment:     Slight hemolysis detected by analyzer. Result may be falsely elevated.     CO2   Date Value Ref Range Status   04/06/2024 22.0 22.0 - 29.0 mmol/L Final     Chloride   Date Value Ref Range Status   04/06/2024 109 (H) 98 - 107 mmol/L Final     Anion Gap   Date Value Ref Range Status   04/06/2024 9.0 5.0 - 15.0 mmol/L Final     Creatinine   Date Value Ref Range Status   04/06/2024 1.49 (H) 0.57 - 1.00 mg/dL Final     BUN   Date Value Ref Range Status   04/06/2024 17 8 - 23 mg/dL Final     BUN/Creatinine Ratio   Date Value Ref Range Status   04/06/2024 11.4 7.0 - 25.0 Final     Calcium   Date Value Ref Range Status   04/06/2024 8.9 8.6 - 10.5 mg/dL Final     Alkaline Phosphatase   Date Value Ref Range  Status   04/05/2024 98 39 - 117 U/L Final     Total Protein   Date Value Ref Range Status   04/05/2024 5.6 (L) 6.0 - 8.5 g/dL Final     ALT (SGPT)   Date Value Ref Range Status   04/05/2024 18 1 - 33 U/L Final     AST (SGOT)   Date Value Ref Range Status   04/05/2024 32 1 - 32 U/L Final     Total Bilirubin   Date Value Ref Range Status   04/05/2024 0.2 0.0 - 1.2 mg/dL Final     Albumin   Date Value Ref Range Status   04/05/2024 3.2 (L) 3.5 - 5.2 g/dL Final     Globulin   Date Value Ref Range Status   04/05/2024 2.4 gm/dL Final      Latest Reference Range & Units 11/22/21 14:16 05/09/22 09:51 07/31/23 10:49 04/03/24 12:12 04/04/24 05:54 04/05/24 07:39 04/07/24 06:11   TSH Baseline 0.270 - 4.200 uIU/mL 17.800 (H) 5.840 (H) 1.630 115.200 (H) 106.200 (H) 97.100 (H) 87.590 (H)   Free T4 0.93 - 1.70 ng/dL  1.48 1.14 <0.10 (L)  0.38 (L) 0.59 (L)   (H): Data is abnormally high  (L): Data is abnormally low    Medication Review: Reviewed.     aspirin, 81 mg, Oral, Daily  dicyclomine, 20 mg, Oral, BID  levothyroxine, 200 mcg, Oral, Q AM  [Held by provider] metoprolol succinate XL, 25 mg, Oral, Daily  oxybutynin, 5 mg, Oral, TID  Pancrelipase (Lip-Prot-Amyl), 72,000 units of lipase, Oral, Daily With Breakfast & Lunch  sodium chloride, 10 mL, Intravenous, Q12H  venlafaxine XR, 225 mg, Oral, Nightly      Assessment and plan:  Hypothyroidism: Uncontrolled, currently on levothyroxine 200 mcg p.o. daily.  Clinically doing well.  Continue current dose.  Recommend to follow-up with Dr. Sanchez in about 2 to 4 weeks postdischarge.    Catherine Duggan MD. FACE

## 2024-04-07 NOTE — PROGRESS NOTES
Referring Provider: Gordy Blue MD       SUBJECTIVE    Overall feels well, nuclear stress test without evidence of myocardial ischemia/infarction  Receiving levothyroxine for hypothyroidism.       ROS  Review of all systems negative except as indicated above    Personal History:    Past Medical History:   Diagnosis Date    Allergic rhinitis     Anemia     Asthma     B12 deficiency     Bradycardia     Broken arm 2019    Left arm broken     Chronic back pain     Chronic bronchitis     Coronary artery disease     Diarrhea     Encounter for blood transfusion 4/3/2024    Hypothyroidism     MRSA (methicillin resistant Staphylococcus aureus)     Neuropathic pain of foot     Severe back pain 2010    Sleep apnea, obstructive        Past Surgical History:   Procedure Laterality Date    ABDOMINAL HERNIA REPAIR  2003    repair of abdominal wall hernia, intraabdominal hernia and hiatal hernia, 2004 incisional hernia repair.     ACHILLES TENDON SURGERY  2011    rupture    ANKLE FUSION Right 11/06/2015    CARDIAC CATHETERIZATION  02/20/2017    CHOLECYSTECTOMY  1992    GASTRIC BYPASS  1986    LAMINECTOMY  2007    that was complicated by a staph infection     OTHER SURGICAL HISTORY Left 2019    L arm     REMOVAL EXTERNAL FIXATOR      WRIST FRACTURE SURGERY Right        Family History   Problem Relation Age of Onset    Heart disease Mother     COPD Mother     Lung cancer Mother     Lung cancer Father     Other Father         Bladder cancer    Sleep apnea Sister     Hypertension Sister     Diabetes Sister     Other Sister         Heart Valve Replacement        Social History     Tobacco Use    Smoking status: Never    Smokeless tobacco: Never   Vaping Use    Vaping status: Never Used   Substance Use Topics    Alcohol use: No    Drug use: No        Home meds:  Prior to Admission medications    Medication Sig Start Date End Date Taking? Authorizing Provider   aspirin 81 MG EC tablet Take 1 tablet by mouth Daily. 5/20/19  Yes  Leighann Solitario MD   Calcium 200 MG tablet Take 1 tablet by mouth Daily.   Yes Leighann Solitario MD   colestipol (COLESTID) 1 g tablet Take 1 tablet by mouth 2 (Two) Times a Day. 9/5/23  Yes Leighann Solitario MD   dicyclomine (BENTYL) 20 MG tablet Every 12 (Twelve) Hours. 11/28/16  Yes Leighann Solitario MD   levothyroxine (Synthroid) 200 MCG tablet Take 1 tablet by mouth Daily. 8/22/23  Yes Cristopher Garcia Jr., MD   metoprolol succinate XL (TOPROL-XL) 25 MG 24 hr tablet Take 1 tablet by mouth Daily.   Yes Leighann Solitario MD   Pancrelipase, Lip-Prot-Amyl, (Creon) 99197-597588 units capsule delayed-release particles capsule Take 1 capsule by mouth With Snacks for Snacks.   Yes Leighann Solitario MD   Pancrelipase, Lip-Prot-Amyl, (CREON) 78297-229642 units capsule delayed-release particles capsule Take 2 capsules by mouth Daily With Breakfast & Lunch.   Yes Leighann Solitario MD   venlafaxine 75 MG tablet sustained-release 24 hour 24 hr tablet Take 1 tablet by mouth Every Night. In addition to 150mg for a total of 225mg at bedtime   Yes Leighann Solitario MD   venlafaxine XR (EFFEXOR-XR) 150 MG 24 hr capsule Take 1 capsule by mouth Every Night. In addition to 75mg for a total of 225mg at bedtime   Yes Leighann Solitario MD   albuterol sulfate  (90 Base) MCG/ACT inhaler Inhale 2 puffs Every 4 (Four) Hours As Needed for Wheezing. 12/30/22   Cristopher Garcia Jr., MD   oxybutynin (DITROPAN) 5 MG tablet Take 1 tablet by mouth 3 (Three) Times a Day. 6/20/23   Cristopher Garcia Jr., MD   Vitamin D, Cholecalciferol, (CHOLECALCIFEROL) 10 MCG (400 UNIT) tablet Take 1 tablet by mouth Daily.    Leighann Solitario MD       Allergies:  Hydrocodone-acetaminophen, Oxycodone-acetaminophen, Ketorolac tromethamine, Promethazine, and Tramadol      OBJECTIVE    Vital Signs  Vitals:    04/06/24 2254 04/07/24 0250 04/07/24 0623 04/07/24 1051   BP: 133/70 110/78 115/72 113/69   BP Location:  "Left arm Left arm Left arm Left arm   Patient Position: Lying Lying Lying Sitting   Pulse: 85 89 75    Resp: 16 20 16 16   Temp: 98 °F (36.7 °C) 97.8 °F (36.6 °C) 98 °F (36.7 °C) 98.3 °F (36.8 °C)   TempSrc: Oral Oral Oral Oral   SpO2: 97% 95% 96%    Weight:       Height:           Flowsheet Rows      Flowsheet Row First Filed Value   Admission Height 165.1 cm (65\") Documented at 04/03/2024 0935   Admission Weight 108 kg (238 lb 1.6 oz) Documented at 04/03/2024 0935              Intake/Output Summary (Last 24 hours) at 4/7/2024 1322  Last data filed at 4/7/2024 0623  Gross per 24 hour   Intake 120 ml   Output 2700 ml   Net -2580 ml              Physical Exam:  General-no acute distress  No elevated JVP noted.  Cardiovascular-S1-S2 normal, no murmurs noted.  Respiratory-normal breath sounds, no wheezing/crackles.  GI-abdomen is soft and nontender  No pedal edema        Results Review:    BNP        TROPONIN  Results from last 7 days   Lab Units 04/04/24  0554   HSTROP T ng/L 40*       CoAg  Results from last 7 days   Lab Units 04/03/24  0958   INR  0.94   APTT seconds 26.6       Creatinine Clearance  Estimated Creatinine Clearance: 46.7 mL/min (A) (by C-G formula based on SCr of 1.49 mg/dL (H)).      Radiology  No radiology results for the last day      EKG  I personally viewed and interpreted the patient's EKG/Telemetry data:  ECG 12 Lead Chest Pain   Final Result   HEART RATE= 46  bpm   RR Interval= 1300  ms   MA Interval= 38  ms   P Horizontal Axis= 50  deg   P Front Axis= 0  deg   QRSD Interval= 111  ms   QT Interval= 453  ms   QTcB= 397  ms   QRS Axis= -57  deg   T Wave Axis= 238  deg   - ABNORMAL ECG -   Sinus bradycardia   Incomplete left bundle branch block   LVH with secondary repolarization abnormality   Anterior Q waves, possibly due to LVH   LAHB   Electronically Signed By: Jorge Prescott (MICHAEL) 04-Apr-2024 06:56:04   Date and Time of Study: 2024-04-03 09:47:32      Telemetry Scan   Final Result    "   Telemetry Scan   Final Result      Telemetry Scan   Final Result            Echocardiogram:    Results for orders placed during the hospital encounter of 04/03/24    Adult Transthoracic Echo Complete w/ Color, Spectral and Contrast if Necessary Per Protocol    Interpretation Summary    Left ventricular systolic function is moderately decreased. Calculated left ventricular EF = 34.5%    Left ventricular diastolic function is consistent with (grade II w/high LAP) pseudonormalization.    The left atrial cavity is mildly dilated.    Estimated right ventricular systolic pressure from tricuspid regurgitation is normal (<35 mmHg).        Stress Test:  Results for orders placed during the hospital encounter of 04/03/24    Stress Test With Myocardial Perfusion One Day    Interpretation Summary    Myocardial perfusion imaging indicates a normal myocardial perfusion study with no evidence of ischemia. Impressions are consistent with a low risk study.    Left ventricular ejection fraction is mildly reduced (Calculated EF = 42%).    This is normal Cardiolite imaging stress test with no evidence of ischemia or myocardial infarction.  Left ventricle size and function is normal on gated SPECT imaging.  No wall motion abnormality was noted.  Clinical correlation recommended.  Further recommendation as per ordering physician. .    Findings consistent with a normal ECG stress test.         Cardiac Catheterization:  No results found for this or any previous visit.         Other:         ASSESSMENT & PLAN:    New diagnosis of heart failure with reduced ejection fraction  Nuclear stress test 4/6/2024 without evidence of myocardial ischemia/infarction  Unclear etiology  Could be in setting of significant hypothyroidism and occult arrhythmia.  Metoprolol discontinued in setting of bradycardia  Initiate low-dose ACE inhibitor once renal function stabilizes as outpatient  Event monitor on  discharge to rule out occult arrhythmia as cause  of heart failure  Outpatient follow-up with Dr. Casillas     Severe hypothyroidism  Reports noncompliance to levothyroxine  Endocrinology team following  Stressed on compliance     Coronary angiogram 2017 with distal LAD disease otherwise nonobstructive CAD  Continue baby aspirin           OP f/u with Dr Casillas      Part of this note may be an electronic transcription/translation of spoken language to printed text using the Dragon Dictation System.    Jacob Velásquez MD  04/07/24  13:22 EDT

## 2024-04-07 NOTE — PLAN OF CARE
Problem: Adult Inpatient Plan of Care  Goal: Plan of Care Review  Outcome: Ongoing, Progressing  Goal: Patient-Specific Goal (Individualized)  Outcome: Ongoing, Progressing  Goal: Absence of Hospital-Acquired Illness or Injury  Outcome: Ongoing, Progressing  Intervention: Identify and Manage Fall Risk  Recent Flowsheet Documentation  Taken 4/7/2024 0332 by Nola Hassna RN  Safety Promotion/Fall Prevention:   safety round/check completed   room organization consistent  Taken 4/7/2024 0219 by Nola Hassan RN  Safety Promotion/Fall Prevention:   safety round/check completed   room organization consistent  Taken 4/6/2024 2349 by Nola Hassan RN  Safety Promotion/Fall Prevention:   room organization consistent   safety round/check completed  Taken 4/6/2024 2041 by Nola Hassan RN  Safety Promotion/Fall Prevention:   safety round/check completed   room organization consistent  Intervention: Prevent Skin Injury  Recent Flowsheet Documentation  Taken 4/7/2024 0332 by Nola Hassan RN  Body Position: position changed independently  Taken 4/6/2024 2349 by Nola Hassan RN  Body Position: position changed independently  Taken 4/6/2024 2041 by Nola Hassan RN  Body Position: position changed independently  Skin Protection: adhesive use limited  Intervention: Prevent and Manage VTE (Venous Thromboembolism) Risk  Recent Flowsheet Documentation  Taken 4/7/2024 0332 by Nola Hassan RN  Activity Management: bedrest  Taken 4/6/2024 2349 by Nola Hassan RN  Activity Management: bedrest  Taken 4/6/2024 2041 by Nola Hassan RN  Activity Management: bedrest  Intervention: Prevent Infection  Recent Flowsheet Documentation  Taken 4/7/2024 0332 by Nola Hassan RN  Infection Prevention:   single patient room provided   hand hygiene promoted  Taken 4/7/2024 0219 by Nola Hassan RN  Infection Prevention:   single patient room provided   hand hygiene promoted  Taken 4/6/2024 2349 by  Nola Hassan RN  Infection Prevention:   single patient room provided   hand hygiene promoted  Taken 4/6/2024 2041 by Nola Hassan RN  Infection Prevention:   single patient room provided   hand hygiene promoted  Goal: Optimal Comfort and Wellbeing  Outcome: Ongoing, Progressing  Goal: Readiness for Transition of Care  Outcome: Ongoing, Progressing     Problem: Asthma Comorbidity  Goal: Maintenance of Asthma Control  Outcome: Ongoing, Progressing  Intervention: Maintain Asthma Symptom Control  Recent Flowsheet Documentation  Taken 4/7/2024 0332 by Nola Hassan RN  Medication Review/Management:   medications reviewed   high-risk medications identified  Taken 4/7/2024 0219 by Nola Hassan RN  Medication Review/Management:   medications reviewed   high-risk medications identified  Taken 4/6/2024 2349 by Nola Hassan RN  Medication Review/Management:   medications reviewed   high-risk medications identified  Taken 4/6/2024 2041 by Nola Hassan RN  Medication Review/Management:   medications reviewed   high-risk medications identified     Problem: Skin Injury Risk Increased  Goal: Skin Health and Integrity  Outcome: Ongoing, Progressing  Intervention: Optimize Skin Protection  Recent Flowsheet Documentation  Taken 4/6/2024 2041 by Nola Hassan RN  Skin Protection: adhesive use limited     Problem: Breathing Pattern Ineffective  Goal: Effective Breathing Pattern  Outcome: Ongoing, Progressing     Problem: Chest Pain  Goal: Resolution of Chest Pain Symptoms  Outcome: Ongoing, Progressing     Problem: Fall Injury Risk  Goal: Absence of Fall and Fall-Related Injury  Outcome: Ongoing, Progressing  Intervention: Identify and Manage Contributors  Recent Flowsheet Documentation  Taken 4/7/2024 0332 by Nola Hassan RN  Medication Review/Management:   medications reviewed   high-risk medications identified  Taken 4/7/2024 0219 by Nola Hassan RN  Medication Review/Management:    medications reviewed   high-risk medications identified  Taken 4/6/2024 2349 by Nola Hassan, RN  Medication Review/Management:   medications reviewed   high-risk medications identified  Taken 4/6/2024 2041 by Nola Hassan, RN  Medication Review/Management:   medications reviewed   high-risk medications identified  Intervention: Promote Injury-Free Environment  Recent Flowsheet Documentation  Taken 4/7/2024 0332 by Nola Hassan, RN  Safety Promotion/Fall Prevention:   safety round/check completed   room organization consistent  Taken 4/7/2024 0219 by Nola Hassan, RN  Safety Promotion/Fall Prevention:   safety round/check completed   room organization consistent  Taken 4/6/2024 2349 by Nola Hassan, RN  Safety Promotion/Fall Prevention:   room organization consistent   safety round/check completed  Taken 4/6/2024 2041 by Nola Hassan, RN  Safety Promotion/Fall Prevention:   safety round/check completed   room organization consistent   Goal Outcome Evaluation:

## 2024-04-07 NOTE — PLAN OF CARE
Goal Outcome Evaluation:  Plan of Care Reviewed With: patient        Progress: improving  Outcome Evaluation: Pt d/c home.  F/u with [facility] OP for possible cath.

## 2024-04-07 NOTE — PROGRESS NOTES
Patient: Ventura Newby    Procedure: Procedure(s):  ROBOTIC ASSISTED LAPAROSCOPIC RIGHT PARTIAL NEPHRECTOMY WITH INTRAOPERATIVE RENAL ULTRASOUND       Diagnosis: Malignant neoplasm of right kidney, except renal pelvis (H) [C64.1]  Diagnosis Additional Information: No value filed.    Anesthesia Type:   General     Note:    Oropharynx: oropharynx clear of all foreign objects  Level of Consciousness: drowsy  Oxygen Supplementation: face mask  Level of Supplemental Oxygen (L/min / FiO2): 6  Independent Airway: airway patency satisfactory and stable  Dentition: dentition unchanged  Vital Signs Stable: post-procedure vital signs reviewed and stable  Report to RN Given: handoff report given  Patient transferred to: PACU  Comments: To PACU: Arouses easily, good airway, 02 face mask, VSS  Report to RN  Handoff Report: Identifed the Patient, Identified the Reponsible Provider, Reviewed the pertinent medical history, Discussed the surgical course, Reviewed Intra-OP anesthesia mangement and issues during anesthesia, Set expectations for post-procedure period and Allowed opportunity for questions and acknowledgement of understanding      Vitals:  Vitals Value Taken Time   /83 08/03/22 1133   Temp     Pulse 78 08/03/22 1135   Resp 7 08/03/22 1135   SpO2 100 % 08/03/22 1135   Vitals shown include unvalidated device data.    Electronically Signed By: MERRY Aranda CRNA  August 3, 2022  11:37 AM   Procedure   Procedures    [facility] Progress Note      Patient Name: Cassie Winter  : 1960  MRN: [id number]  Primary Care Physician:  Colby Vines MD  Date of admission: 4/3/2024    Subjective     Interval History:  no further soa or chest pain, feels stronger    Review of Systems:   14 point review of systems is otherwise negative except for mentioned above on HPI    Objective     Vitals:   Temp:  [97.6 °F (36.4 °C)-98.3 °F (36.8 °C)] 98.3 °F (36.8 °C)  Heart Rate:  [75-89] 75  Resp:  [16-20] 16  BP: (110-133)/(60-78) 113/69    Intake/Output Summary (Last 24 hours) at 2024 1349  Last data filed at 2024 0623  Gross per 24 hour   Intake 120 ml   Output 2700 ml   Net -2580 ml       Physical Exam:    General Appearance: alert, oriented x 3, no acute distress   Skin: warm and dry  HEENT: oral mucosa normal, nonicteric sclera  Neck: supple, no JVD  Lungs: CTA  Heart: RRR, normal S1 and S2  Abdomen: soft, nontender, nondistended  : no palpable bladder  Extremities: no edema, cyanosis or clubbing  Neuro: normal speech and mental status     Scheduled Meds:     aspirin, 81 mg, Oral, Daily  dicyclomine, 20 mg, Oral, BID  levothyroxine, 200 mcg, Oral, Q AM  [Held by provider] metoprolol succinate XL, 25 mg, Oral, Daily  oxybutynin, 5 mg, Oral, TID  Pancrelipase (Lip-Prot-Amyl), 72,000 units of lipase, Oral, Daily With Breakfast & Lunch  sodium chloride, 10 mL, Intravenous, Q12H  venlafaxine XR, 225 mg, Oral, Nightly      IV Meds:          Results Reviewed:   I have personally reviewed the results from the time of this admission to 2024 13:49 EDT     Results from last 7 days   Lab Units 24  0638 24  0739 24  0554 24  0959   SODIUM mmol/L 140 139 139 141   POTASSIUM mmol/L 4.6 4.5 4.4 4.5   CHLORIDE mmol/L 109* 109* 110* 110*   CO2 mmol/L 22.0 21.0* 20.0* 17.0*   BUN mg/dL 17 19 21 23   CREATININE mg/dL 1.49* 1.48* 1.84* 1.72*   CALCIUM mg/dL 8.9 8.6  8.8 9.4   BILIRUBIN mg/dL  --  0.2  --  0.3   ALK PHOS U/L  --  98  --  112   ALT (SGPT) U/L  --  18  --  27   AST (SGOT) U/L  --  32  --  50*   GLUCOSE mg/dL 101* 86 88 101*     Estimated Creatinine Clearance: 46.7 mL/min (A) (by C-G formula based on SCr of 1.49 mg/dL (H)).          Results from last 7 days   Lab Units 04/06/24  0638 04/05/24  0739 04/04/24  0554 04/03/24  0958   WBC 10*3/mm3 6.19 7.62 8.68 8.94   HEMOGLOBIN g/dL 10.9* 10.0* 10.9* 12.6   PLATELETS 10*3/mm3 271 262 260 327     Results from last 7 days   Lab Units 04/03/24  0958   INR  0.94       Assessment / Plan     ASSESSMENT:  Shawn-suspect due to contrast nephropathy, resolving nicely with normal renal us and no proteinuria  Pyuria, f/u urine culture  Hypovolemia-better    PLAN:  Heplocked ivf  Avoid nephrotoxins  Check lab in am  Dc is ok from a renal standpoint    Thank you for involving us in the care of Cassie Winter.  Please feel free to call with any questions.    Jairo Elmore MD  04/07/24  13:49 EDT    [facility]  [phone]

## 2024-04-08 LAB
ALBUMIN SERPL-MCNC: 3.3 G/DL (ref 3.5–5.2)
ALBUMIN/GLOB SERPL: 1.2 G/DL
ALP SERPL-CCNC: 89 U/L (ref 39–117)
ALT SERPL W P-5'-P-CCNC: 19 U/L (ref 1–33)
ANION GAP SERPL CALCULATED.3IONS-SCNC: 9 MMOL/L (ref 5–15)
ANION GAP SERPL CALCULATED.3IONS-SCNC: 9 MMOL/L (ref 5–15)
AST SERPL-CCNC: 30 U/L (ref 1–32)
BASOPHILS # BLD AUTO: 0.06 10*3/MM3 (ref 0–0.2)
BASOPHILS NFR BLD AUTO: 0.9 % (ref 0–1.5)
BILIRUB SERPL-MCNC: 0.3 MG/DL (ref 0–1.2)
BUN SERPL-MCNC: 18 MG/DL (ref 8–23)
BUN SERPL-MCNC: 19 MG/DL (ref 8–23)
BUN/CREAT SERPL: 12.6 (ref 7–25)
BUN/CREAT SERPL: 14.8 (ref 7–25)
CALCIUM SPEC-SCNC: 9 MG/DL (ref 8.6–10.5)
CALCIUM SPEC-SCNC: 9.1 MG/DL (ref 8.6–10.5)
CHLORIDE SERPL-SCNC: 107 MMOL/L (ref 98–107)
CHLORIDE SERPL-SCNC: 107 MMOL/L (ref 98–107)
CO2 SERPL-SCNC: 22 MMOL/L (ref 22–29)
CO2 SERPL-SCNC: 22 MMOL/L (ref 22–29)
CREAT SERPL-MCNC: 1.28 MG/DL (ref 0.57–1)
CREAT SERPL-MCNC: 1.43 MG/DL (ref 0.57–1)
DEPRECATED RDW RBC AUTO: 57 FL (ref 37–54)
EGFRCR SERPLBLD CKD-EPI 2021: 40.8 ML/MIN/1.73
EGFRCR SERPLBLD CKD-EPI 2021: 46.6 ML/MIN/1.73
EOSINOPHIL # BLD AUTO: 0.35 10*3/MM3 (ref 0–0.4)
EOSINOPHIL NFR BLD AUTO: 5.3 % (ref 0.3–6.2)
ERYTHROCYTE [DISTWIDTH] IN BLOOD BY AUTOMATED COUNT: 15.8 % (ref 12.3–15.4)
GLOBULIN UR ELPH-MCNC: 2.7 GM/DL
GLUCOSE SERPL-MCNC: 78 MG/DL (ref 65–99)
GLUCOSE SERPL-MCNC: 84 MG/DL (ref 65–99)
HCT VFR BLD AUTO: 32.4 % (ref 34–46.6)
HGB BLD-MCNC: 10.3 G/DL (ref 12–15.9)
IMM GRANULOCYTES # BLD AUTO: 0.04 10*3/MM3 (ref 0–0.05)
IMM GRANULOCYTES NFR BLD AUTO: 0.6 % (ref 0–0.5)
LYMPHOCYTES # BLD AUTO: 2.71 10*3/MM3 (ref 0.7–3.1)
LYMPHOCYTES NFR BLD AUTO: 41.3 % (ref 19.6–45.3)
MCH RBC QN AUTO: 31.7 PG (ref 26.6–33)
MCHC RBC AUTO-ENTMCNC: 31.8 G/DL (ref 31.5–35.7)
MCV RBC AUTO: 99.7 FL (ref 79–97)
MONOCYTES # BLD AUTO: 0.58 10*3/MM3 (ref 0.1–0.9)
MONOCYTES NFR BLD AUTO: 8.8 % (ref 5–12)
NEUTROPHILS NFR BLD AUTO: 2.82 10*3/MM3 (ref 1.7–7)
NEUTROPHILS NFR BLD AUTO: 43.1 % (ref 42.7–76)
NRBC BLD AUTO-RTO: 0 /100 WBC (ref 0–0.2)
PLATELET # BLD AUTO: 298 10*3/MM3 (ref 140–450)
PMV BLD AUTO: 9.9 FL (ref 6–12)
POTASSIUM SERPL-SCNC: 4.2 MMOL/L (ref 3.5–5.2)
POTASSIUM SERPL-SCNC: 5 MMOL/L (ref 3.5–5.2)
PROT SERPL-MCNC: 6 G/DL (ref 6–8.5)
RBC # BLD AUTO: 3.25 10*6/MM3 (ref 3.77–5.28)
SODIUM SERPL-SCNC: 138 MMOL/L (ref 136–145)
SODIUM SERPL-SCNC: 138 MMOL/L (ref 136–145)
WBC NRBC COR # BLD AUTO: 6.56 10*3/MM3 (ref 3.4–10.8)

## 2024-04-08 PROCEDURE — 85025 COMPLETE CBC W/AUTO DIFF WBC: CPT | Performed by: INTERNAL MEDICINE

## 2024-04-08 PROCEDURE — 80048 BASIC METABOLIC PNL TOTAL CA: CPT | Performed by: INTERNAL MEDICINE

## 2024-04-08 PROCEDURE — 99232 SBSQ HOSP IP/OBS MODERATE 35: CPT | Performed by: INTERNAL MEDICINE

## 2024-04-08 RX ORDER — ATORVASTATIN CALCIUM 10 MG/1
10 TABLET, FILM COATED ORAL NIGHTLY
Status: DISCONTINUED | OUTPATIENT
Start: 2024-04-08 | End: 2024-04-10 | Stop reason: HOSPADM

## 2024-04-08 RX ADMIN — OXYBUTYNIN CHLORIDE 5 MG: 5 TABLET ORAL at 18:50

## 2024-04-08 RX ADMIN — ACETAMINOPHEN 650 MG: 325 TABLET, FILM COATED ORAL at 21:18

## 2024-04-08 RX ADMIN — OXYBUTYNIN CHLORIDE 5 MG: 5 TABLET ORAL at 10:25

## 2024-04-08 RX ADMIN — ASPIRIN 81 MG: 81 TABLET, COATED ORAL at 10:32

## 2024-04-08 RX ADMIN — PANCRELIPASE 72000 UNITS OF LIPASE: 36000; 180000; 114000 CAPSULE, DELAYED RELEASE PELLETS ORAL at 14:59

## 2024-04-08 RX ADMIN — DICYCLOMINE HYDROCHLORIDE 20 MG: 10 CAPSULE ORAL at 10:25

## 2024-04-08 RX ADMIN — OXYBUTYNIN CHLORIDE 5 MG: 5 TABLET ORAL at 21:11

## 2024-04-08 RX ADMIN — LEVOTHYROXINE SODIUM 200 MCG: 0.1 TABLET ORAL at 06:33

## 2024-04-08 RX ADMIN — Medication 10 ML: at 10:27

## 2024-04-08 RX ADMIN — SACUBITRIL AND VALSARTAN 1 TABLET: 24; 26 TABLET, FILM COATED ORAL at 23:50

## 2024-04-08 RX ADMIN — DICYCLOMINE HYDROCHLORIDE 20 MG: 10 CAPSULE ORAL at 21:11

## 2024-04-08 RX ADMIN — VENLAFAXINE HYDROCHLORIDE 225 MG: 75 CAPSULE, EXTENDED RELEASE ORAL at 21:11

## 2024-04-08 RX ADMIN — ATORVASTATIN CALCIUM 10 MG: 10 TABLET, FILM COATED ORAL at 21:11

## 2024-04-08 RX ADMIN — PANCRELIPASE 72000 UNITS OF LIPASE: 36000; 180000; 114000 CAPSULE, DELAYED RELEASE PELLETS ORAL at 10:26

## 2024-04-08 NOTE — PROGRESS NOTES
ENDOCRINE CONSULT PROGRESS NOTE  DATE OF SERVICE: 24        PATIENT NAME: Cassie Winter  PATIENT : 1960 AGE: 63 y.o.  MRN NUMBER: [id number]    ==========================================================================    CHIEF COMPLAINT: Hypothyroidism    CARE TEAM:   Patient Care Team:  Colby Vines MD as PCP - General (Internal Medicine)  Bonnie Landa PT as Physical Therapist (Physical Therapy)  Pablito Casillas MD as Consulting Physician (Cardiology)    SUBJECTIVE  Patient seen and examined.  Tolerating medication.  Shortness of breath improving.    ==========================================================================    CURRENT ACTIVE HOSPITAL MEDICATIONS    Scheduled Medications:  aspirin, 81 mg, Oral, Daily  atorvastatin, 10 mg, Oral, Nightly  dicyclomine, 20 mg, Oral, BID  levothyroxine, 200 mcg, Oral, Q AM  [Held by provider] metoprolol succinate XL, 25 mg, Oral, Daily  oxybutynin, 5 mg, Oral, TID  Pancrelipase (Lip-Prot-Amyl), 72,000 units of lipase, Oral, Daily With Breakfast & Lunch  sacubitril-valsartan, 1 tablet, Oral, Q12H  sodium chloride, 10 mL, Intravenous, Q12H  venlafaxine XR, 225 mg, Oral, Nightly         PRN Medications:    acetaminophen    aluminum-magnesium hydroxide-simethicone    calcium carbonate    ipratropium-albuterol    nitroglycerin    ondansetron ODT **OR** ondansetron    Pancrelipase (Lip-Prot-Amyl)    simethicone    sodium chloride    [COMPLETED] Insert Peripheral IV **AND** sodium chloride    sodium chloride    sodium chloride     ==========================================================================    OBJECTIVE    Vitals:    24   BP: 112/82   Pulse: 81   Resp: 20   Temp: 97.5 °F (36.4 °C)   SpO2: 97%      Body mass index is 40.54 kg/m².     General - A&Ox3, NAD, Calm    ==========================================================================    LAB EVALUATION    Lab Results   Component Value Date    GLUCOSE 78 2024  "   BUN 19 04/08/2024    CREATININE 1.28 (H) 04/08/2024    EGFRIFNONA 52 (L) 11/22/2021    BCR 14.8 04/08/2024    K 4.2 04/08/2024    CO2 22.0 04/08/2024    CALCIUM 9.0 04/08/2024    ALBUMIN 3.3 (L) 04/07/2024    LABIL2 1.3 02/11/2019    AST 30 04/07/2024    ALT 19 04/07/2024       No results found for: \"HGBA1C\"  Lab Results   Component Value Date    CREATININE 1.28 (H) 04/08/2024         ==========================================================================    ASSESSMENT AND PLAN    # Hypothyroidism with noncompliance, unintentional  - Continue levothyroxine 200 mcg every day  - If patient is planned to be discharged can be discharged the same dosing outpatient endocrinology follow-up  - Repeat thyroid function on 4/10/2024 if patient is in the hospital    - Will de-escalate levothyroxine therapy to 200 mcg every day  - Continue to clinically monitor  - If patient is planned for discharge can be discharged on levothyroxine 200 mcg p.o. daily and outpatient endocrinology next    # Shortness of breath  # Acute kidney injury  - Care as per primary team    Will follow with you.  Rest as per primary team.    This note was created using voice recognition software and is inherently subject to errors including those of syntax and \"sound-alike\" substitutions which may escape proofreading.  In such instances, original meaning may be extrapolated by contextual derivation.    Note: Portions of this note may have been copied from previous notes but documentation have been reviewed and edited as necessary to support clinical decision making for today's visit.  The time of this note does not reflect the time I saw the patient but the time that this note was written.    ==========================================================================  Cuco Sanchez MD  Department of Endocrine, Diabetes and Metabolism  [facility], [place]  ==========================================================================    "

## 2024-04-08 NOTE — CASE MANAGEMENT/SOCIAL WORK
Continued Stay Note  [facility]     Patient Name: Cassie Winter  MRN: [id number]  Today's Date: 4/8/2024    Admit Date: 4/3/2024  Denied by [facility] and [facility]. Will need more SNF choices  Plan: From home alone. Referred to [facility]; declined.  Referred to [facility]; acceptance pending. PASRR QFR.  Will need precert and pharmacy changed   Discharge Plan       Row Name 04/08/24 1342       Plan    Plan From home alone. Referred to [facility]; declined.  Referred to [facility]; acceptance pending. PASRR QFR.  Will need precert and pharmacy changed    Plan Comments CM met with Ms. Winter for SNF choices. First and second choices are [facility] and [facility]. CM added both facilites to Epic and contact laisions. [facility] declined her insurance.  [facility] pending. PASRR QFR. OT eval pending. CM will need to completed precert                          Diane AMBROSIO,RN Case Manager  [facility]  Phone: Desk- [phone] cell- [phone]

## 2024-04-08 NOTE — DISCHARGE PLACEMENT REQUEST
"Cassie Everett (63 y.o. Female)       Date of Birth   1960    Social Security Number       Address   [address] [place] [address] [place] [facility]    Home Phone   [phone]    MRN   [id number]       Yarsani   None    Marital Status   Single                            Admission Date   4/3/24    Admission Type   Emergency    Admitting Provider   Jorge Prescott MD    Attending Provider   Gordy Blue MD    Department, Room/Bed   [facility] OBSERVATION, 223/1       Discharge Date       Discharge Disposition       Discharge Destination                                 Attending Provider: Gordy Blue MD    Allergies: Hydrocodone-acetaminophen, Oxycodone-acetaminophen, Ketorolac Tromethamine, Promethazine, Tramadol    Isolation: None   Infection: None   Code Status: CPR    Ht: 165.1 cm (65\")   Wt: 110 kg (243 lb 9.7 oz)    Admission Cmt: None   Principal Problem: Shortness of breath [R06.02]                   Active Insurance as of 4/3/2024       Primary Coverage       Payor Plan Insurance Group Employer/Plan Group    ANTHEM MEDICARE REPLACEMENT ANTHEM MEDICARE ADVANTAGE [id number]       Payor Plan Address Payor Plan Phone Number Payor Plan Fax Number Effective Dates    [address] [phone]  9/1/2023 - None Entered    [facility] [address]         Subscriber Name Subscriber Birth Date Member ID       CASSIE EVERETT 1960 [id number]                     Emergency Contacts        (Rel.) Home Phone Work Phone Mobile Phone    DAXA HARVEY (Sister) -- -- [phone]    MICHELLE SANTIAGO (Other) [phone] -- [phone]    BRYAN GLASGOW (Daughter) -- -- [phone]              Insurance Information                  ANTHEM MEDICARE REPLACEMENT/ANTHEM MEDICARE ADVANTAGE Phone: [phone]    Subscriber: Cassie Everett Subscriber#: [id number]    Group#: [id number] Precert#: --          "

## 2024-04-08 NOTE — PLAN OF CARE
Problem: Fall Injury Risk  Goal: Absence of Fall and Fall-Related Injury  Outcome: Ongoing, Progressing  Intervention: Identify and Manage Contributors  Recent Flowsheet Documentation  Taken 4/7/2024 2015 by Alan Borrego RN  Medication Review/Management: medications reviewed  Intervention: Promote Injury-Free Environment  Recent Flowsheet Documentation  Taken 4/8/2024 0600 by Alan Borrego RN  Safety Promotion/Fall Prevention: safety round/check completed  Taken 4/8/2024 0400 by Alan Borrego RN  Safety Promotion/Fall Prevention: safety round/check completed  Taken 4/8/2024 0200 by Alan Borrego RN  Safety Promotion/Fall Prevention: safety round/check completed  Taken 4/8/2024 0000 by Alan Borrego RN  Safety Promotion/Fall Prevention: safety round/check completed  Taken 4/7/2024 2200 by Alan Borrego RN  Safety Promotion/Fall Prevention: safety round/check completed  Taken 4/7/2024 2015 by Alan Borrego RN  Safety Promotion/Fall Prevention: safety round/check completed   Goal Outcome Evaluation:

## 2024-04-08 NOTE — PROGRESS NOTES
Procedure   Procedures    [facility] Progress Note      Patient Name: Cassie Winter  : 1960  MRN: [id number]  Primary Care Physician:  Colby Vines MD  Date of admission: 4/3/2024    Subjective     Interval History:    Patient was seen and examined this morning.  She was resting comfortably and extent of any chest pain, shortness of breath, nausea or vomiting    Objective     Vitals:   Temp:  [97.7 °F (36.5 °C)-98.9 °F (37.2 °C)] 97.7 °F (36.5 °C)  Heart Rate:  [] 73  Resp:  [16-17] 17  BP: ()/(57-76) 125/69  No intake or output data in the 24 hours ending 24 1210      Physical Exam:    General Appearance: alert, oriented x 3, no acute distress   Skin: warm and dry  HEENT: oral mucosa normal, nonicteric sclera  Neck: supple, no JVD  Lungs: CTA  Heart: RRR, normal S1 and S2  Abdomen: soft, nontender, nondistended  : no palpable bladder  Extremities: no edema, cyanosis or clubbing  Neuro: normal speech and mental status     Scheduled Meds:     aspirin, 81 mg, Oral, Daily  dicyclomine, 20 mg, Oral, BID  levothyroxine, 200 mcg, Oral, Q AM  [Held by provider] metoprolol succinate XL, 25 mg, Oral, Daily  oxybutynin, 5 mg, Oral, TID  Pancrelipase (Lip-Prot-Amyl), 72,000 units of lipase, Oral, Daily With Breakfast & Lunch  sodium chloride, 10 mL, Intravenous, Q12H  venlafaxine XR, 225 mg, Oral, Nightly      IV Meds:          Results Reviewed:   I have personally reviewed the results from the time of this admission to 2024 12:10 EDT     Results from last 7 days   Lab Units 24  1108 24  0638 24  0739 24  0554 24  0959   SODIUM mmol/L 138 140 139   < > 141   POTASSIUM mmol/L 4.2 4.6 4.5   < > 4.5   CHLORIDE mmol/L 107 109* 109*   < > 110*   CO2 mmol/L 22.0 22.0 21.0*   < > 17.0*   BUN mg/dL 19 17 19   < > 23   CREATININE mg/dL 1.28* 1.49* 1.48*   < > 1.72*   CALCIUM mg/dL 9.0 8.9 8.6   < > 9.4   BILIRUBIN mg/dL  --   --  0.2  --  0.3    ALK PHOS U/L  --   --  98  --  112   ALT (SGPT) U/L  --   --  18  --  27   AST (SGOT) U/L  --   --  32  --  50*   GLUCOSE mg/dL 78 101* 86   < > 101*    < > = values in this interval not displayed.     Estimated Creatinine Clearance: 54.4 mL/min (A) (by C-G formula based on SCr of 1.28 mg/dL (H)).          Results from last 7 days   Lab Units 04/08/24  1108 04/06/24  0638 04/05/24  0739 04/04/24  0554 04/03/24  0958   WBC 10*3/mm3 6.56 6.19 7.62 8.68 8.94   HEMOGLOBIN g/dL 10.3* 10.9* 10.0* 10.9* 12.6   PLATELETS 10*3/mm3 298 271 262 260 327     Results from last 7 days   Lab Units 04/03/24  0958   INR  0.94       Assessment / Plan     ASSESSMENT:  Shawn-suspect due to contrast nephropathy, resolving nicely with normal renal us and no proteinuria.  Creatinine decreased to 1.28 mg/DL.  Electrolytes, volume status okay.  May have mild underlying CKD also  Hyperthyroidism.  Endocrinology following  Hypovolemia-better    PLAN:  Continue current treatment  I will sign off.  Please call for any question    Thank you for involving us in the care of Cassie Winter.  Please feel free to call with any questions.    Mikael Ramos MD  04/08/24  12:10 EDT    [facility]  [phone]

## 2024-04-08 NOTE — NURSING NOTE
Rn attempted to place 22 in RAC LFA. IV was removed from  LAC r/t d/c orders for today that were dc'd. Pt states she had to have Iv place last line with u/s and has been a lab stick the last few morning. CN made aware. Nurse attempted. IV team called and message left.
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
## 2024-04-08 NOTE — PROGRESS NOTES
"[facility] MEDICINE SERVICE  DAILY PROGRESS NOTE    NAME: Cassie Winter  : 1960  MRN: [id number]      LOS: 3 days     PROVIDER OF SERVICE: Gordy Blue MD    Chief Complaint: Shortness of breath    Subjective:     Interval History:  History taken from: patient    History Present Illness       Per the documentation by Kelley Sesay, dated ,  \"63-year-old white female with a history of thyroid dysfunction, CAD. She presents today from her primary care provider's office with complaints of shortness of breath and chest pain. She states has been ongoing for the last 2 months or so. She has symptoms at rest but her symptoms are much worse with exertion. She denies any fever or cough. No leg pain or swelling. She denies any black or bloody stools. She does report some frequent belching. No abdominal pain nausea or vomiting. \"     24-Hospitalist team has been consulted for further medical management.       24 seen and examined in bed no acute distress, vital signs stable, discussed with RN.  Denies any chest pain or shortness of breath feeling feeling better  2024 patient seen and examined in bed acute distress, new complaints, vital signs stable.  Awaiting OT evaluation.    Review of Systems  Patient denies any complaints at this time.  Objective:     Vital Signs  Temp:  [98.2 °F (36.8 °C)-98.9 °F (37.2 °C)] 98.9 °F (37.2 °C)  Heart Rate:  [] 79  Resp:  [16] 16  BP: ()/(57-76) 153/74   Body mass index is 40.54 kg/m².      Physical Exam  general Appearance: AOO x 4, cooperative, no distress, appropriate for age  Head:  Normocephalic, without obvious abnormality  Eyes:  PERRL, EOM's intact, conjunctivae and cornea clear  Nose:  Nares symmetrical, septum midline, mucosa pink  Throat:  Lips, tongue, and mucosa are moist, pink, and intact  Neck:  Supple; symmetrical, trachea midline, no adenopathy  Back:  Symmetrical, ROM normal, no CVA tenderness  Lungs: Respirations " unlabored, no audible wheeze  Heart: Regular rate & rhythm, S1 and S2 normal  Abdomen:  Soft, nontender, bowel sounds active all four quadrants  Musculoskeletal: Tone and strength strong and symmetrical, all extremities; no joint pain or edema         Skin/Hair/Nails:  Skin warm, dry and intact, no rashes or abnormal dyspigmentation        Scheduled Meds   aspirin, 81 mg, Oral, Daily  dicyclomine, 20 mg, Oral, BID  levothyroxine, 200 mcg, Oral, Q AM  [Held by provider] metoprolol succinate XL, 25 mg, Oral, Daily  oxybutynin, 5 mg, Oral, TID  Pancrelipase (Lip-Prot-Amyl), 72,000 units of lipase, Oral, Daily With Breakfast & Lunch  sodium chloride, 10 mL, Intravenous, Q12H  venlafaxine XR, 225 mg, Oral, Nightly       PRN Meds     acetaminophen    aluminum-magnesium hydroxide-simethicone    calcium carbonate    ipratropium-albuterol    nitroglycerin    ondansetron ODT **OR** ondansetron    Pancrelipase (Lip-Prot-Amyl)    simethicone    sodium chloride    [COMPLETED] Insert Peripheral IV **AND** sodium chloride    sodium chloride    sodium chloride   Infusions         Diagnostic Data    Results from last 7 days   Lab Units 04/06/24  0638 04/05/24  0739   WBC 10*3/mm3 6.19 7.62   HEMOGLOBIN g/dL 10.9* 10.0*   HEMATOCRIT % 32.4* 31.4*   PLATELETS 10*3/mm3 271 262   GLUCOSE mg/dL 101* 86   CREATININE mg/dL 1.49* 1.48*   BUN mg/dL 17 19   SODIUM mmol/L 140 139   POTASSIUM mmol/L 4.6 4.5   AST (SGOT) U/L  --  32   ALT (SGPT) U/L  --  18   ALK PHOS U/L  --  98   BILIRUBIN mg/dL  --  0.2   ANION GAP mmol/L 9.0 9.0       No radiology results for the last day      I reviewed the patient's new clinical results.    Assessment/Plan:     Active and Resolved Problems  Active Hospital Problems    Diagnosis  POA    **Shortness of breath [R06.02]  Yes    Dyspnea [R06.00]  Yes    Other sleep apnea [G47.39]  Yes    Asthma [J45.909]  Yes    Chronic coronary artery disease [I25.10]  Yes    Irritable bowel syndrome [K58.9]  Yes    Chronic  low back pain [M54.50, G89.29]  Yes    Neuropathy [G62.9]  Yes    Depression [F32.A]  Yes    B12 deficiency [E53.8]  Yes    Anemia [D64.9]  Yes    Hypothyroidism [E03.9]  Yes      Resolved Hospital Problems   No resolved problems to display.     Chest pain/dyspnea   -Currently on 2 L nasal cannula  -EKG:rate 46, sinus ludwin, incomplete LBBB  - cardiology consulted.  stress test  -Troponin flat  - echo pending  -Continue telemetry  Continue medical management with aspirin, metoprolol succinate as tolerated.      Hypothyroidism  - tsh 115, free T4 0.10  - pt on synthroid  - endocrinology consulted>If patient is planned for discharge can be discharged on levothyroxine 200 mcg p.o. daily and outpatient endocrinology next   - increased synthroid to 250  - recheck as outpt     ALL  -Creatinine trending down  -IV fluids stopped.  -  renal unremarkable  - nephrology on board>Dc is ok from a renal standpoint      Chronic pancreatic insufficiency  - creon, colestid, bentyl    DVT prophylaxis:  Mechanical DVT prophylaxis orders are present.    Code status is   Code Status and Medical Interventions:   Ordered at: 04/03/24 1444     Code Status (Patient has no pulse and is not breathing):    CPR (Attempt to Resuscitate)     Medical Interventions (Patient has pulse or is breathing):    Full Support       Plan for disposition:Recommending SNF at d/c and will plan to see 3x/week at [facility] for progression of mobility. PPE: gloves  in 1 days    Time: 30 minutes    Signature: Electronically signed by Gordy Blue MD, 04/08/24, 10:05 EDT.  [facility] Hospitalist Team

## 2024-04-08 NOTE — PROGRESS NOTES
"[facility] MEDICINE SERVICE  DAILY PROGRESS NOTE    NAME: Cassie Winter  : 1960  MRN: [id number]      LOS: 3 days     PROVIDER OF SERVICE: Gordy Blue MD    Chief Complaint: Shortness of breath    Subjective:     Interval History:  History taken from: patient    History Present Illness     Per the documentation by Kelley Sesay, dated ,  \"63-year-old white female with a history of thyroid dysfunction, CAD. She presents today from her primary care provider's office with complaints of shortness of breath and chest pain. She states has been ongoing for the last 2 months or so. She has symptoms at rest but her symptoms are much worse with exertion. She denies any fever or cough. No leg pain or swelling. She denies any black or bloody stools. She does report some frequent belching. No abdominal pain nausea or vomiting. \"     24-Hospitalist team has been consulted for further medical management.       24 seen and examined in bed no acute distress, vital signs stable, discussed with RN.  Denies any chest pain or shortness of breath feeling feeling better  2024 patient seen and examined in bed acute distress, new complaints, vital signs stable.  Awaiting OT evaluation.  2024 patient seen and examined in bed no acute distress, vital signs stable, discussed with RN, awaiting PT OT eval.  Patient will need 30 days or less of skilled services    Review of Systems  Patient denies any complaints at this time.  Objective:     Vital Signs  Temp:  [98.2 °F (36.8 °C)-98.9 °F (37.2 °C)] 98.9 °F (37.2 °C)  Heart Rate:  [] 79  Resp:  [16] 16  BP: ()/(57-76) 153/74   Body mass index is 40.54 kg/m².      Physical Exam  general Appearance: AOO x 4, cooperative, no distress, appropriate for age  Head:  Normocephalic, without obvious abnormality  Eyes:  PERRL, EOM's intact, conjunctivae and cornea clear  Nose:  Nares symmetrical, septum midline, mucosa pink  Throat:  Lips, " tongue, and mucosa are moist, pink, and intact  Neck:  Supple; symmetrical, trachea midline, no adenopathy  Back:  Symmetrical, ROM normal, no CVA tenderness  Lungs: Respirations unlabored, no audible wheeze  Heart: Regular rate & rhythm, S1 and S2 normal  Abdomen:  Soft, nontender, bowel sounds active all four quadrants  Musculoskeletal: Tone and strength strong and symmetrical, all extremities; no joint pain or edema         Skin/Hair/Nails:  Skin warm, dry and intact, no rashes or abnormal dyspigmentation        Scheduled Meds   aspirin, 81 mg, Oral, Daily  dicyclomine, 20 mg, Oral, BID  levothyroxine, 200 mcg, Oral, Q AM  [Held by provider] metoprolol succinate XL, 25 mg, Oral, Daily  oxybutynin, 5 mg, Oral, TID  Pancrelipase (Lip-Prot-Amyl), 72,000 units of lipase, Oral, Daily With Breakfast & Lunch  sodium chloride, 10 mL, Intravenous, Q12H  venlafaxine XR, 225 mg, Oral, Nightly       PRN Meds     acetaminophen    aluminum-magnesium hydroxide-simethicone    calcium carbonate    ipratropium-albuterol    nitroglycerin    ondansetron ODT **OR** ondansetron    Pancrelipase (Lip-Prot-Amyl)    simethicone    sodium chloride    [COMPLETED] Insert Peripheral IV **AND** sodium chloride    sodium chloride    sodium chloride   Infusions         Diagnostic Data    Results from last 7 days   Lab Units 04/06/24  0638 04/05/24  0739   WBC 10*3/mm3 6.19 7.62   HEMOGLOBIN g/dL 10.9* 10.0*   HEMATOCRIT % 32.4* 31.4*   PLATELETS 10*3/mm3 271 262   GLUCOSE mg/dL 101* 86   CREATININE mg/dL 1.49* 1.48*   BUN mg/dL 17 19   SODIUM mmol/L 140 139   POTASSIUM mmol/L 4.6 4.5   AST (SGOT) U/L  --  32   ALT (SGPT) U/L  --  18   ALK PHOS U/L  --  98   BILIRUBIN mg/dL  --  0.2   ANION GAP mmol/L 9.0 9.0       No radiology results for the last day      I reviewed the patient's new clinical results.    Assessment/Plan:     Active and Resolved Problems  Active Hospital Problems    Diagnosis  POA    **Shortness of breath [R06.02]  Yes     Dyspnea [R06.00]  Yes    Other sleep apnea [G47.39]  Yes    Asthma [J45.909]  Yes    Chronic coronary artery disease [I25.10]  Yes    Irritable bowel syndrome [K58.9]  Yes    Chronic low back pain [M54.50, G89.29]  Yes    Neuropathy [G62.9]  Yes    Depression [F32.A]  Yes    B12 deficiency [E53.8]  Yes    Anemia [D64.9]  Yes    Hypothyroidism [E03.9]  Yes      Resolved Hospital Problems   No resolved problems to display.     Chest pain/dyspnea   -Currently on 2 L nasal cannula  -EKG:rate 46, sinus ludwin, incomplete LBBB  - cardiology consulted.  stress test  -Troponin flat  - echo pending  -Continue telemetry  Continue medical management with aspirin, metoprolol succinate as tolerated.      Hypothyroidism  - tsh 115, free T4 0.10  - pt on synthroid  - endocrinology consulted>If patient is planned for discharge can be discharged on levothyroxine 200 mcg p.o. daily and outpatient endocrinology next   - increased synthroid to 250  - recheck as outpt     ALL  -Creatinine trending down  -IV fluids stopped.  -  renal unremarkable  - nephrology on board>Dc is ok from a renal standpoint      Chronic pancreatic insufficiency  - creon, colestid, bentyl    DVT prophylaxis:  Mechanical DVT prophylaxis orders are present.    Code status is   Code Status and Medical Interventions:   Ordered at: 04/03/24 1444     Code Status (Patient has no pulse and is not breathing):    CPR (Attempt to Resuscitate)     Medical Interventions (Patient has pulse or is breathing):    Full Support       Plan for disposition:Recommending SNF at d/c and will plan to see 3x/week at [facility] for progression of mobility. PPE: gloves  in 1 days    Time: 30 minutes    Signature: Electronically signed by Gordy Blue MD, 04/08/24, 10:07 EDT.  [facility] Hospitalist Team

## 2024-04-09 ENCOUNTER — APPOINTMENT (OUTPATIENT)
Dept: GENERAL RADIOLOGY | Facility: HOSPITAL | Age: 64
End: 2024-04-09
Payer: MEDICARE

## 2024-04-09 LAB
CHOLEST SERPL-MCNC: 178 MG/DL (ref 0–200)
CORTIS SERPL-MCNC: 8.98 MCG/DL
HDLC SERPL-MCNC: 73 MG/DL (ref 40–60)
LDLC SERPL CALC-MCNC: 86 MG/DL (ref 0–100)
LDLC/HDLC SERPL: 1.14 {RATIO}
TRIGL SERPL-MCNC: 109 MG/DL (ref 0–150)
VLDLC SERPL-MCNC: 19 MG/DL (ref 5–40)

## 2024-04-09 PROCEDURE — 97166 OT EVAL MOD COMPLEX 45 MIN: CPT

## 2024-04-09 PROCEDURE — 82533 TOTAL CORTISOL: CPT | Performed by: INTERNAL MEDICINE

## 2024-04-09 PROCEDURE — 97530 THERAPEUTIC ACTIVITIES: CPT | Performed by: PHYSICAL THERAPIST

## 2024-04-09 PROCEDURE — 74018 RADEX ABDOMEN 1 VIEW: CPT

## 2024-04-09 PROCEDURE — 25810000003 SODIUM CHLORIDE 0.9 % SOLUTION: Performed by: INTERNAL MEDICINE

## 2024-04-09 PROCEDURE — 99232 SBSQ HOSP IP/OBS MODERATE 35: CPT | Performed by: INTERNAL MEDICINE

## 2024-04-09 PROCEDURE — 25810000003 SODIUM CHLORIDE 0.9 % SOLUTION: Performed by: NURSE PRACTITIONER

## 2024-04-09 PROCEDURE — 80061 LIPID PANEL: CPT | Performed by: INTERNAL MEDICINE

## 2024-04-09 RX ORDER — ATORVASTATIN CALCIUM 10 MG/1
10 TABLET, FILM COATED ORAL NIGHTLY
Start: 2024-04-09

## 2024-04-09 RX ORDER — METOPROLOL SUCCINATE 25 MG/1
12.5 TABLET, EXTENDED RELEASE ORAL
Status: DISCONTINUED | OUTPATIENT
Start: 2024-04-10 | End: 2024-04-09

## 2024-04-09 RX ORDER — METOPROLOL SUCCINATE 25 MG/1
12.5 TABLET, EXTENDED RELEASE ORAL DAILY
Status: DISCONTINUED | OUTPATIENT
Start: 2024-04-10 | End: 2024-04-09

## 2024-04-09 RX ORDER — CHOLESTYRAMINE LIGHT 4 G/5.7G
1 POWDER, FOR SUSPENSION ORAL DAILY
Status: DISCONTINUED | OUTPATIENT
Start: 2024-04-09 | End: 2024-04-10 | Stop reason: HOSPADM

## 2024-04-09 RX ORDER — MIDODRINE HYDROCHLORIDE 5 MG/1
5 TABLET ORAL
Status: DISCONTINUED | OUTPATIENT
Start: 2024-04-09 | End: 2024-04-10 | Stop reason: HOSPADM

## 2024-04-09 RX ORDER — CETIRIZINE HYDROCHLORIDE 10 MG/1
10 TABLET ORAL DAILY
Status: DISCONTINUED | OUTPATIENT
Start: 2024-04-09 | End: 2024-04-10 | Stop reason: HOSPADM

## 2024-04-09 RX ADMIN — LEVOTHYROXINE SODIUM 200 MCG: 0.1 TABLET ORAL at 09:20

## 2024-04-09 RX ADMIN — Medication 10 ML: at 09:06

## 2024-04-09 RX ADMIN — SODIUM CHLORIDE 500 ML: 9 INJECTION, SOLUTION INTRAVENOUS at 14:01

## 2024-04-09 RX ADMIN — VENLAFAXINE HYDROCHLORIDE 225 MG: 75 CAPSULE, EXTENDED RELEASE ORAL at 21:02

## 2024-04-09 RX ADMIN — ATORVASTATIN CALCIUM 10 MG: 10 TABLET, FILM COATED ORAL at 21:02

## 2024-04-09 RX ADMIN — PANCRELIPASE 72000 UNITS OF LIPASE: 36000; 180000; 114000 CAPSULE, DELAYED RELEASE PELLETS ORAL at 11:13

## 2024-04-09 RX ADMIN — SIMETHICONE 80 MG: 80 TABLET, CHEWABLE ORAL at 02:24

## 2024-04-09 RX ADMIN — OXYBUTYNIN CHLORIDE 5 MG: 5 TABLET ORAL at 16:14

## 2024-04-09 RX ADMIN — CETIRIZINE HYDROCHLORIDE 10 MG: 10 TABLET, FILM COATED ORAL at 09:04

## 2024-04-09 RX ADMIN — DICYCLOMINE HYDROCHLORIDE 20 MG: 10 CAPSULE ORAL at 21:02

## 2024-04-09 RX ADMIN — MIDODRINE HYDROCHLORIDE 5 MG: 5 TABLET ORAL at 11:13

## 2024-04-09 RX ADMIN — OXYBUTYNIN CHLORIDE 5 MG: 5 TABLET ORAL at 21:02

## 2024-04-09 RX ADMIN — MIDODRINE HYDROCHLORIDE 5 MG: 5 TABLET ORAL at 18:45

## 2024-04-09 RX ADMIN — DICYCLOMINE HYDROCHLORIDE 20 MG: 10 CAPSULE ORAL at 09:04

## 2024-04-09 RX ADMIN — SODIUM CHLORIDE 500 ML: 9 INJECTION, SOLUTION INTRAVENOUS at 11:37

## 2024-04-09 RX ADMIN — PANCRELIPASE 72000 UNITS OF LIPASE: 36000; 180000; 114000 CAPSULE, DELAYED RELEASE PELLETS ORAL at 09:20

## 2024-04-09 RX ADMIN — ASPIRIN 81 MG: 81 TABLET, COATED ORAL at 09:04

## 2024-04-09 RX ADMIN — OXYBUTYNIN CHLORIDE 5 MG: 5 TABLET ORAL at 09:04
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
## 2024-04-09 NOTE — THERAPY TREATMENT NOTE
"Subjective: Pt agreeable to therapeutic plan of care.  Reports she did not get a lot of sleep last night.     Objective:     Bed mobility - Supervision supine to sit  Transfers - Min-A sit to stand, and bed to chair with rolling walker  Ambulation - NT this date due to tachycardia. BP low, but MAP WNL.   *Gait belt applied and non-skid socks worn during treatment.     Vitals: Tachycardic  BP in supine: 95/56 (61), HR: 78 bpm, O2 sats: 100%  BP in sittin/63 (70), HR: 100 bpm, O2 sats: 94%  BP in sitting immediately after standing (pt could not tolerate any further standing): 87/64 (70), HR: 135 bpm, O2 sats: 100%  BP ending session in recliner: 98/68 (75), HR improved     Pain: 0 VAS   Location: N/A  Intervention for pain: N/A    Education: Provided education on the importance of mobility in the acute care setting, Verbal/Tactile Cues, and Transfer Training    Assessment: Cassie Winter presents with functional mobility impairments which indicate the need for skilled intervention. Tolerating session today, but was limited due to tachycardia.  BP was also low, but MAP within safe limit.  Patient with mild c/o dizziness with standing.  Was able to transfer bed to chair with min Ax1.  She required cues to safely keep RW with her.  Will continue to follow and progress as tolerated.     Plan/Recommendations:   If medically appropriate, Moderate Intensity Therapy recommended post-acute care. This is recommended as therapy feels the patient would require 3-4 days per week and wouldn't tolerate \"3 hour daily\" rehab intensity. SNF would be the preferred choice. If the patient does not agree to SNF, arrange HH or OP depending on home bound status. If patient is medically complex, consider LTACH. Pt requires no DME at discharge.     Pt desires Skilled Rehab placement at discharge. Pt cooperative; agreeable to therapeutic recommendations and plan of care.     Basic Mobility 6-click:  Rollin = Total, A lot = 2, A " little = 3; 4 = None  Supine>Sit:   1 = Total, A lot = 2, A little = 3; 4 = None   Sit>Stand with arms:  1 = Total, A lot = 2, A little = 3; 4 = None  Bed>Chair:   1 = Total, A lot = 2, A little = 3; 4 = None  Ambulate in room:  1 = Total, A lot = 2, A little = 3; 4 = None  3-5 Steps with railin = Total, A lot = 2, A little = 3; 4 = None  Score: 17    Post-Tx Position: Up in Chair, Staff Present, Alarms activated, and Call light and personal items within reach  PPE: gloves

## 2024-04-09 NOTE — PLAN OF CARE
Goal Outcome Evaluation:           Progress: improving  Outcome Evaluation: Pt is 64 y/o F admitted after about a month of SOA and several falls which fortunatelly did not result in any acute injury. She has acute kidney injury and has been bradycardic but now this date her HR is high when standing. Her BP is slightly low but her MAP is WNL. Pt is weak and needs to use a RW. She has 15 steps to her upstairs apartment in a duplex. Recommend SNF for rehab then moving to downstairs apt.      Anticipated Discharge Disposition (OT): skilled nursing facility

## 2024-04-09 NOTE — CASE MANAGEMENT/SOCIAL WORK
Continued Stay Note  [facility]     Patient Name: Cassie Winter  MRN: [id number]  Today's Date: 4/9/2024    Admit Date: 4/3/2024    Plan: From home alone. Accepted at [facility] with ready bed. PASRR completed.  Pharmacy [facility].  Will  need precert when OT eval documented   Discharge Plan       Row Name 04/09/24 1036       Plan    Plan From home alone. Accepted at [facility] with ready bed. PASRR completed.  Pharmacy [facility].  Will  need precert when OT eval documented    Plan Comments Barriers:Hypotension. Pending OT eval. CM met with Mrs. Winter this morning for additional SNF choices. Referred to [facility] and [facility] but no beds available.  Referred to next choice , [facility] and accepted with ready bed today. Pharmacy changed to [facility].                    Diane AMBROSIO,RN Case Manager  [facility]  Phone: Desk- [phone] cell- [phone]

## 2024-04-09 NOTE — CASE MANAGEMENT/SOCIAL WORK
Continued Stay Note  [facility]     Patient Name: Cassie Winter  MRN: [id number]  Today's Date: 4/9/2024    Admit Date: 4/3/2024    Plan: From home alone. Accepted at [facility] with ready bed today. PASRR completed. Pharmacy [facility]. Precert approved   Discharge Plan       Row Name 04/09/24 1152       Plan    Plan From home alone. Accepted at [facility] with ready bed today. PASRR completed. Pharmacy [facility]. Precert approved    Plan Comments Barriers: Cardiology re-consulted by Dr. Blue. IVF bolus and labwork ordered.  CMA submitted precert for [facility] and she was approved. CM notified Janell with [facility]                                           Diaen AMBROSIO,RN Case Manager  [facility]  Phone: Desk- [phone] cell- [phone]

## 2024-04-09 NOTE — CASE MANAGEMENT/SOCIAL WORK
Social Work Assessment  [facility]     Patient Name: Cassie Winter  MRN: [id number]  Today's Date: 4/9/2024    Admit Date: 4/3/2024     Discharge Plan       Row Name 04/09/24 1634       Plan    Plan Comments RN/CM consult requesting SW meet with pt regarding resources.  SW met with pt at bedside.  Patient requesting free phone resources.  SW typed information for pt and she was appreciative.  No other needs identified at this time.             CONY Sigala MSW    Phone: [phone]  Cell: [phone]  Fax: [phone]  [email or web address]

## 2024-04-09 NOTE — PLAN OF CARE
"Goal Outcome Evaluation:      Assessment: Cassie Winter presents with functional mobility impairments which indicate the need for skilled intervention. Tolerating session today, but was limited due to tachycardia.  BP was also low, but MAP within safe limit.  Patient with mild c/o dizziness with standing.  Was able to transfer bed to chair with min Ax1.  She required cues to safely keep RW with her.  Will continue to follow and progress as tolerated.     Plan/Recommendations:   If medically appropriate, Moderate Intensity Therapy recommended post-acute care. This is recommended as therapy feels the patient would require 3-4 days per week and wouldn't tolerate \"3 hour daily\" rehab intensity. SNF would be the preferred choice. If the patient does not agree to SNF, arrange HH or OP depending on home bound status. If patient is medically complex, consider LTACH. Pt requires no DME at discharge.     Pt desires Skilled Rehab placement at discharge. Pt cooperative; agreeable to therapeutic recommendations and plan of care.                                         "

## 2024-04-09 NOTE — PLAN OF CARE
Problem: Fall Injury Risk  Goal: Absence of Fall and Fall-Related Injury  Outcome: Ongoing, Progressing  Intervention: Identify and Manage Contributors  Recent Flowsheet Documentation  Taken 4/8/2024 2015 by Alan Borrego RN  Medication Review/Management: medications reviewed  Intervention: Promote Injury-Free Environment  Recent Flowsheet Documentation  Taken 4/9/2024 0600 by Alan Borrego RN  Safety Promotion/Fall Prevention: safety round/check completed  Taken 4/9/2024 0400 by Alan Borrego RN  Safety Promotion/Fall Prevention: safety round/check completed  Taken 4/9/2024 0200 by Alan Borrego RN  Safety Promotion/Fall Prevention: safety round/check completed  Taken 4/9/2024 0000 by Alan Borrego RN  Safety Promotion/Fall Prevention: safety round/check completed  Taken 4/8/2024 2200 by Alan Borrego RN  Safety Promotion/Fall Prevention: safety round/check completed  Taken 4/8/2024 2015 by Alan Borrego RN  Safety Promotion/Fall Prevention: safety round/check completed   Goal Outcome Evaluation:

## 2024-04-09 NOTE — THERAPY EVALUATION
Patient Name: Cassie Winter  : 1960    MRN: [id number]                              Today's Date: 2024       Admit Date: 4/3/2024    Visit Dx:     ICD-10-CM ICD-9-CM   1. Shortness of breath  R06.02 786.05   2. Chest pain, unspecified type  R07.9 786.50   3. Acute kidney injury  N17.9 584.9     Patient Active Problem List   Diagnosis    Allergic rhinitis    Anemia    Asthma    B12 deficiency    Chronic coronary artery disease    Chronic low back pain    Depression    H/O laminectomy    Hypothyroidism    Neuropathy    Irritable bowel syndrome    Spinal stenosis of lumbar region    Dizziness    Narrow complex tachycardia    Other sleep apnea    TMJ syndrome    Overactive bladder    Pancreatic insufficiency    Fall    Blockage of coronary artery of heart    Alkaline phosphatase raised    Anemia, iron deficiency    Fatty stool    Gallstones    Arthritis    Gastritis, unspecified, without bleeding    Generalized abdominal pain    High blood pressure    Insomnia    Intestinal malabsorption    Seasonal allergies    Second degree hemorrhoids    Encounter for screening mammogram for malignant neoplasm of breast    Other specified disorders of bladder    Thyroid disease    Shortness of breath    Dyspnea     Past Medical History:   Diagnosis Date    Allergic rhinitis     Anemia     Asthma     B12 deficiency     Bradycardia     Broken arm 2019    Left arm broken     Chronic back pain     Chronic bronchitis     Coronary artery disease     Diarrhea     Encounter for blood transfusion 4/3/2024    Hypothyroidism     MRSA (methicillin resistant Staphylococcus aureus)     Neuropathic pain of foot     Severe back pain 2010    Sleep apnea, obstructive      Past Surgical History:   Procedure Laterality Date    ABDOMINAL HERNIA REPAIR  2003    repair of abdominal wall hernia, intraabdominal hernia and hiatal hernia, 2004 incisional hernia repair.     ACHILLES TENDON SURGERY  2011    rupture    ANKLE FUSION Right 2015     CARDIAC CATHETERIZATION  02/20/2017    CHOLECYSTECTOMY  1992    GASTRIC BYPASS  1986    LAMINECTOMY  2007    that was complicated by a staph infection     OTHER SURGICAL HISTORY Left 2019    L arm     REMOVAL EXTERNAL FIXATOR      WRIST FRACTURE SURGERY Right       General Information       Row Name 04/09/24 1019          General Information    Prior Level of Function independent:;all household mobility;ADL's;min assist:;home management;max assist:;driving  no DME  -     Existing Precautions/Restrictions fall  reports 5 falls since jan. Hit head last fall. Feels like she is SOA then goes down.  -     Barriers to Rehab previous functional deficit  -       Row Name 04/09/24 1019          Living Environment    People in Home alone  dtr & SIlLcheck in on pt daily  -       Row Name 04/09/24 1019          Home Main Entrance    Number of Stairs, Main Entrance other (see comments)  15 steps to upstiars apartment. Pt lives in [facility]. Has inquired with chan who says someone is moving out of a downstairs apt. She needs to FU with this so she can move to ground floor living.  -     Stair Railings, Main Entrance railings on both sides of stairs  -       Row Name 04/09/24 1019          Stairs Within Home, Primary    Number of Stairs, Within Home, Primary one  -       Row Name 04/09/24 1019          Cognition    Orientation Status (Cognition) oriented x 4  -       Row Name 04/09/24 1019          Safety Issues, Functional Mobility    Safety Issues Affecting Function (Mobility) judgment;problem-solving  -     Impairments Affecting Function (Mobility) balance;endurance/activity tolerance;strength;shortness of breath  -               User Key  (r) = Recorded By, (t) = Taken By, (c) = Cosigned By      Initials Name Provider Type     Ramonita Dumont, OT Occupational Therapist                     Mobility/ADL's       Row Name 04/09/24 1022          Bed Mobility    Bed Mobility supine-sit  -     Supine-Sit  [place] (Bed Mobility) standby assist;set up  -[facility] Name 04/09/24 1022          Transfers    Transfers sit-stand transfer;stand-sit transfer  -[facility] Name 04/09/24 1022          Sit-Stand Transfer    Sit-Stand [place] (Transfers) set up;standby assist  -     Assistive Device (Sit-Stand Transfers) walker, front-wheeled  -[facility] Name 04/09/24 1022          Stand-Sit Transfer    Stand-Sit [place] (Transfers) set up;standby assist;verbal cues  -     Assistive Device (Stand-Sit Transfers) walker, front-wheeled  -[facility] Name 04/09/24 1022          Functional Mobility    Functional Mobility- Comment pt stood several min at EOB to try and get good standing BP but by the time one took she needed to sit down beccause HR was 135 BPM & she was SOA.  -[facility] Name 04/09/24 1022          Activities of Daily Living    BADL Assessment/Intervention grooming  -MH       Row Name 04/09/24 1022          Grooming Assessment/Training    [place] Level (Grooming) hair care, combing/brushing;wash face, hands;set up  -     Position (Grooming) edge of bed sitting;unsupported sitting  -[facility] Name 04/09/24 1022          Toileting Assessment/Training    [place] Level (Toileting) toileting skills;dependent (less than 25% patient effort)  -     Comment, (Toileting) pt is afraid of falls so using periwick.  -               User Key  (r) = Recorded By, (t) = Taken By, (c) = Cosigned By      Initials Name Provider Type     Ramonita Dumont OT Occupational Therapist                   Obj/Interventions       [facility] Name 04/09/24 1025          Sensory Assessment (Somatosensory)    Sensory Assessment (Somatosensory) sensation intact  -[facility] Name 04/09/24 1025          Vision Assessment/Intervention    Visual Impairment/Limitations WFL  -               User Key  (r) = Recorded By, (t) = Taken By, (c) = Cosigned By      Initials Name Provider Type    Ramonita Mukherjee OT  Occupational Therapist                   Goals/Plan       [facility] Name 04/09/24 1036          Bathing Goal 1 (OT)    Activity/Device (Bathing Goal 1, OT) bathing skills, all  -     [place] Level/Cues Needed (Bathing Goal 1, OT) modified independence  -     Time Frame (Bathing Goal 1, OT) 2 weeks  -[facility] Name 04/09/24 1036          Dressing Goal 1 (OT)    Activity/Device (Dressing Goal 1, OT) dressing skills, all  -     [place]/Cues Needed (Dressing Goal 1, OT) modified independence  -     Time Frame (Dressing Goal 1, OT) 2 weeks  -[facility] Name 04/09/24 1036          Toileting Goal 1 (OT)    Activity/Device (Toileting Goal 1, OT) toileting skills, all  -     [place] Level/Cues Needed (Toileting Goal 1, OT) independent  -     Time Frame (Toileting Goal 1, OT) 1 week  -               User Key  (r) = Recorded By, (t) = Taken By, (c) = Cosigned By      Initials Name Provider Type     Ramonita Dumont, OT Occupational Therapist                   Clinical Impression       [facility] Name 04/09/24 1027          Pain Assessment    Pretreatment Pain Rating 0/10 - no pain  -     Posttreatment Pain Rating 0/10 - no pain  -[facility] Name 04/09/24 1027          Plan of Care Review    Progress improving  -     Outcome Evaluation Pt is 64 y/o F admitted after about a month of SOA and several falls which fortunatelly did not result in any acute injury. She has acute kidney injury and has been bradycardic but now this date her HR is high when standing. Her BP is slightly low but her MAP is WNL. Pt is weak and needs to use a RW. She has 15 steps to her upstairs apartment in a duplex. Recommend SNF for rehab then moving to downstairs apt.  -       Row Name 04/09/24 1027          Therapy Assessment/Plan (OT)    Rehab Potential (OT) good, to achieve stated therapy goals  -     Criteria for Skilled Therapeutic Interventions Met (OT) skilled treatment is necessary  -     Therapy Frequency (OT) 3  times/wk  -     Predicted Duration of Therapy Intervention (OT) until d/c  -       Row Name 04/09/24 1027          Therapy Plan Review/Discharge Plan (OT)    Anticipated Discharge Disposition (OT) skilled nursing facility  -       Row Name 04/09/24 1027          Vital Signs    Pre Systolic BP Rehab 95  -MH     Pre Treatment Diastolic BP 56  -MH     Intra Systolic BP Rehab 95  -MH     Intra Treatment Diastolic BP 63  -MH     Post Systolic BP Rehab 87  -MH     Post Treatment Diastolic BP 64  -MH     Pretreatment Heart Rate (beats/min) 80  -MH     Intratreatment Heart Rate (beats/min) 135  -MH     Posttreatment Heart Rate (beats/min) 90  -MH     O2 Delivery Pre Treatment room air  -     Pre Patient Position Supine  -MH     Intra Patient Position Standing  -MH     Post Patient Position Sitting  -       Row Name 04/09/24 1027          Positioning and Restraints    Pre-Treatment Position in bed  -MH     Post Treatment Position chair  -MH     In Chair notified nsg;call light within reach;sitting;encouraged to call for assist  -               User Key  (r) = Recorded By, (t) = Taken By, (c) = Cosigned By      Initials Name Provider Type     Ramonita Dumont, OT Occupational Therapist                   Outcome Measures       Row Name 04/09/24 1036          How much help from another person do you currently need...    Turning from your back to your side while in flat bed without using bedrails? 4  -MH     Moving from lying on back to sitting on the side of a flat bed without bedrails? 4  -MH     Moving to and from a bed to a chair (including a wheelchair)? 4  -MH     Standing up from a chair using your arms (e.g., wheelchair, bedside chair)? 4  -MH     Climbing 3-5 steps with a railing? 3  -MH     To walk in hospital room? 3  -MH     AM-PAC 6 Clicks Score (PT) 22  -     Highest Level of Mobility Goal 7 --> Walk 25 feet or more  -               User Key  (r) = Recorded By, (t) = Taken By, (c) = Cosigned By       Initials Name Provider Type     Ramonita Dumont OT Occupational Therapist                    Occupational Therapy Education       Title: PT OT SLP Therapies (In Progress)       Topic: Occupational Therapy (In Progress)       Point: ADL training (Not Started)       Description:   Instruct learner(s) on proper safety adaptation and remediation techniques during self care or transfers.   Instruct in proper use of assistive devices.                  Learner Progress:  Not documented in this visit.              Point: Home exercise program (Not Started)       Description:   Instruct learner(s) on appropriate technique for monitoring, assisting and/or progressing therapeutic exercises/activities.                  Learner Progress:  Not documented in this visit.              Point: Precautions (Done)       Description:   Instruct learner(s) on prescribed precautions during self-care and functional transfers.                  Learning Progress Summary             Patient Acceptance, E,TB,D, VU,NR,DU by  at 4/9/2024 1037                         Point: Body mechanics (Done)       Description:   Instruct learner(s) on proper positioning and spine alignment during self-care, functional mobility activities and/or exercises.                  Learning Progress Summary             Patient Acceptance, E,TB,D, VU,NR,DU by  at 4/9/2024 1037                                         User Key       Initials Effective Dates Name Provider Type Discipline     06/16/21 -  Ramonita Dumont OT Occupational Therapist OT                  OT Recommendation and Plan  Therapy Frequency (OT): 3 times/wk  Plan of Care Review  Progress: improving  Outcome Evaluation: Pt is 64 y/o F admitted after about a month of SOA and several falls which fortunatelly did not result in any acute injury. She has acute kidney injury and has been bradycardic but now this date her HR is high when standing. Her BP is slightly low but her MAP is WNL. Pt is weak and  needs to use a RW. She has 15 steps to her upstairs apartment [place] a duplex. Recommend SNF for rehab then moving to downstairs apt.     Time Calculation:         Time Calculation- OT       Row Name 04/09/24 1037             Time Calculation- OT    OT Start Time 0828  -      OT Stop Time 0853  -      OT Time Calculation (min) 25 min  -      Total Timed Code Minutes- OT 0 minute(s)  -      OT Received On 04/09/24  -      OT - Next Appointment 04/11/24  -      OT Goal Re-Cert Due Date 04/23/24  -                User Key  (r) = Recorded By, (t) = Taken By, (c) = Cosigned By      Initials Name Provider Type     Ramonita Dumont OT Occupational Therapist                  Therapy Charges for Today       Code Description Service Date Service Provider Modifiers Qty    07299894551 HC OT EVAL MOD COMPLEXITY 3 4/9/2024 Ramonita Dumont OT GO 1                 Ramonita Dumont OT  4/9/2024

## 2024-04-09 NOTE — SIGNIFICANT NOTE
Case Management/Social Work    Patient Name:  Cassie Winter  YOB: 1960  MRN: [id number]  Admit Date:  4/3/2024           04/09/24 1132   Post Acute Pre-Cert Documentation   Request Submitted by Facility - Type: Hospital   Post-Acute Authorization Type Submitted: SNF   Date Post Acute Pre-Cert Inititated per Facility 04/09/24   Verification from Payer Yes   Date Post Acute Pre-Cert Completed 04/09/24   Accepting Facility [facility]   Hospital Discharge Date Requested 04/09/24   All Clinicals Submitted? Yes   Had Accepting Facility at Time of Submission Yes   Response Received from Insurance? Approval   Response Communicated to:    Authorization Number: [id number]   Post Acute Pre-Cert Initiated Comment YOLY submitted SNF precert for [facility] via [employer] portal. Authorization ID [id number]. SNF precert auto approved. Valid 4/9-4/15. Approval letter indexed to media. CM made aware.           Electronically signed by:  Juan A Ngo CMA  04/09/24 11:38 EDT    Juan A Ngo  Case Management Associate  [address] [address]  P: [phone]  F: [phone]

## 2024-04-09 NOTE — DISCHARGE PLACEMENT REQUEST
"Cassie Everett (63 y.o. Female)       Date of Birth   1960    Social Security Number       Address   [address] [place] [address] [place] [facility]    Home Phone   [phone]    MRN   [id number]       Mosque   None    Marital Status   Single                            Admission Date   4/3/24    Admission Type   Emergency    Admitting Provider   Jorge Prescott MD    Attending Provider   Gordy Blue MD    Department, Room/Bed   [facility] OBSERVATION, 223/1       Discharge Date       Discharge Disposition       Discharge Destination                                 Attending Provider: Godry Blue MD    Allergies: Hydrocodone-acetaminophen, Oxycodone-acetaminophen, Ketorolac Tromethamine, Promethazine, Tramadol    Isolation: None   Infection: None   Code Status: CPR    Ht: 165.1 cm (65\")   Wt: 110 kg (243 lb 9.7 oz)    Admission Cmt: None   Principal Problem: Shortness of breath [R06.02]                   Active Insurance as of 4/3/2024       Primary Coverage       Payor Plan Insurance Group Employer/Plan Group    ANTHEM MEDICARE REPLACEMENT ANTHEM MEDICARE ADVANTAGE [id number]       Payor Plan Address Payor Plan Phone Number Payor Plan Fax Number Effective Dates    [address] [phone]  9/1/2023 - None Entered    [facility] [id number]         Subscriber Name Subscriber Birth Date Member ID       CASSIE EVERETT 1960 [id number]                     Emergency Contacts        (Rel.) Home Phone Work Phone Mobile Phone    DAXA HARVEY (Sister) -- -- [phone]    MICHELLE SANTIAGO (Other) [phone] -- [phone]    BRYAN GLASGOW (Daughter) -- -- [phone]                 History & Physical        Kelley Sesay PA-C at 04/03/24 1647       Attestation signed by Jorge Prescott MD at 04/04/24 0804    SUPERVISE: For this patient encounter, I reviewed the APC's documentation, treatment plan, and medical decision making.                [facility] " Observation Unit H&P    Patient Name: Cassie Winter  : 1960  MRN: [id number]  Primary Care Physician: Colby Vines MD  Date of admission: 4/3/2024     Patient Care Team:  Colby Vines MD as PCP - General (Internal Medicine)  Bonnie Landa PT as Physical Therapist (Physical Therapy)  Pablito Casillas MD as Consulting Physician (Cardiology)          Subjective  History Present Illness     Chief Complaint:   Chief Complaint   Patient presents with    Chest Pain     Chest pain for one week. soA, nausea,   Pt had fall and hit head on Monday.  Pt was at PMD and sent in for evaluation.         Chest pain, dyspnea    Chest Pain   Associated symptoms include palpitations and shortness of breath.       ED  63-year-old white female with a history of thyroid dysfunction, CAD. She presents today from her primary care provider's office with complaints of shortness of breath and chest pain. She states has been ongoing for the last 2 months or so. She has symptoms at rest but her symptoms are much worse with exertion. She denies any fever or cough. No leg pain or swelling. She denies any black or bloody stools. She does report some frequent belching. No abdominal pain nausea or vomiting.     Observation 4/3/24  Cardiology consulted. Endocrinology consulted.  Ct chest pending    Review of Systems   Cardiovascular:  Positive for chest pain and palpitations.   Respiratory:  Positive for shortness of breath.              Personal History     Past Medical History:   Past Medical History:   Diagnosis Date    Allergic rhinitis     Anemia     Asthma     B12 deficiency     Bradycardia     Broken arm 2019    Left arm broken     Chronic back pain     Chronic bronchitis     Coronary artery disease     Diarrhea     Encounter for blood transfusion 4/3/2024    Hypothyroidism     MRSA (methicillin resistant Staphylococcus aureus)     Neuropathic pain of foot     Severe back pain 2010    Sleep apnea, obstructive         Surgical History:      Past Surgical History:   Procedure Laterality Date    ABDOMINAL HERNIA REPAIR  2003    repair of abdominal wall hernia, intraabdominal hernia and hiatal hernia, 2004 incisional hernia repair.     ACHILLES TENDON SURGERY  2011    rupture    ANKLE FUSION Right 11/06/2015    CARDIAC CATHETERIZATION  02/20/2017    CHOLECYSTECTOMY  1992    GASTRIC BYPASS  1986    LAMINECTOMY  2007    that was complicated by a staph infection     OTHER SURGICAL HISTORY Left 2019    L arm     REMOVAL EXTERNAL FIXATOR      WRIST FRACTURE SURGERY Right            Family History: family history includes COPD in her mother; Diabetes in her sister; Heart disease in her mother; Hypertension in her sister; Lung cancer in her father and mother; Other in her father and sister; Sleep apnea in her sister. Otherwise pertinent FHx was reviewed and unremarkable.     Social History:  reports that she has never smoked. She has never used smokeless tobacco. She reports that she does not drink alcohol and does not use drugs.      Medications:  Prior to Admission medications    Medication Sig Start Date End Date Taking? Authorizing Provider   aspirin 81 MG EC tablet Take 1 tablet by mouth Daily. 5/20/19  Yes Leighann Solitario MD   Calcium 200 MG tablet Take 1 tablet by mouth Daily.   Yes Leighann Solitario MD   colestipol (COLESTID) 1 g tablet Take 1 tablet by mouth 2 (Two) Times a Day. 9/5/23  Yes Leighann Solitario MD   dicyclomine (BENTYL) 20 MG tablet Every 12 (Twelve) Hours. 11/28/16  Yes Leighann Solitario MD   levothyroxine (Synthroid) 200 MCG tablet Take 1 tablet by mouth Daily. 8/22/23  Yes Cristopher Garcia Jr., MD   metoprolol succinate XL (TOPROL-XL) 25 MG 24 hr tablet Take 1 tablet by mouth Daily.   Yes Leighann Solitario MD   Pancrelipase, Lip-Prot-Amyl, (Creon) 69630-202449 units capsule delayed-release particles capsule Take 1 capsule by mouth With Snacks for Snacks.   Yes Leighann Solitario MD    Pancrelipase, Lip-Prot-Amyl, (CREON) 25356-188271 units capsule delayed-release particles capsule Take 2 capsules by mouth Daily With Breakfast & Lunch.   Yes Leighann Solitario MD   venlafaxine 75 MG tablet sustained-release 24 hour 24 hr tablet Take 1 tablet by mouth Every Night. In addition to 150mg for a total of 225mg at bedtime   Yes Leighann Solitario MD   venlafaxine XR (EFFEXOR-XR) 150 MG 24 hr capsule Take 1 capsule by mouth Every Night. In addition to 75mg for a total of 225mg at bedtime   Yes Leighann Solitario MD   Creon 61180-031216 units capsule delayed-release particles capsule 2 capsules Daily With Breakfast & Lunch. 10/22/21 4/3/24 Yes Leighann Solitario MD   albuterol sulfate  (90 Base) MCG/ACT inhaler Inhale 2 puffs Every 4 (Four) Hours As Needed for Wheezing. 12/30/22   Cristopher Garcia Jr., MD   oxybutynin (DITROPAN) 5 MG tablet Take 1 tablet by mouth 3 (Three) Times a Day. 6/20/23   Cristopher Garcia Jr., MD   Vitamin D, Cholecalciferol, (CHOLECALCIFEROL) 10 MCG (400 UNIT) tablet Take 1 tablet by mouth Daily.    Leighann Solitario MD   ibuprofen (ADVIL,MOTRIN) 800 MG tablet Take 1 tablet by mouth 3 times a day. 1/9/24 4/3/24  Leighann Solitario MD   metoprolol succinate XL (TOPROL-XL) 25 MG 24 hr tablet TAKE 1 TABLET BY MOUTH DAILY 3/11/24 4/3/24  Pablito Casillas MD   mometasone-formoterol (Dulera) 200-5 MCG/ACT inhaler Inhale 2 puffs 2 (Two) Times a Day. 11/8/22 4/3/24  Cristopher Garcia Jr., MD   montelukast (SINGULAIR) 10 MG tablet TAKE 1 TABLET BY MOUTH EVERY NIGHT  Patient not taking: Reported on 4/3/2024 5/15/23 4/3/24  Cristopher Garcia Jr., MD   oxyCODONE-acetaminophen (PERCOCET) 5-325 MG per tablet Take 1 tablet by mouth Every 8 (Eight) Hours As Needed for Moderate Pain.  Patient not taking: Reported on 4/3/2024 1/21/24 4/3/24  Jeremiah Elmore PA Rexulti 2 MG tablet Take 1 tablet by mouth Daily.  Patient not taking: Reported on 4/3/2024  7/7/23 4/3/24  Cristopher Garcia Jr., MD   tiotropium (SPIRIVA) 18 MCG per inhalation capsule Place 1 capsule into inhaler and inhale Daily.  Patient not taking: Reported on 4/3/2024 5/17/23 4/3/24  Michelle CageMADDIE   venlafaxine 75 MG tablet sustained-release 24 hour 24 hr tablet Take 1 tablet by mouth Daily With Breakfast.  Patient taking differently: Take 1 tablet by mouth Every Night. 11/14/23 4/3/24  Colby Vines MD   venlafaxine XR (Effexor XR) 150 MG 24 hr capsule Take 1 capsule by mouth Daily. 11/14/23 4/3/24  Colby Vines MD       Allergies:    Allergies   Allergen Reactions    Hydrocodone-Acetaminophen Hives     Can tolerate with Benadryl    Oxycodone-Acetaminophen Hives     Can tolerate with Benadryl    Ketorolac Tromethamine Itching    Promethazine Itching    Tramadol Itching       Objective  Objective     Vital Signs  Temp:  [97.5 °F (36.4 °C)] 97.5 °F (36.4 °C)  Heart Rate:  [44-62] 58  Resp:  [16-19] 17  BP: (107-164)/(41-91) 122/91  SpO2:  [80 %-100 %] 100 %  on  Flow (L/min):  [2] 2;   Device (Oxygen Therapy): nasal cannula  Body mass index is 39.62 kg/m².    Physical Exam  Constitutional:       Appearance: Normal appearance.   HENT:      Mouth/Throat:      Mouth: Mucous membranes are moist.   Cardiovascular:      Rate and Rhythm: Regular rhythm. Bradycardia present.      Pulses: Normal pulses.      Heart sounds: Normal heart sounds.   Pulmonary:      Effort: Pulmonary effort is normal.      Breath sounds: Normal breath sounds.   Skin:     General: Skin is warm and dry.   Neurological:      General: No focal deficit present.      Mental Status: She is alert and oriented to person, place, and time.   Psychiatric:         Mood and Affect: Mood normal.         Behavior: Behavior normal.           Results Review:  I have personally reviewed most recent cardiac tracings, lab results, and radiology images and interpretations and agree with findings, most notably: cbc, cmp, trop, bnp, tsh,  inr, chest xray, ekg.    Results from last 7 days   Lab Units 04/03/24  0958   WBC 10*3/mm3 8.94   HEMOGLOBIN g/dL 12.6   HEMATOCRIT % 38.3   PLATELETS 10*3/mm3 327   INR  0.94     Results from last 7 days   Lab Units 04/03/24  1212 04/03/24  0959   SODIUM mmol/L  --  141   POTASSIUM mmol/L  --  4.5   CHLORIDE mmol/L  --  110*   CO2 mmol/L  --  17.0*   BUN mg/dL  --  23   CREATININE mg/dL  --  1.72*   GLUCOSE mg/dL  --  101*   CALCIUM mg/dL  --  9.4   ALK PHOS U/L  --  112   ALT (SGPT) U/L  --  27   AST (SGOT) U/L  --  50*   HSTROP T ng/L 41* 46*   PROBNP pg/mL  --  248.5     Estimated Creatinine Clearance: 39.8 mL/min (A) (by C-G formula based on SCr of 1.72 mg/dL (H)).  Brief Urine Lab Results       None            Microbiology Results (last 10 days)       ** No results found for the last 240 hours. **            ECG/EMG Results (most recent)       Procedure Component Value Units Date/Time    ECG 12 Lead Chest Pain [[id number]] Collected: 04/03/24 0947     Updated: 04/03/24 0948     QT Interval 453 ms      QTC Interval 397 ms     Narrative:      HEART RATE= 46  bpm  RR Interval= 1300  ms  MT Interval= 38  ms  P Horizontal Axis= 50  deg  P Front Axis= 0  deg  QRSD Interval= 111  ms  QT Interval= 453  ms  QTcB= 397  ms  QRS Axis= -57  deg  T Wave Axis= 238  deg  - ABNORMAL ECG -  Sinus bradycardia  Incomplete left bundle branch block  LVH with secondary repolarization abnormality  Anterior Q waves, possibly due to LVH  Electronically Signed By:   Date and Time of Study: 2024-04-03 09:47:32                    XR Chest 1 View    Result Date: 4/3/2024  Impression: No evidence of acute cardiopulmonary disease. Resolution of questionable opacity in the left upper lobe after repositioning of EKG lead. Electronically Signed: Martin Roca MD  4/3/2024 4:33 PM EDT  Workstation ID: [id number]    CT Head Without Contrast    Result Date: 4/3/2024  Impression: No acute intracranial abnormality. Electronically Signed: Mansi  MD Hua  4/3/2024 12:00 PM EDT  Workstation ID: [id number]    XR Chest 1 View    Result Date: 4/3/2024  Impression: 1.Questionable nodular shadow left upper lobe versus summation of shadows. Follow-up recommended to reassess Electronically Signed: Rashard Hendricks MD  4/3/2024 11:30 AM EDT  Workstation ID: [id number]       Estimated Creatinine Clearance: 39.8 mL/min (A) (by C-G formula based on SCr of 1.72 mg/dL (H)).    Assessment & Plan  Assessment/Plan       Active Hospital Problems    Diagnosis  POA    **Shortness of breath [R06.02]  Yes      Resolved Hospital Problems   No resolved problems to display.     Chest pain/dyspnea  Lab Results   Component Value Date    TROPONINT 41 (H) 04/03/2024    TROPONINT 46 (H) 04/03/2024     -02 placed at 2 lpm nc  - cbc, inr, bnp unremarkable  -Chest X-ray: reviewed and showing no acute cardiopulmonary process  -EKG:rate 46, sinus ludwin, incomplete LBBB  -ct chest pending  - cardiology consulted  -Telemetry      Hypothyroidism  - tsh 115  - pt on synthroid  - pt noncompliant      VTE Prophylaxis -   Mechanical Order History:        Ordered        04/03/24 1451  Place Sequential Compression Device  Once            04/03/24 1451  Maintain Sequential Compression Device  Continuous                          Pharmalogical Order History:       None            CODE STATUS:    Code Status and Medical Interventions:   Ordered at: 04/03/24 1444     Code Status (Patient has no pulse and is not breathing):    CPR (Attempt to Resuscitate)     Medical Interventions (Patient has pulse or is breathing):    Full Support       This patient has been examined wearing personal protective equipment.     I discussed the patient's findings and my recommendations with patient and nursing staff.      Signature:Electronically signed by Kelley Sesay PA-C, 04/03/24, 4:47 PM EDT.             Electronically signed by Jorge Prescott MD at 04/04/24 0804       Operative/Procedure Notes (all)    No notes of this  type exist for this encounter.          Physician Progress Notes (last 48 hours)        Cuco Sanchez MD at 24 1816              ENDOCRINE CONSULT PROGRESS NOTE  DATE OF SERVICE: 24        PATIENT NAME: Cassie Wniter  PATIENT : 1960 AGE: 63 y.o.  MRN NUMBER: [id number]    ==========================================================================    CHIEF COMPLAINT: Hypothyroidism    CARE TEAM:   Patient Care Team:  Colby Vines MD as PCP - General (Internal Medicine)  Bonnie Landa PT as Physical Therapist (Physical Therapy)  Pablito Casillas MD as Consulting Physician (Cardiology)    SUBJECTIVE  Patient seen and examined.  Tolerating medication.  Shortness of breath improving.    ==========================================================================    CURRENT ACTIVE HOSPITAL MEDICATIONS    Scheduled Medications:  aspirin, 81 mg, Oral, Daily  atorvastatin, 10 mg, Oral, Nightly  dicyclomine, 20 mg, Oral, BID  levothyroxine, 200 mcg, Oral, Q AM  [Held by provider] metoprolol succinate XL, 25 mg, Oral, Daily  oxybutynin, 5 mg, Oral, TID  Pancrelipase (Lip-Prot-Amyl), 72,000 units of lipase, Oral, Daily With Breakfast & Lunch  sacubitril-valsartan, 1 tablet, Oral, Q12H  sodium chloride, 10 mL, Intravenous, Q12H  venlafaxine XR, 225 mg, Oral, Nightly         PRN Medications:    acetaminophen    aluminum-magnesium hydroxide-simethicone    calcium carbonate    ipratropium-albuterol    nitroglycerin    ondansetron ODT **OR** ondansetron    Pancrelipase (Lip-Prot-Amyl)    simethicone    sodium chloride    [COMPLETED] Insert Peripheral IV **AND** sodium chloride    sodium chloride    sodium chloride     ==========================================================================    OBJECTIVE    Vitals:    24 1746   BP: 112/82   Pulse: 81   Resp: 20   Temp: 97.5 °F (36.4 °C)   SpO2: 97%      Body mass index is 40.54 kg/m².     General - A&Ox3, NAD,  "Calm    ==========================================================================    LAB EVALUATION    Lab Results   Component Value Date    GLUCOSE 78 04/08/2024    BUN 19 04/08/2024    CREATININE 1.28 (H) 04/08/2024    EGFRIFNONA 52 (L) 11/22/2021    BCR 14.8 04/08/2024    K 4.2 04/08/2024    CO2 22.0 04/08/2024    CALCIUM 9.0 04/08/2024    ALBUMIN 3.3 (L) 04/07/2024    LABIL2 1.3 02/11/2019    AST 30 04/07/2024    ALT 19 04/07/2024       No results found for: \"HGBA1C\"  Lab Results   Component Value Date    CREATININE 1.28 (H) 04/08/2024         ==========================================================================    ASSESSMENT AND PLAN    # Hypothyroidism with noncompliance, unintentional  - Continue levothyroxine 200 mcg every day  - If patient is planned to be discharged can be discharged the same dosing outpatient endocrinology follow-up  - Repeat thyroid function on 4/10/2024 if patient is in the hospital    - Will de-escalate levothyroxine therapy to 200 mcg every day  - Continue to clinically monitor  - If patient is planned for discharge can be discharged on levothyroxine 200 mcg p.o. daily and outpatient endocrinology next    # Shortness of breath  # Acute kidney injury  - Care as per primary team    Will follow with you.  Rest as per primary team.    This note was created using voice recognition software and is inherently subject to errors including those of syntax and \"sound-alike\" substitutions which may escape proofreading.  In such instances, original meaning may be extrapolated by contextual derivation.    Note: Portions of this note may have been copied from previous notes but documentation have been reviewed and edited as necessary to support clinical decision making for today's visit.  The time of this note does not reflect the time I saw the patient but the time that this note was written.    ==========================================================================  Cuco Sanchez, " MD  Department of Endocrine, Diabetes and Metabolism  [place], [place]  ==========================================================================      Electronically signed by Cuco Sanchez MD at 24 1817       Mikael Ramos MD at 24 1210          Procedure   Procedures    [facility] Progress Note      Patient Name: Cassie Winter  : 1960  MRN: [id number]  Primary Care Physician:  Colby Vines MD  Date of admission: 4/3/2024    Subjective     Interval History:    Patient was seen and examined this morning.  She was resting comfortably and extent of any chest pain, shortness of breath, nausea or vomiting    Objective     Vitals:   Temp:  [97.7 °F (36.5 °C)-98.9 °F (37.2 °C)] 97.7 °F (36.5 °C)  Heart Rate:  [] 73  Resp:  [16-17] 17  BP: ()/(57-76) 125/69  No intake or output data in the 24 hours ending 24 1210      Physical Exam:    General Appearance: alert, oriented x 3, no acute distress   Skin: warm and dry  HEENT: oral mucosa normal, nonicteric sclera  Neck: supple, no JVD  Lungs: CTA  Heart: RRR, normal S1 and S2  Abdomen: soft, nontender, nondistended  : no palpable bladder  Extremities: no edema, cyanosis or clubbing  Neuro: normal speech and mental status     Scheduled Meds:     aspirin, 81 mg, Oral, Daily  dicyclomine, 20 mg, Oral, BID  levothyroxine, 200 mcg, Oral, Q AM  [Held by provider] metoprolol succinate XL, 25 mg, Oral, Daily  oxybutynin, 5 mg, Oral, TID  Pancrelipase (Lip-Prot-Amyl), 72,000 units of lipase, Oral, Daily With Breakfast & Lunch  sodium chloride, 10 mL, Intravenous, Q12H  venlafaxine XR, 225 mg, Oral, Nightly      IV Meds:          Results Reviewed:   I have personally reviewed the results from the time of this admission to 2024 12:10 EDT     Results from last 7 days   Lab Units 24  1108 24  0638 24  0739 24  0554 24  0959   SODIUM mmol/L 138 140 139   < > 141    POTASSIUM mmol/L 4.2 4.6 4.5   < > 4.5   CHLORIDE mmol/L 107 109* 109*   < > 110*   CO2 mmol/L 22.0 22.0 21.0*   < > 17.0*   BUN mg/dL 19 17 19   < > 23   CREATININE mg/dL 1.28* 1.49* 1.48*   < > 1.72*   CALCIUM mg/dL 9.0 8.9 8.6   < > 9.4   BILIRUBIN mg/dL  --   --  0.2  --  0.3   ALK PHOS U/L  --   --  98  --  112   ALT (SGPT) U/L  --   --  18  --  27   AST (SGOT) U/L  --   --  32  --  50*   GLUCOSE mg/dL 78 101* 86   < > 101*    < > = values in this interval not displayed.     Estimated Creatinine Clearance: 54.4 mL/min (A) (by C-G formula based on SCr of 1.28 mg/dL (H)).          Results from last 7 days   Lab Units 24  1108 24  0638 24  0739 24  0554 24  0958   WBC 10*3/mm3 6.56 6.19 7.62 8.68 8.94   HEMOGLOBIN g/dL 10.3* 10.9* 10.0* 10.9* 12.6   PLATELETS 10*3/mm3 298 271 262 260 327     Results from last 7 days   Lab Units 24  0958   INR  0.94       Assessment / Plan     ASSESSMENT:  Shawn-suspect due to contrast nephropathy, resolving nicely with normal renal us and no proteinuria.  Creatinine decreased to 1.28 mg/DL.  Electrolytes, volume status okay.  May have mild underlying CKD also  Hyperthyroidism.  Endocrinology following  Hypovolemia-better    PLAN:  Continue current treatment  I will sign off.  Please call for any question    Thank you for involving us in the care of Cassie Winter.  Please feel free to call with any questions.    Mikael Ramos MD  24  12:10 EDT    [facility]  [phone]               Electronically signed by Mikael Ramos MD at 24 1140       Gordy Blue MD at 24 1007              [facility] MEDICINE SERVICE  DAILY PROGRESS NOTE    NAME: Cassie Winter  : 1960  MRN: [id number]      LOS: 3 days     PROVIDER OF SERVICE: Gordy Blue MD    Chief Complaint: Shortness of breath    Subjective:     Interval History:  History taken from: patient    History Present Illness     Per the  "documentation by Kelley Sesay, dated 04/03,  \"63-year-old white female with a history of thyroid dysfunction, CAD. She presents today from her primary care provider's office with complaints of shortness of breath and chest pain. She states has been ongoing for the last 2 months or so. She has symptoms at rest but her symptoms are much worse with exertion. She denies any fever or cough. No leg pain or swelling. She denies any black or bloody stools. She does report some frequent belching. No abdominal pain nausea or vomiting. \"     4/5/24-Hospitalist team has been consulted for further medical management.       4/6/24 seen and examined in bed no acute distress, vital signs stable, discussed with RN.  Denies any chest pain or shortness of breath feeling feeling better  4/7/2024 patient seen and examined in bed acute distress, new complaints, vital signs stable.  Awaiting OT evaluation.  4/8/2024 patient seen and examined in bed no acute distress, vital signs stable, discussed with RN, awaiting PT OT eval.  Patient will need 30 days or less of skilled services    Review of Systems  Patient denies any complaints at this time.  Objective:     Vital Signs  Temp:  [98.2 °F (36.8 °C)-98.9 °F (37.2 °C)] 98.9 °F (37.2 °C)  Heart Rate:  [] 79  Resp:  [16] 16  BP: ()/(57-76) 153/74   Body mass index is 40.54 kg/m².      Physical Exam  general Appearance: AOO x 4, cooperative, no distress, appropriate for age  Head:  Normocephalic, without obvious abnormality  Eyes:  PERRL, EOM's intact, conjunctivae and cornea clear  Nose:  Nares symmetrical, septum midline, mucosa pink  Throat:  Lips, tongue, and mucosa are moist, pink, and intact  Neck:  Supple; symmetrical, trachea midline, no adenopathy  Back:  Symmetrical, ROM normal, no CVA tenderness  Lungs: Respirations unlabored, no audible wheeze  Heart: Regular rate & rhythm, S1 and S2 normal  Abdomen:  Soft, nontender, bowel sounds active all four " quadrants  Musculoskeletal: Tone and strength strong and symmetrical, all extremities; no joint pain or edema         Skin/Hair/Nails:  Skin warm, dry and intact, no rashes or abnormal dyspigmentation        Scheduled Meds   aspirin, 81 mg, Oral, Daily  dicyclomine, 20 mg, Oral, BID  levothyroxine, 200 mcg, Oral, Q AM  [Held by provider] metoprolol succinate XL, 25 mg, Oral, Daily  oxybutynin, 5 mg, Oral, TID  Pancrelipase (Lip-Prot-Amyl), 72,000 units of lipase, Oral, Daily With Breakfast & Lunch  sodium chloride, 10 mL, Intravenous, Q12H  venlafaxine XR, 225 mg, Oral, Nightly       PRN Meds     acetaminophen    aluminum-magnesium hydroxide-simethicone    calcium carbonate    ipratropium-albuterol    nitroglycerin    ondansetron ODT **OR** ondansetron    Pancrelipase (Lip-Prot-Amyl)    simethicone    sodium chloride    [COMPLETED] Insert Peripheral IV **AND** sodium chloride    sodium chloride    sodium chloride   Infusions         Diagnostic Data    Results from last 7 days   Lab Units 04/06/24  0638 04/05/24  0739   WBC 10*3/mm3 6.19 7.62   HEMOGLOBIN g/dL 10.9* 10.0*   HEMATOCRIT %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 kg (238 lb 1.6 oz) Documented at 04/03/2024 0935              Intake/Output Summary (Last 24 hours) at 4/7/2024 1322  Last data filed at 4/7/2024 0623  Gross per 24 hour   Intake 120 ml   Output 2700 ml   Net -2580 ml              Physical Exam:  General-no acute distress  No elevated JVP noted.  Cardiovascular-S1-S2 normal, no murmurs noted.  Respiratory-normal breath sounds, no wheezing/crackles.  GI-abdomen is soft and nontender  No pedal edema        Results Review:    BNP        TROPONIN  Results from last 7 days   Lab Units 04/04/24  0554   HSTROP T ng/L 40*       CoAg  Results from last 7 days   Lab Units 04/03/24  0958   INR  0.94   APTT seconds 26.6       Creatinine Clearance  Estimated Creatinine Clearance: 46.7 mL/min (A) (by C-G formula based on SCr of 1.49 mg/dL (H)).      Radiology  No radiology results for the last day      EKG  I personally viewed and interpreted the patient's EKG/Telemetry data:  ECG 12 Lead Chest Pain   Final Result   HEART RATE= 46  bpm   RR Interval= 1300  ms   VA Interval= 38  ms   P Horizontal Axis= 50  deg   P Front Axis= 0  deg   QRSD Interval= 111  ms   QT Interval= 453  ms   QTcB= 397  ms   QRS Axis= -57  deg   T Wave Axis= 238  deg   - ABNORMAL ECG -   Sinus bradycardia   Incomplete left bundle branch block   LVH with secondary repolarization abnormality   Anterior Q waves, possibly due to LVH   LA   Electronically Signed By: Jorge Prescott ([facility]) 04-Apr-2024 06:56:04   Date and Time of Study: 2024-04-03 09:47:32      Telemetry Scan   Final Result      Telemetry Scan   Final Result      Telemetry Scan   Final Result            Echocardiogram:    Results for orders placed during the hospital encounter of 04/03/24    Adult Transthoracic Echo Complete w/ Color, Spectral and Contrast if Necessary Per Protocol    Interpretation Summary    Left ventricular systolic function is moderately " decreased. Calculated left ventricular EF = 34.5%    Left ventricular diastolic function is consistent with (grade II w/high LAP) pseudonormalization.    The left atrial cavity is mildly dilated.    Estimated right ventricular systolic pressure from tricuspid regurgitation is normal (<35 mmHg).        Stress Test:  Results for orders placed during the hospital encounter of 04/03/24    Stress Test With Myocardial Perfusion One Day    Interpretation Summary    Myocardial perfusion imaging indicates a normal myocardial perfusion study with no evidence of ischemia. Impressions are consistent with a low risk study.    Left ventricular ejection fraction is mildly reduced (Calculated EF = 42%).    This is normal Cardiolite imaging stress test with no evidence of ischemia or myocardial infarction.  Left ventricle size and function is normal on gated SPECT imaging.  No wall motion abnormality was noted.  Clinical correlation recommended.  Further recommendation as per ordering physician. .    Findings consistent with a normal ECG stress test.         Cardiac Catheterization:  No results found for this or any previous visit.         Other:         ASSESSMENT & PLAN:    New diagnosis of heart failure with reduced ejection fraction  Nuclear stress test 4/6/2024 without evidence of myocardial ischemia/infarction  Unclear etiology  Could be in setting of significant hypothyroidism and occult arrhythmia.  Metoprolol discontinued in setting of bradycardia  Initiate low-dose ACE inhibitor once renal function stabilizes as outpatient  Event monitor on  discharge to rule out occult arrhythmia as cause of heart failure  Outpatient follow-up with Dr. Casillas     Severe hypothyroidism  Reports noncompliance to levothyroxine  Endocrinology team following  Stressed on compliance     Coronary angiogram 2017 with distal LAD disease otherwise nonobstructive CAD  Continue baby aspirin           OP f/u with Dr Casillas      Part of this note  may be an electronic transcription/translation of spoken language to printed text using the Dragon Dictation System.    Jacob Velásquez MD  24  13:22 EDT                  Electronically signed by Jacob Velásquez MD at 24 1323       Consult Notes (last 48 hours)  Notes from 24 1129 through 24 1129   No notes of this type exist for this encounter.       Nutrition Notes (all)    No notes exist for this encounter.          Physical Therapy Notes (all)        Zaira Delvalle PT at 24 1130  Version 1 of 1         Goal Outcome Evaluation:  Plan of Care Reviewed With: patient        Progress: no change  Outcome Evaluation: Pt is a 62 y/o F who presented to [facility] w/ c/o chest pain/dyspnea and SOA (EKG: sinus bradycardia w/ incomplete LBBB, troponin (-), chronic pancreatic insuffiency, ALL, hypothyroidism, and with 5 falls since 2024 with most recent fall striking head (CT head (-) acute changes). At baseline, pt lives alone in a duplex apartment with [address], was independent w/ all mobility/ADLs w/ no AD, supportive daughter and son-in-law who assist as needed. During eval, pt requires SBA for supine to/from sit transfer, CGA for sit to/from stand transfers x 2 with c/o dizziness and BLE weakness requiring return to sitting EOB on both attempts (BP standin/63 mmHg and sitting EOB: 110/83 mmHg). Pt also with increased SOA, though unable to obtain good pleth line with both finger and forehead probes for Sp02 monitor. At this time, pt appears to be functioning below baseline and not safe for home alone. Recommending SNF at d/c and will plan to see 3x/week at [facility] for progression of mobility. PPE: gloves      Anticipated Discharge Disposition (PT): skilled nursing facility                          Electronically signed by Zaira Delvalle PT at 24 1130       Zaira Delvalle PT at 24 1130  Version 1 of 1         Patient Name: Cassie Winter  : 1960    MRN: [id number]                               Today's Date: 4/6/2024       Admit Date: 4/3/2024    Visit Dx:     ICD-10-CM ICD-9-CM   1. Shortness of breath  R06.02 786.05   2. Chest pain, unspecified type  R07.9 786.50   3. Acute kidney injury  N17.9 584.9     Patient Active Problem List   Diagnosis    Allergic rhinitis    Anemia    Asthma    B12 deficiency    Chronic coronary artery disease    Chronic low back pain    Depression    H/O laminectomy    Hypothyroidism    Neuropathy    Irritable bowel syndrome    Spinal stenosis of lumbar region    Dizziness    Narrow complex tachycardia    Other sleep apnea    TMJ syndrome    Overactive bladder    Pancreatic insufficiency    Fall    Blockage of coronary artery of heart    Alkaline phosphatase raised    Anemia, iron deficiency    Fatty stool    Gallstones    Arthritis    Gastritis, unspecified, without bleeding    Generalized abdominal pain    High blood pressure    Insomnia    Intestinal malabsorption    Seasonal allergies    Second degree hemorrhoids    Encounter for screening mammogram for malignant neoplasm of breast    Other specified disorders of bladder    Thyroid disease    Shortness of breath    Dyspnea     Past Medical History:   Diagnosis Date    Allergic rhinitis     Anemia     Asthma     B12 deficiency     Bradycardia     Broken arm 2019    Left arm broken     Chronic back pain     Chronic bronchitis     Coronary artery disease     Diarrhea     Encounter for blood transfusion 4/3/2024    Hypothyroidism     MRSA (methicillin resistant Staphylococcus aureus)     Neuropathic pain of foot     Severe back pain 2010    Sleep apnea, obstructive      Past Surgical History:   Procedure Laterality Date    ABDOMINAL HERNIA REPAIR  2003    repair of abdominal wall hernia, intraabdominal hernia and hiatal hernia, 2004 incisional hernia repair.     ACHILLES TENDON SURGERY  2011    rupture    ANKLE FUSION Right 11/06/2015    CARDIAC CATHETERIZATION  02/20/2017    CHOLECYSTECTOMY  1992     GASTRIC BYPASS  1986    LAMINECTOMY  2007    that was complicated by a staph infection     OTHER SURGICAL HISTORY Left 2019    L arm     REMOVAL EXTERNAL FIXATOR      WRIST FRACTURE SURGERY Right       General Information       Row Name 04/06/24 1055          Physical Therapy Time and Intention    Document Type evaluation  -AO     Mode of Treatment physical therapy  -AO       Row Name 04/06/24 1055          General Information    Patient Profile Reviewed yes  -AO     Prior Level of Function --  Pt typically independent w/ all mobility/ADLs w/ no AD, reports 5 falls since January 2024, son-in-law drives to appointments  -AO     Existing Precautions/Restrictions fall  -AO     Barriers to Rehab none identified  -AO       Row Name 04/06/24 1055          Living Environment    People in Home alone  Has supportive daughter and son-in-law who check in daily and provide assist as needed  -AO       Row Name 04/06/24 1055          Home Main Entrance    Number of Stairs, Main Entrance other (see comments)  15  -AO     Stair Railings, Main Entrance railings on both sides of stairs  -AO       Row Name 04/06/24 1055          Stairs Within Home, Primary    Number of Stairs, Within Home, Primary one  -AO       Row Name 04/06/24 1055          Cognition    Orientation Status (Cognition) oriented x 4  -AO       Row Name 04/06/24 1055          Safety Issues, Functional Mobility    Impairments Affecting Function (Mobility) balance;endurance/activity tolerance;shortness of breath;strength  -AO               User Key  (r) = Recorded By, (t) = Taken By, (c) = Cosigned By      Initials Name Provider Type    AO Zaira Delvalle, PT Physical Therapist                   Mobility       Row Name 04/06/24 1055          Bed Mobility    Bed Mobility supine-sit;sit-supine  -AO     Supine-Sit [place] (Bed Mobility) standby assist  -AO     Sit-Supine [place] (Bed Mobility) standby assist  -AO       Row Name 04/06/24 1055          Sit-Stand  Transfer    Sit-Stand [place] (Transfers) contact guard  -AO     Assistive Device (Sit-Stand Transfers) --  HHA  -AO       Row Name 04/06/24 1055          Gait/Stairs (Locomotion)    [place] Level (Gait) unable to assess  -AO               User Key  (r) = Recorded By, (t) = Taken By, (c) = Cosigned By      Initials Name Provider Type    AO Zaira Delvalle, PT Physical Therapist                   Obj/Interventions       Row Name 04/06/24 1055          Range of Motion Comprehensive    General Range of Motion no range of motion deficits identified  -AO       [facility] Name 04/06/24 1055          Strength Comprehensive (MMT)    General Manual Muscle Testing (MMT) Assessment lower extremity strength deficits identified  -AO     Comment, General Manual Muscle Testing (MMT) Assessment B hip flexion: 3+/5, B knee flexion/extension: 4/5  -AO       [facility] Name 04/06/24 1055          Balance    Balance Assessment sitting static balance;sitting dynamic balance;sit to stand dynamic balance;standing static balance;standing dynamic balance  -AO     Static Sitting Balance standby assist  -AO     Dynamic Sitting Balance standby assist  -AO     Position, Sitting Balance unsupported;sitting edge of bed  -AO     Sit to Stand Dynamic Balance contact guard  -AO     Static Standing Balance contact guard  -AO     Dynamic Standing Balance minimal assist  -AO     Position/Device Used, Standing Balance --  HHA  -AO       Row Name 04/06/24 1055          Sensory Assessment (Somatosensory)    Sensory Assessment (Somatosensory) sensation intact  -AO               User Key  (r) = Recorded By, (t) = Taken By, (c) = Cosigned By      Initials Name Provider Type    AO Zaira Delvalle, PT Physical Therapist                   Goals/Plan       Row Name 04/06/24 1055          Bed Mobility Goal 1 (PT)    Activity/Assistive Device (Bed Mobility Goal 1, PT) bed mobility activities, all  -AO     [place] Level/Cues Needed (Bed Mobility Goal 1, PT)  modified independence  -AO     Time Frame (Bed Mobility Goal 1, PT) long term goal (LTG);2 weeks  -AO     Progress/Outcomes (Bed Mobility Goal 1, PT) goal not met  -AO       Row Name 04/06/24 1055          Transfer Goal 1 (PT)    Activity/Assistive Device (Transfer Goal 1, PT) transfers, all  -AO     [place] Level/Cues Needed (Transfer Goal 1, PT) modified independence  -AO     Time Frame (Transfer Goal 1, PT) long term goal (LTG);2 weeks  -AO     Progress/Outcome (Transfer Goal 1, PT) goal not met  -AO       Row Name 04/06/24 1055          Gait Training Goal 1 (PT)    Activity/Assistive Device (Gait Training Goal 1, PT) gait (walking locomotion);walker, rolling  -AO     [place] Level (Gait Training Goal 1, PT) supervision required  -AO     Distance (Gait Training Goal 1, PT) 100 ft  -AO     Time Frame (Gait Training Goal 1, PT) long term goal (LTG);2 weeks  -AO     Progress/Outcome (Gait Training Goal 1, PT) goal not met  -AO       Row Name 04/06/24 1055          Therapy Assessment/Plan (PT)    Planned Therapy Interventions (PT) balance training;bed mobility training;gait training;home exercise program;neuromuscular re-education;patient/family education;strengthening;transfer training  -AO               User Key  (r) = Recorded By, (t) = Taken By, (c) = Cosigned By      Initials Name Provider Type    Zaira Figueroa, PT Physical Therapist                   Clinical Impression       Row Name 04/06/24 1055          Pain    Pretreatment Pain Rating 0/10 - no pain  -AO     Posttreatment Pain Rating 0/10 - no pain  -AO       Row Name 04/06/24 1055          Plan of Care Review    Plan of Care Reviewed With patient  -AO     Progress no change  -AO     Outcome Evaluation Pt is a 64 y/o F who presented to [facility] w/ c/o chest pain/dyspnea and SOA (EKG: sinus bradycardia w/ incomplete LBBB, troponin (-), chronic pancreatic insuffiency, ALL, hypothyroidism, and with 5 falls since January 2024 with most recent fall  striking head (CT head (-) acute changes). At baseline, pt lives alone in a duplex apartment with [address], was independent w/ all mobility/ADLs w/ no AD, supportive daughter and son-in-law who assist as needed. During eval, pt requires SBA for supine to/from sit transfer, CGA for sit to/from stand transfers x 2 with c/o dizziness and BLE weakness requiring return to sitting EOB on both attempts (BP standin/63 mmHg and sitting EOB: 110/83 mmHg). Pt also with increased SOA, though unable to obtain good pleth line with both finger and forehead probes for Sp02 monitor. At this time, pt appears to be functioning below baseline and not safe for home alone. Recommending SNF at d/c and will plan to see 3x/week at [facility] for progression of mobility. PPE: gloves  -AO       Row Name 24 4857          Therapy Assessment/Plan (PT)    Rehab Potential (PT) good, to achieve stated therapy goals  -AO     Criteria for Skilled Interventions Met (PT) yes;meets criteria  -AO     Therapy Frequency (PT) 3 times/wk  -AO     Predicted Duration of Therapy Intervention (PT) until d/c  -AO       Row Name 24 1058          Vital Signs    Recovery Time BP standin/63 mmHg and sitting EOB: 110/83 mmHg). Pt also with increased SOA, though unable to obtain good pleth line with both finger and forehead probes for Sp02 monitor  -AO       Row Name 24 1052          Positioning and Restraints    Pre-Treatment Position in bed  -AO     Post Treatment Position bed  Pt to have stress test soon and requests return to supine  -AO     In Bed notified nsg;supine;exit alarm on;call light within reach;encouraged to call for assist  -AO               User Key  (r) = Recorded By, (t) = Taken By, (c) = Cosigned By      Initials Name Provider Type    Zaira Figueroa, PT Physical Therapist                   Outcome Measures       Row Name 24 105          How much help from another person do you currently need...    Turning from your  back to your side while in flat bed without using bedrails? 4  -AO     Moving from lying on back to sitting on the side of a flat bed without bedrails? 4  -AO     Moving to and from a bed to a chair (including a wheelchair)? 3  -AO     Standing up from a chair using your arms (e.g., wheelchair, bedside chair)? 3  -AO     Climbing 3-5 steps with a railing? 1  -AO     To walk in hospital room? 2  -AO     AM-PAC 6 Clicks Score (PT) 17  -AO     Highest Level of Mobility Goal 5 --> Static standing  -AO       Row Name 04/06/24 1055          Functional Assessment    Outcome Measure Options AM-PAC 6 Clicks Basic Mobility (PT)  -AO               User Key  (r) = Recorded By, (t) = Taken By, (c) = Cosigned By      Initials Name Provider Type    AO Zaira Delvalle, PT Physical Therapist                                 Physical Therapy Education       Title: PT OT SLP Therapies (Done)       Topic: Physical Therapy (Done)       Point: Mobility training (Done)       Learning Progress Summary             Patient Acceptance, E,TB, VU by AO at 4/6/2024 1129                                         User Key       Initials Effective Dates Name Provider Type Discipline    AO 06/16/21 -  Zaira Delvalle, PT Physical Therapist PT                  PT Recommendation and Plan  Planned Therapy Interventions (PT): balance training, bed mobility training, gait training, home exercise program, neuromuscular re-education, patient/family education, strengthening, transfer training  Plan of Care Reviewed With: patient  Progress: no change  Outcome Evaluation: Pt is a 64 y/o F who presented to [facility] w/ c/o chest pain/dyspnea and SOA (EKG: sinus bradycardia w/ incomplete LBBB, troponin (-), chronic pancreatic insuffiency, ALL, hypothyroidism, and with 5 falls since January 2024 with most recent fall striking head (CT head (-) acute changes). At baseline, pt lives alone in a duplex apartment with [address], was independent w/ all mobility/ADLs w/ no AD,  supportive daughter and son-in-law who assist as needed. During eval, pt requires SBA for supine to/from sit transfer, CGA for sit to/from stand transfers x 2 with c/o dizziness and BLE weakness requiring return to sitting EOB on both attempts (BP standin/63 mmHg and sitting EOB: 110/83 mmHg). Pt also with increased SOA, though unable to obtain good pleth line with both finger and forehead probes for Sp02 monitor. At this time, pt appears to be functioning below baseline and not safe for home alone. Recommending SNF at d/c and will plan to see 3x/week at [facility] for progression of mobility. PPE: gloves     Time Calculation:   PT Evaluation Complexity  History, PT Evaluation Complexity: 3 or more personal factors and/or comorbidities  Examination of Body Systems (PT Eval Complexity): total of 3 or more elements  Clinical Presentation (PT Evaluation Complexity): stable  Clinical Decision Making (PT Evaluation Complexity): low complexity  Overall Complexity (PT Evaluation Complexity): low complexity     PT Charges       Row Name 24 1130             Time Calculation    Start Time 1055  -AO      Stop Time 1120  -AO      Time Calculation (min) 25 min  -AO      PT Received On 24  -AO      PT - Next Appointment 24  -AO      PT Goal Re-Cert Due Date 24  -AO         Time Calculation- PT    Total Timed Code Minutes- PT 0 minute(s)  -AO                User Key  (r) = Recorded By, (t) = Taken By, (c) = Cosigned By      Initials Name Provider Type    AO Zaira Delvalle, PT Physical Therapist                  Therapy Charges for Today       Code Description Service Date Service Provider Modifiers Qty    [id number]  PT EVAL LOW COMPLEXITY 4 2024 Zaira Delvalle, PT GP 1            PT G-Codes  Outcome Measure Options: AM-PAC 6 Clicks Basic Mobility (PT)  AM-PAC 6 Clicks Score (PT): 17  PT Discharge Summary  Anticipated Discharge Disposition (PT): skilled nursing facility    Zaira Delvalle  PT  2024      Electronically signed by Zaira Delvalle, PT at 24 1131          Occupational Therapy Notes (all)        Ramonita Dumont, OT at 24 1039          Patient Name: Cassie Winter  : 1960    MRN: [id number]                              Today's Date: 2024       Admit Date: 4/3/2024    Visit Dx:     ICD-10-CM ICD-9-CM   1. Shortness of breath  R06.02 786.05   2. Chest pain, unspecified type  R07.9 786.50   3. Acute kidney injury  N17.9 584.9     Patient Active Problem List   Diagnosis    Allergic rhinitis    Anemia    Asthma    B12 deficiency    Chronic coronary artery disease    Chronic low back pain    Depression    H/O laminectomy    Hypothyroidism    Neuropathy    Irritable bowel syndrome    Spinal stenosis of lumbar region    Dizziness    Narrow complex tachycardia    Other sleep apnea    TMJ syndrome    Overactive bladder    Pancreatic insufficiency    Fall    Blockage of coronary artery of heart    Alkaline phosphatase raised    Anemia, iron deficiency    Fatty stool    Gallstones    Arthritis    Gastritis, unspecified, without bleeding    Generalized abdominal pain    High blood pressure    Insomnia    Intestinal malabsorption    Seasonal allergies    Second degree hemorrhoids    Encounter for screening mammogram for malignant neoplasm of breast    Other specified disorders of bladder    Thyroid disease    Shortness of breath    Dyspnea     Past Medical History:   Diagnosis Date    Allergic rhinitis     Anemia     Asthma     B12 deficiency     Bradycardia     Broken arm 2019    Left arm broken     Chronic back pain     Chronic bronchitis     Coronary artery disease     Diarrhea     Encounter for blood transfusion 4/3/2024    Hypothyroidism     MRSA (methicillin resistant Staphylococcus aureus)     Neuropathic pain of foot     Severe back pain 2010    Sleep apnea, obstructive      Past Surgical History:   Procedure Laterality Date    ABDOMINAL HERNIA REPAIR  2003    repair of  abdominal wall hernia, intraabdominal hernia and hiatal hernia, 2004 incisional hernia repair.     ACHILLES TENDON SURGERY  2011    rupture    ANKLE FUSION Right 11/06/2015    CARDIAC CATHETERIZATION  02/20/2017    CHOLECYSTECTOMY  1992    GASTRIC BYPASS  1986    LAMINECTOMY  2007    that was complicated by a staph infection     OTHER SURGICAL HISTORY Left 2019    L arm     REMOVAL EXTERNAL FIXATOR      WRIST FRACTURE SURGERY Right       General Information       Row Name 04/09/24 1019          General Information    Prior Level of Function independent:;all household mobility;ADL's;min assist:;home management;max assist:;driving  no DME  -     Existing Precautions/Restrictions fall  reports 5 falls since jan. Hit head last fall. Feels like she is SOA then goes down.  -     Barriers to Rehab previous functional deficit  -       Row Name 04/09/24 1019          Living Environment    People in Home alone  dtr & SIlLcheck in on pt daily  -       Row Name 04/09/24 1019          Home Main Entrance    Number of Stairs, Main Entrance other (see comments)  15 steps to upstiars apartment. [facility] Has inquired with chan who says someone is moving out of a downstairs apt. She needs to FU with this so she can move to ground floor living.  -     Stair Railings, Main Entrance railings on both sides of stairs  -       Row Name 04/09/24 1019          Stairs Within Home, Primary    Number of Stairs, Within Home, Primary one  -       Row Name 04/09/24 1019          Cognition    Orientation Status (Cognition) oriented x 4  -       Row Name 04/09/24 1019          Safety Issues, Functional Mobility    Safety Issues Affecting Function (Mobility) judgment;problem-solving  -     Impairments Affecting Function (Mobility) balance;endurance/activity tolerance;strength;shortness of breath  -               User Key  (r) = Recorded By, (t) = Taken By, (c) = Cosigned By      Initials Name Provider Type      Ramonita Dumont OT Occupational Therapist                     Mobility/ADL's       Row Name 04/09/24 1022          Bed Mobility    Bed Mobility supine-sit  -     Supine-Sit [place] (Bed Mobility) standby assist;set up  -[facility] Name 04/09/24 1022          Transfers    Transfers sit-stand transfer;stand-sit transfer  -       Row Name 04/09/24 1022          Sit-Stand Transfer    Sit-Stand [place] (Transfers) set up;standby assist  -     Assistive Device (Sit-Stand Transfers) walker, front-wheeled  -       Row Name 04/09/24 1022          Stand-Sit Transfer    Stand-Sit [place] (Transfers) set up;standby assist;verbal cues  -     Assistive Device (Stand-Sit Transfers) walker, front-wheeled  -       Row Name 04/09/24 1022          Functional Mobility    Functional Mobility- Comment pt stood several min at EOB to try and get good standing BP but by the time one took she needed to sit down beccause HR was 135 BPM & she was SOA.  -[facility] Name 04/09/24 1022          Activities of Daily Living    BADL Assessment/Intervention grooming  -[facility] Name 04/09/24 1022          Grooming Assessment/Training    [place] Level (Grooming) hair care, combing/brushing;wash face, hands;set up  -     Position (Grooming) edge of bed sitting;unsupported sitting  -[facility] Name 04/09/24 1022          Toileting Assessment/Training    [place] Level (Toileting) toileting skills;dependent (less than 25% patient effort)  -     Comment, (Toileting) pt is afraid of falls so using periwick.  -               User Key  (r) = Recorded By, (t) = Taken By, (c) = Cosigned By      Initials Name Provider Type     Ramonita Dumont OT Occupational Therapist                   Obj/Interventions       [facility] Name 04/09/24 1025          Sensory Assessment (Somatosensory)    Sensory Assessment (Somatosensory) sensation intact  -[facility] Name 04/09/24 1025          Vision Assessment/Intervention     Visual Impairment/Limitations WFL  -               User Key  (r) = Recorded By, (t) = Taken By, (c) = Cosigned By      Initials Name Provider Type     Ramonita Dumont OT Occupational Therapist                   Goals/Plan       Row Name 04/09/24 1036          Bathing Goal 1 (OT)    Activity/Device (Bathing Goal 1, OT) bathing skills, all  -     [place] Level/Cues Needed (Bathing Goal 1, OT) modified independence  -     Time Frame (Bathing Goal 1, OT) 2 weeks  -       Row Name 04/09/24 1036          Dressing Goal 1 (OT)    Activity/Device (Dressing Goal 1, OT) dressing skills, all  -     [place]/Cues Needed (Dressing Goal 1, OT) modified independence  -     Time Frame (Dressing Goal 1, OT) 2 weeks  -       Row Name 04/09/24 1036          Toileting Goal 1 (OT)    Activity/Device (Toileting Goal 1, OT) toileting skills, all  -     [place] Level/Cues Needed (Toileting Goal 1, OT) independent  -     Time Frame (Toileting Goal 1, OT) 1 week  -               User Key  (r) = Recorded By, (t) = Taken By, (c) = Cosigned By      Initials Name Provider Type     Ramonita Dumont, OT Occupational Therapist                   Clinical Impression       Row Name 04/09/24 1027          Pain Assessment    Pretreatment Pain Rating 0/10 - no pain  -     Posttreatment Pain Rating 0/10 - no pain  -       Row Name 04/09/24 1027          Plan of Care Review    Progress improving  -     Outcome Evaluation Pt is 62 y/o F admitted after about a month of SOA and several falls which fortunatelly did not result in any acute injury. She has acute kidney injury and has been bradycardic but now this date her HR is high when standing. Her BP is slightly low but her MAP is WNL. Pt is weak and needs to use a RW. She has 15 steps to her upstairs apartment in a duplex. Recommend SNF for rehab then moving to downstairs apt.  -       Row Name 04/09/24 1027          Therapy Assessment/Plan (OT)    Rehab Potential  (OT) good, to achieve stated therapy goals  -     Criteria for Skilled Therapeutic Interventions Met (OT) skilled treatment is necessary  -     Therapy Frequency (OT) 3 times/wk  -     Predicted Duration of Therapy Intervention (OT) until d/c  -       Row Name 04/09/24 1027          Therapy Plan Review/Discharge Plan (OT)    Anticipated Discharge Disposition (OT) skilled nursing facility  -       Row Name 04/09/24 1027          Vital Signs    Pre Systolic BP Rehab 95  -MH     Pre Treatment Diastolic BP 56  -MH     Intra Systolic BP Rehab 95  -MH     Intra Treatment Diastolic BP 63  -MH     Post Systolic BP Rehab 87  -MH     Post Treatment Diastolic BP 64  -MH     Pretreatment Heart Rate (beats/min) 80  -MH     Intratreatment Heart Rate (beats/min) 135  -MH     Posttreatment Heart Rate (beats/min) 90  -MH     O2 Delivery Pre Treatment room air  -     Pre Patient Position Supine  -MH     Intra Patient Position Standing  -MH     Post Patient Position Sitting  -       Row Name 04/09/24 1027          Positioning and Restraints    Pre-Treatment Position in bed  -     Post Treatment Position chair  -     In Chair notified nsg;call light within reach;sitting;encouraged to call for assist  -               User Key  (r) = Recorded By, (t) = Taken By, (c) = Cosigned By      Initials Name Provider Type     Ramonita Dumont, OT Occupational Therapist                   Outcome Measures       Row Name 04/09/24 1036          How much help from another person do you currently need...    Turning from your back to your side while in flat bed without using bedrails? 4  -MH     Moving from lying on back to sitting on the side of a flat bed without bedrails? 4  -MH     Moving to and from a bed to a chair (including a wheelchair)? 4  -MH     Standing up from a chair using your arms (e.g., wheelchair, bedside chair)? 4  -MH     Climbing 3-5 steps with a railing? 3  -MH     To walk in hospital room? 3  -MH     AM-PAC 6  Clicks Score (PT) 22  -     Highest Level of Mobility Goal 7 --> Walk 25 feet or more  -               User Key  (r) = Recorded By, (t) = Taken By, (c) = Cosigned By      Initials Name Provider Type     Ramonita Dumont OT Occupational Therapist                    Occupational Therapy Education       Title: PT OT SLP Therapies (In Progress)       Topic: Occupational Therapy (In Progress)       Point: ADL training (Not Started)       Description:   Instruct learner(s) on proper safety adaptation and remediation techniques during self care or transfers.   Instruct in proper use of assistive devices.                  Learner Progress:  Not documented in this visit.              Point: Home exercise program (Not Started)       Description:   Instruct learner(s) on appropriate technique for monitoring, assisting and/or progressing therapeutic exercises/activities.                  Learner Progress:  Not documented in this visit.              Point: Precautions (Done)       Description:   Instruct learner(s) on prescribed precautions during self-care and functional transfers.                  Learning Progress Summary             Patient Acceptance, E,TB,D, VU,NR,DU by  at 4/9/2024 1037                         Point: Body mechanics (Done)       Description:   Instruct learner(s) on proper positioning and spine alignment during self-care, functional mobility activities and/or exercises.                  Learning Progress Summary             Patient Acceptance, E,TB,D, VU,NR,DU by  at 4/9/2024 1037                                         User Key       Initials Effective Dates Name Provider Type [facility] 06/16/21 -  Ramonita Dumont OT Occupational Therapist OT                  OT Recommendation and Plan  Therapy Frequency (OT): 3 times/wk  Plan of Care Review  Progress: improving  Outcome Evaluation: Pt is 62 y/o F admitted after about a month of SOA and several falls which fortunatelly did not result in  any acute injury. She has acute kidney injury and has been bradycardic but now this date her HR is high when standing. Her BP is slightly low but her MAP is WNL. Pt is weak and needs to use a RW. She has 15 steps to her upstairs apartment in a duplex. Recommend SNF for rehab then moving to downstairs apt.     Time Calculation:         Time Calculation- OT       Row Name 04/09/24 1037             Time Calculation- OT    OT Start Time 0828  -      OT Stop Time 0853  -      OT Time Calculation (min) 25 min  -      Total Timed Code Minutes- OT 0 minute(s)  -      OT Received On 04/09/24  -      OT - Next Appointment 04/11/24  -      OT Goal Re-Cert Due Date 04/23/24  -                User Key  (r) = Recorded By, (t) = Taken By, (c) = Cosigned By      Initials Name Provider Type     Ramonita Dumont OT Occupational Therapist                  Therapy Charges for Today       Code Description Service Date Service Provider Modifiers Qty    [id number]  OT EVAL MOD COMPLEXITY 3 4/9/2024 Ramonita Dumont OT GO 1                 Ramonita Dumont OT  4/9/2024    Electronically signed by Ramonita Dumont OT at 04/09/24 1039       Ramonita Dumont OT at 04/09/24 1037          Goal Outcome Evaluation:           Progress: improving  Outcome Evaluation: Pt is 62 y/o F admitted after about a month of SOA and several falls which fortunatelly did not result in any acute injury. She has acute kidney injury and has been bradycardic but now this date her HR is high when standing. Her BP is slightly low but her MAP is WNL. Pt is weak and needs to use a RW. She has 15 steps to her upstairs apartment in a duplex. Recommend SNF for rehab then moving to downstairs apt.      Anticipated Discharge Disposition (OT): skilled nursing facility                          Electronically signed by Ramonita Dumont OT at 04/09/24 1037       Speech Language Pathology Notes (all)    No notes exist for this encounter.

## 2024-04-09 NOTE — PROGRESS NOTES
"[facility] MEDICINE SERVICE  DAILY PROGRESS NOTE    NAME: Cassie Winter  : 1960  MRN: [id number]      LOS: 4 days     PROVIDER OF SERVICE: Gordy Blue MD    Chief Complaint: Shortness of breath    Subjective:     Interval History:  History taken from: patient    History Present Illness     Per the documentation by Kelley Sesay, dated ,  \"63-year-old white female with a history of thyroid dysfunction, CAD. She presents today from her primary care provider's office with complaints of shortness of breath and chest pain. She states has been ongoing for the last 2 months or so. She has symptoms at rest but her symptoms are much worse with exertion. She denies any fever or cough. No leg pain or swelling. She denies any black or bloody stools. She does report some frequent belching. No abdominal pain nausea or vomiting. \"     24-Hospitalist team has been consulted for further medical management.     24 seen and examined in bed no acute distress, vital signs stable, discussed with RN.  Denies any chest pain or shortness of breath feeling feeling better  2024 patient seen and examined in bed acute distress, new complaints, vital signs stable.  Awaiting OT evaluation.  2024 patient seen and examined in bed no acute distress, vital signs stable, discussed with RN, awaiting PT OT eval.  Patient will need 30 days or less of skilled services  24 seen in bed NAD, BP has been low midodrine added, received entresto, fluid bolus ordered     Review of Systems  Patient denies any complaints at this time.  Objective:     Vital Signs  Temp:  [96.9 °F (36.1 °C)-97.7 °F (36.5 °C)] 96.9 °F (36.1 °C)  Heart Rate:  [] 81  Resp:  [13-20] 18  BP: ()/(56-82) 74/60   Body mass index is 40.54 kg/m².      Physical Exam  general Appearance: AOO x 4, cooperative, no distress, appropriate for age  Head:  Normocephalic, without obvious abnormality  Eyes:  PERRL, EOM's intact, " conjunctivae and cornea clear  Nose:  Nares symmetrical, septum midline, mucosa pink  Throat:  Lips, tongue, and mucosa are moist, pink, and intact  Neck:  Supple; symmetrical, trachea midline, no adenopathy  Back:  Symmetrical, ROM normal, no CVA tenderness  Lungs: Respirations unlabored, no audible wheeze  Heart: Regular rate & rhythm, S1 and S2 normal  Abdomen:  Soft, nontender, bowel sounds active all four quadrants  Musculoskeletal: Tone and strength strong and symmetrical, all extremities; no joint pain or edema         Skin/Hair/Nails:  Skin warm, dry and intact, no rashes or abnormal dyspigmentation        Scheduled Meds   aspirin, 81 mg, Oral, Daily  atorvastatin, 10 mg, Oral, Nightly  cetirizine, 10 mg, Oral, Daily  cholestyramine light, 1 packet, Oral, Daily  dicyclomine, 20 mg, Oral, BID  levothyroxine, 200 mcg, Oral, Q AM  [START ON 4/10/2024] metoprolol succinate XL, 12.5 mg, Oral, Q24H  midodrine, 5 mg, Oral, TID AC  oxybutynin, 5 mg, Oral, TID  Pancrelipase (Lip-Prot-Amyl), 72,000 units of lipase, Oral, Daily With Breakfast & Lunch  sodium chloride, 500 mL, Intravenous, Once  sodium chloride, 10 mL, Intravenous, Q12H  venlafaxine XR, 225 mg, Oral, Nightly       PRN Meds     acetaminophen    aluminum-magnesium hydroxide-simethicone    calcium carbonate    ipratropium-albuterol    nitroglycerin    ondansetron ODT **OR** ondansetron    Pancrelipase (Lip-Prot-Amyl)    simethicone    sodium chloride    [COMPLETED] Insert Peripheral IV **AND** sodium chloride    sodium chloride    sodium chloride   Infusions         Diagnostic Data    Results from last 7 days   Lab Units 04/08/24  1108 04/07/24  0611   WBC 10*3/mm3 6.56  --    HEMOGLOBIN g/dL 10.3*  --    HEMATOCRIT % 32.4*  --    PLATELETS 10*3/mm3 298  --    GLUCOSE mg/dL 78 84   CREATININE mg/dL 1.28* 1.43*   BUN mg/dL 19 18   SODIUM mmol/L 138 138   POTASSIUM mmol/L 4.2 5.0   AST (SGOT) U/L  --  30   ALT (SGPT) U/L  --  19   ALK PHOS U/L  --  89    BILIRUBIN mg/dL  --  0.3   ANION GAP mmol/L 9.0 9.0       XR Abdomen KUB    Result Date: 4/9/2024  Impression: Mild constipation. No acute process. Chronic findings as noted. Electronically Signed: Lidia López MD  4/9/2024 10:40 AM EDT  Workstation ID: [id number]       I reviewed the patient's new clinical results.    Assessment/Plan:     Active and Resolved Problems  Active Hospital Problems    Diagnosis  POA    **Shortness of breath [R06.02]  Yes    Dyspnea [R06.00]  Yes    Other sleep apnea [G47.39]  Yes    Asthma [J45.909]  Yes    Chronic coronary artery disease [I25.10]  Yes    Irritable bowel syndrome [K58.9]  Yes    Chronic low back pain [M54.50, G89.29]  Yes    Neuropathy [G62.9]  Yes    Depression [F32.A]  Yes    B12 deficiency [E53.8]  Yes    Anemia [D64.9]  Yes    Hypothyroidism [E03.9]  Yes      Resolved Hospital Problems   No resolved problems to display.     Chest pain/dyspnea   -Currently on 2 L nasal cannula  -EKG:rate 46, sinus ludwin, incomplete LBBB  - cardiology consulted.  stress test  -Troponin flat  - echo pending  -Continue telemetry  Continue medical management with aspirin, metoprolol succinate as tolerated.      Hypothyroidism  - tsh 115, free T4 0.10  - pt on synthroid  - endocrinology consulted>If patient is planned for discharge can be discharged on levothyroxine 200 mcg p.o. daily and outpatient endocrinology next   - increased synthroid to 250  - recheck as outpt     ALL  -Creatinine trending down  -IV fluids stopped.  - us renal unremarkable  - nephrology on board>Dc is ok from a renal standpoint      Chronic pancreatic insufficiency  - creon, colestid, bentyl    Hypotension-  Hold bp meds  Fluid bolus  Midodrine added    DVT prophylaxis:  Mechanical DVT prophylaxis orders are present.    Code status is   Code Status and Medical Interventions:   Ordered at: 04/03/24 2865     Code Status (Patient has no pulse and is not breathing):    CPR (Attempt to Resuscitate)     Medical  Interventions (Patient has pulse or is breathing):    Full Support       Plan for disposition:Recommending SNF at d/c and will plan to see 3x/week at [facility] for progression of mobility. PPE: gloves  in 1 days    Time: 30 minutes    Signature: Electronically signed by Gordy Blue MD, 04/09/24, 11:45 EDT.  [facility] Hospitalist Team

## 2024-04-09 NOTE — PROGRESS NOTES
ENDOCRINE CONSULT PROGRESS NOTE  DATE OF SERVICE: 24        PATIENT NAME: Cassie Winter  PATIENT : 1960 AGE: 63 y.o.  MRN NUMBER: [id number]    ==========================================================================    CHIEF COMPLAINT: Hypothyroidism    CARE TEAM:   Patient Care Team:  Colby Vines MD as PCP - General (Internal Medicine)  Bonnie Landa PT as Physical Therapist (Physical Therapy)  Pablito Casillas MD as Consulting Physician (Cardiology)    SUBJECTIVE  Patient seen and examined.  Tolerating medication.  Shortness of breath improving.    ==========================================================================    CURRENT ACTIVE HOSPITAL MEDICATIONS    Scheduled Medications:  aspirin, 81 mg, Oral, Daily  atorvastatin, 10 mg, Oral, Nightly  cetirizine, 10 mg, Oral, Daily  cholestyramine light, 1 packet, Oral, Daily  dicyclomine, 20 mg, Oral, BID  levothyroxine, 200 mcg, Oral, Q AM  midodrine, 5 mg, Oral, TID AC  oxybutynin, 5 mg, Oral, TID  Pancrelipase (Lip-Prot-Amyl), 72,000 units of lipase, Oral, Daily With Breakfast & Lunch  sodium chloride, 10 mL, Intravenous, Q12H  venlafaxine XR, 225 mg, Oral, Nightly         PRN Medications:    acetaminophen    aluminum-magnesium hydroxide-simethicone    calcium carbonate    ipratropium-albuterol    nitroglycerin    ondansetron ODT **OR** ondansetron    Pancrelipase (Lip-Prot-Amyl)    simethicone    sodium chloride    [COMPLETED] Insert Peripheral IV **AND** sodium chloride    sodium chloride    sodium chloride     ==========================================================================    OBJECTIVE    Vitals:    24 1113   BP: (!) 74/60   Pulse: 81   Resp: 18   Temp: 96.9 °F (36.1 °C)   SpO2: 100%      Body mass index is 40.54 kg/m².     General - A&Ox3, NAD, Calm    ==========================================================================    LAB EVALUATION    Lab Results   Component Value Date    GLUCOSE 78  "04/08/2024    BUN 19 04/08/2024    CREATININE 1.28 (H) 04/08/2024    EGFRIFNONA 52 (L) 11/22/2021    BCR 14.8 04/08/2024    K 4.2 04/08/2024    CO2 22.0 04/08/2024    CALCIUM 9.0 04/08/2024    ALBUMIN 3.3 (L) 04/07/2024    LABIL2 1.3 02/11/2019    AST 30 04/07/2024    ALT 19 04/07/2024       No results found for: \"HGBA1C\"  Lab Results   Component Value Date    CREATININE 1.28 (H) 04/08/2024         ==========================================================================    ASSESSMENT AND PLAN    # Hypothyroidism with noncompliance, unintentional  - Continue levothyroxine 200 mcg every day  - Repeat thyroid function on 4/10/2024  - The patient is being planned for discharge can be discharged on the same dosing outpatient follow-up with endocrinology    # Shortness of breath  # Acute kidney injury  - Care as per primary team    Will follow with you.  Rest as per primary team.    This note was created using voice recognition software and is inherently subject to errors including those of syntax and \"sound-alike\" substitutions which may escape proofreading.  In such instances, original meaning may be extrapolated by contextual derivation.    Note: Portions of this note may have been copied from previous notes but documentation have been reviewed and edited as necessary to support clinical decision making for today's visit.  The time of this note does not reflect the time I saw the patient but the time that this note was written.    ==========================================================================  Cuco Sanchez MD  Department of Endocrine, Diabetes and Metabolism  [facility]  [place], [place]  ==========================================================================    "

## 2024-04-10 VITALS
SYSTOLIC BLOOD PRESSURE: 126 MMHG | HEIGHT: 65 IN | OXYGEN SATURATION: 93 % | RESPIRATION RATE: 19 BRPM | BODY MASS INDEX: 40.59 KG/M2 | DIASTOLIC BLOOD PRESSURE: 69 MMHG | WEIGHT: 243.61 LBS | HEART RATE: 64 BPM | TEMPERATURE: 97 F

## 2024-04-10 LAB
T4 FREE SERPL-MCNC: 0.83 NG/DL (ref 0.93–1.7)
TSH SERPL DL<=0.05 MIU/L-ACNC: 42.34 UIU/ML (ref 0.27–4.2)
WHOLE BLOOD HOLD SPECIMEN: NORMAL

## 2024-04-10 PROCEDURE — 84443 ASSAY THYROID STIM HORMONE: CPT | Performed by: INTERNAL MEDICINE

## 2024-04-10 PROCEDURE — 99232 SBSQ HOSP IP/OBS MODERATE 35: CPT | Performed by: INTERNAL MEDICINE

## 2024-04-10 PROCEDURE — 84439 ASSAY OF FREE THYROXINE: CPT | Performed by: INTERNAL MEDICINE

## 2024-04-10 RX ORDER — MIDODRINE HYDROCHLORIDE 5 MG/1
5 TABLET ORAL
Qty: 30 TABLET | Refills: 0 | Status: SHIPPED | OUTPATIENT
Start: 2024-04-10

## 2024-04-10 RX ADMIN — PANCRELIPASE 72000 UNITS OF LIPASE: 36000; 180000; 114000 CAPSULE, DELAYED RELEASE PELLETS ORAL at 11:41

## 2024-04-10 RX ADMIN — OXYBUTYNIN CHLORIDE 5 MG: 5 TABLET ORAL at 09:21

## 2024-04-10 RX ADMIN — Medication 10 ML: at 09:21

## 2024-04-10 RX ADMIN — DICYCLOMINE HYDROCHLORIDE 20 MG: 10 CAPSULE ORAL at 09:21

## 2024-04-10 RX ADMIN — CETIRIZINE HYDROCHLORIDE 10 MG: 10 TABLET, FILM COATED ORAL at 09:21

## 2024-04-10 RX ADMIN — ASPIRIN 81 MG: 81 TABLET, COATED ORAL at 09:21

## 2024-04-10 RX ADMIN — PANCRELIPASE 72000 UNITS OF LIPASE: 36000; 180000; 114000 CAPSULE, DELAYED RELEASE PELLETS ORAL at 09:21

## 2024-04-10 RX ADMIN — SIMETHICONE 80 MG: 80 TABLET, CHEWABLE ORAL at 09:58

## 2024-04-10 RX ADMIN — MIDODRINE HYDROCHLORIDE 5 MG: 5 TABLET ORAL at 11:41

## 2024-04-10 RX ADMIN — MIDODRINE HYDROCHLORIDE 5 MG: 5 TABLET ORAL at 09:21

## 2024-04-10 RX ADMIN — LEVOTHYROXINE SODIUM 200 MCG: 0.1 TABLET ORAL at 05:05
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
## 2024-04-10 NOTE — CASE MANAGEMENT/SOCIAL WORK
Case Management Discharge Note      Final Note: [facility]    Provided Post Acute Provider List?: Yes  Post Acute Provider List: Nursing Home    Selected Continued Care - Discharged on 4/10/2024 Admission date: 4/3/2024 - Discharge disposition: Skilled Nursing Facility (DC - External)      Destination Coordination complete.      Service Provider Selected Services Address Phone Fax Patient Preferred    [facility] [address] [place] [address] [phone] --                     Transportation Services  W/C Van: Skilled Nursing Facility Van    Final Discharge Disposition Code: 03 - skilled nursing facility (SNF)

## 2024-04-10 NOTE — CASE MANAGEMENT/SOCIAL WORK
Continued Stay Note  [facility]     Patient Name: Cassie Winter  MRN: [id number]  Today's Date: 4/10/2024    Admit Date: 4/3/2024    Plan: From home alone. Accepted at [facility] with ready bed today. PASRR completed. Pharmacy Remedi. Precert approved   Discharge Plan       Row Name 04/10/24 1152       Plan    Plan Comments Mrs. Winter has discharge orders and [facility] has a ready bed and their van will pick her up between 2:30-3:00 PM.  Nursing, Mrs. Winter and her visiting sister informed of  time.  Her sister  has concerns about her returning to her duplex after rehab because it is dirty and she thinks some people are taking advantage of Ms. Winter.  CM informed her of  contact information for [facility] if she has concerns and for [facility].. CM asked Ms. Winter on several occasions alone and with sister in room if she is being taken advantage of by Ne Goff and her   and she denied stating that they  help her at home and provide transportation to appointments and shopping.. Ms. Winter had Ne listed as her daughter per her preference. She reported she  has multiple sisters that are not involved with her and she apologized for her sister's behavior.                          Diane AMBROSIO,RN Case Manager  [facility]  Phone: Desk- [phone] cell- [phone]

## 2024-04-10 NOTE — PLAN OF CARE
Problem: Fall Injury Risk  Goal: Absence of Fall and Fall-Related Injury  Outcome: Ongoing, Progressing  Intervention: Identify and Manage Contributors  Recent Flowsheet Documentation  Taken 4/9/2024 2030 by Alan Borrego RN  Medication Review/Management: medications reviewed  Intervention: Promote Injury-Free Environment  Recent Flowsheet Documentation  Taken 4/10/2024 0600 by Alan Borrego RN  Safety Promotion/Fall Prevention: safety round/check completed  Taken 4/10/2024 0400 by Alan Borrego RN  Safety Promotion/Fall Prevention: safety round/check completed  Taken 4/10/2024 0200 by Alan Borrego RN  Safety Promotion/Fall Prevention: safety round/check completed  Taken 4/10/2024 0000 by Alan Borrego RN  Safety Promotion/Fall Prevention: safety round/check completed  Taken 4/9/2024 2200 by Alan Borrego RN  Safety Promotion/Fall Prevention: safety round/check completed  Taken 4/9/2024 2030 by Alan Borrego RN  Safety Promotion/Fall Prevention: safety round/check completed   Goal Outcome Evaluation:

## 2024-04-10 NOTE — DISCHARGE SUMMARY
[facility] Medicine Services  Discharge Summary    Date of Service: 24  Patient Name: Cassie Winter  : 1960  MRN: [id number]    Date of Admission: 4/3/2024  Discharge Diagnosis: Chest pain/dyspnea   Date of Discharge:  24  Primary Care Physician: Colby Vines MD      Presenting Problem:   Shortness of breath [R06.02]  Acute kidney injury [N17.9]  Chest pain, unspecified type [R07.9]  Dyspnea [R06.00]    Active and Resolved Hospital Problems:  Active Hospital Problems    Diagnosis POA    **Shortness of breath [R06.02] Yes    Dyspnea [R06.00] Yes    Other sleep apnea [G47.39] Yes    Asthma [J45.909] Yes    Chronic coronary artery disease [I25.10] Yes    Irritable bowel syndrome [K58.9] Yes    Chronic low back pain [M54.50, G89.29] Yes    Neuropathy [G62.9] Yes    Depression [F32.A] Yes    B12 deficiency [E53.8] Yes    Anemia [D64.9] Yes    Hypothyroidism [E03.9] Yes      Resolved Hospital Problems   No resolved problems to display.     Chest pain/dyspnea   -Currently on 2 L nasal cannula  -EKG:rate 46, sinus ludwin, incomplete LBBB  - cardiology consulted.  stress test  -Troponin flat  - Myocardial perfusion imaging indicates a normal myocardial perfusion study with no evidence of ischemia. Impressions are consistent with a low risk study   -Continue telemetry  Continue medical management with aspirin, metoprolol succinate as tolerated.      Hypothyroidism  - tsh 115, free T4 0.10  - pt on synthroid  - endocrinology consulted>If patient is planned for discharge can be discharged on levothyroxine 200 mcg p.o. daily and outpatient endocrinology next   - increased synthroid to 250  - recheck as outpt     ALL  -Creatinine trending down  -IV fluids stopped.  - us renal unremarkable  - nephrology on board>Dc is ok from a renal standpoint      Chronic pancreatic insufficiency  - creon, colestid, bentyl    Hospital Course     HPI:Per the documentation by Kelley Sesay, dated  "04/03,  \"63-year-old white female with a history of thyroid dysfunction, CAD. She presents today from her primary care provider's office with complaints of shortness of breath and chest pain. She states has been ongoing for the last 2 months or so. She has symptoms at rest but her symptoms are much worse with exertion. She denies any fever or cough. No leg pain or swelling. She denies any black or bloody stools. She does report some frequent belching. No abdominal pain nausea or vomiting. \"     4/5/24-Hospitalist team has been consulted for further medical management.    4/6/24 seen and examined in bed no acute distress, vital signs stable, discussed with RN.  Denies any chest pain or shortness of breath feeling feeling better  4/7/24 doing better today, Myocardial perfusion imaging indicates a normal myocardial perfusion study with no evidence of ischemia. Impressions are consistent with a low risk study.  will dc home. Condition on dc stable  4/8/2024 patient seen and examined in bed no acute distress, vital signs stable, discussed with RN, awaiting PT OT eval.  Patient will need 30 days or less of skilled services  4/9/24 seen in bed NAD, BP has been low midodrine added, received entresto, fluid bolus ordered   4/10/24 doing better today. Will dc to SN, condition on dc stable    DISCHARGE Follow Up Recommendations for labs and diagnostics: follow with pcp in on e wee  Event monitor on  discharge to rule out occult arrhythmia as cause of heart failure  Outpatient follow-up with Dr. Casillas 2 weeks    Reasons For Change In Medications and Indications for New Medications:  CHANGE how you take:  venlafaxine XR (EFFEXOR-XR) -- Another medication with the same name was removed. Continue taking this medication, and follow the directions you see here.   STOP taking:  metoprolol succinate XL 25 MG 24 hr tablet (TOPROL-XL)     Day of Discharge     Vital Signs:  Temp:  [96.9 °F (36.1 °C)-98 °F (36.7 °C)] 97.4 °F (36.3 " °C)  Heart Rate:  [] 65  Resp:  [16-21] 19  BP: ()/(49-68) 110/65    Physical Exam   general Appearance: AOO x 4, cooperative, no distress, appropriate for age  Head:  Normocephalic, without obvious abnormality  Eyes:  PERRL, EOM's intact, conjunctivae and cornea clear  Nose:  Nares symmetrical, septum midline, mucosa pink  Throat:  Lips, tongue, and mucosa are moist, pink, and intact  Neck:  Supple; symmetrical, trachea midline, no adenopathy  Back:  Symmetrical, ROM normal, no CVA tenderness  Lungs: Respirations unlabored, no audible wheeze  Heart: Regular rate & rhythm, S1 and S2 normal  Abdomen:  Soft, nontender, bowel sounds active all four quadrants  Musculoskeletal: Tone and strength strong and symmetrical, all extremities; no joint pain or edema         Skin/Hair/Nails:  Skin warm, dry and intact, no rashes or abnormal dyspigmentation         Pertinent  and/or Most Recent Results     LAB RESULTS:      Lab 04/08/24  1108 04/06/24  0638 04/05/24  0739 04/04/24  0554 04/03/24  0958   WBC 6.56 6.19 7.62 8.68 8.94   HEMOGLOBIN 10.3* 10.9* 10.0* 10.9* 12.6   HEMATOCRIT 32.4* 32.4* 31.4* 34.5 38.3   PLATELETS 298 271 262 260 327   NEUTROS ABS 2.82 3.09 3.60 3.49 4.39   IMMATURE GRANS (ABS) 0.04 0.04 0.04 0.04 0.04   LYMPHS ABS 2.71 2.22 2.91 4.05* 3.44*   MONOS ABS 0.58 0.48 0.61 0.72 0.65   EOS ABS 0.35 0.29 0.39 0.32 0.32   MCV 99.7* 95.6 98.4* 98.9* 97.2*   PROTIME  --   --   --   --  10.3   APTT  --   --   --   --  26.6         Lab 04/10/24  0332 04/08/24  1108 04/07/24  0611 04/06/24  0638 04/05/24  0739 04/04/24  0554 04/03/24  1212   SODIUM  --  138 138 140 139 139  --    POTASSIUM  --  4.2 5.0 4.6 4.5 4.4  --    CHLORIDE  --  107 107 109* 109* 110*  --    CO2  --  22.0 22.0 22.0 21.0* 20.0*  --    ANION GAP  --  9.0 9.0 9.0 9.0 9.0  --    BUN  --  19 18 17 19 21  --    CREATININE  --  1.28* 1.43* 1.49* 1.48* 1.84*  --    EGFR  --  46.6* 40.8* 38.8* 39.1* 30.1*  --    GLUCOSE  --  78 84 101* 86  88  --    CALCIUM  --  9.0 9.1 8.9 8.6 8.8  --    TSH 42.340*  --  87.590*  --  97.100* 106.200* 115.200*         Lab 04/07/24  0611 04/05/24  0739 04/03/24  0959   TOTAL PROTEIN 6.0 5.6* 6.8   ALBUMIN 3.3* 3.2* 4.0   GLOBULIN 2.7 2.4 2.8   ALT (SGPT) 19 18 27   AST (SGOT) 30 32 50*   BILIRUBIN 0.3 0.2 0.3   ALK PHOS 89 98 112         Lab 04/04/24  0554 04/03/24  1212 04/03/24  0959 04/03/24  0958   PROBNP  --   --  248.5  --    HSTROP T 40* 41* 46*  --    PROTIME  --   --   --  10.3   INR  --   --   --  0.94         Lab 04/09/24  0719   CHOLESTEROL 178   LDL CHOL 86   HDL CHOL 73*   TRIGLYCERIDES 109             Brief Urine Lab Results  (Last result in the past 365 days)        Color   Clarity   Blood   Leuk Est   Nitrite   Protein   CREAT   Urine HCG        04/05/24 0940 Yellow   Clear   Negative   Negative   Negative   Negative                 Microbiology Results (last 10 days)       ** No results found for the last 240 hours. **            XR Abdomen KUB    Result Date: 4/9/2024  Impression: Impression: Mild constipation. No acute process. Chronic findings as noted. Electronically Signed: Lidia López MD  4/9/2024 10:40 AM EDT  Workstation ID: [id number]    US Renal Bilateral    Result Date: 4/5/2024  Impression: Impression: Unremarkable exam Electronically Signed: Michael Robb MD  4/5/2024 9:11 AM EDT  Workstation ID: [id number]    CT Angiogram Chest Pulmonary Embolism    Result Date: 4/3/2024  Impression: No pulmonary embolus. No acute cardiopulmonary disease. Electronically Signed: Baldemar Chapa MD  4/3/2024 6:05 PM EDT  Workstation ID: [id number]    XR Chest 1 View    Result Date: 4/3/2024  Impression: Impression: No evidence of acute cardiopulmonary disease. Resolution of questionable opacity in the left upper lobe after repositioning of EKG lead. Electronically Signed: Martin Roca MD  4/3/2024 4:33 PM EDT  Workstation ID: [id number]    CT Head Without Contrast    Result Date: 4/3/2024  Impression:  Impression: No acute intracranial abnormality. Electronically Signed: Mansi Sequeira MD  4/3/2024 12:00 PM EDT  Workstation ID: [id number]    XR Chest 1 View    Result Date: 4/3/2024  Impression: Impression: 1.Questionable nodular shadow left upper lobe versus summation of shadows. Follow-up recommended to reassess Electronically Signed: Rashard Hendricks MD  4/3/2024 11:30 AM EDT  Workstation ID: [id number]             Results for orders placed during the hospital encounter of 04/03/24    Adult Transthoracic Echo Complete w/ Color, Spectral and Contrast if Necessary Per Protocol    Interpretation Summary    Left ventricular systolic function is moderately decreased. Calculated left ventricular EF = 34.5%    Left ventricular diastolic function is consistent with (grade II w/high LAP) pseudonormalization.    The left atrial cavity is mildly dilated.    Estimated right ventricular systolic pressure from tricuspid regurgitation is normal (<35 mmHg).      Labs Pending at Discharge:      Procedures Performed           Consults:   Consults       Date and Time Order Name Status Description    4/9/2024  9:11 AM Inpatient Cardiology Consult      4/5/2024 12:12 PM Inpatient Hospitalist Consult Completed     4/3/2024  5:12 PM Inpatient Nephrology Consult      4/3/2024  4:33 PM Inpatient Endocrinology Consult Completed     4/3/2024  2:45 PM Inpatient Cardiology Consult Completed                ASSESSMENT & PLAN:     New diagnosis of heart failure with reduced ejection fraction  Nuclear stress test 4/6/2024 without evidence of myocardial ischemia/infarction  Unclear etiology  Could be in setting of significant hypothyroidism and occult arrhythmia.  Metoprolol discontinued in setting of bradycardia  Initiate low-dose ACE inhibitor once renal function stabilizes  Event monitor on  discharge to rule out occult arrhythmia as cause of heart failure  Outpatient follow-up with Dr. Casillas     Severe hypothyroidism  Reports noncompliance to  "levothyroxine  Endocrinology team following  Stressed on compliance     Coronary angiogram 2017 with distal LAD disease otherwise nonobstructive CAD  Continue baby aspirin             Part of this note may be an electronic transcription/translation of spoken language to printed text using the Dragon Dictation System.     Jacob Velásquez MD  04/06/24  15:22 EDT        ASSESSMENT AND PLAN     # Hypothyroidism with noncompliance, unintentional  - Continue levothyroxine 200 mcg every day  - Repeat thyroid function on 4/10/2024  - The patient is being planned for discharge can be discharged on the same dosing outpatient follow-up with endocrinology     # Shortness of breath  # Acute kidney injury  - Care as per primary team     Will follow with you.  Rest as per primary team.     This note was created using voice recognition software and is inherently subject to errors including those of syntax and \"sound-alike\" substitutions which may escape proofreading.  In such instances, original meaning may be extrapolated by contextual derivation.     Note: Portions of this note may have been copied from previous notes but documentation have been reviewed and edited as necessary to support clinical decision making for today's visit.  The time of this note does not reflect the time I saw the patient but the time that this note was written.     ==========================================================================  Cuco Sanchez MD  Department of Endocrine, Diabetes and Metabolism  [facility], [place]  ==========================================    Discharge Details        Discharge Medications        New Medications        Instructions Start Date   atorvastatin 10 MG tablet  Commonly known as: LIPITOR   10 mg, Oral, Nightly      midodrine 5 MG tablet  Commonly known as: PROAMATINE   5 mg, Oral, 3 Times Daily Before Meals             Changes to Medications        Instructions Start Date   venlafaxine  MG 24 hr " capsule  Commonly known as: EFFEXOR-XR  What changed: Another medication with the same name was removed. Continue taking this medication, and follow the directions you see here.   150 mg, Oral, Nightly, In addition to 75mg for a total of 225mg at bedtime             Continue These Medications        Instructions Start Date   albuterol sulfate  (90 Base) MCG/ACT inhaler  Commonly known as: PROVENTIL HFA;VENTOLIN HFA;PROAIR HFA   2 puffs, Inhalation, Every 4 Hours PRN      aspirin 81 MG EC tablet   81 mg, Oral, Daily      Calcium 200 MG tablet   1 tablet, Oral, Daily      colestipol 1 g tablet  Commonly known as: COLESTID   Take 1 tablet by mouth 2 (Two) Times a Day.      Creon 87935-167011 units capsule delayed-release particles capsule  Generic drug: Pancrelipase (Lip-Prot-Amyl)   36,000 units of lipase, Oral, With Snacks      Pancrelipase (Lip-Prot-Amyl) 33410-311468 units capsule delayed-release particles capsule  Commonly known as: CREON   72,000 units of lipase, Oral, Daily With Breakfast & Lunch      dicyclomine 20 MG tablet  Commonly known as: BENTYL   Every 12 Hours      levothyroxine 200 MCG tablet  Commonly known as: Synthroid   200 mcg, Oral, Daily      oxybutynin 5 MG tablet  Commonly known as: DITROPAN   5 mg, Oral, 3 Times Daily      Vitamin D (Cholecalciferol) 10 MCG (400 UNIT) tablet  Commonly known as: CHOLECALCIFEROL   400 Units, Oral, Daily             Stop These Medications      metoprolol succinate XL 25 MG 24 hr tablet  Commonly known as: TOPROL-XL              Allergies   Allergen Reactions    Hydrocodone-Acetaminophen Hives     Can tolerate with Benadryl    Oxycodone-Acetaminophen Hives     Can tolerate with Benadryl    Ketorolac Tromethamine Itching    Promethazine Itching    Tramadol Itching         Discharge Disposition:   Skilled Nursing Facility (DC - External)    Diet:  Hospital:  Diet Order   Procedures    Diet: Cardiac; Healthy Heart (2-3 Na+); Fluid Consistency: Thin (IDDSI 0)          Discharge Activity:   Activity Instructions       Gradually Increase Activity Until at Pre-Hospitalization Level      Gradually Increase Activity Until at Pre-Hospitalization Level      Gradually Increase Activity Until at Pre-Hospitalization Level                CODE STATUS:  Code Status and Medical Interventions:   Ordered at: 04/03/24 1444     Code Status (Patient has no pulse and is not breathing):    CPR (Attempt to Resuscitate)     Medical Interventions (Patient has pulse or is breathing):    Full Support         Future Appointments   Date Time Provider Department Center   12/4/2024 11:40 AM Pablito Casillas MD [facility]       Additional Instructions for the Follow-ups that You Need to Schedule       Discharge Follow-up with PCP   As directed       Currently Documented PCP:    Colby Vines MD    PCP Phone Number:    [phone]     Follow Up Details: 1 week        Discharge Follow-up with PCP   As directed       Currently Documented PCP:    Colby Vines MD    PCP Phone Number:    [phone]     Follow Up Details: 1 week        Discharge Follow-up with PCP   As directed       Currently Documented PCP:    Colby Vines MD    PCP Phone Number:    [phone]     Follow Up Details: 1 week        Discharge Follow-up with Specified Provider: cardiology; 1 Week   As directed      To: cardiology   Follow Up: 1 Week        Discharge Follow-up with Specified Provider: cardiology; 2 Weeks   As directed      To: cardiology   Follow Up: 2 Weeks                Time spent on Discharge including face to face service:  >30 minutes    Signature: Electronically signed by Gordy Blue MD, 04/10/24, 08:23 EDT.  [facility], operated by [facility] Hospitalist Team

## 2024-05-09 ENCOUNTER — TELEPHONE (OUTPATIENT)
Dept: CARDIOLOGY | Facility: CLINIC | Age: 64
End: 2024-05-09
Payer: MEDICARE

## 2024-05-10 ENCOUNTER — TELEPHONE (OUTPATIENT)
Dept: CARDIOLOGY | Facility: CLINIC | Age: 64
End: 2024-05-10
Payer: MEDICARE

## 2024-05-14 ENCOUNTER — OFFICE VISIT (OUTPATIENT)
Dept: CARDIOLOGY | Facility: CLINIC | Age: 64
End: 2024-05-14
Payer: MEDICARE

## 2024-05-14 VITALS
BODY MASS INDEX: 37.15 KG/M2 | SYSTOLIC BLOOD PRESSURE: 99 MMHG | DIASTOLIC BLOOD PRESSURE: 66 MMHG | HEIGHT: 65 IN | HEART RATE: 75 BPM | WEIGHT: 223 LBS | OXYGEN SATURATION: 100 %

## 2024-05-14 DIAGNOSIS — R06.09 DYSPNEA ON EXERTION: ICD-10-CM

## 2024-05-14 DIAGNOSIS — E66.9 OBESITY (BMI 30-39.9): ICD-10-CM

## 2024-05-14 DIAGNOSIS — R07.2 PRECORDIAL PAIN: ICD-10-CM

## 2024-05-14 DIAGNOSIS — E03.9 ACQUIRED HYPOTHYROIDISM: Primary | Chronic | ICD-10-CM

## 2024-05-14 DIAGNOSIS — I50.22 CHRONIC HFREF (HEART FAILURE WITH REDUCED EJECTION FRACTION): ICD-10-CM

## 2024-05-14 RX ORDER — FAMOTIDINE 20 MG/1
20 TABLET, FILM COATED ORAL DAILY
COMMUNITY

## 2024-05-14 RX ORDER — DAPAGLIFLOZIN 5 MG/1
5 TABLET, FILM COATED ORAL DAILY
COMMUNITY

## 2024-05-14 RX ORDER — SPIRONOLACTONE 25 MG/1
25 TABLET ORAL DAILY
COMMUNITY

## 2024-05-14 RX ORDER — TRAZODONE HYDROCHLORIDE 50 MG/1
50 TABLET ORAL NIGHTLY
COMMUNITY

## 2024-05-14 RX ORDER — FERROUS SULFATE 325(65) MG
325 TABLET ORAL DAILY
COMMUNITY
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
## 2024-05-16 ENCOUNTER — TELEPHONE (OUTPATIENT)
Dept: CARDIOLOGY | Facility: CLINIC | Age: 64
End: 2024-05-16
Payer: MEDICARE

## 2024-05-16 NOTE — TELEPHONE ENCOUNTER
Caller: Cassie Winter    Relationship: Self    Best call back number:[phone]     What is the best time to reach you: ANYTIME    Who are you requesting to speak with (clinical staff, provider,  specific staff member): ANYONE    What was the call regarding: PT FOLLOWING UP ON ENDOCRINOLOGY REFERRAL PUT IN BY MD - PT STATES THEY COULD NOT GET HER IN UNTIL 07.19.24 AND SHE STATES SHE WAS UNDER THE IMPRESSION SHE NEEDED TO BE SEEN SOONER THAN THAT AND IS ASKING WHAT SHE SHOULD DO - SHE INQUIRED IF ANY OTHER PROVIDERS HAD SOONER AVAILABILITY AND WAS TOLD NO BUT PUT ON THE WAITLIST - PT SEEKING ADVISEMENT     Is it okay if the provider responds through MyChart: CALL BACK PLEASE

## 2024-05-21 ENCOUNTER — HOSPITAL ENCOUNTER (OUTPATIENT)
Dept: CARDIOLOGY | Facility: HOSPITAL | Age: 64
Discharge: HOME | End: 2024-05-21
Payer: MEDICARE

## 2024-05-21 ENCOUNTER — HOSPITAL ENCOUNTER (OUTPATIENT)
Facility: HOSPITAL | Age: 64
Setting detail: HOSPITAL OUTPATIENT SURGERY
Discharge: HOME | End: 2024-05-21
Attending: INTERNAL MEDICINE | Admitting: INTERNAL MEDICINE
Payer: MEDICARE

## 2024-05-21 ENCOUNTER — HOSPITAL ENCOUNTER (OUTPATIENT)
Dept: GENERAL RADIOLOGY | Facility: HOSPITAL | Age: 64
Discharge: HOME | End: 2024-05-21
Payer: MEDICARE

## 2024-05-21 ENCOUNTER — LAB (OUTPATIENT)
Dept: LAB | Facility: HOSPITAL | Age: 64
End: 2024-05-21
Payer: MEDICARE

## 2024-05-21 VITALS
HEIGHT: 63 IN | TEMPERATURE: 97.3 F | HEART RATE: 91 BPM | RESPIRATION RATE: 17 BRPM | DIASTOLIC BLOOD PRESSURE: 49 MMHG | BODY MASS INDEX: 38.67 KG/M2 | WEIGHT: 218.26 LBS | SYSTOLIC BLOOD PRESSURE: 131 MMHG | OXYGEN SATURATION: 96 %

## 2024-05-21 DIAGNOSIS — E66.9 OBESITY (BMI 30-39.9): ICD-10-CM

## 2024-05-21 DIAGNOSIS — I50.22 CHRONIC HFREF (HEART FAILURE WITH REDUCED EJECTION FRACTION): ICD-10-CM

## 2024-05-21 DIAGNOSIS — R07.2 PRECORDIAL PAIN: ICD-10-CM

## 2024-05-21 DIAGNOSIS — E03.9 ACQUIRED HYPOTHYROIDISM: Chronic | ICD-10-CM

## 2024-05-21 DIAGNOSIS — R06.09 DYSPNEA ON EXERTION: ICD-10-CM

## 2024-05-21 DIAGNOSIS — E03.9 ACQUIRED HYPOTHYROIDISM: ICD-10-CM

## 2024-05-21 PROBLEM — R06.00 DYSPNEA: Chronic | Status: ACTIVE | Noted: 2024-04-05

## 2024-05-21 LAB
ANION GAP SERPL CALCULATED.3IONS-SCNC: 14 MMOL/L (ref 5–15)
BUN SERPL-MCNC: 21 MG/DL (ref 8–23)
BUN/CREAT SERPL: 14 (ref 7–25)
CALCIUM SPEC-SCNC: 9.8 MG/DL (ref 8.6–10.5)
CHLORIDE SERPL-SCNC: 106 MMOL/L (ref 98–107)
CO2 SERPL-SCNC: 19 MMOL/L (ref 22–29)
CREAT SERPL-MCNC: 1.5 MG/DL (ref 0.57–1)
DEPRECATED RDW RBC AUTO: 50.1 FL (ref 37–54)
EGFRCR SERPLBLD CKD-EPI 2021: 38.5 ML/MIN/1.73
ERYTHROCYTE [DISTWIDTH] IN BLOOD BY AUTOMATED COUNT: 13.5 % (ref 12.3–15.4)
GLUCOSE SERPL-MCNC: 85 MG/DL (ref 65–99)
HCT VFR BLD AUTO: 36.6 % (ref 34–46.6)
HGB BLD-MCNC: 11.5 G/DL (ref 12–15.9)
INR PPP: 0.93 (ref 0.93–1.1)
MCH RBC QN AUTO: 31.6 PG (ref 26.6–33)
MCHC RBC AUTO-ENTMCNC: 31.4 G/DL (ref 31.5–35.7)
MCV RBC AUTO: 100.5 FL (ref 79–97)
PLATELET # BLD AUTO: 337 10*3/MM3 (ref 140–450)
PMV BLD AUTO: 10.2 FL (ref 6–12)
POTASSIUM SERPL-SCNC: 4.5 MMOL/L (ref 3.5–5.2)
PROTHROMBIN TIME: 10.2 SECONDS (ref 9.6–11.7)
RBC # BLD AUTO: 3.64 10*6/MM3 (ref 3.77–5.28)
SODIUM SERPL-SCNC: 139 MMOL/L (ref 136–145)
WBC NRBC COR # BLD AUTO: 6.72 10*3/MM3 (ref 3.4–10.8)

## 2024-05-21 PROCEDURE — 71046 X-RAY EXAM CHEST 2 VIEWS: CPT

## 2024-05-21 PROCEDURE — 25510000001 IOPAMIDOL PER 1 ML: Performed by: INTERNAL MEDICINE

## 2024-05-21 PROCEDURE — C1894 INTRO/SHEATH, NON-LASER: HCPCS | Performed by: INTERNAL MEDICINE

## 2024-05-21 PROCEDURE — 93460 R&L HRT ART/VENTRICLE ANGIO: CPT | Performed by: INTERNAL MEDICINE

## 2024-05-21 PROCEDURE — 85610 PROTHROMBIN TIME: CPT

## 2024-05-21 PROCEDURE — 25010000002 MIDAZOLAM PER 1 MG: Performed by: INTERNAL MEDICINE

## 2024-05-21 PROCEDURE — C1751 CATH, INF, PER/CENT/MIDLINE: HCPCS | Performed by: INTERNAL MEDICINE

## 2024-05-21 PROCEDURE — 80048 BASIC METABOLIC PNL TOTAL CA: CPT

## 2024-05-21 PROCEDURE — 25810000003 SODIUM CHLORIDE 0.9 % SOLUTION: Performed by: INTERNAL MEDICINE

## 2024-05-21 PROCEDURE — 36415 COLL VENOUS BLD VENIPUNCTURE: CPT

## 2024-05-21 PROCEDURE — C1760 CLOSURE DEV, VASC: HCPCS | Performed by: INTERNAL MEDICINE

## 2024-05-21 PROCEDURE — 85027 COMPLETE CBC AUTOMATED: CPT

## 2024-05-21 PROCEDURE — C1769 GUIDE WIRE: HCPCS | Performed by: INTERNAL MEDICINE

## 2024-05-21 PROCEDURE — 99152 MOD SED SAME PHYS/QHP 5/>YRS: CPT | Performed by: INTERNAL MEDICINE

## 2024-05-21 PROCEDURE — 25010000002 FENTANYL CITRATE (PF) 100 MCG/2ML SOLUTION: Performed by: INTERNAL MEDICINE

## 2024-05-21 RX ORDER — SODIUM CHLORIDE 9 MG/ML
100 INJECTION, SOLUTION INTRAVENOUS CONTINUOUS
Status: DISCONTINUED | OUTPATIENT
Start: 2024-05-21 | End: 2024-05-22 | Stop reason: HOSPADM

## 2024-05-21 RX ORDER — LIDOCAINE HYDROCHLORIDE 10 MG/ML
INJECTION, SOLUTION EPIDURAL; INFILTRATION; INTRACAUDAL; PERINEURAL
Status: DISCONTINUED | OUTPATIENT
Start: 2024-05-21 | End: 2024-05-21 | Stop reason: HOSPADM

## 2024-05-21 RX ORDER — ASPIRIN 81 MG/1
81 TABLET ORAL DAILY
COMMUNITY

## 2024-05-21 RX ORDER — SODIUM CHLORIDE 9 MG/ML
5 INJECTION, SOLUTION INTRAVENOUS CONTINUOUS
Status: DISPENSED | OUTPATIENT
Start: 2024-05-21 | End: 2024-05-21

## 2024-05-21 RX ORDER — FLUTICASONE PROPIONATE 50 MCG
2 SPRAY, SUSPENSION (ML) NASAL DAILY
COMMUNITY

## 2024-05-21 RX ORDER — NITROGLYCERIN 0.4 MG/1
0.4 TABLET SUBLINGUAL
Status: DISCONTINUED | OUTPATIENT
Start: 2024-05-21 | End: 2024-05-22 | Stop reason: HOSPADM

## 2024-05-21 RX ORDER — FENTANYL CITRATE 50 UG/ML
INJECTION, SOLUTION INTRAMUSCULAR; INTRAVENOUS
Status: DISCONTINUED | OUTPATIENT
Start: 2024-05-21 | End: 2024-05-21 | Stop reason: HOSPADM

## 2024-05-21 RX ORDER — MIDAZOLAM HYDROCHLORIDE 1 MG/ML
INJECTION INTRAMUSCULAR; INTRAVENOUS
Status: DISCONTINUED | OUTPATIENT
Start: 2024-05-21 | End: 2024-05-21 | Stop reason: HOSPADM

## 2024-05-21 RX ADMIN — SODIUM CHLORIDE 100 ML/HR: 9 INJECTION, SOLUTION INTRAVENOUS at 12:50

## 2024-05-21 RX ADMIN — SODIUM CHLORIDE 100 ML/HR: 9 INJECTION, SOLUTION INTRAVENOUS at 15:07

## 2024-05-21 NOTE — DISCHARGE INSTR - ACTIVITY
Post Cath Instructions    Call Dr. Ramos's office for a follow up appointment at [phone] in 2- 4 weeks.      Call Dr. Casillas’s office to schedule a follow up appointment as directed.      Drink plenty of fluids for the next 24 hours.  This helps to eliminate the dye used in your procedure through urination.  You may resume a normal diet; however, try to avoid foods that would cause gas or constipation.    Sedative medication given to you during your catheterization may decrease your judgement and reaction time for up to 24-48 hours.  Therefore:  DO NOT drive or operate hazardous machinery (48 hours)  DO NOT consume alcoholic beverages  DO NOT make any important/legal decisions  Have someone stay with you for at least 24 hours    To allow proper healing and prevent bleeding, the following activities are to be strictly avoided for the next 24-48 hours:  Excessive bending at wound site  Straining (anything that would tense up muscles around the affected puncture site)  Lifting objects greater than 5 pounds, pushing, or pulling for 5 days    For Groin Cases:  Refrain from sexual activity  Refrain from running or vigorous walking  No prolonged sitting or standing  Limit stair climbing as much as possible    Keep the puncture site clean and dry.  You may remove the dressing tomorrow and replace it with a band-aid for at least one additional day.  Gently clean the site with mild soap and water.  No scrubbing/rubbing and lightly pat the area dry.  Showers are acceptable; however, avoid submerging in water (tub baths, hot tubs, swimming pools, dishwater, etc…) for at least one week.  The site should be completely healed before resuming these activities to reduce the risk of infection.  Check the site often.  Watch for signs and symptoms of infection and notify your physician if any of the following occur:  Bleeding or an increase in swelling at the puncture site  Fever  Increased soreness around puncture site  Foul odor  or significant drainage from the puncture site  Swelling, redness, or warmth at the puncture site    **A bruise or small “pea sized” lump under the skin at the puncture site is not unusual.  This should disappear within 3-4 weeks.**  CONTACT YOUR PHYSICIAN OR CALL 911 IF YOU EXPERIENCE ANY OF THE FOLLOWING:  Increased angina (chest pain) or frequent sensations of pressure, burning, pain, or other discomfort in the chest, arm, jaws, or stomach  Lightheadedness, dizziness, faint feeling, sweating, or difficulty breathing  Odd sensation changes like numbness, tingling, coldness, or pain in the arm or leg in which the catheter was inserted  Limb in which the catheter was inserted becomes pale/bluish in color    IMPORTANT:  Although this occurs very rarely, if you should develop bright red or excessive bleeding, feel a “pop” inside at the insertion site, or notice a sudden increase in swelling larger than a walnut, you should call 911.  Hold continuous firm pressure to the access site until emergency personnel arrive.  It is best if someone else can do this for you.

## 2024-05-21 NOTE — CONSULTS
[facility] CONSULT NOTE    Referring Provider: Dr. Casillas, Pablito Sanchez MD   Reason for Consultation: Renal insufficiency    Chief complaint.  Exertional shortness of breath    History of present illness: Patient is 63 years old female with history of coronary disease, congestive heart failure, hypothyroidism, was having exertional dyspnea.  Patient echocardiogram showed EF around 30 to 35% and her stress test was abnormal.  Patient is here for cardiac cath.  Her creatinine was found to be 1.5 Mg/DL.  Patient was recently admitted to the hospital and had acute kidney injury due to contrast-induced nephropathy but creatinine was down to 1.28 on discharge.  Previously her creatinine has fluctuated.  Denies any use of NSAIDs but has been on SGLT2 inhibitor and diuretics.  Denies any cough, expectoration, nausea, vomiting, hematuria or dysuria    History  Past Medical History:   Diagnosis Date    Allergic rhinitis     Anemia     Asthma     B12 deficiency     Bradycardia     Broken arm 2019    Left arm broken     Chronic back pain     Chronic bronchitis     Coronary artery disease     Diarrhea     Encounter for blood transfusion 4/3/2024    Hypothyroidism     MRSA (methicillin resistant Staphylococcus aureus)     Neuropathic pain of foot     Severe back pain 2010    Sleep apnea, obstructive      Past Surgical History:   Procedure Laterality Date    ABDOMINAL HERNIA REPAIR  2003    repair of abdominal wall hernia, intraabdominal hernia and hiatal hernia, 2004 incisional hernia repair.     ACHILLES TENDON SURGERY  2011    rupture    ANKLE FUSION Right 11/06/2015    CARDIAC CATHETERIZATION  02/20/2017    CHOLECYSTECTOMY  1992    GASTRIC BYPASS  1986    LAMINECTOMY  2007    that was complicated by a staph infection     OTHER SURGICAL HISTORY Left 2019    L arm     REMOVAL EXTERNAL FIXATOR      ULNA/RADIUS EXTERNAL FIXATOR APPLICATION Left     WRIST FRACTURE SURGERY Right      Social History     Tobacco Use     "Smoking status: Never     Passive exposure: Past    Smokeless tobacco: Never   Vaping Use    Vaping status: Never Used   Substance Use Topics    Alcohol use: No    Drug use: No     Family History   Problem Relation Age of Onset    Heart disease Mother     COPD Mother     Lung cancer Mother     Lung cancer Father     Other Father         Bladder cancer    Sleep apnea Sister     Hypertension Sister     Diabetes Sister     Other Sister         Heart Valve Replacement        Review of Systems  ROS    Objective     Vital Signs  Temp:  [97.3 °F (36.3 °C)] 97.3 °F (36.3 °C)  Heart Rate:  [77] 77  Resp:  [17] 17  BP: (135)/(71) 135/71    No intake/output data recorded.  No intake/output data recorded.    Physical Exam:  Physical Exam    General Appearance: alert, oriented x 3, no acute distress   Skin: warm and dry  HEENT: oral mucosa normal, nonicteric sclera  Neck: supple, no JVD  Lungs: CTA  Heart: RRR, normal S1 and S2  Abdomen: soft, nontender, nondistended  : no palpable bladder  Extremities: no edema, cyanosis or clubbing  Neuro: normal speech and mental status     Results Review:   I reviewed the patient's new clinical results.    Lab Results   Component Value Date    CALCIUM 9.8 05/21/2024     Results from last 7 days   Lab Units 05/21/24  1037   SODIUM mmol/L 139   POTASSIUM mmol/L 4.5   CHLORIDE mmol/L 106   CO2 mmol/L 19.0*   BUN mg/dL 21   CREATININE mg/dL 1.50*   GLUCOSE mg/dL 85   CALCIUM mg/dL 9.8   WBC 10*3/mm3 6.72   HEMOGLOBIN g/dL 11.5*   PLATELETS 10*3/mm3 337     Lab Results   Component Value Date    TROPONINT 40 (H) 04/04/2024     Estimated Creatinine Clearance: 41.9 mL/min (A) (by C-G formula based on SCr of 1.5 mg/dL (H)).No results found for: \"URICACID\"    Brief Urine Lab Results  (Last result in the past 365 days)        Color   Clarity   Blood   Leuk Est   Nitrite   Protein   CREAT   Urine HCG        04/05/24 0940 Yellow   Clear   Negative   Negative   Negative   Negative             "       Prior to Admission medications    Medication Sig Start Date End Date Taking? Authorizing Provider   albuterol sulfate  (90 Base) MCG/ACT inhaler Inhale 2 puffs Every 4 (Four) Hours As Needed for Wheezing. 12/30/22  Yes Cristopher Garcia Jr., MD   aspirin 81 MG EC tablet Take 1 tablet by mouth Daily.   Yes ProviderLeighann MD   atorvastatin (LIPITOR) 10 MG tablet Take 1 tablet by mouth Every Night. 4/9/24  Yes Gordy Blue MD   colestipol (COLESTID) 1 g tablet Take 1 tablet by mouth 2 (Two) Times a Day. 9/5/23  Yes ProviderLeighann MD   dapagliflozin (Farxiga) 5 MG tablet tablet Take 1 tablet by mouth Daily.   Yes ProviderLeighann MD   dicyclomine (BENTYL) 20 MG tablet Take 1 tablet by mouth Every 12 (Twelve) Hours. 11/28/16  Yes ProviderLeighann MD   famotidine (PEPCID) 20 MG tablet Take 1 tablet by mouth Daily.   Yes ProviderLeighann MD   fluticasone (FLONASE) 50 MCG/ACT nasal spray 2 sprays into the nostril(s) as directed by provider Daily.   Yes ProviderLeighann MD   levothyroxine (Synthroid) 200 MCG tablet Take 1 tablet by mouth Daily. 8/22/23  Yes Cristopher Garcia Jr., MD   midodrine (PROAMATINE) 5 MG tablet Take 1 tablet by mouth 3 (Three) Times a Day Before Meals. 4/10/24  Yes Gordy Blue MD   oxybutynin (DITROPAN) 5 MG tablet Take 1 tablet by mouth 3 (Three) Times a Day. 6/20/23  Yes Cristopher Garcia Jr., MD   Pancrelipase, Lip-Prot-Amyl, (Creon) 40045-453182 units capsule delayed-release particles capsule Take 1 capsule by mouth With Snacks for Snacks.   Yes ProviderLeighann MD   Pancrelipase, Lip-Prot-Amyl, (CREON) 44113-131422 units capsule delayed-release particles capsule Take 2 capsules by mouth Daily With Breakfast & Lunch.   Yes ProviderLeighann MD   spironolactone (ALDACTONE) 25 MG tablet Take 1 tablet by mouth Daily.   Yes ProviderLeighann MD   traZODone (DESYREL) 50 MG tablet Take 1 tablet by mouth Every Night.   Yes  ProviderLeighann MD   venlafaxine XR (EFFEXOR-XR) 150 MG 24 hr capsule Take 1 capsule by mouth Every Night. In addition to 75mg for a total of 225mg at bedtime   Yes Leighann Solitario MD   Vitamin D, Cholecalciferol, (CHOLECALCIFEROL) 10 MCG (400 UNIT) tablet Take 1 tablet by mouth Daily.   Yes Leighann Solitario MD   ferrous sulfate 325 (65 FE) MG tablet Take 1 tablet by mouth Daily.    Leighann Solitario MD   aspirin 81 MG EC tablet Take 1 tablet by mouth Daily.  Patient not taking: Reported on 5/14/2024 5/20/19 5/21/24  ProviderLeighann MD         No current facility-administered medications for this encounter.      Assessment & Plan       Acute kidney injury.  Most likely due to renal hypoperfusion related to CHF and use of diuretics and SGLT2 inhibitor.  Patient may have underlying CKD also.  Creatinine 1.5 this morning.  Electrolytes seems to be okay  Congestive heart failure.  Systolic.  Chronic.  Stable hemodynamically  Chronic hypotension.  Patient is on midodrine as outpatient.  Blood pressure okay at this time  Dyspnea.  History of coronary disease.  Patient is here for cardiac cath for further evaluation    Plan:  I explained the risk of contrast nephropathy to patient and she sounded understanding  Starting patient on IV fluids in form of normal saline  Discussed with cardiology  I will monitor patient volume status    I discussed the patients findings and my recommendations with patient, family, nursing staff, and consulting provider    Mikael Ramos MD  05/21/24  12:34 EDT

## 2024-05-24 RX ORDER — VENLAFAXINE HYDROCHLORIDE 75 MG/1
75 TABLET, EXTENDED RELEASE ORAL
Qty: 90 TABLET | Refills: 1 | OUTPATIENT
Start: 2024-05-24

## 2024-05-29 ENCOUNTER — TELEPHONE (OUTPATIENT)
Dept: CARDIOLOGY | Facility: CLINIC | Age: 64
End: 2024-05-29
Payer: MEDICARE

## 2024-05-29 DIAGNOSIS — N18.30 STAGE 3 CHRONIC KIDNEY DISEASE, UNSPECIFIED WHETHER STAGE 3A OR 3B CKD: Primary | ICD-10-CM

## 2024-05-29 NOTE — TELEPHONE ENCOUNTER
Caller: Cassie Winter    Relationship: Self    Best call back number: [phone] (home)      What was the call regarding: PT STATES SHE CALLED DR PUGA'S OFFICE AND THEY NEED A REFERRAL FROM US TO GET PT IN WITH NEPHROLOGY. SHE IS REQUESTING DR MORA PUT IN A REFERRAL FOR HER IF POSSIBLE.

## 2024-06-21 ENCOUNTER — OFFICE VISIT (OUTPATIENT)
Dept: CARDIOLOGY | Facility: CLINIC | Age: 64
End: 2024-06-21
Payer: MEDICARE

## 2024-06-21 VITALS
SYSTOLIC BLOOD PRESSURE: 117 MMHG | HEART RATE: 65 BPM | BODY MASS INDEX: 37.56 KG/M2 | HEIGHT: 63 IN | OXYGEN SATURATION: 99 % | WEIGHT: 212 LBS | DIASTOLIC BLOOD PRESSURE: 76 MMHG

## 2024-06-21 DIAGNOSIS — Z09 HOSPITAL DISCHARGE FOLLOW-UP: Primary | ICD-10-CM

## 2024-06-21 DIAGNOSIS — I95.89 CHRONIC HYPOTENSION: ICD-10-CM

## 2024-06-21 DIAGNOSIS — I50.42 CHRONIC COMBINED SYSTOLIC AND DIASTOLIC CONGESTIVE HEART FAILURE: ICD-10-CM

## 2024-06-21 DIAGNOSIS — I25.10 CHRONIC CORONARY ARTERY DISEASE: Chronic | ICD-10-CM

## 2024-06-21 DIAGNOSIS — E07.9 THYROID DISEASE: ICD-10-CM

## 2024-06-21 DIAGNOSIS — E66.9 OBESITY (BMI 30-39.9): Chronic | ICD-10-CM

## 2024-06-21 PROBLEM — I10 HIGH BLOOD PRESSURE: Status: RESOLVED | Noted: 2024-04-03 | Resolved: 2024-06-21

## 2024-06-21 RX ORDER — IPRATROPIUM BROMIDE AND ALBUTEROL SULFATE 2.5; .5 MG/3ML; MG/3ML
SOLUTION RESPIRATORY (INHALATION)
COMMUNITY
Start: 2024-06-20
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
## 2024-07-02 ENCOUNTER — TRANSCRIBE ORDERS (OUTPATIENT)
Dept: ADMINISTRATIVE | Facility: HOSPITAL | Age: 64
End: 2024-07-02
Payer: MEDICARE

## 2024-07-02 ENCOUNTER — LAB (OUTPATIENT)
Dept: LAB | Facility: HOSPITAL | Age: 64
End: 2024-07-02
Payer: MEDICARE

## 2024-07-02 DIAGNOSIS — N18.30 STAGE 3 CHRONIC KIDNEY DISEASE, UNSPECIFIED WHETHER STAGE 3A OR 3B CKD: Primary | ICD-10-CM

## 2024-07-02 DIAGNOSIS — N18.30 STAGE 3 CHRONIC KIDNEY DISEASE, UNSPECIFIED WHETHER STAGE 3A OR 3B CKD: ICD-10-CM

## 2024-07-02 DIAGNOSIS — I95.9 LOW SYSTOLIC BLOOD PRESSURE: ICD-10-CM

## 2024-07-02 DIAGNOSIS — I50.9 CHRONIC HEART FAILURE, UNSPECIFIED HEART FAILURE TYPE: ICD-10-CM

## 2024-07-02 DIAGNOSIS — M15.0 PRIMARY GENERALIZED HYPERTROPHIC OSTEOARTHROSIS: ICD-10-CM

## 2024-07-02 LAB
ALBUMIN SERPL-MCNC: 3.9 G/DL (ref 3.5–5.2)
ANION GAP SERPL CALCULATED.3IONS-SCNC: 11.1 MMOL/L (ref 5–15)
BACTERIA UR QL AUTO: ABNORMAL /HPF
BILIRUB UR QL STRIP: NEGATIVE
BUN SERPL-MCNC: 21 MG/DL (ref 8–23)
BUN/CREAT SERPL: 19.1 (ref 7–25)
CALCIUM SPEC-SCNC: 10 MG/DL (ref 8.6–10.5)
CHLORIDE SERPL-SCNC: 109 MMOL/L (ref 98–107)
CLARITY UR: CLEAR
CO2 SERPL-SCNC: 21.9 MMOL/L (ref 22–29)
COLOR UR: YELLOW
CREAT SERPL-MCNC: 1.1 MG/DL (ref 0.57–1)
EGFRCR SERPLBLD CKD-EPI 2021: 55.9 ML/MIN/1.73
GLUCOSE SERPL-MCNC: 82 MG/DL (ref 65–99)
GLUCOSE UR STRIP-MCNC: ABNORMAL MG/DL
HGB UR QL STRIP.AUTO: NEGATIVE
HYALINE CASTS UR QL AUTO: ABNORMAL /LPF
KETONES UR QL STRIP: NEGATIVE
LEUKOCYTE ESTERASE UR QL STRIP.AUTO: ABNORMAL
NITRITE UR QL STRIP: NEGATIVE
PH UR STRIP.AUTO: <=5 [PH] (ref 5–8)
PHOSPHATE SERPL-MCNC: 3.6 MG/DL (ref 2.5–4.5)
POTASSIUM SERPL-SCNC: 4.4 MMOL/L (ref 3.5–5.2)
PROT UR QL STRIP: NEGATIVE
RBC # UR STRIP: ABNORMAL /HPF
REF LAB TEST METHOD: ABNORMAL
SODIUM SERPL-SCNC: 142 MMOL/L (ref 136–145)
SP GR UR STRIP: 1.01 (ref 1–1.03)
SQUAMOUS #/AREA URNS HPF: ABNORMAL /HPF
UROBILINOGEN UR QL STRIP: ABNORMAL
WBC # UR STRIP: ABNORMAL /HPF

## 2024-07-02 PROCEDURE — 81001 URINALYSIS AUTO W/SCOPE: CPT

## 2024-07-02 PROCEDURE — 36415 COLL VENOUS BLD VENIPUNCTURE: CPT

## 2024-07-02 PROCEDURE — 80069 RENAL FUNCTION PANEL: CPT

## 2024-07-05 ENCOUNTER — EXTERNAL PBMM DATA (OUTPATIENT)
Dept: PHARMACY | Facility: OTHER | Age: 64
End: 2024-07-05
Payer: MEDICARE

## 2024-07-19 ENCOUNTER — OFFICE VISIT (OUTPATIENT)
Dept: ENDOCRINOLOGY | Facility: CLINIC | Age: 64
End: 2024-07-19
Payer: MEDICARE

## 2024-07-19 ENCOUNTER — LAB (OUTPATIENT)
Dept: LAB | Facility: HOSPITAL | Age: 64
End: 2024-07-19
Payer: MEDICARE

## 2024-07-19 DIAGNOSIS — E03.9 HYPOTHYROIDISM, UNSPECIFIED TYPE: Primary | ICD-10-CM

## 2024-07-19 DIAGNOSIS — E03.9 HYPOTHYROIDISM, UNSPECIFIED TYPE: Primary | Chronic | ICD-10-CM

## 2024-07-19 DIAGNOSIS — K86.89 PANCREATIC INSUFFICIENCY: ICD-10-CM

## 2024-07-19 LAB
T4 FREE SERPL-MCNC: 1.4 NG/DL (ref 0.93–1.7)
TSH SERPL DL<=0.05 MIU/L-ACNC: 0.05 UIU/ML (ref 0.27–4.2)

## 2024-07-19 PROCEDURE — 36415 COLL VENOUS BLD VENIPUNCTURE: CPT

## 2024-07-19 PROCEDURE — 84443 ASSAY THYROID STIM HORMONE: CPT

## 2024-07-19 PROCEDURE — 84439 ASSAY OF FREE THYROXINE: CPT

## 2024-07-22 ENCOUNTER — TELEPHONE (OUTPATIENT)
Dept: ENDOCRINOLOGY | Facility: CLINIC | Age: 64
End: 2024-07-22
Payer: MEDICARE

## 2024-07-22 VITALS
BODY MASS INDEX: 37.56 KG/M2 | HEART RATE: 74 BPM | HEIGHT: 63 IN | DIASTOLIC BLOOD PRESSURE: 77 MMHG | SYSTOLIC BLOOD PRESSURE: 121 MMHG | WEIGHT: 212 LBS | OXYGEN SATURATION: 97 %

## 2024-07-22 RX ORDER — CETIRIZINE HYDROCHLORIDE 10 MG/1
1 TABLET, CHEWABLE ORAL
COMMUNITY

## 2024-07-22 NOTE — TELEPHONE ENCOUNTER
Pt was seen Friday while epic was down, called pt to schedule 3 mo fu(October) and had to leave  - [facility] can schedule when pt calls back

## 2024-07-29 ENCOUNTER — READMISSION MANAGEMENT (OUTPATIENT)
Dept: CALL CENTER | Facility: HOSPITAL | Age: 64
End: 2024-07-29
Payer: MEDICARE

## 2024-07-29 ENCOUNTER — TELEPHONE (OUTPATIENT)
Dept: FAMILY MEDICINE CLINIC | Facility: CLINIC | Age: 64
End: 2024-07-29

## 2024-07-29 NOTE — OUTREACH NOTE
Prep Survey      Flowsheet Row Responses   [facility] facility patient discharged from? Non-BH   Is LACE score < 7 ? Non-BH Discharge   Eligibility [facility]   Date of Discharge 07/26/24   Discharge Disposition Home or Self Care   Discharge diagnosis Rehab   Does the patient have one of the following disease processes/diagnoses(primary or secondary)? Other   Prep survey completed? Yes            Halima BACK - Registered Nurse

## 2024-07-29 NOTE — TELEPHONE ENCOUNTER
Caller: Cassie Winter    Relationship to patient: Self    Best call back number: [phone]    New or established patient?  [] New  [x] Established    Date of discharge: 07/26/24    Facility discharged from: [facility]

## 2024-07-30 ENCOUNTER — EXTERNAL PBMM DATA (OUTPATIENT)
Dept: PHARMACY | Facility: OTHER | Age: 64
End: 2024-07-30
Payer: MEDICARE

## 2024-07-30 ENCOUNTER — TRANSITIONAL CARE MANAGEMENT TELEPHONE ENCOUNTER (OUTPATIENT)
Dept: CALL CENTER | Facility: HOSPITAL | Age: 64
End: 2024-07-30
Payer: MEDICARE

## 2024-07-30 NOTE — OUTREACH NOTE
Call Center TCM Note      Flowsheet Row Responses   [facility] patient discharged from? Non-  [[facility]]   Does the patient have one of the following disease processes/diagnoses(primary or secondary)? Other   TCM attempt successful? Yes   Call start time 0915   Call end time 0919   Meds reviewed with patient/caregiver? Yes   Is the patient having any side effects they believe may be caused by any medication additions or changes? No   Does the patient have all medications ordered at discharge? Yes   Prescription comments Pt states all needed medications in place   Is the patient taking all medications as directed (includes completed medication regime)? Yes   Comments TCM APPT WITH PCP DR LIT TRISTAN IS 08/02/2024   Does the patient have an appointment with their PCP within 7-14 days of discharge? Yes   Has home health visited the patient within 72 hours of discharge? N/A   Home health comments Pt is residing now at [facility] [address] [facility] so no HH needed   What DME was ordered? Nebulizer, walker   Has all DME been delivered? Yes   Psychosocial issues? No   Did the patient receive a copy of their discharge instructions? Yes   Nursing interventions Reviewed instructions with patient   What is the patient's perception of their health status since discharge? Improving   Is the patient/caregiver able to teach back signs and symptoms related to disease process for when to call PCP? Yes   Is the patient/caregiver able to teach back signs and symptoms related to disease process for when to call 911? Yes   Is the patient/caregiver able to teach back the hierarchy of who to call/visit for symptoms/problems? PCP, Specialist, Home health nurse, Urgent Care, ED, 911 Yes   If the patient is a current smoker, are they able to teach back resources for cessation? Not a smoker   TCM call completed? Yes   Wrap up additional comments D/C DX: a/c HFrEF - Pt is doing well. No questions at this time. TCM APPT with  PCP Dr Vines is 08/02/2024.   Call end time 0919            Diane Vallejo MA    7/30/2024, 09:22 EDT

## 2024-08-02 ENCOUNTER — LAB (OUTPATIENT)
Dept: FAMILY MEDICINE CLINIC | Facility: CLINIC | Age: 64
End: 2024-08-02
Payer: MEDICARE

## 2024-08-02 ENCOUNTER — OFFICE VISIT (OUTPATIENT)
Dept: FAMILY MEDICINE CLINIC | Facility: CLINIC | Age: 64
End: 2024-08-02
Payer: MEDICARE

## 2024-08-02 VITALS
BODY MASS INDEX: 36.96 KG/M2 | HEIGHT: 63 IN | RESPIRATION RATE: 16 BRPM | OXYGEN SATURATION: 100 % | SYSTOLIC BLOOD PRESSURE: 108 MMHG | DIASTOLIC BLOOD PRESSURE: 64 MMHG | WEIGHT: 208.6 LBS | HEART RATE: 44 BPM

## 2024-08-02 DIAGNOSIS — E03.9 ACQUIRED HYPOTHYROIDISM: Chronic | ICD-10-CM

## 2024-08-02 DIAGNOSIS — E53.8 VITAMIN B12 DEFICIENCY: ICD-10-CM

## 2024-08-02 DIAGNOSIS — I25.10 CHRONIC CORONARY ARTERY DISEASE: Chronic | ICD-10-CM

## 2024-08-02 DIAGNOSIS — E55.9 VITAMIN D DEFICIENCY: ICD-10-CM

## 2024-08-02 DIAGNOSIS — N18.30 STAGE 3 CHRONIC KIDNEY DISEASE, UNSPECIFIED WHETHER STAGE 3A OR 3B CKD: ICD-10-CM

## 2024-08-02 DIAGNOSIS — I50.42 CHRONIC COMBINED SYSTOLIC AND DIASTOLIC CONGESTIVE HEART FAILURE: Primary | ICD-10-CM

## 2024-08-02 PROCEDURE — 99495 TRANSJ CARE MGMT MOD F2F 14D: CPT | Performed by: INTERNAL MEDICINE

## 2024-08-02 PROCEDURE — 82607 VITAMIN B-12: CPT | Performed by: INTERNAL MEDICINE

## 2024-08-02 PROCEDURE — 82306 VITAMIN D 25 HYDROXY: CPT | Performed by: INTERNAL MEDICINE

## 2024-08-02 PROCEDURE — 1126F AMNT PAIN NOTED NONE PRSNT: CPT | Performed by: INTERNAL MEDICINE

## 2024-08-02 PROCEDURE — 1111F DSCHRG MED/CURRENT MED MERGE: CPT | Performed by: INTERNAL MEDICINE

## 2024-08-02 PROCEDURE — 36415 COLL VENOUS BLD VENIPUNCTURE: CPT

## 2024-08-02 RX ORDER — DICYCLOMINE HYDROCHLORIDE 10 MG/1
CAPSULE ORAL
COMMUNITY
Start: 2024-06-25

## 2024-08-02 RX ORDER — TRAZODONE HYDROCHLORIDE 100 MG/1
TABLET ORAL
COMMUNITY
Start: 2024-06-25

## 2024-08-02 RX ORDER — VENLAFAXINE HYDROCHLORIDE 225 MG/1
TABLET, EXTENDED RELEASE ORAL
COMMUNITY
Start: 2024-06-25

## 2024-08-02 RX ORDER — TRAZODONE HYDROCHLORIDE 150 MG/1
TABLET ORAL
COMMUNITY
Start: 2024-06-25 | End: 2024-08-02 | Stop reason: DRUGHIGH

## 2024-08-02 RX ORDER — FAMOTIDINE 20 MG
1000 TABLET ORAL DAILY
Qty: 30 CAPSULE | Refills: 5 | Status: SHIPPED | OUTPATIENT
Start: 2024-08-02

## 2024-08-02 NOTE — PATIENT INSTRUCTIONS
Please stop at lab on second floor to have blood drawn    Medications:  Continue current medications as prescribed  Switched vitamin D to capsules instead of liquid    Intermittently monitor home blood pressures    Follow up specialists as scheduled    Encourage healthy diet and activity as tolerated    Follow up in 3 months or sooner if something arises

## 2024-08-02 NOTE — PROGRESS NOTES
Transitional Care Follow Up Visit  Subjective     Cassie Winter is a 63 y.o. female who presents for a transitional care management visit.    Within 48 business hours after discharge our office contacted her via telephone to coordinate her care and needs.      I reviewed and discussed the details of that call along with the discharge summary, hospital problems, inpatient lab results, inpatient diagnostic studies, and consultation reports with Cassie.     Current outpatient and discharge medications have been reconciled for the patient.  Reviewed by: Colby Vines MD          7/29/2024     2:40 PM   Date of TCM Phone Call   [facility]   Date of Discharge 7/26/2024   Discharge Disposition Home or Self Care     Risk for Readmission (LACE) No data recorded    History of Present Illness   Course During Hospital Stay:      Patient previously admitted to [facility] on 4/3/2024 until 4/10/2024 after initially presenting with chest pain and dyspnea.  EKG notable for sinus bradycardia with incomplete left bundle branch block.  Troponins flat.  Cardiology consulted and patient underwent stress test which showed normal myocardial perfusion without evidence of ischemia.  Continue medical therapy with aspirin and statin was recommended.  Metoprolol was discontinued due to bradycardia.    Patient also noted to have TSH of 115 and free T4 of 0.110 on admission lab.  Patient increased to levothyroxine 200 mcg daily and recommended to recheck as an outpatient.    Patient eventually followed up with cardiology as an outpatient and recommended to undergo Coronary angiogram which was performed on 5/21/2024.  Coronary angiogram without significant obstructive coronary artery disease and showed moderate LV dysfunction and normal right-sided heart pressures.  Patient recommended to continue on Farxiga and spironolactone as unable to tolerate additional medications due to hypotension and  "bradycardia.    Patient overall doing well since discharge from rehab. She has been able to ambulate much better after working with therapies. Energy has improved especially since recent levothyroxine dose change. Blood pressure usually around 120/70-80. Denies chest pain, shortness of breath, orthopnea, PND, lower extremity edema, palpitations, tachycardia. Weight overall stable. Tolerating recent changes in medication well.     Hypothyroidism  Most recently seen by endocrinology on 7/19/2024.  Recommended to continue levothyroxine 200 mcg daily and repeat thyroid function test.  Most recent TSH 0.048 with normal free T4 of 1.4.  Patient recommended to continue on current dose of levothyroxine.  Follow-up recommended with endocrinology in 3 months.  Denies significant additional hyper or hypothyroid symptoms.    CKD  Recently evaluated by nephrology on 7/2/2024.  Also saw nephrology while most recently inpatient.  Previous renal ultrasound unremarkable.  Chronic kidney disease felt most likely due to cardiorenal syndrome and azotemia related to spironolactone and SGLT2 inhibitors.  Renal function overall improved on recheck.  Patient trying to avoid NSAIDs.    Patient would like to switch from oral to capsule vitamin D.      The following portions of the patient's history were reviewed and updated as appropriate: allergies, current medications, past family history, past medical history, past social history, past surgical history, and problem list.    Review of Systems  Review of systems negative unless otherwise noted in HPI.      Objective   /64   Pulse (!) 44   Resp 16   Ht 160 cm (63\")   Wt 94.6 kg (208 lb 9.6 oz)   SpO2 100%   BMI 36.95 kg/m²   Physical Exam    General: Well-appearing patient, no apparent distress  HEENT: No posterior pharynx erythema, no tonsillar erythema or exudates,  Cardiac: Regular rate and rhythm, normal S1/S2, no murmur, rubs or gallops, no lower extremity edema  Lungs: " Clear to auscultation bilaterally, no crackles or wheezes  Abdomen: Soft, non-tender, no guarding or rebound tenderness, no hepatosplenomegaly  Skin: No significant rashes or lesions  MSK: Grossly normal tone and strength  Neuro: Alert and oriented x3, CN II-XII grossly intact  Psych: Appropriate mood and affect     Assessment & Plan     (I50.42) Chronic combined systolic and diastolic congestive heart failure  Assessment: Patient with combined systolic/diastolic heart failure. Most recent echocardiogram with EF of approximately 35%. Weight recently overall stable currently appears euvolemic on exam.  Compliant with medications including Farxiga and spironolactone as unable to tolerate guideline directed medical therapy due to bradycardia and hypotension.  Initial Plan:  - Continue Farxiga and spironolactone  - Monitor weight  - Follow-up with cardiology as scheduled    (I25.10) Chronic coronary artery disease  Assessment: Most recent coronary angiogram without significant obstructive coronary artery disease.  Follows with cardiology.  Compliant with medications including aspirin and statin.  Plan:  - Aspirin 81 mg daily  - Continue statin as prescribed  - Encourage healthy diet and exercise     (N18.30) Stage 3 chronic kidney disease, unspecified whether stage 3a or 3b CKD  Assessment: Follows with nephrology most likely secondary to cardiorenal and medication effects. Most recent creatinine 1.1 and had improved from about 1.5 during admission.  Plan:  - Labs recently performed  - Avoid nephrotoxins, including NSAIDs  - Stay well hydrated  - Encourage blood pressure control  - Follow-up with nephrology as scheduled    (E03.9) Acquired hypothyroidism  Assessment: Patient severely hypothyroid during recent admission.  Patient overall feeling much improved with increased dose of levothyroxine to 200 mcg daily.  Most recent thyroid testing with normal free T4 level.  Plan:  - Continue levothyroxine as prescribed  -  Follow-up with endocrinology as scheduled  - Labs per endocrinology    (E53.8) Vitamin B12 deficiency -  Assessment: Plan on vitamin B12 replacement and has not been recently.  No recent vitamin B12 level.  Plan:  - Vitamin B12  - Consider vitamin B12 replacement based on lab value    (E55.9) Vitamin D deficiency - Plan: Vitamin D 25 hydroxy    Patient previously scheduled for Medicare wellness visit which resulted in patient being sent to [facility] for further evaluation due to previous medical issues.  Patient will need to be rescheduled for Medicare wellness to address preventative care.     Patient with a history of chronic hypotension preventing guideline directed medical therapy.  Beta-blockers have been held due to hypotension and bradycardia and previously has had Entresto discontinued.

## 2024-08-03 LAB
25(OH)D3 SERPL-MCNC: 26.2 NG/ML (ref 30–100)
VIT B12 BLD-MCNC: <150 PG/ML (ref 211–946)

## 2024-08-15 ENCOUNTER — TELEPHONE (OUTPATIENT)
Dept: FAMILY MEDICINE CLINIC | Facility: CLINIC | Age: 64
End: 2024-08-15

## 2024-08-15 DIAGNOSIS — E53.8 VITAMIN B12 DEFICIENCY: Primary | ICD-10-CM

## 2024-08-15 RX ORDER — CYANOCOBALAMIN 1000 UG/ML
1000 INJECTION, SOLUTION INTRAMUSCULAR; SUBCUTANEOUS
Status: SHIPPED | OUTPATIENT
Start: 2024-08-15 | End: 2025-07-17

## 2024-08-15 NOTE — TELEPHONE ENCOUNTER
Caller: Cassie Winter    Relationship: Self    Best call back number: [phone]     What medication are you requesting: VITAMIN B  THAT PROVIDER SUGGESTED    What are your current symptoms: NEEDS VIT B SHOT        Have you had these symptoms before:    [x] Yes  [] No    Have you been treated for these symptoms before:   [x] Yes  [] No    If a prescription is needed, what is your preferred pharmacy and phone number:    [facility](NURSE FAX NUMBER)  [phone]  PHONE:[phone]  ATTN:BROOKLYN(NURSING SUPERVISOR)      Additional notes:  PATIENT LIVES AT [facility]

## 2024-08-15 NOTE — TELEPHONE ENCOUNTER
Would recommend cyanocobalamin injection 1000 mcg every 28 days.  I previously placed order for her to have done in clinic.  Please let me know if she needs alternative order to have it done somewhere else.    Colby Vines MD

## 2024-08-23 RX ORDER — LEVOTHYROXINE SODIUM 175 UG/1
175 TABLET ORAL DAILY
Qty: 90 TABLET | Refills: 0 | OUTPATIENT
Start: 2024-08-23

## 2024-08-26 ENCOUNTER — OFFICE VISIT (OUTPATIENT)
Dept: PULMONOLOGY | Facility: HOSPITAL | Age: 64
End: 2024-08-26
Payer: MEDICARE

## 2024-08-26 VITALS
DIASTOLIC BLOOD PRESSURE: 72 MMHG | SYSTOLIC BLOOD PRESSURE: 130 MMHG | WEIGHT: 196 LBS | OXYGEN SATURATION: 99 % | HEIGHT: 63 IN | HEART RATE: 81 BPM | RESPIRATION RATE: 16 BRPM | BODY MASS INDEX: 34.73 KG/M2

## 2024-08-26 DIAGNOSIS — J45.20 MILD INTERMITTENT ASTHMA, UNSPECIFIED WHETHER COMPLICATED: Chronic | ICD-10-CM

## 2024-08-26 DIAGNOSIS — I50.42 CHRONIC COMBINED SYSTOLIC AND DIASTOLIC CONGESTIVE HEART FAILURE: ICD-10-CM

## 2024-08-26 DIAGNOSIS — G47.33 OSA (OBSTRUCTIVE SLEEP APNEA): Primary | ICD-10-CM

## 2024-08-26 PROCEDURE — G0463 HOSPITAL OUTPT CLINIC VISIT: HCPCS

## 2024-08-26 NOTE — PROGRESS NOTES
HPI:  New patient for pulmonary and sleep referral.  Patient had sleep apnea in 2019 and used CPAP machine for couple years but then lost the machine.  At that time she was feeling the machine was helping her in controlling her snoring and sleep disordered.  Right now she is having interrupted sleep and fatigue but there is nobody around to tell if she has snoring.  The asthma overall has been controlled with as needed albuterol.  Patient is non-smoker    Past Medical History:   Diagnosis Date    Allergic     As a child    Allergic rhinitis     Anemia     Anxiety     Arthritis     Asthma     B12 deficiency     Bradycardia     Broken arm 2019    Left arm broken     Cataract 2014    Surgery 2015    CHF (congestive heart failure)     Cholelithiasis 1993    Surgery 1993    Chronic back pain     Chronic bronchitis     Coronary artery disease     Depression     In my late 20s or early 30s    Diarrhea     Encounter for blood transfusion 04/03/2024    GERD (gastroesophageal reflux disease)     Hyperlipidemia 2024    Hypothyroidism     Irritable bowel syndrome 2020    Low back pain 2003    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 8 g 5    aspirin 81 MG EC tablet Take 1 tablet by mouth Daily.      atorvastatin (LIPITOR) 10 MG tablet Take 1 tablet by mouth Every Night.      CALCIUM PO Take 1 tablet by mouth Daily.      cetirizine (ZyrTEC) 10 MG chewable tablet Chew 1 tablet.      colestipol (COLESTID) 1 g tablet Take 1 tablet by mouth 2 (Two) Times a Day.      dapagliflozin (Farxiga) 5 MG tablet tablet Take 1 tablet by mouth Daily.       dicyclomine (BENTYL) 10 MG capsule       famotidine (PEPCID) 20 MG tablet Take 1 tablet by mouth Daily.      ferrous sulfate 325 (65 FE) MG tablet Take 1 tablet by mouth Daily.      fluticasone (FLONASE) 50 MCG/ACT nasal spray 2 sprays into the nostril(s) as directed by provider Daily.      ipratropium-albuterol (DUO-NEB) 0.5-2.5 mg/3 ml nebulizer       levothyroxine (SYNTHROID, LEVOTHROID) 175 MCG tablet Take 1 tablet by mouth Daily for 180 days. 30 tablet 5    midodrine (PROAMATINE) 5 MG tablet Take 1 tablet by mouth 3 (Three) Times a Day Before Meals. 30 tablet 0    Pancrelipase, Lip-Prot-Amyl, (CREON) 68908-370326 units capsule delayed-release particles capsule Take 2 capsules by mouth Daily With Breakfast & Lunch.      spironolactone (ALDACTONE) 25 MG tablet Take 1 tablet by mouth Daily.      traZODone (DESYREL) 100 MG tablet       venlafaxine 225 MG tablet sustained-release 24 hour 24 hr tablet       Vitamin D, Cholecalciferol, 25 MCG (1000 UT) capsule Take 1 capsule by mouth Daily. 30 capsule 5    [DISCONTINUED] Pancrelipase, Lip-Prot-Amyl, (Creon) 69739-830068 units capsule delayed-release particles capsule Take 1 capsule by mouth With Snacks for Snacks.       Current Facility-Administered Medications on File Prior to Visit   Medication Dose Route Frequency Provider Last Rate Last Admin    cyanocobalamin injection 1,000 mcg  1,000 mcg Intramuscular Q28 Days Colby Vines MD            Social History     Tobacco Use    Smoking status: Never     Passive exposure: Past    Smokeless tobacco: Never   Vaping Use    Vaping status: Never Used   Substance Use Topics    Alcohol use: Not Currently     Comment: Haven't in the past 29 years    Drug use: No        Family History   Problem Relation Age of Onset    Heart disease Mother     COPD Mother     Lung cancer Mother     Anemia Mother     Hypertension Mother     Alcohol abuse Mother     Lung cancer Father     Cancer Father     Alcohol abuse Father     COPD  "Father     Heart disease Father     Sleep apnea Sister     Hypertension Sister     Diabetes Sister     Obesity Sister     Thyroid disease Sister     Heart disease Sister     Heart disease Sister         Review of system:  Constitutional: Negative for chills, fever and malaise/fatigue.   HENT: Negative.    Eyes: Negative.    Cardiovascular: Negative.    Respiratory: negative for cough and shortness of breath.    Skin: Negative.    Musculoskeletal: Negative.    Gastrointestinal: Negative.    Genitourinary: Negative.    Neurological: Negative.    Psychiatric/Behavioral: Negative.    Physical exam:  Blood pressure 130/72, pulse 81, resp. rate 16, height 160 cm (63\"), weight 88.9 kg (196 lb), SpO2 99%.    General Appearance:  Alert   HEENT:  Normocephalic, without obvious abnormality, Conjunctiva/corneas clear,.   Nares normal, no drainage     Neck:  Supple, symmetrical, trachea midline. No JVD.  Lungs /Chest wall:   good air entry Bilaterlly, respirations unlabored, symmetrical wall movement.     Heart:  Regular rate and rhythm, S1 S2 normal  Abdomen: Soft, non-tender, no masses, no organomegaly.    Extremities: No edema, no clubbing or cyanosis    No radiology results for the last 90 days.   Results for orders placed during the hospital encounter of 04/03/24    Adult Transthoracic Echo Complete w/ Color, Spectral and Contrast if Necessary Per Protocol    Interpretation Summary    Left ventricular systolic function is moderately decreased. Calculated left ventricular EF = 34.5%    Left ventricular diastolic function is consistent with (grade II w/high LAP) pseudonormalization.    The left atrial cavity is mildly dilated.    Estimated right ventricular systolic pressure from tricuspid regurgitation is normal (<35 mmHg).        Assessment and plan:  WILFRID: Nocturnal polysomnography 2019 with AHI 5.4, patient lost her CPAP machine 3 years ago, currently having symptoms of interrupted sleep and fatigue  Mild intermittent " asthma, controlled on albuterol  Overweight: Patient is gradually losing weight  Hypothyroidism  CHF: EF 34% and diastolic dysfunction    Plan:  Home sleep test  Albuterol as needed  Diuresis      Patient is advised to stay up-to-date on immunizations for flu, pneumococcal and COVID-19

## 2024-09-04 ENCOUNTER — TELEPHONE (OUTPATIENT)
Dept: FAMILY MEDICINE CLINIC | Facility: CLINIC | Age: 64
End: 2024-09-04
Payer: MEDICARE

## 2024-09-04 ENCOUNTER — EXTERNAL PBMM DATA (OUTPATIENT)
Dept: PHARMACY | Facility: OTHER | Age: 64
End: 2024-09-04
Payer: MEDICARE

## 2024-09-04 NOTE — TELEPHONE ENCOUNTER
Caller: Cassie Winter    Relationship: Self    Best call back number: [phone]     What orders are you requesting (i.e. lab or imaging): ORDERS FOR INCONTINENCE PULL UPS    Additional notes: PATIENTS CALLED TO LET HER PCP KNOW THAT THE [facility] ON [address] WILL BE CALLING TO EITHER GET A PRESCRIPTION FOR THESE OR AN ORDER.

## 2024-09-05 NOTE — TELEPHONE ENCOUNTER
Please let patient know that PCP has received orders and once signed we will fax them back today. Thank you.

## 2024-09-16 ENCOUNTER — HOSPITAL ENCOUNTER (OUTPATIENT)
Dept: SLEEP MEDICINE | Facility: HOSPITAL | Age: 64
Discharge: HOME | End: 2024-09-16
Admitting: INTERNAL MEDICINE
Payer: MEDICARE

## 2024-09-16 DIAGNOSIS — G47.33 OSA (OBSTRUCTIVE SLEEP APNEA): ICD-10-CM

## 2024-09-16 PROCEDURE — 95806 SLEEP STUDY UNATT&RESP EFFT: CPT

## 2024-09-20 DIAGNOSIS — E03.9 HYPOTHYROIDISM, UNSPECIFIED TYPE: Primary | ICD-10-CM

## 2024-09-20 RX ORDER — LEVOTHYROXINE SODIUM 175 UG/1
175 TABLET ORAL DAILY
Qty: 30 TABLET | Refills: 5 | Status: SHIPPED | OUTPATIENT
Start: 2024-09-20 | End: 2025-03-19

## 2024-10-03 ENCOUNTER — LAB (OUTPATIENT)
Dept: LAB | Facility: HOSPITAL | Age: 64
End: 2024-10-03
Payer: MEDICARE

## 2024-10-03 DIAGNOSIS — E03.9 HYPOTHYROIDISM, UNSPECIFIED TYPE: Chronic | ICD-10-CM

## 2024-10-03 LAB
T4 FREE SERPL-MCNC: 0.92 NG/DL (ref 0.92–1.68)
TSH SERPL DL<=0.05 MIU/L-ACNC: 11.9 UIU/ML (ref 0.27–4.2)

## 2024-10-03 PROCEDURE — 36415 COLL VENOUS BLD VENIPUNCTURE: CPT

## 2024-10-03 PROCEDURE — 84439 ASSAY OF FREE THYROXINE: CPT

## 2024-10-03 PROCEDURE — 84443 ASSAY THYROID STIM HORMONE: CPT

## 2024-10-15 ENCOUNTER — OFFICE (AMBULATORY)
Age: 64
End: 2024-10-15
Payer: MEDICAID

## 2024-10-15 ENCOUNTER — OFFICE (AMBULATORY)
Dept: URBAN - METROPOLITAN AREA CLINIC 64 | Facility: CLINIC | Age: 64
End: 2024-10-15
Payer: MEDICAID

## 2024-10-15 VITALS
DIASTOLIC BLOOD PRESSURE: 61 MMHG | DIASTOLIC BLOOD PRESSURE: 61 MMHG | HEIGHT: 65 IN | SYSTOLIC BLOOD PRESSURE: 117 MMHG | WEIGHT: 203 LBS | SYSTOLIC BLOOD PRESSURE: 117 MMHG | HEIGHT: 65 IN | WEIGHT: 203 LBS | DIASTOLIC BLOOD PRESSURE: 61 MMHG | WEIGHT: 203 LBS | DIASTOLIC BLOOD PRESSURE: 61 MMHG | HEART RATE: 64 BPM | HEIGHT: 65 IN | HEART RATE: 64 BPM | HEIGHT: 65 IN | HEART RATE: 64 BPM | DIASTOLIC BLOOD PRESSURE: 61 MMHG | HEART RATE: 64 BPM | SYSTOLIC BLOOD PRESSURE: 117 MMHG | HEIGHT: 65 IN | HEART RATE: 64 BPM | HEIGHT: 65 IN | WEIGHT: 203 LBS | HEIGHT: 65 IN | HEART RATE: 64 BPM | WEIGHT: 203 LBS | DIASTOLIC BLOOD PRESSURE: 61 MMHG | HEART RATE: 64 BPM | SYSTOLIC BLOOD PRESSURE: 117 MMHG | SYSTOLIC BLOOD PRESSURE: 117 MMHG | WEIGHT: 203 LBS | DIASTOLIC BLOOD PRESSURE: 61 MMHG | WEIGHT: 203 LBS | SYSTOLIC BLOOD PRESSURE: 117 MMHG | SYSTOLIC BLOOD PRESSURE: 117 MMHG

## 2024-10-15 DIAGNOSIS — R19.7 DIARRHEA, UNSPECIFIED: ICD-10-CM

## 2024-10-15 DIAGNOSIS — K90.9 INTESTINAL MALABSORPTION, UNSPECIFIED: ICD-10-CM

## 2024-10-15 DIAGNOSIS — R12 HEARTBURN: ICD-10-CM

## 2024-10-15 PROCEDURE — 99214 OFFICE O/P EST MOD 30 MIN: CPT | Performed by: NURSE PRACTITIONER

## 2024-10-15 RX ORDER — PANCRELIPASE 36000; 180000; 114000 [USP'U]/1; [USP'U]/1; [USP'U]/1
CAPSULE, DELAYED RELEASE PELLETS ORAL
Qty: 540 | Refills: 3 | Status: ACTIVE

## 2024-10-15 RX ORDER — DICYCLOMINE HYDROCHLORIDE 20 MG/1
TABLET ORAL
Qty: 60 | Refills: 11 | Status: ACTIVE

## 2024-10-15 RX ORDER — COLESTIPOL HYDROCHLORIDE 1 G/1
TABLET, FILM COATED ORAL
Qty: 60 | Refills: 11 | Status: ACTIVE

## 2024-10-16 ENCOUNTER — OFFICE VISIT (OUTPATIENT)
Dept: ENDOCRINOLOGY | Facility: CLINIC | Age: 64
End: 2024-10-16
Payer: MEDICARE

## 2024-10-16 VITALS
SYSTOLIC BLOOD PRESSURE: 110 MMHG | BODY MASS INDEX: 36.14 KG/M2 | DIASTOLIC BLOOD PRESSURE: 68 MMHG | HEART RATE: 57 BPM | OXYGEN SATURATION: 94 % | WEIGHT: 204 LBS | HEIGHT: 63 IN

## 2024-10-16 DIAGNOSIS — K86.89 PANCREATIC INSUFFICIENCY: ICD-10-CM

## 2024-10-16 DIAGNOSIS — E03.9 HYPOTHYROIDISM, UNSPECIFIED TYPE: Primary | ICD-10-CM

## 2024-10-16 PROCEDURE — 1160F RVW MEDS BY RX/DR IN RCRD: CPT | Performed by: INTERNAL MEDICINE

## 2024-10-16 PROCEDURE — 1159F MED LIST DOCD IN RCRD: CPT | Performed by: INTERNAL MEDICINE

## 2024-10-16 PROCEDURE — 99214 OFFICE O/P EST MOD 30 MIN: CPT | Performed by: INTERNAL MEDICINE

## 2024-10-16 RX ORDER — LEVOTHYROXINE SODIUM 200 UG/1
TABLET ORAL
Qty: 16 TABLET | Refills: 3 | Status: SHIPPED | OUTPATIENT
Start: 2024-10-16

## 2024-10-16 RX ORDER — LIDOCAINE 50 MG/G
PATCH TOPICAL
COMMUNITY

## 2024-10-16 RX ORDER — LEVOTHYROXINE SODIUM 175 UG/1
TABLET ORAL
Qty: 18 TABLET | Refills: 3 | Status: SHIPPED | OUTPATIENT
Start: 2024-10-16

## 2024-10-16 NOTE — PATIENT INSTRUCTIONS
Please,    Please adjust levothyroxine therapy as follows:  Levothyroxine 200 mcg on Monday, Wednesday and Friday  Levothyroxine 175 mcg on Tuesday, Thursday, Saturday and Sunday    Repeat thyroid function and clinical follow-up in 3 months time.    Take it every day, on an empty stomach, 30 minutes before eating  Avoid taking it with iron, calcium, other medications (blocks absorption), wait at least 4 hrs after taking levothyroxine to take these medications  If you miss a pill, you can take 2 the next day and return to schedule from next day    Thank you for your visit today.    If you have any questions or concerns please feel free to reach out of the office.

## 2024-10-16 NOTE — PROGRESS NOTES
-----------------------------------------------------------------  ENDOCRINE CLINIC NOTE  -----------------------------------------------------------------        PATIENT NAME: Cassie Winter  PATIENT : 1960 AGE: 64 y.o.  MRN NUMBER: [id number]  PRIMARY CARE: Colby Vines MD    ==========================================================================    CHIEF COMPLAINT: Hypothyroidism  DATE OF SERVICE: 10/16/24    ==========================================================================    HPI / SUBJECTIVE    64 y.o. female is seen in the clinic today for hypothyroidism.  Patient is known to have hypothyroidism for last 20 years.  Patient was seen by endocrine team during hospitalization in 2024.  Patient was last seen in the clinic in 2024 and she was taking levothyroxine 200 mcg p.o. daily.  Patient was found to evidence of iatrogenic hyperthyroidism and dose was adjusted to 175 mcg.  Patient is currently taking medication properly.  Currently living at assisted living facility.  History significant for chronic diarrhea with pancreatic insufficiency for which she is currently maintained on Creon therapy.    ==========================================================================                                                PAST MEDICAL HISTORY    Past Medical History:   Diagnosis Date    Allergic     As a child    Allergic rhinitis     Anemia     Anxiety     Arthritis     Asthma     B12 deficiency     Bradycardia     Broken arm 2019    Left arm broken     Cataract 2014    Surgery 2015    CHF (congestive heart failure)     Cholelithiasis 1993    Surgery 1993    Chronic back pain     Chronic bronchitis     Coronary artery disease     Depression     In my late 20s or early 30s    Diarrhea     Encounter for blood transfusion 2024    GERD (gastroesophageal reflux disease)     Hyperlipidemia     Hypothyroidism     Irritable bowel syndrome     Low back pain 2003    Surgery  in 2007 lamenectomy L3-L5    MRSA (methicillin resistant Staphylococcus aureus)     Neuropathic pain of foot     Obesity     Osteopenia     Renal insufficiency 2024    Severe back pain 2010    Sleep apnea, obstructive        ==========================================================================    PAST SURGICAL HISTORY    Past Surgical History:   Procedure Laterality Date    ABDOMINAL HERNIA REPAIR  2003    repair of abdominal wall hernia, intraabdominal hernia and hiatal hernia, 2004 incisional hernia repair.     ACHILLES TENDON SURGERY  2011    rupture    ANKLE FUSION Right 11/06/2015    BARIATRIC SURGERY  1985    CARDIAC CATHETERIZATION  02/20/2017    CARDIAC CATHETERIZATION N/A 05/21/2024    Procedure: Right and Left Heart Cath;  Surgeon: Pablito Casillas MD;  Location: [facility] CATH INVASIVE [facility];  Service: Cardiovascular;  Laterality: N/A;    CHOLECYSTECTOMY  1992    COLONOSCOPY  2023    EYE SURGERY  2015    Retinas in both eyes so had 2 or 3 retinal    FRACTURE SURGERY  2015 , 2017    GASTRIC BYPASS  1986    GASTRIC RESTRICTION SURGERY  1985    LAMINECTOMY  2007    that was complicated by a staph infection     OTHER SURGICAL HISTORY Left 2019    L arm     REMOVAL EXTERNAL FIXATOR      SPINE SURGERY  2007    ULNA/RADIUS EXTERNAL FIXATOR APPLICATION Left     WRIST FRACTURE SURGERY Right        ==========================================================================    FAMILY HISTORY    Family History   Problem Relation Age of Onset    Heart disease Mother     COPD Mother     Lung cancer Mother     Anemia Mother     Hypertension Mother     Alcohol abuse Mother     Lung cancer Father     Cancer Father     Alcohol abuse Father     COPD Father     Heart disease Father     Sleep apnea Sister     Hypertension Sister     Diabetes Sister     Obesity Sister     Thyroid disease Sister     Heart disease Sister     Heart disease Sister         ==========================================================================    SOCIAL HISTORY    Social History     Socioeconomic History    Marital status: Single   Tobacco Use    Smoking status: Never     Passive exposure: Past    Smokeless tobacco: Never   Vaping Use    Vaping status: Never Used   Substance and Sexual Activity    Alcohol use: Not Currently     Comment: Haven't in the past 29 years    Drug use: No    Sexual activity: Defer       ==========================================================================    MEDICATIONS      Current Outpatient Medications:     albuterol sulfate  (90 Base) MCG/ACT inhaler, Inhale 2 puffs Every 4 (Four) Hours As Needed for Wheezing., Disp: 8 g, Rfl: 5    aspirin 81 MG EC tablet, Take 1 tablet by mouth Daily., Disp: , Rfl:     atorvastatin (LIPITOR) 10 MG tablet, Take 1 tablet by mouth Every Night., Disp: , Rfl:     CALCIUM PO, Take 1 tablet by mouth Daily., Disp: , Rfl:     cetirizine (ZyrTEC) 10 MG chewable tablet, Chew 1 tablet., Disp: , Rfl:     colestipol (COLESTID) 1 g tablet, Take 1 tablet by mouth 2 (Two) Times a Day., Disp: , Rfl:     dapagliflozin (Farxiga) 5 MG tablet tablet, Take 1 tablet by mouth Daily., Disp: , Rfl:     dicyclomine (BENTYL) 10 MG capsule, , Disp: , Rfl:     famotidine (PEPCID) 20 MG tablet, Take 1 tablet by mouth Daily., Disp: , Rfl:     ferrous sulfate 325 (65 FE) MG tablet, Take 1 tablet by mouth Daily., Disp: , Rfl:     fluticasone (FLONASE) 50 MCG/ACT nasal spray, Administer 2 sprays into the nostril(s) as directed by provider Daily., Disp: , Rfl:     ipratropium-albuterol (DUO-NEB) 0.5-2.5 mg/3 ml nebulizer, , Disp: , Rfl:     levothyroxine (SYNTHROID, LEVOTHROID) 175 MCG tablet, Take 1 tablet by mouth Daily for 180 days., Disp: 30 tablet, Rfl: 5    lidocaine (LIDODERM) 5 %, 0, Disp: , Rfl:     midodrine (PROAMATINE) 5 MG tablet, Take 1 tablet by mouth 3 (Three) Times a Day Before Meals., Disp: 30 tablet, Rfl: 0     "Pancrelipase, Lip-Prot-Amyl, (CREON) 90299-260596 units capsule delayed-release particles capsule, Take 2 capsules by mouth Daily With Breakfast & Lunch., Disp: , Rfl:     spironolactone (ALDACTONE) 25 MG tablet, Take 1 tablet by mouth Daily., Disp: , Rfl:     traZODone (DESYREL) 100 MG tablet, , Disp: , Rfl:     venlafaxine 225 MG tablet sustained-release 24 hour 24 hr tablet, , Disp: , Rfl:     Vitamin D, Cholecalciferol, 25 MCG (1000 UT) capsule, Take 1 capsule by mouth Daily., Disp: 30 capsule, Rfl: 5    Current Facility-Administered Medications:     cyanocobalamin injection 1,000 mcg, 1,000 mcg, Intramuscular, Q28 Days, Colby Vines MD    ==========================================================================    ALLERGIES    Allergies   Allergen Reactions    Hydrocodone-Acetaminophen Hives     Can tolerate with Benadryl    Oxycodone-Acetaminophen Hives     Can tolerate with Benadryl    Ketorolac Tromethamine Itching    Promethazine Itching    Tramadol Itching       ==========================================================================    OBJECTIVE    Vitals:    10/16/24 1435   BP: 110/68   Pulse: 57   SpO2: 94%     Body mass index is 36.14 kg/m².     General: Alert, cooperative, no acute distress    ==========================================================================    LAB EVALUATION    Lab Results   Component Value Date    GLUCOSE 82 07/02/2024    BUN 21 07/02/2024    CREATININE 1.10 (H) 07/02/2024    EGFRIFNONA 52 (L) 11/22/2021    BCR 19.1 07/02/2024    K 4.4 07/02/2024    CO2 21.9 (L) 07/02/2024    CALCIUM 10.0 07/02/2024    ALBUMIN 3.9 07/02/2024    LABIL2 1.3 02/11/2019    AST 30 04/07/2024    ALT 19 04/07/2024    CHOL 178 04/09/2024    TRIG 109 04/09/2024    HDL 73 (H) 04/09/2024    LDL 86 04/09/2024     No results found for: \"HGBA1C\"  Lab Results   Component Value Date    CREATININE 1.10 (H) 07/02/2024     Lab Results   Component Value Date    TSH 11.900 (H) 10/03/2024    FREET4 0.92 " 10/03/2024       ==========================================================================    ASSESSMENT AND PLAN    # Hypothyroidism  # Pancreatic insufficiency    - Blood work reviewed and will adjust levothyroxine therapy as follows:  - Levothyroxine 175 mcg on Tuesdays, Thursdays, Saturday and Sunday  - Levothyroxine 200 mcg on Monday, Wednesday and Friday  - Repeat thyroid profile back again in 3 months time  - Patient to follow-up in 3 months because she is currently living in assisted living facility and will need clear communication to maintain proper compliance  - Blood work before next visit as well    Thank you for courtesy of consultation.    Return to clinic: 3 months    Entire assessment and plan was discussed and counseled the patient in detail to which patient verbalized understanding and agreed with care.  Answered all queries and concerns.    Part of this note may be an electronic transcription/translation of spoken language to printed text using the Dragon Dictation System.     Note: Portions of this note may have been copied from previous notes but documentation have been reviewed and edited as necessary to support clinical decision making for today's visit.    ==========================================================================    INFORMATION PROVIDED TO PATIENT    Patient Instructions   Please,    Please adjust levothyroxine therapy as follows:  Levothyroxine 200 mcg on Monday, Wednesday and Friday  Levothyroxine 175 mcg on Tuesday, Thursday, Saturday and Sunday    Repeat thyroid function and clinical follow-up in 3 months time.    Take it every day, on an empty stomach, 30 minutes before eating  Avoid taking it with iron, calcium, other medications (blocks absorption), wait at least 4 hrs after taking levothyroxine to take these medications  If you miss a pill, you can take 2 the next day and return to schedule from next day    Thank you for your visit today.    If you have any  questions or concerns please feel free to reach out of the office.       ==========================================================================  Cuco Sanchez MD  Department of Endocrine, Diabetes and Metabolism  [place], [place]  ==========================================================================

## 2024-10-24 ENCOUNTER — OFFICE VISIT (OUTPATIENT)
Dept: FAMILY MEDICINE CLINIC | Facility: CLINIC | Age: 64
End: 2024-10-24
Payer: MEDICARE

## 2024-10-24 ENCOUNTER — LAB (OUTPATIENT)
Dept: FAMILY MEDICINE CLINIC | Facility: CLINIC | Age: 64
End: 2024-10-24
Payer: MEDICARE

## 2024-10-24 VITALS
BODY MASS INDEX: 36.29 KG/M2 | OXYGEN SATURATION: 93 % | HEART RATE: 65 BPM | WEIGHT: 204.8 LBS | RESPIRATION RATE: 18 BRPM | SYSTOLIC BLOOD PRESSURE: 118 MMHG | HEIGHT: 63 IN | DIASTOLIC BLOOD PRESSURE: 64 MMHG

## 2024-10-24 DIAGNOSIS — Z01.419 ROUTINE GYNECOLOGICAL EXAMINATION: ICD-10-CM

## 2024-10-24 DIAGNOSIS — E03.9 HYPOTHYROIDISM, UNSPECIFIED TYPE: ICD-10-CM

## 2024-10-24 DIAGNOSIS — Z12.31 ENCOUNTER FOR SCREENING MAMMOGRAM FOR MALIGNANT NEOPLASM OF BREAST: ICD-10-CM

## 2024-10-24 DIAGNOSIS — Z01.419 ROUTINE GYNECOLOGICAL EXAMINATION: Primary | ICD-10-CM

## 2024-10-24 DIAGNOSIS — D50.8 OTHER IRON DEFICIENCY ANEMIA: ICD-10-CM

## 2024-10-24 DIAGNOSIS — D50.8 OTHER IRON DEFICIENCY ANEMIA: Chronic | ICD-10-CM

## 2024-10-24 LAB
T4 FREE SERPL-MCNC: 1.37 NG/DL (ref 0.93–1.7)
TSH SERPL DL<=0.05 MIU/L-ACNC: 1.23 UIU/ML (ref 0.27–4.2)

## 2024-10-24 PROCEDURE — 84443 ASSAY THYROID STIM HORMONE: CPT | Performed by: INTERNAL MEDICINE

## 2024-10-24 PROCEDURE — 36415 COLL VENOUS BLD VENIPUNCTURE: CPT

## 2024-10-24 PROCEDURE — 84466 ASSAY OF TRANSFERRIN: CPT | Performed by: NURSE PRACTITIONER

## 2024-10-24 PROCEDURE — 82728 ASSAY OF FERRITIN: CPT | Performed by: NURSE PRACTITIONER

## 2024-10-24 PROCEDURE — 84439 ASSAY OF FREE THYROXINE: CPT | Performed by: INTERNAL MEDICINE

## 2024-10-24 PROCEDURE — 83540 ASSAY OF IRON: CPT | Performed by: NURSE PRACTITIONER

## 2024-10-24 PROCEDURE — 85025 COMPLETE CBC W/AUTO DIFF WBC: CPT | Performed by: NURSE PRACTITIONER

## 2024-10-24 NOTE — PROGRESS NOTES
"Chief Complaint  Gynecologic Exam  Subjective        Cassie Winter presents to [facility]  History of Present Illness  Pt comes in today for pap only.   She typically sees Dr. Vines and is scheduled next week for MWV  Last Pap was about 3 years ago.  She is requesting labs for anemia. Hx of NEERAJ and states she hasn't been taking her iron supplement.   She lives at [facility] on [facility] and states she doesn't think they have been giving it to her as prescribed in the past.   Gynecologic Exam         Objective     Vital Signs:   /64   Pulse 65   Resp 18   Ht 160 cm (62.99\")   Wt 92.9 kg (204 lb 12.8 oz)   SpO2 93%   BMI 36.29 kg/m²       BP Readings from Last 3 Encounters:   10/24/24 118/64   10/16/24 110/68   08/26/24 130/72       Wt Readings from Last 3 Encounters:   10/24/24 92.9 kg (204 lb 12.8 oz)   10/16/24 92.5 kg (204 lb)   08/26/24 88.9 kg (196 lb)     Physical Exam  Constitutional:       Appearance: She is well-developed.   Eyes:      Pupils: Pupils are equal, round, and reactive to light.   Cardiovascular:      Rate and Rhythm: Normal rate and regular rhythm.   Pulmonary:      Effort: Pulmonary effort is normal.      Breath sounds: Normal breath sounds.   Chest:   Breasts:     Right: Normal.      Left: Normal.   Genitourinary:     Vagina: Normal.      Cervix: Normal.   Neurological:      Mental Status: She is alert and oriented to person, place, and time.        Result Review :                 Assessment and Plan    Diagnoses and all orders for this visit:    1. Routine gynecological examination (Primary)  -     IGP,Aptima HPV,Age Gdln; Future    2. Other iron deficiency anemia  -     CBC & Differential; Future  -     Ferritin; Future  -     Iron and TIBC; Future    3. Encounter for screening mammogram for malignant neoplasm of breast  -     Mammo Screening Digital Tomosynthesis Bilateral With CAD; Future    Will check labs  Follow up as scheduled for MWV  During " this visit for their annual exam, we reviewed their personal history, social history and family history. We went over their medications and all the recommended health maintenance items for their age group. They were given the opportunity to ask questions and discuss other concerns.         Follow Up   Return for Next scheduled follow up.  Patient was given instructions and counseling regarding her condition or for health maintenance advice. Please see specific information pulled into the AVS if appropriate.

## 2024-10-25 LAB
BASOPHILS # BLD AUTO: 0.07 10*3/MM3 (ref 0–0.2)
BASOPHILS NFR BLD AUTO: 1 % (ref 0–1.5)
DEPRECATED RDW RBC AUTO: 44.1 FL (ref 37–54)
EOSINOPHIL # BLD AUTO: 0.18 10*3/MM3 (ref 0–0.4)
EOSINOPHIL NFR BLD AUTO: 2.6 % (ref 0.3–6.2)
ERYTHROCYTE [DISTWIDTH] IN BLOOD BY AUTOMATED COUNT: 12 % (ref 12.3–15.4)
FERRITIN SERPL-MCNC: 168 NG/ML (ref 13–150)
HCT VFR BLD AUTO: 35 % (ref 34–46.6)
HGB BLD-MCNC: 10.7 G/DL (ref 12–15.9)
IMM GRANULOCYTES # BLD AUTO: 0.02 10*3/MM3 (ref 0–0.05)
IMM GRANULOCYTES NFR BLD AUTO: 0.3 % (ref 0–0.5)
IRON 24H UR-MRATE: 81 MCG/DL (ref 37–145)
IRON SATN MFR SERPL: 26 % (ref 20–50)
LYMPHOCYTES # BLD AUTO: 2.25 10*3/MM3 (ref 0.7–3.1)
LYMPHOCYTES NFR BLD AUTO: 32.8 % (ref 19.6–45.3)
MCH RBC QN AUTO: 30.7 PG (ref 26.6–33)
MCHC RBC AUTO-ENTMCNC: 30.6 G/DL (ref 31.5–35.7)
MCV RBC AUTO: 100.3 FL (ref 79–97)
MONOCYTES # BLD AUTO: 0.6 10*3/MM3 (ref 0.1–0.9)
MONOCYTES NFR BLD AUTO: 8.8 % (ref 5–12)
NEUTROPHILS NFR BLD AUTO: 3.73 10*3/MM3 (ref 1.7–7)
NEUTROPHILS NFR BLD AUTO: 54.5 % (ref 42.7–76)
NRBC BLD AUTO-RTO: 0 /100 WBC (ref 0–0.2)
PLATELET # BLD AUTO: 282 10*3/MM3 (ref 140–450)
PMV BLD AUTO: 10.5 FL (ref 6–12)
RBC # BLD AUTO: 3.49 10*6/MM3 (ref 3.77–5.28)
TIBC SERPL-MCNC: 314 MCG/DL (ref 298–536)
TRANSFERRIN SERPL-MCNC: 211 MG/DL (ref 200–360)
WBC NRBC COR # BLD AUTO: 6.85 10*3/MM3 (ref 3.4–10.8)

## 2024-10-28 LAB
AGE GDLN ACOG TESTING: NORMAL
CYTOLOGIST CVX/VAG CYTO: NORMAL
CYTOLOGY CVX/VAG DOC CYTO: NORMAL
CYTOLOGY CVX/VAG DOC THIN PREP: NORMAL
DX ICD CODE: NORMAL
Lab: NORMAL
OTHER STN SPEC: NORMAL
STAT OF ADQ CVX/VAG CYTO-IMP: NORMAL

## 2024-11-07 ENCOUNTER — OFFICE VISIT (OUTPATIENT)
Dept: FAMILY MEDICINE CLINIC | Facility: CLINIC | Age: 64
End: 2024-11-07
Payer: MEDICARE

## 2024-11-07 VITALS
SYSTOLIC BLOOD PRESSURE: 112 MMHG | HEART RATE: 57 BPM | WEIGHT: 204.6 LBS | RESPIRATION RATE: 18 BRPM | DIASTOLIC BLOOD PRESSURE: 62 MMHG | BODY MASS INDEX: 36.25 KG/M2 | HEIGHT: 63 IN | OXYGEN SATURATION: 99 %

## 2024-11-07 DIAGNOSIS — I50.22 CHRONIC SYSTOLIC CHF (CONGESTIVE HEART FAILURE): ICD-10-CM

## 2024-11-07 DIAGNOSIS — E03.9 HYPOTHYROIDISM, UNSPECIFIED TYPE: ICD-10-CM

## 2024-11-07 DIAGNOSIS — B02.9 HERPES ZOSTER WITHOUT COMPLICATION: ICD-10-CM

## 2024-11-07 DIAGNOSIS — E66.9 OBESITY (BMI 30-39.9): Chronic | ICD-10-CM

## 2024-11-07 DIAGNOSIS — G47.33 OSA (OBSTRUCTIVE SLEEP APNEA): ICD-10-CM

## 2024-11-07 DIAGNOSIS — Z00.00 PREVENTATIVE HEALTH CARE: Primary | ICD-10-CM

## 2024-11-07 DIAGNOSIS — N18.9 CHRONIC KIDNEY DISEASE, UNSPECIFIED CKD STAGE: ICD-10-CM

## 2024-11-07 PROCEDURE — G0439 PPPS, SUBSEQ VISIT: HCPCS | Performed by: INTERNAL MEDICINE

## 2024-11-07 PROCEDURE — 99214 OFFICE O/P EST MOD 30 MIN: CPT | Performed by: INTERNAL MEDICINE

## 2024-11-07 PROCEDURE — 1125F AMNT PAIN NOTED PAIN PRSNT: CPT | Performed by: INTERNAL MEDICINE

## 2024-11-07 RX ORDER — DICYCLOMINE HCL 20 MG
TABLET ORAL
COMMUNITY
Start: 2024-10-15

## 2024-11-07 RX ORDER — VALACYCLOVIR HYDROCHLORIDE 1 G/1
1000 TABLET, FILM COATED ORAL 3 TIMES DAILY
Qty: 21 TABLET | Refills: 0 | Status: SHIPPED | OUTPATIENT
Start: 2024-11-07 | End: 2024-11-14

## 2024-11-07 NOTE — PROGRESS NOTES
Subjective   The ABCs of the Annual Wellness Visit  Medicare Wellness Visit      Cassie Winter is a 64 y.o. patient who presents for a Medicare Wellness Visit.    The following portions of the patient's history were reviewed and   updated as appropriate: allergies, current medications, past family history, past medical history, past social history, past surgical history, and problem list.    Compared to one year ago, the patient's physical   health is better.  Compared to one year ago, the patient's mental   health is better.    Recent Hospitalizations:  This patient has had a [facility] admission record on file within the last 365 days.  Current Medical Providers:  Patient Care Team:  Colby Vines MD as PCP - General (Internal Medicine)  Bonnie Landa PT (Inactive) as Physical Therapist (Physical Therapy)  Pablito Casillas MD as Consulting Physician (Cardiology)    Outpatient Medications Prior to Visit   Medication Sig Dispense Refill    albuterol sulfate  (90 Base) MCG/ACT inhaler Inhale 2 puffs Every 4 (Four) Hours As Needed for Wheezing. 8 g 5    aspirin 81 MG EC tablet Take 1 tablet by mouth Daily.      atorvastatin (LIPITOR) 10 MG tablet Take 1 tablet by mouth Every Night.      CALCIUM PO Take 1 tablet by mouth Daily.      cetirizine (ZyrTEC) 10 MG chewable tablet Chew 1 tablet.      colestipol (COLESTID) 1 g tablet Take 1 tablet by mouth 2 (Two) Times a Day.      dapagliflozin (Farxiga) 5 MG tablet tablet Take 1 tablet by mouth Daily.      dicyclomine (BENTYL) 20 MG tablet       famotidine (PEPCID) 20 MG tablet Take 1 tablet by mouth Daily.      ipratropium-albuterol (DUO-NEB) 0.5-2.5 mg/3 ml nebulizer       levothyroxine (SYNTHROID, LEVOTHROID) 175 MCG tablet 175 mcg on Tuesdays, Thursday, Saturday and Sunday.  200 mcg on Monday, Wednesday and Friday 18 tablet 3    levothyroxine (SYNTHROID, LEVOTHROID) 200 MCG tablet 175 mcg on Tuesdays, Thursday, Saturday and Sunday.  200 mcg  on Monday, Wednesday and Friday 16 tablet 3    lidocaine (LIDODERM) 5 % 0      midodrine (PROAMATINE) 5 MG tablet Take 1 tablet by mouth 3 (Three) Times a Day Before Meals. 30 tablet 0    Pancrelipase, Lip-Prot-Amyl, (CREON) 96077-394263 units capsule delayed-release particles capsule Take 2 capsules by mouth Daily With Breakfast & Lunch.      spironolactone (ALDACTONE) 25 MG tablet Take 1 tablet by mouth Daily.      traZODone (DESYREL) 100 MG tablet  (Patient taking differently: 125 mg.)      venlafaxine 225 MG tablet sustained-release 24 hour 24 hr tablet       Vitamin D, Cholecalciferol, 25 MCG (1000 UT) capsule Take 1 capsule by mouth Daily. 30 capsule 5    ferrous sulfate 325 (65 FE) MG tablet Take 1 tablet by mouth Daily. (Patient not taking: Reported on 11/7/2024)      fluticasone (FLONASE) 50 MCG/ACT nasal spray Administer 2 sprays into the nostril(s) as directed by provider Daily. (Patient not taking: Reported on 11/7/2024)      dicyclomine (BENTYL) 10 MG capsule        Facility-Administered Medications Prior to Visit   Medication Dose Route Frequency Provider Last Rate Last Admin    cyanocobalamin injection 1,000 mcg  1,000 mcg Intramuscular Q28 Days Colby Vines MD         No opioid medication identified on active medication list. I have reviewed chart for other potential  high risk medication/s and harmful drug interactions in the elderly.      Aspirin is on active medication list. Aspirin use is indicated based on review of current medical condition/s. Pros and cons of this therapy have been discussed today. Benefits of this medication outweigh potential harm.  Patient has been encouraged to continue taking this medication.  .      Patient Active Problem List   Diagnosis    Allergic rhinitis    Anemia    Asthma    B12 deficiency    Chronic coronary artery disease    Chronic low back pain    Depression    H/O laminectomy    Hypothyroidism    Neuropathy    Irritable bowel syndrome    Spinal stenosis  "of lumbar region    Dizziness    Narrow complex tachycardia    Other sleep apnea    TMJ syndrome    Overactive bladder    Pancreatic insufficiency    Fall    Blockage of coronary artery of heart    Alkaline phosphatase raised    Anemia, iron deficiency    Fatty stool    Gallstones    Arthritis    Gastritis, unspecified, without bleeding    Generalized abdominal pain    Insomnia    Intestinal malabsorption    Seasonal allergies    Second degree hemorrhoids    Encounter for screening mammogram for malignant neoplasm of breast    Other specified disorders of bladder    Thyroid disease    Shortness of breath    Dyspnea    Chronic combined systolic and diastolic congestive heart failure    Precordial pain    Obesity (BMI 30-39.9)    Chronic hypotension     Advance Care Planning Advance Directive is on file.  ACP discussion was held with the patient during this visit. Patient has an advance directive in EMR which is still valid.             Objective   Vitals:    11/07/24 1405   BP: 112/62   Pulse: 57   Resp: 18   SpO2: 99%   Weight: 92.8 kg (204 lb 9.6 oz)   Height: 160 cm (62.99\")   PainSc:   4       Estimated body mass index is 36.25 kg/m² as calculated from the following:    Height as of this encounter: 160 cm (62.99\").    Weight as of this encounter: 92.8 kg (204 lb 9.6 oz).    Class 2 Severe Obesity (BMI >=35 and <=39.9). Obesity-related health conditions include the following: obstructive sleep apnea, hypertension, coronary heart disease, impaired fasting glucose, and dyslipidemias. Obesity is unchanged. BMI is is above average; BMI management plan is completed. We discussed portion control and increasing exercise.       Does the patient have evidence of cognitive impairment? No                                                                                                Health  Risk Assessment    Smoking Status:  Social History     Tobacco Use   Smoking Status Never    Passive exposure: Past   Smokeless Tobacco " Never     Alcohol Consumption:  Social History     Substance and Sexual Activity   Alcohol Use Not Currently    Comment: Haven't in the past 29 years       Fall Risk Screen  STEADI Fall Risk Assessment was completed, and patient is at HIGH risk for falls. Assessment completed on:2024    Depression Screenin/7/2024     2:10 PM   PHQ-2/PHQ-9 Depression Screening   Little interest or pleasure in doing things Not at all   Feeling down, depressed, or hopeless Not at all     Health Habits and Functional and Cognitive Screenin/7/2024     2:11 PM   Functional & Cognitive Status   Do you have difficulty preparing food and eating? No   Do you have difficulty bathing yourself, getting dressed or grooming yourself? No   Do you have difficulty using the toilet? No   Do you have difficulty moving around from place to place? No   Do you have trouble with steps or getting out of a bed or a chair? Yes   Current Diet Low Fat Diet   Dental Exam Up to date   Eye Exam Up to date   Exercise (times per week) Other   Current Exercises Include Home Exercise Program (TV, Computer, Etc.);Light Weights;Walking   Do you need help using the phone?  No   Are you deaf or do you have serious difficulty hearing?  No   Do you need help to go to places out of walking distance? Yes   Do you need help shopping? No   Do you need help preparing meals?  No   Do you need help with housework?  No   Do you need help with laundry? No   Do you need help taking your medications? No   Do you need help managing money? No   Do you ever drive or ride in a car without wearing a seat belt? No   Have you felt unusual stress, anger or loneliness in the last month? No   Who do you live with? Alone   If you need help, do you have trouble finding someone available to you? No   Have you been bothered in the last four weeks by sexual problems? No   Do you have difficulty concentrating, remembering or making decisions? No           Age-appropriate  Screening Schedule:  Refer to the list below for future screening recommendations based on patient's age, sex and/or medical conditions. Orders for these recommended tests are listed in the plan section. The patient has been provided with a written plan.    Health Maintenance List  Health Maintenance   Topic Date Due    BMI FOLLOWUP  04/01/2023    MAMMOGRAM  05/27/2023    ZOSTER VACCINE (2 of 2) 10/16/2023    COVID-19 Vaccine (4 - 2024-25 season) 01/13/2025 (Originally 9/1/2024)    TDAP/TD VACCINES (1 - Tdap) 10/24/2025 (Originally 9/13/1979)    ANNUAL WELLNESS VISIT  04/03/2025    LIPID PANEL  04/09/2025    DXA SCAN  10/16/2025    PAP SMEAR  10/24/2027    COLORECTAL CANCER SCREENING  08/05/2029    HEPATITIS C SCREENING  Completed    INFLUENZA VACCINE  Completed    Pneumococcal Vaccine 65+  Completed                                                                                                                                                CMS Preventative Services Quick Reference  Risk Factors Identified During Encounter  Immunizations Discussed/Encouraged:  Holding off on shingles vaccine due to active shingles.    The above risks/problems have been discussed with the patient.  Pertinent information has been shared with the patient in the After Visit Summary.  An After Visit Summary and PPPS were made available to the patient.    Follow Up:  Next Medicare Wellness visit to be scheduled in 1 year.     Dr Colby Vines   Internal Medicine Physician  [facility]--[address], [address]  [place] [address]

## 2024-11-07 NOTE — PROGRESS NOTES
Chief Complaint  Medicare Wellness-subsequent    HPI:    Cassie Winter presents to [facility]    Patient is a 64-year-old female presenting for annual Medicare wellness visit.    Patient reports that she has had a rash that has been itching burning about a week. Rash is on the left side of the chest on breast and under breast extending around to the side. She is now having sharp, burning pain and has been ongoing for the last few days. Nothing really makes it better or worse. She has been taking bendaryl to help with itching. No prior similar symptoms. She had chicken pox as kid. Due for second shingles vaccine, previously completed first shot.    Pap smear  Patient recently seen by Danae Cage on 10/24/2024 for Pap only.  She also requested anemia labs given history of iron deficient anemia and has not been taking iron supplementation.  Pap negative.  CBC notable for hemoglobin 10.7 with elevated ferritin and normal iron, iron saturation, and TIBC.    CHF  Most recent echocardiogram with EF of 34% with notable diastolic dysfunction. Denies chest pain, shortness of breath, orthopnea, PND, lower extremity edema, palpitations, tachycardia     CAD  Most recent coronary angiogram on 5/21/2024 without significant obstructive coronary artery disease.  Currently on aspirin 81 mg daily and atorvastatin 10 mg daily.    Hypothyroidism  Follows with endocrinology, most recently 10/16/2024.  Patient currently on levothyroxine 175 mcg 4 days a week and levothyroxine 200 mcg 3 days a week.  Most recent thyroid functioning test 10/24/2024 normal.  Denies significant additional hyper or hypothyroid symptoms.     CKD  Recently evaluated by nephrology on 7/2/2024.  Also saw nephrology while most recently inpatient.  Previous renal ultrasound unremarkable.  Chronic kidney disease felt most likely due to cardiorenal syndrome and azotemia related to spironolactone and SGLT2 inhibitors.  Renal  function overall improved on recheck.  Patient trying to avoid NSAIDs.    WILFRID  Follows with sleep medicine.  Recently performed sleep study on 9/16/2024.    Preventative:    Diet and Exercise: Overall could be significantly better    Alcohol, Tobacco, and Recreational Drug use: None    Cancer screenings:  Colonoscopy: Most recently completed 8/5/2019, due for repeat in 10 years (2029)  Mammogram: Most recently ordered 10/24/2024 and scheduled 11/18/2024  Pap/HPV: Recently completed on 10/24/2024 which was negativev    DEXA: Most recently completed 10/16/2023 notable for osteopenia.    Immunizations: Due for shingles vaccine    Advanced Health Care Directive: On file      Review of Systems:  ROS negative unless otherwise noted in HPI above.    Past Medical History:   Diagnosis Date    Allergic     As a child    Allergic rhinitis     Anemia     Anxiety     Arthritis     Asthma     B12 deficiency     Bradycardia     Broken arm 2019    Left arm broken     Cataract 2014    Surgery 2015    CHF (congestive heart failure)     Cholelithiasis 1993    Surgery 1993    Chronic back pain     Chronic bronchitis     Coronary artery disease     Depression     In my late 20s or early 30s    Diarrhea     Encounter for blood transfusion 04/03/2024    GERD (gastroesophageal reflux disease)     Hyperlipidemia 2024    Hypothyroidism     Irritable bowel syndrome 2020    Low back pain 2003    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    Renal insufficiency 2024    Severe back pain 2010    Sleep apnea, obstructive          Current Outpatient Medications:     aspirin 81 MG EC tablet, Take 1 tablet by mouth Daily., Disp: , Rfl:     atorvastatin (LIPITOR) 10 MG tablet, Take 1 tablet by mouth Every Night., Disp: , Rfl:     CALCIUM PO, Take 1 tablet by mouth Daily., Disp: , Rfl:      cetirizine (ZyrTEC) 10 MG chewable tablet, Chew 1 tablet., Disp: , Rfl:     colestipol (COLESTID) 1 g tablet, Take 1 tablet by mouth 2 (Two) Times a Day., Disp: , Rfl:     dapagliflozin (Farxiga) 5 MG tablet tablet, Take 1 tablet by mouth Daily., Disp: , Rfl:     dicyclomine (BENTYL) 20 MG tablet, , Disp: , Rfl:     famotidine (PEPCID) 20 MG tablet, Take 1 tablet by mouth Daily., Disp: , Rfl:     ipratropium-albuterol (DUO-NEB) 0.5-2.5 mg/3 ml nebulizer, , Disp: , Rfl:     levothyroxine (SYNTHROID, LEVOTHROID) 175 MCG tablet, 175 mcg on Tuesdays, Thursday, Saturday and Sunday.  200 mcg on Monday, Wednesday and Friday, Disp: 18 tablet, Rfl: 3    levothyroxine (SYNTHROID, LEVOTHROID) 200 MCG tablet, 175 mcg on Tuesdays, Thursday, Saturday and Sunday.  200 mcg on Monday, Wednesday and Friday, Disp: 16 tablet, Rfl: 3    lidocaine (LIDODERM) 5 %, 0, Disp: , Rfl:     midodrine (PROAMATINE) 5 MG tablet, Take 1 tablet by mouth 3 (Three) Times a Day Before Meals., Disp: 30 tablet, Rfl: 0    Pancrelipase, Lip-Prot-Amyl, (CREON) 02561-418344 units capsule delayed-release particles capsule, Take 2 capsules by mouth Daily With Breakfast & Lunch., Disp: , Rfl:     spironolactone (ALDACTONE) 25 MG tablet, Take 1 tablet by mouth Daily., Disp: , Rfl:     traZODone (DESYREL) 100 MG tablet, , Disp: , Rfl:     venlafaxine 225 MG tablet sustained-release 24 hour 24 hr tablet, , Disp: , Rfl:     Vitamin D, Cholecalciferol, 25 MCG (1000 UT) capsule, Take 1 capsule by mouth Daily., Disp: 30 capsule, Rfl: 5    albuterol sulfate  (90 Base) MCG/ACT inhaler, Inhale 2 puffs Every 4 (Four) Hours As Needed for Wheezing., Disp: 8 g, Rfl: 5    ferrous sulfate 325 (65 FE) MG tablet, Take 1 tablet by mouth Daily. (Patient not taking: Reported on 11/20/2024), Disp: , Rfl:     fluticasone (FLONASE) 50 MCG/ACT nasal spray, Administer 2 sprays into the nostril(s) as directed by provider Daily. (Patient not taking: Reported on 11/20/2024), Disp: ,  "Rfl:     Current Facility-Administered Medications:     cyanocobalamin injection 1,000 mcg, 1,000 mcg, Intramuscular, Q28 Days, Colby Vines MD    Social History     Socioeconomic History    Marital status: Single   Tobacco Use    Smoking status: Never     Passive exposure: Past    Smokeless tobacco: Never   Vaping Use    Vaping status: Never Used   Substance and Sexual Activity    Alcohol use: Not Currently     Comment: Haven't in the past 29 years    Drug use: No    Sexual activity: Defer        Objective   Vital Signs:  /62   Pulse 57   Resp 18   Ht 160 cm (62.99\")   Wt 92.8 kg (204 lb 9.6 oz)   SpO2 99%   BMI 36.25 kg/m²   Estimated body mass index is 36.25 kg/m² as calculated from the following:    Height as of this encounter: 160 cm (62.99\").    Weight as of this encounter: 92.8 kg (204 lb 9.6 oz).    Physical Exam:  General: Well-appearing patient, no apparent distress  HEENT: No posterior pharynx erythema, no tonsillar erythema or exudates, normal external auditory canals, TM normal without bulging or erythema  Neck: No cervical lymphadenopathy  Cardiac: Regular rate and rhythm, normal S1/S2, no murmur, rubs or gallops, no lower extremity edema  Lungs: Clear to auscultation bilaterally, no crackles or wheezes  Abdomen: Soft, non-tender, no guarding or rebound tenderness, no hepatosplenomegaly  Skin: Blistery, erythematous rash of the left lateral breast and chest wall  MSK: Grossly normal tone and strength  Neuro: Alert and oriented x3, CN II-XII grossly intact  Psych: Appropriate mood and affect    Assessment and Plan:    (Z00.00) Preventative health care  Patient is a 64 year old female who is overall doing well. Reviewed social and family history. Encouraged increased healthy diet and exercise and discussed importance to overall health. Up to date with GYN gender appropriate cancer screenings discussed indicated vaccines based on age and comorbidities. No skin, mood concerns.  Plan:  - " Encourage healthy diet and exercise  - Up date vaccines, if necessary  - Screening labs as ordered  - Update cancer screening as below  - Advanced health care directive     (B02.9) Herpes zoster without complication  Assessment: Patient with left-sided erythematous, blistering rash consistent with shingles.  Proceeding with treatment given patient's age and comorbidities.  Discussed Shingrix vaccine, which would hold off for now given active shingles.  Discussed the importance of avoiding shingles contacting pregnant and immunocompromise perforations.  Plan:  - Start valtrex 1000 mg three times a day for 7 days  - Shingles precautions  - Hold on shingles vaccine for now    (I50.22) Chronic systolic CHF (congestive heart failure)  Assessment: Patient with systolic heart failure. Most recent echocardiogram with EF of 34%. Weight stable. Currently appears euvolemic on exam.  No evidence of exacerbation.  Compliant with medications as prescribed.  Plan:  - Continue Farxiga and spironolactone  - Monitor weight  - Follow-up with cardiology as scheduled    (G47.33) WILFRID (obstructive sleep apnea)    (N18.9) Chronic kidney disease, unspecified CKD stage  Assessment: Most likely secondary to cardiorenal syndrome and medication effect. Most recent creatinine 1.10.  Plan:  - Creatinine, electrolytes up-to-date  - Avoid nephrotoxins, including NSAIDs  - Stay well hydrated  - Encourage blood pressure control  - Nephrology follow-up as scheduled    (E03.9) Hypothyroidism, unspecified type  Assessment: Follows with endocrinology.  Overall doing well on currently prescribed levothyroxine.  Plan:   - Follow-up with endocrinology as scheduled  - Continue levothyroxine as prescribed by endocrinology    (E66.9) Obesity (BMI 30-39.9)  Class 2 Severe Obesity (BMI >=35 and <=39.9). Obesity-related health conditions include the following: obstructive sleep apnea, hypertension, coronary heart disease, and dyslipidemias. Obesity is unchanged.  BMI is is above average; BMI management plan is completed. We discussed portion control and increasing exercise.        Patient was given instructions and counseling regarding her condition or for health maintenance advice. Please see specific information pulled into the AVS if appropriate.       Dr Colby Vines   Internal Medicine Physician  [facility]--[address], [address]  [place] [address]

## 2024-11-07 NOTE — PATIENT INSTRUCTIONS
Up to date with labs    Up to date with vaccines; hold of on Shingles vaccine given new rash    Medications:  Start valtrex 1000 mg three times a day for 7 days  Continue additional medications as prescribed    Encourage healthy diet and activity as tolerated    Follow up with specialists as scheduled    Follow up in 6 months or sooner if something arises

## 2024-11-20 ENCOUNTER — OFFICE VISIT (OUTPATIENT)
Dept: PULMONOLOGY | Facility: HOSPITAL | Age: 64
End: 2024-11-20
Payer: MEDICARE

## 2024-11-20 VITALS
HEIGHT: 63 IN | DIASTOLIC BLOOD PRESSURE: 68 MMHG | RESPIRATION RATE: 14 BRPM | OXYGEN SATURATION: 98 % | BODY MASS INDEX: 36.14 KG/M2 | HEART RATE: 73 BPM | WEIGHT: 204 LBS | SYSTOLIC BLOOD PRESSURE: 135 MMHG

## 2024-11-20 DIAGNOSIS — J45.20 MILD INTERMITTENT ASTHMA, UNSPECIFIED WHETHER COMPLICATED: Primary | ICD-10-CM

## 2024-11-20 DIAGNOSIS — I50.42 CHRONIC COMBINED SYSTOLIC AND DIASTOLIC CONGESTIVE HEART FAILURE: ICD-10-CM

## 2024-11-20 DIAGNOSIS — J96.11 CHRONIC RESPIRATORY FAILURE WITH HYPOXIA: ICD-10-CM

## 2024-11-20 PROCEDURE — G0463 HOSPITAL OUTPT CLINIC VISIT: HCPCS

## 2024-11-20 RX ORDER — ALBUTEROL SULFATE 90 UG/1
2 INHALANT RESPIRATORY (INHALATION) EVERY 4 HOURS PRN
Qty: 8 G | Refills: 5 | Status: SHIPPED | OUTPATIENT
Start: 2024-11-20

## 2024-11-20 NOTE — PROGRESS NOTES
HPI:  Patient had sleep apnea in 2019 and used CPAP machine for couple years but then lost the machine.  At that time she was feeling the machine was helping her in controlling her snoring and sleep disordered.  Right now she is having interrupted sleep and fatigue but there is nobody around to tell if she has snoring.  The asthma overall has been controlled with as needed albuterol.  Patient is non-smoker    Past Medical History:   Diagnosis Date    Allergic     As a child    Allergic rhinitis     Anemia     Anxiety     Arthritis     Asthma     B12 deficiency     Bradycardia     Broken arm 2019    Left arm broken     Cataract 2014    Surgery 2015    CHF (congestive heart failure)     Cholelithiasis 1993    Surgery 1993    Chronic back pain     Chronic bronchitis     Coronary artery disease     Depression     In my late 20s or early 30s    Diarrhea     Encounter for blood transfusion 04/03/2024    GERD (gastroesophageal reflux disease)     Hyperlipidemia 2024    Hypothyroidism     Irritable bowel syndrome 2020    Low back pain 2003    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    Renal insufficiency 2024    Severe back pain 2010    Sleep apnea, obstructive         Current Outpatient Medications on File Prior to Visit   Medication Sig Dispense Refill    albuterol sulfate  (90 Base) MCG/ACT inhaler Inhale 2 puffs Every 4 (Four) Hours As Needed for Wheezing. 8 g 5    aspirin 81 MG EC tablet Take 1 tablet by mouth Daily.      atorvastatin (LIPITOR) 10 MG tablet Take 1 tablet by mouth Every Night.      CALCIUM PO Take 1 tablet by mouth Daily.      cetirizine (ZyrTEC) 10 MG chewable tablet Chew 1 tablet.      colestipol (COLESTID) 1 g tablet Take 1 tablet by mouth 2 (Two) Times a Day.      dapagliflozin (Farxiga) 5 MG tablet tablet Take 1 tablet by mouth  Daily.      dicyclomine (BENTYL) 20 MG tablet       famotidine (PEPCID) 20 MG tablet Take 1 tablet by mouth Daily.      ipratropium-albuterol (DUO-NEB) 0.5-2.5 mg/3 ml nebulizer       levothyroxine (SYNTHROID, LEVOTHROID) 175 MCG tablet 175 mcg on Tuesdays, Thursday, Saturday and Sunday.  200 mcg on Monday, Wednesday and Friday 18 tablet 3    levothyroxine (SYNTHROID, LEVOTHROID) 200 MCG tablet 175 mcg on Tuesdays, Thursday, Saturday and Sunday.  200 mcg on Monday, Wednesday and Friday 16 tablet 3    lidocaine (LIDODERM) 5 % 0      midodrine (PROAMATINE) 5 MG tablet Take 1 tablet by mouth 3 (Three) Times a Day Before Meals. 30 tablet 0    Pancrelipase, Lip-Prot-Amyl, (CREON) 69382-525795 units capsule delayed-release particles capsule Take 2 capsules by mouth Daily With Breakfast & Lunch.      spironolactone (ALDACTONE) 25 MG tablet Take 1 tablet by mouth Daily.      traZODone (DESYREL) 100 MG tablet  (Patient taking differently: Take 125 mg by mouth Every Night.)      venlafaxine 225 MG tablet sustained-release 24 hour 24 hr tablet       Vitamin D, Cholecalciferol, 25 MCG (1000 UT) capsule Take 1 capsule by mouth Daily. 30 capsule 5    ferrous sulfate 325 (65 FE) MG tablet Take 1 tablet by mouth Daily. (Patient not taking: Reported on 11/20/2024)      fluticasone (FLONASE) 50 MCG/ACT nasal spray Administer 2 sprays into the nostril(s) as directed by provider Daily. (Patient not taking: Reported on 11/20/2024)       Current Facility-Administered Medications on File Prior to Visit   Medication Dose Route Frequency Provider Last Rate Last Admin    cyanocobalamin injection 1,000 mcg  1,000 mcg Intramuscular Q28 Days Colby Vines MD            Social History     Tobacco Use    Smoking status: Never     Passive exposure: Past    Smokeless tobacco: Never   Vaping Use    Vaping status: Never Used   Substance Use Topics    Alcohol use: Not Currently     Comment: Haven't in the past 29 years    Drug use: No     "    Family History   Problem Relation Age of Onset    Heart disease Mother     COPD Mother     Lung cancer Mother     Anemia Mother     Hypertension Mother     Alcohol abuse Mother     Lung cancer Father     Cancer Father     Alcohol abuse Father     COPD Father     Heart disease Father     Sleep apnea Sister     Hypertension Sister     Diabetes Sister     Obesity Sister     Thyroid disease Sister     Heart disease Sister     Heart disease Sister     Diabetes Sister     Kidney disease Sister     Miscarriages / Stillbirths Sister         Lost baby after birth,  shortly after.    Thyroid disease Sister     Miscarriages / Stillbirths Sister         Stillbirth         Review of system:  Constitutional: Negative for chills, fever and malaise/fatigue.   HENT: Negative.    Eyes: Negative.    Cardiovascular: Negative.    Respiratory: Mild exertional shortness of breath.    Skin: Negative.    Musculoskeletal: Negative.    Gastrointestinal: Negative.    Genitourinary: Negative.    Neurological: Negative.    Psychiatric/Behavioral: Negative.    Physical exam:  Blood pressure 135/68, pulse 73, resp. rate 14, height 160 cm (62.99\"), weight 92.5 kg (204 lb), SpO2 98%.    General Appearance:  Alert   HEENT:  Normocephalic, without obvious abnormality, Conjunctiva/corneas clear,.   Nares normal, no drainage     Neck:  Supple, symmetrical, trachea midline. No JVD.  Lungs /Chest wall:   good air entry Bilaterlly, respirations unlabored, symmetrical wall movement.     Heart:  Regular rate and rhythm, S1 S2 normal  Abdomen: Soft, non-tender, no masses, no organomegaly.    Extremities: No edema, no clubbing or cyanosis    No radiology results for the last 90 days.   Results for orders placed during the hospital encounter of 24    Adult Transthoracic Echo Complete w/ Color, Spectral and Contrast if Necessary Per Protocol    Interpretation Summary    Left ventricular systolic function is moderately decreased. Calculated left " ventricular EF = 34.5%    Left ventricular diastolic function is consistent with (grade II w/high LAP) pseudonormalization.    The left atrial cavity is mildly dilated.    Estimated right ventricular systolic pressure from tricuspid regurgitation is normal (<35 mmHg).        Assessment and plan:  WILFRID: Nocturnal polysomnography 2019 with AHI 5.4, patient lost her CPAP machine 3 years ago, currently having symptoms of interrupted sleep and fatigue  Home sleep study September 2024 showed AHI of 4.1 but she had desaturation less than 89% for 32 minutes  Mild intermittent asthma, controlled on albuterol  Overweight: Patient is gradually losing weight  Hypothyroidism  CHF: EF 34% and diastolic dysfunction     Plan:  Overnight oximetry to assess need for oxygen at night  Albuterol as needed  Diuresis      I personally reviewed the latest radiological study  Patient is advised to stay up-to-date on immunizations for flu, pneumococcal and COVID-19

## 2024-11-25 PROBLEM — I95.89 CHRONIC HYPOTENSION: Status: RESOLVED | Noted: 2024-06-21 | Resolved: 2024-11-25

## 2024-11-25 PROBLEM — I24.0 BLOCKAGE OF CORONARY ARTERY OF HEART: Status: RESOLVED | Noted: 2024-04-03 | Resolved: 2024-11-25

## 2024-11-25 PROBLEM — R07.2 PRECORDIAL PAIN: Chronic | Status: RESOLVED | Noted: 2024-05-14 | Resolved: 2024-11-25

## 2024-12-04 ENCOUNTER — OFFICE VISIT (OUTPATIENT)
Dept: CARDIOLOGY | Facility: CLINIC | Age: 64
End: 2024-12-04
Payer: MEDICARE

## 2024-12-04 VITALS
DIASTOLIC BLOOD PRESSURE: 53 MMHG | OXYGEN SATURATION: 99 % | SYSTOLIC BLOOD PRESSURE: 118 MMHG | HEART RATE: 65 BPM | WEIGHT: 201 LBS | BODY MASS INDEX: 36.99 KG/M2 | HEIGHT: 62 IN

## 2024-12-04 DIAGNOSIS — I47.19 PAT (PAROXYSMAL ATRIAL TACHYCARDIA): ICD-10-CM

## 2024-12-04 DIAGNOSIS — I95.89 CHRONIC HYPOTENSION: Primary | ICD-10-CM

## 2024-12-04 DIAGNOSIS — E66.9 OBESITY (BMI 30-39.9): ICD-10-CM

## 2024-12-04 DIAGNOSIS — I42.0 CARDIOMYOPATHY, DILATED: ICD-10-CM
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
## 2025-01-22 RX ORDER — UBIDECARENONE 100 MG
CAPSULE ORAL
Qty: 30 CAPSULE | Refills: 3 | Status: SHIPPED | OUTPATIENT
Start: 2025-01-22

## 2025-02-03 ENCOUNTER — OFFICE VISIT (OUTPATIENT)
Dept: ENDOCRINOLOGY | Facility: CLINIC | Age: 65
End: 2025-02-03
Payer: MEDICARE

## 2025-02-03 VITALS
WEIGHT: 188 LBS | OXYGEN SATURATION: 96 % | SYSTOLIC BLOOD PRESSURE: 120 MMHG | DIASTOLIC BLOOD PRESSURE: 86 MMHG | HEIGHT: 62 IN | BODY MASS INDEX: 34.6 KG/M2 | HEART RATE: 65 BPM

## 2025-02-03 DIAGNOSIS — K86.89 PANCREATIC INSUFFICIENCY: ICD-10-CM

## 2025-02-03 DIAGNOSIS — E03.9 HYPOTHYROIDISM, UNSPECIFIED TYPE: Primary | ICD-10-CM

## 2025-02-03 PROCEDURE — 1160F RVW MEDS BY RX/DR IN RCRD: CPT | Performed by: INTERNAL MEDICINE

## 2025-02-03 PROCEDURE — 1159F MED LIST DOCD IN RCRD: CPT | Performed by: INTERNAL MEDICINE

## 2025-02-03 PROCEDURE — 99214 OFFICE O/P EST MOD 30 MIN: CPT | Performed by: INTERNAL MEDICINE

## 2025-02-03 RX ORDER — TEMAZEPAM 15 MG/1
15 CAPSULE ORAL
COMMUNITY
Start: 2025-01-22

## 2025-02-03 NOTE — PROGRESS NOTES
-----------------------------------------------------------------  ENDOCRINE CLINIC NOTE  -----------------------------------------------------------------        PATIENT NAME: Cassie Winter  PATIENT : 1960 AGE: 64 y.o.  MRN NUMBER: [id number]  PRIMARY CARE: Colby Vines MD    ==========================================================================    CHIEF COMPLAINT: Hypothyroidism  DATE OF SERVICE: 25    ==========================================================================    HPI / SUBJECTIVE    64 y.o. female is seen in the clinic today for hypothyroidism.  Known to have hypothyroidism for last 20 yrs.  Current therapy:  - Levothyroxine 175 mcg on , , Saturday and   - Levothyroxine 200 mcg on Monday, Wednesday and Friday  Currently living at assisted living facility.  Properly taking medication.  History significant for chronic diarrhea with pancreatic insufficiency for which patient is currently maintained on Creon therapy.    ==========================================================================                                                PAST MEDICAL HISTORY    Past Medical History:   Diagnosis Date    Allergic     As a child    Allergic rhinitis     Anemia     Anxiety     Arthritis     Asthma     B12 deficiency     Bradycardia     Broken arm 2019    Left arm broken     Cataract 2014    Surgery 2015    CHF (congestive heart failure)     Cholelithiasis 1993    Surgery 1993    Chronic back pain     Chronic bronchitis     Coronary artery disease     Depression     In my late 20s or early 30s    Diarrhea     Encounter for blood transfusion 2024    GERD (gastroesophageal reflux disease)     Hyperlipidemia     Hypothyroidism     Irritable bowel syndrome     Low back pain 2003    Surgery in  lamenectomy L3-L5    MRSA (methicillin resistant Staphylococcus aureus)     Neuropathic pain of foot     Obesity     Osteopenia     Pneumonia      Have had several times over the years.    Renal insufficiency 2024    Severe back pain 2010    Sleep apnea, obstructive     Vitamin D deficiency 2024       ==========================================================================    PAST SURGICAL HISTORY    Past Surgical History:   Procedure Laterality Date    ABDOMINAL HERNIA REPAIR  2003    repair of abdominal wall hernia, intraabdominal hernia and hiatal hernia, 2004 incisional hernia repair.     ACHILLES TENDON SURGERY  2011    rupture    ANKLE FUSION Right 11/06/2015    BARIATRIC SURGERY  1985    CARDIAC CATHETERIZATION  02/20/2017    CARDIAC CATHETERIZATION N/A 05/21/2024    Procedure: Right and Left Heart Cath;  Surgeon: Pablito Casillas MD;  Location: [facility] CATH INVASIVE LOCATION;  Service: Cardiovascular;  Laterality: N/A;    CHOLECYSTECTOMY  1992    COLONOSCOPY  2023    ENDOMETRIAL ABLATION  2025    EYE SURGERY  2015    Retinas in both eyes so had 2 or 3 retinal    FRACTURE SURGERY  2015 , 2017    GASTRIC BYPASS  1986    GASTRIC RESTRICTION SURGERY  1985    LAMINECTOMY  2007    that was complicated by a staph infection     OTHER SURGICAL HISTORY Left 2019    L arm     REMOVAL EXTERNAL FIXATOR      SPINE SURGERY  2007    ULNA/RADIUS EXTERNAL FIXATOR APPLICATION Left     WRIST FRACTURE SURGERY Right        ==========================================================================    FAMILY HISTORY    Family History   Problem Relation Age of Onset    Heart disease Mother     COPD Mother     Lung cancer Mother     Anemia Mother     Hypertension Mother     Alcohol abuse Mother     Heart attack Mother     Heart failure Mother     Lung cancer Father     Cancer Father     Alcohol abuse Father     COPD Father     Heart disease Father     Clotting disorder Father     Heart failure Father     Sleep apnea Sister     Hypertension Sister     Diabetes Sister     Obesity Sister     Thyroid disease Sister     Heart disease Sister     Heart attack Sister      Heart failure Sister     Heart disease Sister     Diabetes Sister     Kidney disease Sister     Miscarriages / Stillbirths Sister         Lost baby after birth,  shortly after.    Thyroid disease Sister     Heart failure Sister     Hypertension Sister     Obesity Sister     Miscarriages / Stillbirths Sister         Stillbirth 1977       ==========================================================================    SOCIAL HISTORY    Social History     Socioeconomic History    Marital status: Single   Tobacco Use    Smoking status: Never     Passive exposure: Past    Smokeless tobacco: Never   Vaping Use    Vaping status: Never Used   Substance and Sexual Activity    Alcohol use: Not Currently     Comment: Haven't in the past 29 years    Drug use: No    Sexual activity: Defer       ==========================================================================    MEDICATIONS      Current Outpatient Medications:     albuterol sulfate  (90 Base) MCG/ACT inhaler, Inhale 2 puffs Every 4 (Four) Hours As Needed for Wheezing., Disp: 8 g, Rfl: 5    aspirin 81 MG EC tablet, Take 1 tablet by mouth Daily., Disp: , Rfl:     atorvastatin (LIPITOR) 10 MG tablet, Take 1 tablet by mouth Every Night., Disp: , Rfl:     CALCIUM PO, Take 1 tablet by mouth Daily., Disp: , Rfl:     cetirizine (ZyrTEC) 10 MG chewable tablet, Chew 1 tablet., Disp: , Rfl:     Cholecalciferol (D3-1000) 25 MCG (1000 UT) capsule, GIVE 1 CAPSULE BY MOUTH ONCE DAILY, Disp: 30 capsule, Rfl: 3    colestipol (COLESTID) 1 g tablet, Take 1 tablet by mouth 2 (Two) Times a Day., Disp: , Rfl:     dapagliflozin (Farxiga) 5 MG tablet tablet, Take 1 tablet by mouth Daily., Disp: , Rfl:     dicyclomine (BENTYL) 20 MG tablet, 2 (Two) Times a Day., Disp: , Rfl:     famotidine (PEPCID) 20 MG tablet, Take 1 tablet by mouth Daily., Disp: , Rfl:     ferrous sulfate 325 (65 FE) MG tablet, Take 1 tablet by mouth Daily., Disp: , Rfl:     fluticasone (FLONASE) 50 MCG/ACT nasal  spray, Administer 2 sprays into the nostril(s) as directed by provider Daily., Disp: , Rfl:     ipratropium-albuterol (DUO-NEB) 0.5-2.5 mg/3 ml nebulizer, , Disp: , Rfl:     levothyroxine (SYNTHROID, LEVOTHROID) 175 MCG tablet, 175 mcg on Tuesdays, Thursday, Saturday and Sunday.  200 mcg on Monday, Wednesday and Friday, Disp: 18 tablet, Rfl: 3    levothyroxine (SYNTHROID, LEVOTHROID) 200 MCG tablet, 175 mcg on Tuesdays, Thursday, Saturday and Sunday.  200 mcg on Monday, Wednesday and Friday, Disp: 16 tablet, Rfl: 3    lidocaine (LIDODERM) 5 %, 0, Disp: , Rfl:     midodrine (PROAMATINE) 5 MG tablet, Take 1 tablet by mouth 3 (Three) Times a Day Before Meals., Disp: 30 tablet, Rfl: 0    Pancrelipase, Lip-Prot-Amyl, (CREON) 05976-582585 units capsule delayed-release particles capsule, Take 2 capsules by mouth Daily With Breakfast & Lunch., Disp: , Rfl:     spironolactone (ALDACTONE) 25 MG tablet, Take 1 tablet by mouth Daily., Disp: , Rfl:     temazepam (RESTORIL) 15 MG capsule, Take 1 capsule by mouth., Disp: , Rfl:     venlafaxine 225 MG tablet sustained-release 24 hour 24 hr tablet, , Disp: , Rfl:     traZODone (DESYREL) 100 MG tablet, , Disp: , Rfl:     Current Facility-Administered Medications:     cyanocobalamin injection 1,000 mcg, 1,000 mcg, Intramuscular, Q28 Days, Colby Vines MD    ==========================================================================    ALLERGIES    Allergies   Allergen Reactions    Hydrocodone-Acetaminophen Hives     Can tolerate with Benadryl    Oxycodone-Acetaminophen Hives     Can tolerate with Benadryl    Ketorolac Tromethamine Itching    Promethazine Itching    Tramadol Itching       ==========================================================================    OBJECTIVE    Vitals:    02/03/25 1436   BP: 120/86   Pulse: 65   SpO2: 96%       Body mass index is 34.39 kg/m².     General: Alert, cooperative, no acute  "distress    ==========================================================================    LAB EVALUATION    Lab Results   Component Value Date    GLUCOSE 82 07/02/2024    BUN 21 07/02/2024    CREATININE 1.10 (H) 07/02/2024    EGFRIFNONA 52 (L) 11/22/2021    BCR 19.1 07/02/2024    K 4.4 07/02/2024    CO2 21.9 (L) 07/02/2024    CALCIUM 10.0 07/02/2024    ALBUMIN 3.9 07/02/2024    LABIL2 1.3 02/11/2019    AST 30 04/07/2024    ALT 19 04/07/2024    CHOL 178 04/09/2024    TRIG 109 04/09/2024    HDL 73 (H) 04/09/2024    LDL 86 04/09/2024     No results found for: \"HGBA1C\"  Lab Results   Component Value Date    CREATININE 1.10 (H) 07/02/2024     Lab Results   Component Value Date    TSH 1.230 10/24/2024    FREET4 1.37 10/24/2024       ==========================================================================    ASSESSMENT AND PLAN    # Hypothyroidism  # Pancreatic insufficiency    - Will get blood work done today for TSH and free T4 levels  - We will continue same levothyroxine therapy  - If thyroid functions are doing stable we will plan for 6 months follow-up  - Therapy will stay as:  Levothyroxine 175 mcg on Tuesdays, Thursdays, Saturday and Sunday  Levothyroxine 200 mcg on Monday, Wednesday and Friday  - Patient is living in assisted living facility and therefore blood work needs to be collected during the visit    Thank you for courtesy of consultation.    Return to clinic: 6 months    Entire assessment and plan was discussed and counseled the patient in detail to which patient verbalized understanding and agreed with care.  Answered all queries and concerns.    Part of this note may be an electronic transcription/translation of spoken language to printed text using the Dragon Dictation System.     Note: Portions of this note may have been copied from previous notes but documentation have been reviewed and edited as necessary to support clinical decision making for today's " visit.    ==========================================================================    INFORMATION PROVIDED TO PATIENT    Patient Instructions   Please continue levothyroxine therapy as follows:  Levothyroxine 200 mcg on Monday, Wednesday and Friday  Levothyroxine 175 mcg on Tuesday, Thursday, Saturday and Sunday    Repeat thyroid function today.    Take it every day, on an empty stomach, 30 minutes before eating  Avoid taking it with iron, calcium, other medications (blocks absorption), wait at least 4 hrs after taking levothyroxine to take these medications  If you miss a pill, you can take 2 the next day and return to schedule from next day    Thank you for your visit today.    If you have any questions or concerns please feel free to reach out of the office.       ==========================================================================  Cuco Sanchez MD  Department of Endocrine, Diabetes and Metabolism  [facility] [place]  ==========================================================================

## 2025-02-03 NOTE — PATIENT INSTRUCTIONS
Please continue levothyroxine therapy as follows:  Levothyroxine 200 mcg on Monday, Wednesday and Friday  Levothyroxine 175 mcg on Tuesday, Thursday, Saturday and Sunday    Repeat thyroid function today.    Take it every day, on an empty stomach, 30 minutes before eating  Avoid taking it with iron, calcium, other medications (blocks absorption), wait at least 4 hrs after taking levothyroxine to take these medications  If you miss a pill, you can take 2 the next day and return to schedule from next day    Thank you for your visit today.    If you have any questions or concerns please feel free to reach out of the office.

## 2025-02-04 LAB
T4 FREE SERPL-MCNC: 1.58 NG/DL (ref 0.82–1.77)
TSH SERPL DL<=0.005 MIU/L-ACNC: 0.03 UIU/ML (ref 0.45–4.5)

## 2025-02-12 DIAGNOSIS — E03.9 HYPOTHYROIDISM, UNSPECIFIED TYPE: ICD-10-CM

## 2025-02-12 RX ORDER — LEVOTHYROXINE SODIUM 175 UG/1
TABLET ORAL
Qty: 20 TABLET | Refills: 11 | Status: SHIPPED | OUTPATIENT
Start: 2025-02-12

## 2025-02-12 RX ORDER — LEVOTHYROXINE SODIUM 200 UG/1
TABLET ORAL
Qty: 15 TABLET | Refills: 11 | Status: SHIPPED | OUTPATIENT
Start: 2025-02-12

## 2025-02-14 DIAGNOSIS — E03.9 HYPOTHYROIDISM, UNSPECIFIED TYPE: Primary | ICD-10-CM

## 2025-03-26 ENCOUNTER — OFFICE VISIT (OUTPATIENT)
Dept: PULMONOLOGY | Facility: HOSPITAL | Age: 65
End: 2025-03-26
Payer: MEDICARE

## 2025-03-26 VITALS
WEIGHT: 184 LBS | OXYGEN SATURATION: 100 % | HEIGHT: 62 IN | SYSTOLIC BLOOD PRESSURE: 120 MMHG | BODY MASS INDEX: 33.86 KG/M2 | HEART RATE: 72 BPM | RESPIRATION RATE: 16 BRPM | DIASTOLIC BLOOD PRESSURE: 73 MMHG

## 2025-03-26 DIAGNOSIS — J45.20 MILD INTERMITTENT ASTHMA, UNSPECIFIED WHETHER COMPLICATED: Primary | ICD-10-CM

## 2025-03-26 DIAGNOSIS — R06.02 SHORTNESS OF BREATH: ICD-10-CM

## 2025-03-26 DIAGNOSIS — J30.9 ALLERGIC RHINITIS, UNSPECIFIED SEASONALITY, UNSPECIFIED TRIGGER: ICD-10-CM

## 2025-03-26 PROCEDURE — G0463 HOSPITAL OUTPT CLINIC VISIT: HCPCS

## 2025-03-26 NOTE — PROGRESS NOTES
HPI:  Patient here for follow-up.  Reports her breathing overall stable although she gets seasonal allergies with sinus congestion and postnasal drainage    Patient had sleep apnea in 2019 and used CPAP machine for couple years but then lost the machine.  At that time she was feeling the machine was helping her in controlling her snoring and sleep disordered.  Right now she is having interrupted sleep and fatigue but there is nobody around to tell if she has snoring.  The asthma overall has been controlled with as needed albuterol.  Patient is non-smoker    Past Medical History:   Diagnosis Date    Allergic     As a child    Allergic rhinitis     Anemia     On and off over the years    Anxiety     Arthritis     Asthma     B12 deficiency     Bradycardia     Broken arm 2019    Left arm broken     Cataract 2014    Surgery 2015    CHF (congestive heart failure)     Cholelithiasis 1993    Surgery 1993    Chronic back pain     Chronic bronchitis     Coronary artery disease 2014    Depression     In my late 20s or early 30s    Diarrhea     Encounter for blood transfusion 04/03/2024    GERD (gastroesophageal reflux disease) 2024    Hyperlipidemia 2024    Hypothyroidism     Irritable bowel syndrome 2020    Low back pain 2003    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    Renal insufficiency 2024    Severe back pain 2010    Sleep apnea, obstructive 2018    Vitamin D deficiency 2024        Current Outpatient Medications on File Prior to Visit   Medication Sig Dispense Refill    albuterol sulfate  (90 Base) MCG/ACT inhaler Inhale 2 puffs Every 4 (Four) Hours As Needed for Wheezing. 8 g 5    aspirin 81 MG EC tablet Take 1 tablet by mouth Daily.      atorvastatin (LIPITOR) 10 MG tablet Take 1 tablet by mouth Every Night.      CALCIUM PO Take 1 tablet by mouth  Daily.      cetirizine (ZyrTEC) 10 MG chewable tablet Chew 1 tablet.      Cholecalciferol (D3-1000) 25 MCG (1000 UT) capsule GIVE 1 CAPSULE BY MOUTH ONCE DAILY 30 capsule 3    colestipol (COLESTID) 1 g tablet Take 1 tablet by mouth 2 (Two) Times a Day.      dapagliflozin (Farxiga) 5 MG tablet tablet Take 1 tablet by mouth Daily.      dicyclomine (BENTYL) 20 MG tablet 2 (Two) Times a Day.      famotidine (PEPCID) 20 MG tablet Take 1 tablet by mouth Daily.      fluticasone (FLONASE) 50 MCG/ACT nasal spray Administer 2 sprays into the nostril(s) as directed by provider Daily.      ipratropium-albuterol (DUO-NEB) 0.5-2.5 mg/3 ml nebulizer       levothyroxine (SYNTHROID, LEVOTHROID) 175 MCG tablet GIVE 1 TABLET BY MOUTH ON TUESDAY, THURSDAY, SATURDAY AND SUNDAY 20 tablet 11    levothyroxine (SYNTHROID, LEVOTHROID) 200 MCG tablet GIVE 1 TABLET BY MOUTH ON MONDAY, WEDNESDAY, AND FRIDAY 15 tablet 11    lidocaine (LIDODERM) 5 % 0      midodrine (PROAMATINE) 5 MG tablet Take 1 tablet by mouth 3 (Three) Times a Day Before Meals. 30 tablet 0    Pancrelipase, Lip-Prot-Amyl, (CREON) 52159-687729 units capsule delayed-release particles capsule Take 2 capsules by mouth Daily With Breakfast & Lunch.      spironolactone (ALDACTONE) 25 MG tablet Take 1 tablet by mouth Daily.      temazepam (RESTORIL) 15 MG capsule Take 1 capsule by mouth.      venlafaxine 225 MG tablet sustained-release 24 hour 24 hr tablet       [DISCONTINUED] traZODone (DESYREL) 100 MG tablet       ferrous sulfate 325 (65 FE) MG tablet Take 1 tablet by mouth Daily. (Patient not taking: Reported on 3/26/2025)       Current Facility-Administered Medications on File Prior to Visit   Medication Dose Route Frequency Provider Last Rate Last Admin    cyanocobalamin injection 1,000 mcg  1,000 mcg Intramuscular Q28 Days Colby Vines MD            Social History     Tobacco Use    Smoking status: Never     Passive exposure: Past    Smokeless tobacco: Never   Vaping Use  "   Vaping status: Never Used   Substance Use Topics    Alcohol use: Not Currently     Comment: Haven't in the past 29 years    Drug use: No        Family History   Problem Relation Age of Onset    Heart disease Mother     COPD Mother     Lung cancer Mother     Anemia Mother     Hypertension Mother     Alcohol abuse Mother     Heart attack Mother     Heart failure Mother     Lung cancer Father     Cancer Father     Alcohol abuse Father     COPD Father     Heart disease Father     Clotting disorder Father     Heart failure Father     Emphysema Father     Sleep apnea Sister     Hypertension Sister     Diabetes Sister     Obesity Sister     Thyroid disease Sister     Heart disease Sister     Heart attack Sister     Heart failure Sister     Heart disease Sister     Diabetes Sister     Kidney disease Sister     Miscarriages / Stillbirths Sister         Lost baby after birth,  shortly after.    Thyroid disease Sister     Heart failure Sister     Hypertension Sister     Obesity Sister     Miscarriages / Stillbirths Sister         Stillbirth 1977        Review of system:  Constitutional: Negative for chills, fever and malaise/fatigue.   HENT: sinus allergies   Eyes: Negative.    Cardiovascular: Negative.    Respiratory: negative for cough and shortness of breath.    Skin: Negative.    Musculoskeletal: Negative.    Gastrointestinal: Negative.    Genitourinary: Negative.    Neurological: Negative.    Psychiatric/Behavioral: Negative.    Physical exam:  Blood pressure 120/73, pulse 72, resp. rate 16, height 157.5 cm (62\"), weight 83.5 kg (184 lb), SpO2 100%.    General Appearance:  Alert   HEENT:  Normocephalic, without obvious abnormality, Conjunctiva/corneas clear,.   Nares normal, no drainage     Neck:  Supple, symmetrical, trachea midline. No JVD.  Lungs /Chest wall:   good air entry Bilaterlly, respirations unlabored, symmetrical wall movement.     Heart:  Regular rate and rhythm, S1 S2 normal  Abdomen: Soft, " non-tender, no masses, no organomegaly.    Extremities: No edema, no clubbing or cyanosis    No radiology results for the last 90 days.   Results for orders placed during the hospital encounter of 04/03/24    Adult Transthoracic Echo Complete w/ Color, Spectral and Contrast if Necessary Per Protocol    Interpretation Summary    Left ventricular systolic function is moderately decreased. Calculated left ventricular EF = 34.5%    Left ventricular diastolic function is consistent with (grade II w/high LAP) pseudonormalization.    The left atrial cavity is mildly dilated.    Estimated right ventricular systolic pressure from tricuspid regurgitation is normal (<35 mmHg).        Assessment and plan:  WILFRID: Nocturnal polysomnography 2019 with AHI 5.4, patient lost her CPAP machine 3 years ago, currently having symptoms of interrupted sleep and fatigue  Home sleep study September 2024 showed AHI of 4.1 but she had desaturation less than 89% for 32 minutes  Overnight oximetry study December 2024 showed no hypoxemia  Mild intermittent asthma, controlled on albuterol  Overweight: Patient is gradually losing weight  Hypothyroidism  CHF: EF 34% and diastolic dysfunction  Allergic rhinitis     Plan:  OTC Zyrtec and Flonase  Albuterol as needed  Duoneb prn  Diuresis as per PCP    I personally reviewed the latest radiological study  Patient is advised to stay up-to-date on immunizations for flu, pneumococcal and COVID-19

## 2025-04-14 ENCOUNTER — PATIENT OUTREACH (OUTPATIENT)
Dept: CASE MANAGEMENT | Facility: CLINIC | Age: 65
End: 2025-04-14
Payer: MEDICARE

## 2025-04-14 NOTE — OUTREACH NOTE
AMBULATORY CASE MANAGEMENT NOTE    Names and Relationships of Patient/Support Persons: Contact: Cassie Winter; Relationship: Self -     Patient Outreach    Spoke with patient at this time for ACO proactive outreach, identified self and role.  ACM notes patient has upcoming appt with PCP 5/7/25 at 2PM, reminded patient of appt.  Patient denies any needs at this time with any of her health conditions or medications.  She states she lives at [facility] on [address] and they take care of getting all of her medications but 1.  She denies any needs at home as well.  No further needs noted, will close program.      Lesly ALMANZAR  Ambulatory Case Management    4/14/2025, 15:23 EDT

## 2025-04-25 ENCOUNTER — EXTERNAL PBMM DATA (OUTPATIENT)
Dept: PHARMACY | Facility: OTHER | Age: 65
End: 2025-04-25
Payer: MEDICARE

## 2025-05-07 ENCOUNTER — OFFICE VISIT (OUTPATIENT)
Dept: FAMILY MEDICINE CLINIC | Facility: CLINIC | Age: 65
End: 2025-05-07
Payer: MEDICARE

## 2025-05-07 VITALS
HEIGHT: 62 IN | SYSTOLIC BLOOD PRESSURE: 122 MMHG | HEART RATE: 63 BPM | DIASTOLIC BLOOD PRESSURE: 80 MMHG | WEIGHT: 196.6 LBS | RESPIRATION RATE: 18 BRPM | BODY MASS INDEX: 36.18 KG/M2

## 2025-05-07 DIAGNOSIS — I42.0 DILATED CARDIOMYOPATHY: ICD-10-CM

## 2025-05-07 DIAGNOSIS — E03.9 HYPOTHYROIDISM, UNSPECIFIED TYPE: Primary | Chronic | ICD-10-CM

## 2025-05-07 DIAGNOSIS — N18.31 STAGE 3A CHRONIC KIDNEY DISEASE: ICD-10-CM

## 2025-05-07 PROBLEM — M19.90 IDIOPATHIC OSTEOARTHRITIS: Status: ACTIVE | Noted: 2024-07-02

## 2025-05-07 RX ORDER — SENNOSIDES 8.6 MG
650 CAPSULE ORAL EVERY 8 HOURS PRN
COMMUNITY

## 2025-05-07 RX ORDER — DIPHENOXYLATE HYDROCHLORIDE AND ATROPINE SULFATE 2.5; .025 MG/1; MG/1
TABLET ORAL DAILY
COMMUNITY

## 2025-05-07 NOTE — PROGRESS NOTES
Chief Complaint  Follow-up    HPI:    Cassie Winter presents to [facility]    Patient is a 64-year-old female presenting for follow-up.    Most recent Medicare wellness visit 11/7/2024.  Additionally had Pap smear provide by Danae Cage on 10/24/2024.  Mammogram also ordered during visit, which has not been completed. She is planning on rescheduling as it previously had to be canceled.     Hypothyroidism  Follows with endocrinology, most recently 2/3/2025.  Currently on levothyroxine 175 mcg Tuesday, Thursday, Saturday, and Sunday.  Also on levothyroxine 200 mcg on Monday, Wednesday, and Friday.  Most recent thyroid functioning on 2/3/2025.  Patient recommended to continue current doses of levothyroxine without changes and repeat thyroid function testing in 3 months.    Dilated cardiomyopathy  Follows with cardiology.  Has not tolerated guideline directed medical therapy due to chronic hypotension.  Entresto and metoprolol previously discontinued.  Currently on spironolactone, Farxiga, and midodrine for cardiomyopathy and also on aspirin and statin for known coronary artery disease.  Most recent echo from 4/3/2024 with EF of 34.5% and grade 2 diastolic dysfunction.  Most recent coronary angiogram 5/21/2024 with nonobstructive coronary artery disease. Blood pressure recently has been normal. No recent lows on current medication regimen.     CKD  Follows with nephrology, most recently 2/3/2025.  Fluctuations in creatinine and CKD presumably due to cardiorenal syndrome and azotemia related to spironolactone and SGLT2 inhibitor.  Patient also recommended to avoid NSAIDs, which she has taken regularly in the past. She is staying well hydrated.     Review of Systems:  ROS negative unless otherwise noted in HPI above.    Past Medical History:   Diagnosis Date    Allergic     As a child    Allergic rhinitis     Anemia     On and off over the years    Anxiety     Arthritis     Asthma      B12 deficiency     Bradycardia     Broken arm 2019    Left arm broken     Cataract 2014    Surgery 2015    CHF (congestive heart failure)     Cholelithiasis 1993    Surgery 1993    Chronic back pain     Chronic bronchitis     Coronary artery disease 2014    Depression     In my late 20s or early 30s    Diarrhea     Encounter for blood transfusion 04/03/2024    GERD (gastroesophageal reflux disease) 2024    Hyperlipidemia 2024    Hypothyroidism     Irritable bowel syndrome 2020    Low back pain 2003    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    Renal insufficiency 2024    Severe back pain 2010    Sleep apnea, obstructive 2018    Visual impairment     Since I was about 6.    Vitamin D deficiency 2024         Current Outpatient Medications:     acetaminophen (TYLENOL) 650 MG 8 hr tablet, Take 1 tablet by mouth Every 8 (Eight) Hours As Needed for Mild Pain., Disp: , Rfl:     albuterol sulfate  (90 Base) MCG/ACT inhaler, Inhale 2 puffs Every 4 (Four) Hours As Needed for Wheezing., Disp: 8 g, Rfl: 5    aspirin 81 MG EC tablet, Take 1 tablet by mouth Daily., Disp: , Rfl:     atorvastatin (LIPITOR) 10 MG tablet, Take 1 tablet by mouth Every Night., Disp: , Rfl:     CALCIUM PO, Take 1 tablet by mouth Daily., Disp: , Rfl:     cetirizine (ZyrTEC) 10 MG chewable tablet, Chew 1 tablet., Disp: , Rfl:     Cholecalciferol (D3-1000) 25 MCG (1000 UT) capsule, GIVE 1 CAPSULE BY MOUTH ONCE DAILY, Disp: 30 capsule, Rfl: 3    colestipol (COLESTID) 1 g tablet, Take 1 tablet by mouth 2 (Two) Times a Day., Disp: , Rfl:     dapagliflozin (Farxiga) 5 MG tablet tablet, Take 1 tablet by mouth Daily., Disp: , Rfl:     dicyclomine (BENTYL) 20 MG tablet, 2 (Two) Times a Day., Disp: , Rfl:     famotidine (PEPCID) 20 MG tablet, Take 1 tablet by mouth Daily., Disp: , Rfl:      "fluticasone (FLONASE) 50 MCG/ACT nasal spray, Administer 2 sprays into the nostril(s) as directed by provider Daily., Disp: , Rfl:     ipratropium-albuterol (DUO-NEB) 0.5-2.5 mg/3 ml nebulizer, , Disp: , Rfl:     levothyroxine (SYNTHROID, LEVOTHROID) 175 MCG tablet, GIVE 1 TABLET BY MOUTH ON TUESDAY, THURSDAY, SATURDAY AND SUNDAY, Disp: 20 tablet, Rfl: 11    levothyroxine (SYNTHROID, LEVOTHROID) 200 MCG tablet, GIVE 1 TABLET BY MOUTH ON MONDAY, WEDNESDAY, AND FRIDAY, Disp: 15 tablet, Rfl: 11    lidocaine (LIDODERM) 5 %, 0, Disp: , Rfl:     midodrine (PROAMATINE) 5 MG tablet, Take 1 tablet by mouth 3 (Three) Times a Day Before Meals., Disp: 30 tablet, Rfl: 0    multivitamin (MULTIVITAMIN PO), Take  by mouth Daily., Disp: , Rfl:     Pancrelipase, Lip-Prot-Amyl, (CREON) 57886-618488 units capsule delayed-release particles capsule, Take 2 capsules by mouth Daily With Breakfast & Lunch., Disp: , Rfl:     spironolactone (ALDACTONE) 25 MG tablet, Take 1 tablet by mouth Daily., Disp: , Rfl:     temazepam (RESTORIL) 15 MG capsule, Take 1 capsule by mouth., Disp: , Rfl:     venlafaxine 225 MG tablet sustained-release 24 hour 24 hr tablet, , Disp: , Rfl:     ferrous sulfate 325 (65 FE) MG tablet, Take 1 tablet by mouth Daily. (Patient not taking: Reported on 5/7/2025), Disp: , Rfl:     Current Facility-Administered Medications:     cyanocobalamin injection 1,000 mcg, 1,000 mcg, Intramuscular, Q28 Days, Colby Vines MD    Social History     Socioeconomic History    Marital status: Single   Tobacco Use    Smoking status: Never     Passive exposure: Past    Smokeless tobacco: Never   Vaping Use    Vaping status: Never Used   Substance and Sexual Activity    Alcohol use: Not Currently     Comment: Haven't in the past 29 years    Drug use: No    Sexual activity: Defer        Objective   Vital Signs:  /80   Pulse 63   Resp 18   Ht 157.5 cm (62\")   Wt 89.2 kg (196 lb 9.6 oz)   BMI 35.96 kg/m²   Estimated body mass " "index is 35.96 kg/m² as calculated from the following:    Height as of this encounter: 157.5 cm (62\").    Weight as of this encounter: 89.2 kg (196 lb 9.6 oz).    Physical Exam:  General: Well-appearing patient, no apparent distress  HEENT: No posterior pharynx erythema, no tonsillar erythema or exudates  Cardiac: Regular rate and rhythm, normal S1/S2, no murmur, rubs or gallops, no lower extremity edema  Lungs: Clear to auscultation bilaterally, no crackles or wheezes  Abdomen: Soft, non-tender, no guarding or rebound tenderness  Skin: No significant rashes or lesions  MSK: Grossly normal tone and strength  Neuro: Alert and oriented x3, CN II-XII grossly intact  Psych: Appropriate mood and affect    Assessment and Plan:    (E03.9) Hypothyroidism, unspecified type  Assessment: Follows with endocrinology.  Patient overall doing well and stable on current alternating doses of levothyroxine.  Up-to-date with thyroid function testing and due for repeat in near future.  Plan:  - Follow-up with endocrinology as scheduled  - Continue levothyroxine as prescribed  - Thyroid function testing per endocrinology    (I42.0) Dilated cardiomyopathy  Assessment: Patient with systolic heart failure. Most recent echocardiogram with EF of 34%. Weight stable. Currently appears euvolemic on exam.  Pliant with medications as prescribed.  Currently on current regiment of spironolactone, Farxiga, midodrine and due to hypotension, which is currently well-controlled on current regimen.  Plan:  - Continue spironolactone, Farxiga, midodrine as prescribed  - Monitor weight and lower extremity swelling  - Follow-up with cardiology as scheduled    (N18.31) Stage 3a chronic kidney disease  Assessment: Follows with nephrology.  Most likely secondary to spironolactone, SGLT2 inhibitor, and prior NSAID use.  Recent creatinine overall stable.  Plan:  - Creatinine, electrolytes up-to-date  - Avoid nephrotoxins, including NSAIDs  - Stay well hydrated  - " Encourage blood pressure control  - Follow-up with nephrology as scheduled    Breast cancer screening  Assessment: Screening mammogram previously ordered, which has not been completed by patient.  Discussed the importance of routine breast cancer screening.  Patient planning on scheduling and following up for mammogram in near future.  Plan:   - Follow-up with mammogram as scheduled    Patient was given instructions and counseling regarding her condition or for health maintenance advice. Please see specific information pulled into the AVS if appropriate.       Dr Colby Vines   Internal Medicine Physician  [facility]--[address], [address]  [place] [address]

## 2025-05-07 NOTE — PATIENT INSTRUCTIONS
No labs today    Medications:  Continue medications as prescribed    Follow up for mammogram as scheduled    Follow up with specialists as scheduled    Encourage healthy diet and activity as tolerated    Follow up in 6 months or sooner if something arises

## 2025-05-14 RX ORDER — UBIDECARENONE 100 MG
1000 CAPSULE ORAL DAILY
Qty: 30 CAPSULE | Refills: 11 | Status: SHIPPED | OUTPATIENT
Start: 2025-05-14

## 2025-05-22 ENCOUNTER — TELEPHONE (OUTPATIENT)
Dept: FAMILY MEDICINE CLINIC | Facility: CLINIC | Age: 65
End: 2025-05-22
Payer: MEDICARE

## 2025-05-22 DIAGNOSIS — Z98.890 H/O LAMINECTOMY: ICD-10-CM

## 2025-05-22 DIAGNOSIS — M48.061 SPINAL STENOSIS OF LUMBAR REGION, UNSPECIFIED WHETHER NEUROGENIC CLAUDICATION PRESENT: ICD-10-CM

## 2025-05-22 DIAGNOSIS — M19.90 IDIOPATHIC OSTEOARTHRITIS: Primary | ICD-10-CM

## 2025-05-22 NOTE — TELEPHONE ENCOUNTER
Caller: Cassie Winter    Relationship: Self    Best call back number: [phone]    What specialty or service is being requested: PODIATRY    What is the provider, practice or medical service name: DR RENÉE SUAREZ WITH [facility]    What is the office location:   [address] [place] [address]    What is the office phone number:   [phone]    Any additional details:   PLEASED CALL TO CONFIRM ORDER WAS SENT.

## 2025-05-22 NOTE — TELEPHONE ENCOUNTER
I spoke with patient, she verbalized understanding. She asked that I mail her lift chair order to her, I have placed the order in the mail.

## 2025-05-22 NOTE — TELEPHONE ENCOUNTER
I spoke with patient, she is requesting a referral to Dr. Saravia for ongoing care. He does injections in her feet, she has never had a formal referral to him but has been seeing him for years. Her new insurance does require the referral. I have pended that if you're agreeable to it.    While on the phone with her she also asked for an order for a lift chair. She stated she spoke with her  and she was informed that insurance will cover this with a doctors order. I did let her know that normally insurance does not cover these expenses but would check with you on an order.

## 2025-06-03 ENCOUNTER — TELEPHONE (OUTPATIENT)
Dept: FAMILY MEDICINE CLINIC | Facility: CLINIC | Age: 65
End: 2025-06-03
Payer: MEDICARE

## 2025-06-03 NOTE — TELEPHONE ENCOUNTER
Caller: BABITA    Relationship: Other    Best call back number: [phone]     What orders are you requesting (i.e. lab or imaging): WHEELCHAIR    In what timeframe would the patient need to come in: ASAP    Additional notes: MARTHA STATES THAT PATIENT IS CALLING BABITA TO FOLLOW UP ABOUT AN ORDER THAT WAS SUPPOSED TO BE PLACED FOR A WHEELCHAIR.     MARTHA STATES THAT BABITA HAS NOT RECEIVED ANY ORDERS FOR THIS AND IS FOLLOWING UP TO SEE IF THIS CAN BE SUBMITTED.     PLEASE CALL PATIENT TO DISCUSS.

## 2025-06-03 NOTE — TELEPHONE ENCOUNTER
I do not see where a wheelchair was discussed or order.  We have to have an office visit with specific documentation and ordering for a wheelchair to be covered by Medicare.      Colby Vines MD

## 2025-06-05 NOTE — TELEPHONE ENCOUNTER
I have faxed the order too [facility] in [place]. They will handle speaking to Patient and reaching out to insurance company etc.

## 2025-06-11 ENCOUNTER — OFFICE VISIT (OUTPATIENT)
Dept: CARDIOLOGY | Facility: CLINIC | Age: 65
End: 2025-06-11
Payer: MEDICARE

## 2025-06-11 VITALS
SYSTOLIC BLOOD PRESSURE: 140 MMHG | WEIGHT: 199 LBS | HEIGHT: 62 IN | OXYGEN SATURATION: 100 % | DIASTOLIC BLOOD PRESSURE: 50 MMHG | BODY MASS INDEX: 36.62 KG/M2 | HEART RATE: 61 BPM

## 2025-06-11 DIAGNOSIS — I95.89 CHRONIC HYPOTENSION: ICD-10-CM

## 2025-06-11 DIAGNOSIS — I50.42 CHRONIC COMBINED SYSTOLIC AND DIASTOLIC CONGESTIVE HEART FAILURE: Primary | ICD-10-CM

## 2025-06-11 DIAGNOSIS — I25.10 CHRONIC CORONARY ARTERY DISEASE: Chronic | ICD-10-CM

## 2025-06-11 PROCEDURE — 99214 OFFICE O/P EST MOD 30 MIN: CPT | Performed by: NURSE PRACTITIONER
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
## 2025-07-21 ENCOUNTER — TELEPHONE (OUTPATIENT)
Dept: ENDOCRINOLOGY | Facility: CLINIC | Age: 65
End: 2025-07-21

## 2025-08-04 ENCOUNTER — TELEPHONE (OUTPATIENT)
Dept: ENDOCRINOLOGY | Facility: CLINIC | Age: 65
End: 2025-08-04

## 2025-08-14 ENCOUNTER — RESULTS FOLLOW-UP (OUTPATIENT)
Dept: ENDOCRINOLOGY | Facility: CLINIC | Age: 65
End: 2025-08-14
Payer: MEDICARE

## 2025-08-14 DIAGNOSIS — E03.9 HYPOTHYROIDISM, UNSPECIFIED TYPE: Primary | ICD-10-CM

## 2025-08-14 RX ORDER — LEVOTHYROXINE SODIUM 200 UG/1
TABLET ORAL
Qty: 26 TABLET | Refills: 3 | Status: SHIPPED | OUTPATIENT
Start: 2025-08-14 | End: 2025-08-18 | Stop reason: SDUPTHER

## 2025-08-14 RX ORDER — LEVOTHYROXINE SODIUM 175 UG/1
TABLET ORAL
Qty: 65 TABLET | Refills: 3 | Status: SHIPPED | OUTPATIENT
Start: 2025-08-14 | End: 2025-08-18 | Stop reason: SDUPTHER

## 2025-08-18 DIAGNOSIS — E03.9 HYPOTHYROIDISM, UNSPECIFIED TYPE: ICD-10-CM

## 2025-08-20 RX ORDER — LEVOTHYROXINE SODIUM 175 UG/1
TABLET ORAL
Qty: 65 TABLET | Refills: 3 | Status: SHIPPED | OUTPATIENT
Start: 2025-08-20 | End: 2025-08-21 | Stop reason: SDUPTHER

## 2025-08-20 RX ORDER — LEVOTHYROXINE SODIUM 200 UG/1
TABLET ORAL
Qty: 26 TABLET | Refills: 3 | Status: SHIPPED | OUTPATIENT
Start: 2025-08-20 | End: 2025-08-21 | Stop reason: SDUPTHER

## 2025-08-21 DIAGNOSIS — E03.9 HYPOTHYROIDISM, UNSPECIFIED TYPE: ICD-10-CM

## 2025-08-21 RX ORDER — LEVOTHYROXINE SODIUM 175 UG/1
TABLET ORAL
Qty: 65 TABLET | Refills: 3 | Status: SHIPPED | OUTPATIENT
Start: 2025-08-21

## 2025-08-21 RX ORDER — CYANOCOBALAMIN 1000 UG/ML
INJECTION, SOLUTION INTRAMUSCULAR; SUBCUTANEOUS
Qty: 1 ML | Refills: 11 | Status: SHIPPED | OUTPATIENT
Start: 2025-08-21

## 2025-08-21 RX ORDER — LEVOTHYROXINE SODIUM 200 UG/1
TABLET ORAL
Qty: 26 TABLET | Refills: 3 | Status: SHIPPED | OUTPATIENT
Start: 2025-08-21

## 2025-08-22 ENCOUNTER — TELEPHONE (OUTPATIENT)
Dept: ENDOCRINOLOGY | Facility: CLINIC | Age: 65
End: 2025-08-22
Payer: MEDICARE

## 2025-08-27 RX ORDER — MIDODRINE HYDROCHLORIDE 5 MG/1
TABLET ORAL
Qty: 90 TABLET | Refills: 11 | Status: SHIPPED | OUTPATIENT
Start: 2025-08-27

## (undated) DEVICE — CATH DIAG IMPULSE FL4 6F 100CM

## (undated) DEVICE — DGW .035 MC J3MM 150CM T H AMP: Brand: EMERALD

## (undated) DEVICE — PK TRY HEART CATH 50

## (undated) DEVICE — CATH DIAG IMPULSE PIG .056 6F 110CM

## (undated) DEVICE — PINNACLE INTRODUCER SHEATH: Brand: PINNACLE

## (undated) DEVICE — CATH DIAG IMPULSE FR4 6F 100CM

## (undated) DEVICE — ELECTRD DEFIB M/FUNC PROPADZ RADIOL 2PK

## (undated) DEVICE — SWAN-GANZ THERMODILUTION CATHETER: Brand: SWAN-GANZ

## (undated) DEVICE — MYNXGRIP 6F/7F: Brand: MYNXGRIP